# Patient Record
Sex: MALE | Race: WHITE | NOT HISPANIC OR LATINO | Employment: OTHER | URBAN - NONMETROPOLITAN AREA
[De-identification: names, ages, dates, MRNs, and addresses within clinical notes are randomized per-mention and may not be internally consistent; named-entity substitution may affect disease eponyms.]

---

## 2017-01-03 ENCOUNTER — OFFICE VISIT (OUTPATIENT)
Dept: NEUROLOGY | Facility: CLINIC | Age: 82
End: 2017-01-03

## 2017-01-03 VITALS
SYSTOLIC BLOOD PRESSURE: 116 MMHG | DIASTOLIC BLOOD PRESSURE: 88 MMHG | HEART RATE: 82 BPM | HEIGHT: 70 IN | BODY MASS INDEX: 25.2 KG/M2 | WEIGHT: 176 LBS

## 2017-01-03 DIAGNOSIS — G47.33 OBSTRUCTIVE SLEEP APNEA: ICD-10-CM

## 2017-01-03 DIAGNOSIS — I66.3: Primary | ICD-10-CM

## 2017-01-03 PROCEDURE — 99212 OFFICE O/P EST SF 10 MIN: CPT | Performed by: NURSE PRACTITIONER

## 2017-01-03 NOTE — MR AVS SNAPSHOT
James Birch   1/3/2017 3:20 PM   Office Visit    Dept Phone:  [phone]   Encounter #:  [id number]    Provider:  WILLIAM Moseley   Department:  [facility] NEUROLOGY                Your Full Care Plan              Your Updated Medication List          This list is accurate as of: 1/3/17  3:46 PM.  Always use your most recent med list.                albuterol 108 (90 BASE) MCG/ACT inhaler   Commonly known as:  PROVENTIL HFA;VENTOLIN HFA       budesonide-formoterol 160-4.5 MCG/ACT inhaler   Commonly known as:  SYMBICORT       CO Q-10 PO       dutasteride 0.5 MG capsule   Commonly known as:  AVODART   TAKE 1 CAPSULE DAILY GENERIC FOR AVODART       esomeprazole 40 MG capsule   Commonly known as:  NEXIUM   Take 1 capsule by mouth 2 (two) times a day.       FISH OIL PO       furosemide 40 MG tablet   Commonly known as:  LASIX       hydrOXYzine 25 MG capsule   Commonly known as:  VISTARIL       MULTIVITAMIN PO       SUPER B COMPLEX/VITAMIN C PO       tamsulosin 0.4 MG capsule 24 hr capsule   Commonly known as:  FLOMAX   TAKE 1 CAPSULE DAILY SHORTLY AFTER THE SAME MEAL GENERIC FOR FLOMAX       VITAMIN D3 PO       XARELTO 15 MG tablet   Generic drug:  rivaroxaban   TAKE ONE TABLET DAILY               You Were Diagnosed With        Codes Comments    Thrombosis of posterior inferior cerebellar artery    -  Primary ICD-10-CM: I66.3  ICD-9-CM: 433.80 denies any stroke-like sx    Obstructive sleep apnea     ICD-10-CM: G47.33  ICD-9-CM: 327.23 reports snoring, fatigue, and history of DAMIAN. Reports has not been tested in many years and does not have a machine.      Instructions     None    Patient Instructions History      Upcoming Appointments     Visit Type Date Time Department    FOLLOW UP 1/3/2017  3:20 PM [facility] NEUROLOGY SPE [facility]    FOLLOW UP 2/2/2017 11:20 AM [facility] NEUROLOGY SPE [facility]AD    FOLLOW UP 2/16/2017  2:30 PM [facility] [place]    FOLLOW UP 3/7/2017 10:40 AM [facility]  NEUROLOGY SPE PAD    FOLLOW UP 2017 10:00 AM [facility]      MyChart Signup     [facility] allows you to send messages to your doctor, view your test results, renew your prescriptions, schedule appointments, and more. To sign up, go to [employer] and click on the Sign Up Now link in the New User? box. Enter your [facility] Activation Code exactly as it appears below along with the last four digits of your Social Security Number and your Date of Birth () to complete the sign-up process. If you do not sign up before the expiration date, you must request a new code.    [facility] [facility] Activation Code: [email or web address]  Expires: 2017  3:45 PM    If you have questions, you can email [email or web address] or call [phone] to talk to our [facility] staff. Remember, [facility] is NOT to be used for urgent needs. For medical emergencies, dial 911.               Other Info from Your Visit           Your Appointments     2017 11:20 AM CST   Follow Up with ED Price   [facility] NEUROLOGY (--)    [address]  [place] [address] [phone]           Arrive 15 minutes prior to appointment.            2017  2:30 PM CST   Follow Up with Hemal William MD   [facility] FAMILY MEDICINE (--)    [address] [place] [id number] [phone]           Arrive 15 minutes prior to appointment.            Mar 07, 2017 10:40 AM CST   Follow Up with ED Price   [facility] NEUROLOGY (--)    [address]  [place] [address] [phone]           Arrive 15 minutes prior to appointment.            2017 10:00 AM CST   Follow Up with Skyler Trimble MD   [facility] GROUP (--)    [address]  [place] [id number] [phone]           Arrive 15 minutes prior to appointment.             "  Allergies     No Known Allergies      Reason for Visit     Stroke No new s/sx      Vital Signs     Blood Pressure Pulse Height Weight Body Mass Index Smoking Status    116/88 82 70\" (177.8 cm) 176 lb (79.8 kg) 25.25 kg/m2 Never Smoker      Problems and Diagnoses Noted     Sleep apnea    Thrombosis of posterior inferior cerebellar artery        "

## 2017-01-03 NOTE — PROGRESS NOTES
Subjective     Chief Complaint   Patient presents with   • Stroke     No new s/sx     James Birch is a 88 y.o. male here for follow-up of stroke and orthostatic hypotension. Reports unable to tolerate northera, took only about two weeks. Reports felt worse on the northera. Reports does not want/unable to tolerate. Reports some rare events of being dizzy going from a sitting to standing position.  Reports does not have dizziness very often. Denies any syncope, near syncope, or falls.    Denies any stroke-like sx.  Walks with cane-denies any falls  Stroke   This is a chronic problem. Associated symptoms include arthralgias, fatigue, myalgias, nausea, numbness and weakness. Pertinent negatives include no abdominal pain, chest pain, chills, congestion, coughing, fever, neck pain, rash or vomiting.        Current Outpatient Prescriptions   Medication Sig Dispense Refill   • albuterol (PROVENTIL HFA;VENTOLIN HFA) 108 (90 BASE) MCG/ACT inhaler Inhale 2 puffs as needed for wheezing.     • B Complex-C (SUPER B COMPLEX/VITAMIN C PO) Take  by mouth daily.     • budesonide-formoterol (SYMBICORT) 160-4.5 MCG/ACT inhaler Inhale 2 puffs 2 (two) times a day.     • Cholecalciferol (VITAMIN D3 PO) Take 1 tablet by mouth daily.     • Coenzyme Q10 (CO Q-10 PO) Take  by mouth daily.     • dutasteride (AVODART) 0.5 MG capsule TAKE 1 CAPSULE DAILY GENERIC FOR AVODART 90 capsule 1   • esomeprazole (NEXIUM) 40 MG capsule Take 1 capsule by mouth 2 (two) times a day. 60 capsule 5   • furosemide (LASIX) 40 MG tablet Take 40 mg by mouth daily as needed.     • hydrOXYzine (VISTARIL) 25 MG capsule Take 25 mg by mouth as needed for itching.     • Multiple Vitamins-Minerals (MULTIVITAMIN PO) Take 1 tablet by mouth daily.     • Omega-3 Fatty Acids (FISH OIL PO) Take  by mouth daily.     • tamsulosin (FLOMAX) 0.4 MG capsule 24 hr capsule TAKE 1 CAPSULE DAILY SHORTLY AFTER THE SAME MEAL GENERIC FOR FLOMAX 90 capsule 1   • XARELTO 15 MG tablet  "TAKE ONE TABLET DAILY 90 tablet 0     No current facility-administered medications for this visit.        Past Medical History   Diagnosis Date   • Atrial fibrillation    • Cancer      SKIN   • COPD (chronic obstructive pulmonary disease)    • GERD (gastroesophageal reflux disease)    • Hyperlipidemia    • Hypertension    • Obstructive sleep apnea    • Prostate disease    • Stroke    • Vertigo        Past Surgical History   Procedure Laterality Date   • Hip surgery     • Hernia repair         family history includes No Known Problems in his father and mother.    Social History   Substance Use Topics   • Smoking status: Never Smoker   • Smokeless tobacco: Never Used      Comment: NOT CURRENTLY   • Alcohol use No       Review of Systems   Constitutional: Positive for fatigue. Negative for chills and fever.   HENT: Positive for hearing loss. Negative for congestion and trouble swallowing.    Eyes: Positive for visual disturbance. Negative for photophobia.   Respiratory: Negative for cough, shortness of breath and wheezing.    Cardiovascular: Negative for chest pain and palpitations.   Gastrointestinal: Positive for nausea. Negative for abdominal pain and vomiting.   Endocrine: Negative.    Genitourinary: Negative.    Musculoskeletal: Positive for arthralgias, gait problem and myalgias. Negative for back pain and neck pain.   Skin: Negative for rash and wound.   Allergic/Immunologic: Negative for immunocompromised state.   Neurological: Positive for dizziness, weakness, light-headedness and numbness.   Hematological: Does not bruise/bleed easily.   Psychiatric/Behavioral: The patient is not nervous/anxious.        Objective     Visit Vitals   • /88   • Pulse 82   • Ht 70\" (177.8 cm)   • Wt 176 lb (79.8 kg)   • BMI 25.25 kg/m2   , Body mass index is 25.25 kg/(m^2).    Physical Exam   Constitutional: He is oriented to person, place, and time. He appears well-developed and well-nourished.   HENT:   Head: " Normocephalic.   Eyes: Pupils are equal, round, and reactive to light.   Neck: Normal range of motion. Neck supple. No JVD present.   Cardiovascular: Normal rate, regular rhythm and normal heart sounds.  Exam reveals no gallop and no friction rub.    No murmur heard.  Pulmonary/Chest: Effort normal and breath sounds normal. He has no wheezes. He has no rales.   Abdominal: Soft. Bowel sounds are normal. There is no tenderness.   Musculoskeletal: Normal range of motion. He exhibits no edema.   Lymphadenopathy:     He has no cervical adenopathy.   Neurological: He is alert and oriented to person, place, and time. He has normal strength. He displays no tremor. No cranial nerve deficit or sensory deficit. He exhibits normal muscle tone. He displays a negative Romberg sign. Coordination normal.   Reflex Scores:       Tricep reflexes are 1+ on the right side and 1+ on the left side.       Bicep reflexes are 1+ on the right side and 1+ on the left side.       Brachioradialis reflexes are 1+ on the right side and 1+ on the left side.       Patellar reflexes are 1+ on the right side and 1+ on the left side.       Achilles reflexes are 1+ on the right side and 1+ on the left side.  Skin: Skin is warm and dry.   Psychiatric: He has a normal mood and affect. Thought content normal.       Results for orders placed or performed in visit on 11/08/16   Comprehensive Metabolic Panel   Result Value Ref Range    Glucose 87 70 - 100 mg/dL    BUN 32 (H) 5 - 21 mg/dL    Creatinine 1.79 (H) 0.50 - 1.40 mg/dL    eGFR Non African Am 36 (L) >60 mL/min/1.73    eGFR African Am 44 (L) >60 mL/min/1.73    BUN/Creatinine Ratio 17.9 7.0 - 25.0    Sodium 138 135 - 145 mmol/L    Potassium 4.4 3.5 - 5.3 mmol/L    Chloride 98 98 - 110 mmol/L    Total CO2 26.0 24.0 - 31.0 mmol/L    Calcium 9.4 8.4 - 10.4 mg/dL    Total Protein 6.8 6.3 - 8.7 g/dL    Albumin 3.70 3.50 - 5.00 g/dL    Globulin 3.1 gm/dL    A/G Ratio 1.2 1.1 - 2.5 g/dL    Total Bilirubin 0.5  0.1 - 1.0 mg/dL    Alkaline Phosphatase 87 24 - 120 U/L    AST (SGOT) 18 7 - 45 U/L    ALT (SGPT) 25 0 - 54 U/L   Magnesium   Result Value Ref Range    Magnesium 1.9 1.4 - 2.2 mg/dL   Phosphorus   Result Value Ref Range    Phosphorus 3.8 2.5 - 4.5 mg/dL   Uric Acid   Result Value Ref Range    Uric Acid 6.1 3.5 - 8.5 mg/dL   Lipid Panel   Result Value Ref Range    Total Cholesterol 224 (H) 130 - 200 mg/dL    Triglycerides 215 (H) 0 - 149 mg/dL    HDL Cholesterol 43 >=40 mg/dL    VLDL Cholesterol 43 mg/dL    LDL Cholesterol  138 (H) 0 - 99 mg/dL   PSA   Result Value Ref Range    PSA 1.070 0.000 - 4.000 ng/mL   Vitamin D 25 Hydroxy   Result Value Ref Range    25 Hydroxy, Vitamin D 31.1 30.0 - 100.0 ng/mL   Protein / Creatinine Ratio, Urine   Result Value Ref Range    Creatinine, Urine 65.4 mg/dL    Total Protein, Urine 58.0 (H) 0.0 - 13.5 mg/dL    Protein/Creatinine Ratio 886.9 mg/g crea   Microscopic Examination   Result Value Ref Range    WBC, UA 3-5 (A) None Seen /hpf    RBC, UA 0-2 (A) None Seen /hpf    Epithelial Cells (non renal) 0-2 /hpf    Cast Type Comment     Bacteria, UA Comment None Seen /hpf   CBC & AUTO Differential   Result Value Ref Range    WBC 5.49 4.80 - 10.80 10*3/mm3    RBC 4.86 4.80 - 5.90 10*6/mm3    Hemoglobin 14.8 14.0 - 18.0 g/dL    Hematocrit 43.4 40.0 - 52.0 %    MCV 89.3 82.0 - 95.0 fL    MCH 30.5 28.0 - 32.0 pg    MCHC 34.1 33.0 - 36.0 g/dL    RDW 13.6 12.0 - 15.0 %    Platelets 203 130 - 400 10*3/mm3    Neutrophil Rel % 59.9 39.0 - 78.0 %    Lymphocyte Rel % 23.5 15.0 - 45.0 %    Monocyte Rel % 14.0 (H) 4.0 - 12.0 %    Eosinophil Rel % 2.2 0.0 - 4.0 %    Basophil Rel % 0.4 0.0 - 2.0 %    Neutrophils Absolute 3.29 1.87 - 8.40 10*3/mm3    Lymphocytes Absolute 1.29 0.72 - 4.86 10*3/mm3    Monocytes Absolute 0.77 0.19 - 1.30 10*3/mm3    Eosinophils Absolute 0.12 0.00 - 0.70 10*3/mm3    Basophils Absolute 0.02 0.00 - 0.20 10*3/mm3   Urinalysis With Microscopic   Result Value Ref Range     Specific Gravity, UA 1.014 1.005 - 1.030    pH, UA 5.5 5.0 - 8.0    Color, UA Yellow     Appearance, UA Clear Clear    Leukocytes, UA See below: (A) Negative    Protein See below: (A) Negative    Glucose, UA Comment Negative    Ketones Comment Negative    Blood, UA Comment Negative    Bilirubin, UA Comment Negative    Urobilinogen, UA Comment     Nitrite, UA Comment Negative       ASSESSMENT/PLAN    James was seen today for stroke.    Diagnoses and all orders for this visit:    Thrombosis of posterior inferior cerebellar artery  Comments:  denies any stroke-like sx  Orders:  -     Overnight Sleep Oximetry Study; Future    Obstructive sleep apnea  Comments:  reports snoring, fatigue, and history of DAMIAN. Reports has not been tested in many years and does not have a machine.  Orders:  -     Overnight Sleep Oximetry Study; Future  Discussed secondary stroke prevention to include a systolic blood pressure goal of less than 150, LDL goal less than 70, and 30-40 minutes of heart rate elevating exercise 3-4 times per week. Continue anticoagulant.  For any stroke-like symptoms, patient to present to the nearest ER.      Return in about 2 months (around 3/3/2017).        WILLIAM Moseley

## 2017-01-16 ENCOUNTER — TELEPHONE (OUTPATIENT)
Dept: NEUROLOGY | Facility: CLINIC | Age: 82
End: 2017-01-16

## 2017-01-24 RX ORDER — RIVAROXABAN 15 MG/1
TABLET, FILM COATED ORAL
Qty: 90 TABLET | Refills: 3 | Status: SHIPPED | OUTPATIENT
Start: 2017-01-24 | End: 2018-02-07 | Stop reason: SDUPTHER

## 2017-02-14 RX ORDER — ESOMEPRAZOLE MAGNESIUM 40 MG/1
CAPSULE, DELAYED RELEASE ORAL
Qty: 60 CAPSULE | Refills: 5 | Status: SHIPPED | OUTPATIENT
Start: 2017-02-14 | End: 2017-08-01 | Stop reason: SDUPTHER

## 2017-02-15 PROBLEM — K21.9 GASTROESOPHAGEAL REFLUX DISEASE: Chronic | Status: ACTIVE | Noted: 2017-02-15

## 2017-02-15 PROBLEM — Z79.01 ANTICOAGULATED: Status: ACTIVE | Noted: 2017-02-15

## 2017-02-15 PROBLEM — N40.0 PROSTATISM: Chronic | Status: ACTIVE | Noted: 2017-02-15

## 2017-02-15 PROBLEM — Z00.00 WELLNESS EXAMINATION: Status: ACTIVE | Noted: 2017-02-15

## 2017-02-15 PROBLEM — E78.5 HYPERLIPIDEMIA: Chronic | Status: ACTIVE | Noted: 2017-02-15

## 2017-02-15 PROBLEM — I10 HYPERTENSION: Chronic | Status: ACTIVE | Noted: 2017-02-15

## 2017-02-15 PROBLEM — N20.9 UROLITHIASIS: Chronic | Status: ACTIVE | Noted: 2017-02-15

## 2017-02-15 PROBLEM — G47.10 HYPERSOMNIA: Status: ACTIVE | Noted: 2017-02-15

## 2017-02-15 PROBLEM — J45.909 ASTHMA: Chronic | Status: ACTIVE | Noted: 2017-02-15

## 2017-02-15 PROBLEM — N18.9 CHRONIC KIDNEY DISEASE: Status: ACTIVE | Noted: 2017-02-15

## 2017-02-15 PROBLEM — R26.9 GAIT DIFFICULTY: Chronic | Status: ACTIVE | Noted: 2017-02-15

## 2017-02-15 PROBLEM — I83.93 VARICOSE VEINS OF LEGS: Status: ACTIVE | Noted: 2017-02-15

## 2017-02-16 ENCOUNTER — OFFICE VISIT (OUTPATIENT)
Dept: FAMILY MEDICINE CLINIC | Facility: CLINIC | Age: 82
End: 2017-02-16

## 2017-02-16 VITALS
HEART RATE: 142 BPM | BODY MASS INDEX: 25.77 KG/M2 | SYSTOLIC BLOOD PRESSURE: 126 MMHG | HEIGHT: 70 IN | OXYGEN SATURATION: 95 % | TEMPERATURE: 98.1 F | DIASTOLIC BLOOD PRESSURE: 70 MMHG | WEIGHT: 180 LBS | RESPIRATION RATE: 16 BRPM

## 2017-02-16 DIAGNOSIS — I10 ESSENTIAL HYPERTENSION: Chronic | ICD-10-CM

## 2017-02-16 DIAGNOSIS — I48.91 ATRIAL FIBRILLATION, UNSPECIFIED TYPE (HCC): ICD-10-CM

## 2017-02-16 DIAGNOSIS — I50.30 CONGESTIVE HEART FAILURE WITH LEFT VENTRICULAR DIASTOLIC DYSFUNCTION, NYHA CLASS 3 (HCC): ICD-10-CM

## 2017-02-16 DIAGNOSIS — R26.9 GAIT DIFFICULTY: Chronic | ICD-10-CM

## 2017-02-16 DIAGNOSIS — G47.33 OBSTRUCTIVE SLEEP APNEA: Primary | ICD-10-CM

## 2017-02-16 PROCEDURE — 99213 OFFICE O/P EST LOW 20 MIN: CPT | Performed by: FAMILY MEDICINE

## 2017-02-16 RX ORDER — MONTELUKAST SODIUM 10 MG/1
1 TABLET ORAL DAILY
COMMUNITY
Start: 2017-02-13 | End: 2017-09-01 | Stop reason: ALTCHOICE

## 2017-02-16 RX ORDER — RAMIPRIL 2.5 MG/1
1 CAPSULE ORAL DAILY
COMMUNITY
Start: 2017-02-13 | End: 2017-05-04 | Stop reason: SDUPTHER

## 2017-03-07 ENCOUNTER — OFFICE VISIT (OUTPATIENT)
Dept: NEUROLOGY | Facility: CLINIC | Age: 82
End: 2017-03-07

## 2017-03-07 VITALS
DIASTOLIC BLOOD PRESSURE: 74 MMHG | WEIGHT: 179 LBS | HEART RATE: 68 BPM | HEIGHT: 70 IN | SYSTOLIC BLOOD PRESSURE: 122 MMHG | BODY MASS INDEX: 25.62 KG/M2

## 2017-03-07 DIAGNOSIS — G47.33 OBSTRUCTIVE SLEEP APNEA: ICD-10-CM

## 2017-03-07 DIAGNOSIS — I66.3: Primary | ICD-10-CM

## 2017-03-07 DIAGNOSIS — I63.311 CEREBRAL INFARCTION DUE TO THROMBOSIS OF RIGHT MIDDLE CEREBRAL ARTERY (HCC): ICD-10-CM

## 2017-03-07 DIAGNOSIS — R26.9 GAIT DIFFICULTY: Chronic | ICD-10-CM

## 2017-03-07 DIAGNOSIS — G90.9 AUTONOMIC DYSFUNCTION: ICD-10-CM

## 2017-03-07 PROCEDURE — 99213 OFFICE O/P EST LOW 20 MIN: CPT | Performed by: PHYSICIAN ASSISTANT

## 2017-03-07 RX ORDER — DILTIAZEM HYDROCHLORIDE 60 MG/1
2 TABLET, FILM COATED ORAL
COMMUNITY
Start: 2017-03-06 | End: 2018-12-07 | Stop reason: SDUPTHER

## 2017-03-09 NOTE — PROGRESS NOTES
Subjective   James Birch is a 88 y.o. male is here today for follow-up.    HPI Comments: Orthostatic symptoms are stable to improved his blood pressure readings are better.  No recurrent stroke symptoms.  Some increased stress and worry in that his son and daughter-in-law were in a significant motor vehicle accident, and are admitted to the hospital.    Stroke   This is a chronic problem. The current episode started more than 1 year ago. The problem occurs daily. The problem has been gradually worsening. Associated symptoms include arthralgias, fatigue, myalgias, nausea, numbness and weakness. Pertinent negatives include no abdominal pain, chest pain, chills, congestion, coughing, fever, neck pain, rash or vomiting. The symptoms are aggravated by exertion and stress. Treatments tried: Supportive care antiplatelet therapy. The treatment provided no relief.   Sleep Apnea   This is a chronic problem. The current episode started more than 1 year ago. The problem occurs daily. The problem has been unchanged. Associated symptoms include arthralgias, fatigue, myalgias, nausea, numbness and weakness. Pertinent negatives include no abdominal pain, chest pain, chills, congestion, coughing, fever, neck pain, rash or vomiting. Nothing aggravates the symptoms. Treatments tried: CPAP. The treatment provided significant relief.       The following portions of the patient's history were reviewed and updated as appropriate: allergies, current medications, past family history, past medical history, past social history, past surgical history and problem list.    Review of Systems   Constitutional: Positive for fatigue. Negative for chills and fever.   HENT: Positive for hearing loss. Negative for congestion and trouble swallowing.    Eyes: Positive for visual disturbance. Negative for photophobia.   Respiratory: Negative for cough, shortness of breath and wheezing.    Cardiovascular: Negative for chest pain and palpitations.    Gastrointestinal: Positive for nausea. Negative for abdominal pain and vomiting.   Endocrine: Negative.    Genitourinary: Negative.    Musculoskeletal: Positive for arthralgias, gait problem and myalgias. Negative for back pain and neck pain.   Skin: Negative for rash and wound.   Allergic/Immunologic: Negative for immunocompromised state.   Neurological: Positive for dizziness, weakness, light-headedness and numbness.   Hematological: Does not bruise/bleed easily.   Psychiatric/Behavioral: The patient is not nervous/anxious.        Objective   Physical Exam   Constitutional: He is oriented to person, place, and time. He appears well-developed and well-nourished.   HENT:   Head: Normocephalic.   Eyes: Pupils are equal, round, and reactive to light.   Neck: Normal range of motion. Neck supple. No JVD present.   Cardiovascular: Normal rate, regular rhythm and normal heart sounds.  Exam reveals no gallop and no friction rub.    No murmur heard.  Pulmonary/Chest: Effort normal and breath sounds normal. He has no wheezes. He has no rales.   Abdominal: Soft. Bowel sounds are normal. There is no tenderness.   Musculoskeletal: Normal range of motion. He exhibits no edema.   Lymphadenopathy:     He has no cervical adenopathy.   Neurological: He is alert and oriented to person, place, and time. He has normal strength. He displays no tremor. No cranial nerve deficit or sensory deficit. He exhibits normal muscle tone. He displays a negative Romberg sign. Coordination normal.   Reflex Scores:       Tricep reflexes are 1+ on the right side and 1+ on the left side.       Bicep reflexes are 1+ on the right side and 1+ on the left side.       Brachioradialis reflexes are 1+ on the right side and 1+ on the left side.       Patellar reflexes are 1+ on the right side and 1+ on the left side.       Achilles reflexes are 1+ on the right side and 1+ on the left side.  Skin: Skin is warm and dry.   Psychiatric: He has a normal mood and  affect. Thought content normal.         Assessment/Plan   James was seen today for stroke and sleep apnea.    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    He has obstructive sleep apnea and he is struggling with this.  He is making a diligent effort to become compliant with this.    10 minutes of a 15 minute outpatient visit spent in counseling and coordination of care.              EMR Dragon transcription disclaimer:  Much of this encounter note is an electronic transcription/translation of spoken language to printed text.  The electronic translation of spoken language may permit erroneous, or at times, nonsensical words or phrases to be inadvertently transcribed.  The author has reviewed the note for such errors, however some may still exist.

## 2017-03-14 ENCOUNTER — TELEPHONE (OUTPATIENT)
Dept: FAMILY MEDICINE CLINIC | Facility: CLINIC | Age: 82
End: 2017-03-14

## 2017-03-14 DIAGNOSIS — R26.9 GAIT DIFFICULTY: Primary | ICD-10-CM

## 2017-03-14 NOTE — TELEPHONE ENCOUNTER
Correspondence:  Wife left note that [facility] gu[facility]upe had no help; and wondered if [facility] would review.  Asking them to review but concerned they will probably be short term help if able to help

## 2017-05-04 RX ORDER — RAMIPRIL 2.5 MG/1
CAPSULE ORAL
Qty: 90 CAPSULE | Refills: 1 | Status: SHIPPED | OUTPATIENT
Start: 2017-05-04 | End: 2017-11-09 | Stop reason: SDUPTHER

## 2017-05-11 RX ORDER — DUTASTERIDE 0.5 MG/1
CAPSULE, LIQUID FILLED ORAL
Qty: 90 CAPSULE | Refills: 1 | Status: SHIPPED | OUTPATIENT
Start: 2017-05-11 | End: 2017-11-20 | Stop reason: SDUPTHER

## 2017-05-11 RX ORDER — TAMSULOSIN HYDROCHLORIDE 0.4 MG/1
CAPSULE ORAL
Qty: 90 CAPSULE | Refills: 1 | Status: SHIPPED | OUTPATIENT
Start: 2017-05-11 | End: 2017-11-30 | Stop reason: SDUPTHER

## 2017-05-24 ENCOUNTER — TELEPHONE (OUTPATIENT)
Dept: FAMILY MEDICINE CLINIC | Facility: CLINIC | Age: 82
End: 2017-05-24

## 2017-05-25 ENCOUNTER — OFFICE VISIT (OUTPATIENT)
Dept: FAMILY MEDICINE CLINIC | Facility: CLINIC | Age: 82
End: 2017-05-25

## 2017-05-25 VITALS
HEART RATE: 66 BPM | TEMPERATURE: 98.9 F | WEIGHT: 176 LBS | DIASTOLIC BLOOD PRESSURE: 60 MMHG | OXYGEN SATURATION: 92 % | BODY MASS INDEX: 25.2 KG/M2 | HEIGHT: 70 IN | RESPIRATION RATE: 16 BRPM | SYSTOLIC BLOOD PRESSURE: 96 MMHG

## 2017-05-25 DIAGNOSIS — N18.30 CHRONIC KIDNEY DISEASE, STAGE 3 (MODERATE): ICD-10-CM

## 2017-05-25 DIAGNOSIS — I10 ESSENTIAL HYPERTENSION: Chronic | ICD-10-CM

## 2017-05-25 DIAGNOSIS — J44.9 CHRONIC OBSTRUCTIVE PULMONARY DISEASE, UNSPECIFIED COPD TYPE (HCC): Primary | ICD-10-CM

## 2017-05-25 DIAGNOSIS — Z79.01 ANTICOAGULATED: ICD-10-CM

## 2017-05-25 DIAGNOSIS — J44.1 COPD EXACERBATION (HCC): ICD-10-CM

## 2017-05-25 PROCEDURE — 99213 OFFICE O/P EST LOW 20 MIN: CPT | Performed by: FAMILY MEDICINE

## 2017-05-25 RX ORDER — LEVOFLOXACIN 250 MG/1
250 TABLET ORAL DAILY
Qty: 7 TABLET | Refills: 0 | Status: SHIPPED | OUTPATIENT
Start: 2017-05-25 | End: 2017-06-08

## 2017-05-25 RX ORDER — AZELASTINE HYDROCHLORIDE 0.5 MG/ML
1 SOLUTION/ DROPS OPHTHALMIC DAILY
COMMUNITY
End: 2017-07-28 | Stop reason: SDUPTHER

## 2017-05-25 RX ORDER — METHYLPREDNISOLONE 4 MG/1
TABLET ORAL
Qty: 21 TABLET | Refills: 0 | Status: SHIPPED | OUTPATIENT
Start: 2017-05-25 | End: 2017-06-08

## 2017-06-08 ENCOUNTER — TELEPHONE (OUTPATIENT)
Dept: FAMILY MEDICINE CLINIC | Facility: CLINIC | Age: 82
End: 2017-06-08

## 2017-06-08 DIAGNOSIS — J44.9 CHRONIC OBSTRUCTIVE PULMONARY DISEASE, UNSPECIFIED COPD TYPE (HCC): ICD-10-CM

## 2017-06-08 DIAGNOSIS — J44.1 COPD EXACERBATION (HCC): ICD-10-CM

## 2017-06-08 RX ORDER — LEVOFLOXACIN 250 MG/1
250 TABLET ORAL DAILY
Qty: 5 TABLET | Refills: 0 | Status: SHIPPED | OUTPATIENT
Start: 2017-06-08 | End: 2017-07-24 | Stop reason: SDUPTHER

## 2017-06-08 RX ORDER — METHYLPREDNISOLONE 4 MG/1
TABLET ORAL
Qty: 21 TABLET | Refills: 0 | Status: SHIPPED | OUTPATIENT
Start: 2017-06-08 | End: 2017-07-12

## 2017-06-09 ENCOUNTER — OFFICE VISIT (OUTPATIENT)
Dept: NEUROLOGY | Facility: CLINIC | Age: 82
End: 2017-06-09

## 2017-06-09 VITALS
HEIGHT: 70 IN | HEART RATE: 68 BPM | DIASTOLIC BLOOD PRESSURE: 78 MMHG | WEIGHT: 174 LBS | BODY MASS INDEX: 24.91 KG/M2 | SYSTOLIC BLOOD PRESSURE: 108 MMHG

## 2017-06-09 DIAGNOSIS — R26.9 GAIT DIFFICULTY: Chronic | ICD-10-CM

## 2017-06-09 DIAGNOSIS — G90.9 AUTONOMIC DYSFUNCTION: Primary | ICD-10-CM

## 2017-06-09 DIAGNOSIS — I66.3: ICD-10-CM

## 2017-06-09 PROCEDURE — 99213 OFFICE O/P EST LOW 20 MIN: CPT | Performed by: PHYSICIAN ASSISTANT

## 2017-06-15 NOTE — PROGRESS NOTES
Subjective   James Birch is a 89 y.o. male is here today for follow-up.    HPI Comments: Orthostatic symptoms are stable to improved his blood pressure readings are better. Some waxing and waning of neurologic symptoms including gait difficulties but overall neurologically stable with no lasting new changes.    Stroke   This is a chronic problem. The current episode started more than 1 year ago. The problem occurs daily. The problem has been gradually worsening. Associated symptoms include arthralgias, fatigue, myalgias, nausea, numbness and weakness. Pertinent negatives include no abdominal pain, chest pain, chills, congestion, coughing, fever, neck pain, rash or vomiting. The symptoms are aggravated by exertion and stress. Treatments tried: Supportive care antiplatelet therapy. The treatment provided no relief.   Sleep Apnea   This is a chronic problem. The current episode started more than 1 year ago. The problem occurs daily. The problem has been unchanged. Associated symptoms include arthralgias, fatigue, myalgias, nausea, numbness and weakness. Pertinent negatives include no abdominal pain, chest pain, chills, congestion, coughing, fever, neck pain, rash or vomiting. Nothing aggravates the symptoms. Treatments tried: CPAP. The treatment provided significant relief.       The following portions of the patient's history were reviewed and updated as appropriate: allergies, current medications, past family history, past medical history, past social history, past surgical history and problem list.    Review of Systems   Constitutional: Positive for fatigue. Negative for chills and fever.   HENT: Positive for hearing loss. Negative for congestion and trouble swallowing.    Eyes: Positive for visual disturbance. Negative for photophobia.   Respiratory: Negative for cough, shortness of breath and wheezing.    Cardiovascular: Negative for chest pain and palpitations.   Gastrointestinal: Positive for nausea.  Negative for abdominal pain and vomiting.   Endocrine: Negative.    Genitourinary: Negative.    Musculoskeletal: Positive for arthralgias, gait problem and myalgias. Negative for back pain and neck pain.   Skin: Negative for rash and wound.   Allergic/Immunologic: Negative for immunocompromised state.   Neurological: Positive for dizziness, weakness, light-headedness and numbness.   Hematological: Does not bruise/bleed easily.   Psychiatric/Behavioral: The patient is not nervous/anxious.        Objective   Physical Exam   Constitutional: He is oriented to person, place, and time. He appears well-developed and well-nourished.   HENT:   Head: Normocephalic.   Eyes: Pupils are equal, round, and reactive to light.   Neck: Normal range of motion. Neck supple. No JVD present.   Cardiovascular: Normal rate, regular rhythm and normal heart sounds.  Exam reveals no gallop and no friction rub.    No murmur heard.  Pulmonary/Chest: Effort normal and breath sounds normal. He has no wheezes. He has no rales.   Abdominal: Soft. Bowel sounds are normal. There is no tenderness.   Musculoskeletal: Normal range of motion. He exhibits no edema.   Lymphadenopathy:     He has no cervical adenopathy.   Neurological: He is alert and oriented to person, place, and time. He has normal strength. He displays no tremor. No cranial nerve deficit or sensory deficit. He exhibits normal muscle tone. He displays a negative Romberg sign. Coordination normal.   Reflex Scores:       Tricep reflexes are 1+ on the right side and 1+ on the left side.       Bicep reflexes are 1+ on the right side and 1+ on the left side.       Brachioradialis reflexes are 1+ on the right side and 1+ on the left side.       Patellar reflexes are 1+ on the right side and 1+ on the left side.       Achilles reflexes are 1+ on the right side and 1+ on the left side.  Skin: Skin is warm and dry.   Psychiatric: He has a normal mood and affect. Thought content normal.          Assessment/Plan   James was seen today for stroke.    Diagnoses and all orders for this visit:    Autonomic dysfunction    Thrombosis of posterior inferior cerebellar artery    Gait difficulty    No changes are recommended within the patient's medication regimen.    Safety and activity discussed in detail.    10 minutes of 15 minute outpatient visit was spent in counseling and coordination of care.      EMR Dragon transcription disclaimer:  Much of this encounter note is an electronic transcription/translation of spoken language to printed text.  The electronic translation of spoken language may permit erroneous, or at times, nonsensical words or phrases to be inadvertently transcribed.  The author has reviewed the note for such errors, however some may still exist.

## 2017-06-16 ENCOUNTER — TELEPHONE (OUTPATIENT)
Dept: NEUROLOGY | Facility: CLINIC | Age: 82
End: 2017-06-16

## 2017-06-16 NOTE — TELEPHONE ENCOUNTER
Sidra called and stated that James is getting more weak and feels that this is becoming urgent. She understood that [facility] was going to order a pulmonary test but she has not heard anything and she checked with Dr. Church and they have not had a referral. I did not see anything in his chart. Can you please call her or check with [facility] to see what we need to do.

## 2017-06-19 ENCOUNTER — TELEPHONE (OUTPATIENT)
Dept: NEUROLOGY | Facility: CLINIC | Age: 82
End: 2017-06-19

## 2017-06-19 NOTE — TELEPHONE ENCOUNTER
I did return Mrs Radha Birch's call. She is concerned because no one has contacted her about setting up Mr. Birch's Overnight Oximetry. I did call [facility] and they will contact the Susy today. I did ask her to call me back if they were not contacted. She did voice understanding.

## 2017-06-28 ENCOUNTER — TELEPHONE (OUTPATIENT)
Dept: NEUROLOGY | Facility: CLINIC | Age: 82
End: 2017-06-28

## 2017-06-28 DIAGNOSIS — G47.33 OBSTRUCTIVE SLEEP APNEA: Primary | ICD-10-CM

## 2017-06-28 DIAGNOSIS — G47.34 NOCTURNAL HYPOXIA: ICD-10-CM

## 2017-06-28 NOTE — PROGRESS NOTES
Review of the patient's overnight oxygen saturation report reveals greater than 5 minutes of SPO2 readings that are greater than 5% below the baseline SPO2 for this patient as well as a cumulative time 7 minutes and 52 seconds at or below 89%.  He appears to qualify for nighttime oxygen.    This was discussed with his wife.  She reports that the patient has been feeling generally weaker lately.

## 2017-07-11 ENCOUNTER — TELEPHONE (OUTPATIENT)
Dept: NEUROLOGY | Facility: CLINIC | Age: 82
End: 2017-07-11

## 2017-07-11 NOTE — TELEPHONE ENCOUNTER
I did return Mr Birch's call as requested. He wanted to know when his oxygen was going to be delivered. I did call [employer] and spoke with Joy who told me if I fax the orders over now they can do it this evening or tomorrow morning. Joy told me that they had never received the order. I did fax everything over to [employer] as requested. I called Mr. Birch back to let him know. He did voice understanding. I also called his son, Dr Birch to let him know. He voiced understanding as well.

## 2017-07-12 ENCOUNTER — OFFICE VISIT (OUTPATIENT)
Dept: NEUROLOGY | Facility: CLINIC | Age: 82
End: 2017-07-12

## 2017-07-12 VITALS
DIASTOLIC BLOOD PRESSURE: 72 MMHG | BODY MASS INDEX: 24.91 KG/M2 | HEIGHT: 70 IN | SYSTOLIC BLOOD PRESSURE: 112 MMHG | HEART RATE: 66 BPM | WEIGHT: 174 LBS

## 2017-07-12 DIAGNOSIS — G47.34 NOCTURNAL HYPOXIA: Primary | ICD-10-CM

## 2017-07-12 DIAGNOSIS — I66.3: ICD-10-CM

## 2017-07-12 DIAGNOSIS — G90.9 AUTONOMIC DYSFUNCTION: ICD-10-CM

## 2017-07-12 PROCEDURE — 99213 OFFICE O/P EST LOW 20 MIN: CPT | Performed by: PHYSICIAN ASSISTANT

## 2017-07-12 NOTE — PATIENT INSTRUCTIONS
Afrin nasal spray at pharmacy, two sprays each side before bed - can repeat once during the night if needed.

## 2017-07-24 ENCOUNTER — TELEPHONE (OUTPATIENT)
Dept: FAMILY MEDICINE CLINIC | Facility: CLINIC | Age: 82
End: 2017-07-24

## 2017-07-24 DIAGNOSIS — J44.9 CHRONIC OBSTRUCTIVE PULMONARY DISEASE, UNSPECIFIED COPD TYPE (HCC): ICD-10-CM

## 2017-07-24 DIAGNOSIS — J44.1 COPD EXACERBATION (HCC): ICD-10-CM

## 2017-07-24 RX ORDER — LEVOFLOXACIN 250 MG/1
250 TABLET ORAL DAILY
Qty: 5 TABLET | Refills: 0 | Status: SHIPPED | OUTPATIENT
Start: 2017-07-24 | End: 2017-09-01

## 2017-07-24 NOTE — TELEPHONE ENCOUNTER
Radha called and states pt has had a cold and congestion and a sore throat over the weekend and would like to have something called into [facility], [phone]

## 2017-07-24 NOTE — PROGRESS NOTES
Subjective   James Birch is a 89 y.o. male is here today for follow-up.    HPI Comments: Orthostatic symptoms are stable to improved his blood pressure readings are better. Some waxing and waning of neurologic symptoms including gait difficulties but overall neurologically stable with no lasting new changes.    Nocturnal hypoxia is being addressed with oxygen.    Stroke   This is a chronic problem. The current episode started more than 1 year ago. The problem occurs daily. The problem has been gradually worsening. Associated symptoms include arthralgias, fatigue, myalgias, nausea, numbness and weakness. Pertinent negatives include no abdominal pain, chest pain, chills, congestion, coughing, fever, neck pain, rash or vomiting. The symptoms are aggravated by exertion and stress. Treatments tried: Supportive care antiplatelet therapy. The treatment provided no relief.       The following portions of the patient's history were reviewed and updated as appropriate: allergies, current medications, past family history, past medical history, past social history, past surgical history and problem list.    Review of Systems   Constitutional: Positive for fatigue. Negative for chills and fever.   HENT: Positive for hearing loss. Negative for congestion and trouble swallowing.    Eyes: Positive for visual disturbance. Negative for photophobia.   Respiratory: Positive for shortness of breath. Negative for cough and wheezing.    Cardiovascular: Negative.  Negative for chest pain.   Gastrointestinal: Positive for nausea. Negative for abdominal pain and vomiting.   Endocrine: Negative.    Genitourinary: Negative.    Musculoskeletal: Positive for arthralgias, gait problem and myalgias. Negative for back pain and neck pain.   Skin: Negative.  Negative for rash.   Allergic/Immunologic: Negative.    Neurological: Positive for dizziness, weakness, light-headedness and numbness.   Hematological: Negative.    Psychiatric/Behavioral:  Positive for decreased concentration and sleep disturbance.       Objective   Physical Exam   Constitutional: He is oriented to person, place, and time. He appears well-developed and well-nourished.   HENT:   Head: Normocephalic.   Eyes: Pupils are equal, round, and reactive to light.   Neck: Normal range of motion. Neck supple. No JVD present.   Cardiovascular: Normal rate, regular rhythm and normal heart sounds.  Exam reveals no gallop and no friction rub.    No murmur heard.  Pulmonary/Chest: Effort normal and breath sounds normal. He has no wheezes. He has no rales.   Abdominal: Soft. Bowel sounds are normal. There is no tenderness.   Musculoskeletal: Normal range of motion. He exhibits no edema.   Lymphadenopathy:     He has no cervical adenopathy.   Neurological: He is alert and oriented to person, place, and time. He has normal strength. He displays no tremor. No cranial nerve deficit or sensory deficit. He exhibits normal muscle tone. He displays a negative Romberg sign. Coordination normal.   Reflex Scores:       Tricep reflexes are 1+ on the right side and 1+ on the left side.       Bicep reflexes are 1+ on the right side and 1+ on the left side.       Brachioradialis reflexes are 1+ on the right side and 1+ on the left side.       Patellar reflexes are 1+ on the right side and 1+ on the left side.       Achilles reflexes are 1+ on the right side and 1+ on the left side.  Skin: Skin is warm and dry.   Psychiatric: He has a normal mood and affect. Thought content normal.         Assessment/Plan    James was seen today for sleeping problem.    Diagnoses and all orders for this visit:    Nocturnal hypoxia    Thrombosis of posterior inferior cerebellar artery    Autonomic dysfunction    Mr. Birch has documented hypoxemia at night.  He shows a positive response to nocturnal oxygen with improved SaO2 levels.    We recommend that Mr. Birch continue nocturnal oxygen.    No changes were made in his medication  regimen today.    10 minutes of a 15 minute outpatient visit was spent in counseling and coordination of care.          EMR Dragon transcription disclaimer:  Much of this encounter note is an electronic transcription/translation of spoken language to printed text.  The electronic translation of spoken language may permit erroneous, or at times, nonsensical words or phrases to be inadvertently transcribed.  The author has reviewed the note for such errors, however some may still exist.

## 2017-07-28 RX ORDER — AZELASTINE HYDROCHLORIDE 0.5 MG/ML
SOLUTION/ DROPS OPHTHALMIC
Qty: 6 ML | Refills: 5 | Status: SHIPPED | OUTPATIENT
Start: 2017-07-28 | End: 2018-04-25 | Stop reason: SDUPTHER

## 2017-08-01 RX ORDER — ESOMEPRAZOLE MAGNESIUM 40 MG/1
CAPSULE, DELAYED RELEASE ORAL
Qty: 180 CAPSULE | Refills: 2 | Status: SHIPPED | OUTPATIENT
Start: 2017-08-01 | End: 2018-04-06 | Stop reason: SDUPTHER

## 2017-09-01 ENCOUNTER — OFFICE VISIT (OUTPATIENT)
Dept: FAMILY MEDICINE CLINIC | Facility: CLINIC | Age: 82
End: 2017-09-01

## 2017-09-01 VITALS
HEIGHT: 70 IN | RESPIRATION RATE: 18 BRPM | BODY MASS INDEX: 25.77 KG/M2 | TEMPERATURE: 98.1 F | OXYGEN SATURATION: 93 % | HEART RATE: 54 BPM | SYSTOLIC BLOOD PRESSURE: 132 MMHG | DIASTOLIC BLOOD PRESSURE: 82 MMHG | WEIGHT: 180 LBS

## 2017-09-01 DIAGNOSIS — J34.89 NASAL OBSTRUCTION: ICD-10-CM

## 2017-09-01 DIAGNOSIS — I48.91 ATRIAL FIBRILLATION, UNSPECIFIED TYPE (HCC): ICD-10-CM

## 2017-09-01 DIAGNOSIS — Z86.73 HISTORY OF CVA (CEREBROVASCULAR ACCIDENT): ICD-10-CM

## 2017-09-01 DIAGNOSIS — Z79.01 ANTICOAGULATED: ICD-10-CM

## 2017-09-01 DIAGNOSIS — I10 ESSENTIAL HYPERTENSION: Primary | Chronic | ICD-10-CM

## 2017-09-01 DIAGNOSIS — G47.34 NOCTURNAL HYPOXIA: ICD-10-CM

## 2017-09-01 DIAGNOSIS — I50.30 CONGESTIVE HEART FAILURE WITH LEFT VENTRICULAR DIASTOLIC DYSFUNCTION, NYHA CLASS 3 (HCC): ICD-10-CM

## 2017-09-01 PROBLEM — Z01.89 LABORATORY TEST: Status: ACTIVE | Noted: 2017-09-01

## 2017-09-01 PROCEDURE — 99213 OFFICE O/P EST LOW 20 MIN: CPT | Performed by: FAMILY MEDICINE

## 2017-09-01 NOTE — PROGRESS NOTES
Subjective   aJmes Birch is a 89 y.o. male presenting with chief complaint of:   Chief Complaint   Patient presents with   • COPD   • Fatigue   • Edema     ankle        History of Present Illness :  With wife.  Has multiple chronic problems; interval appointment.  One of these problems is HTN.      HTN.  The HTN has been present for years/it is chronic.  The HTN is assumed essential/without testing needed to look for other.  The HTN is controlled manifest by todays blood pressure and no home monitoring.   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 It is chronic.  The arrthymia is primarily a fib   The rhythm is not associated with syncope/near syncope, dizziness, or weakness. Medications being used help.  Congestive heart failure/echo changes: This has been present for years/over a year.  It is chronic.  The CHF had features on echo of diastolic dysfunction.  This has been associated with SOB, LE edema on/off.  Nocturnal hypoxemia:  This has been present for years/over a year.  It is chronic.   Using noctural oxygen.   Anticoagulation: Requires anticoagulation with Xarelto for CVA.   This needs to be continually monitored for risk/benefit.   History CVA:  This happened 2010.  It was without cerebellar bleed.  The neuro deficits have been  stable.  Nasal obstruction:  Has had chronically/over a year.  Has had nasal surgery. Has been using Afrin.    Edema:  Has had for years on/off/chronic.  Sees alittle worse now.  Worse end of the day.     Has an/another acute issue today: .    The following portions of the patient's history were reviewed and updated as appropriate: allergies, current medications, past family history, past medical history, past social history, past surgical history and problem list.  Records acquired and reviewed; [facility] migrated.      Current Outpatient Prescriptions:   •  albuterol (PROVENTIL HFA;VENTOLIN HFA) 108 (90 BASE) MCG/ACT inhaler, Inhale 2 puffs as needed for  wheezing., Disp: , Rfl:   •  azelastine (OPTIVAR) 0.05 % ophthalmic solution, INSTILL 1 DROP INTO BOTH EYES TWICE DAILY GENERIC FOR OPTIVAR, Disp: 6 mL, Rfl: 5  •  B Complex-C (SUPER B COMPLEX/VITAMIN C PO), Take  by mouth daily., Disp: , Rfl:   •  Cholecalciferol (VITAMIN D3 PO), Take 1 tablet by mouth daily., Disp: , Rfl:   •  Coenzyme Q10 (CO Q-10 PO), Take  by mouth daily., Disp: , Rfl:   •  dutasteride (AVODART) 0.5 MG capsule, TAKE 1 CAPSULE DAILY GENERIC FOR AVODART, Disp: 90 capsule, Rfl: 1  •  esomeprazole (nexIUM) 40 MG capsule, TAKE 1 CAPSULE TWICE DAILY GENERIC FOR NEXIUM (Patient taking differently: TAKE 1 CAPSULE DAILY GENERIC FOR NEXIUM), Disp: 180 capsule, Rfl: 2  •  fluticasone (VERAMYST) 27.5 MCG/SPRAY nasal spray, 2 sprays into each nostril Daily., Disp: , Rfl:   •  furosemide (LASIX) 40 MG tablet, Take 40 mg by mouth Daily., Disp: , Rfl:   •  Multiple Vitamins-Minerals (MULTIVITAMIN PO), Take 1 tablet by mouth daily., Disp: , Rfl:   •  Omega-3 Fatty Acids (FISH OIL PO), Take  by mouth daily., Disp: , Rfl:   •  ramipril (ALTACE) 2.5 MG capsule, TAKE 1 CAPSULE AT BEDTIME, Disp: 90 capsule, Rfl: 1  •  SYMBICORT 80-4.5 MCG/ACT inhaler, Inhale 2 puffs 2 (Two) Times a Day., Disp: , Rfl:   •  tamsulosin (FLOMAX) 0.4 MG capsule 24 hr capsule, TAKE 1 CAPSULE DAILY SHORTLY AFTER THE SAME MEAL GENERIC FOR FLOMAX, Disp: 90 capsule, Rfl: 1  •  XARELTO 15 MG tablet, TAKE ONE TABLET DAILY, Disp: 90 tablet, Rfl: 3  Afrin OTC    No Known Allergies    Review of Systems  GENERAL:  Inactive/slower with limits, speed, samni for age and balance. Sleep is ok. No fever now.  ENDO:  No syncope, near or diaphoretic sweaty spells..  HEENT: No head injury or headache,  No vision change, Same mod hearing loss.  Ears without pain/drainage.  No sore throat.  Marked bilateral nasal/sinus congestion/drainage. No epistaxis.  CHEST: No chest wall tenderness or mass. No change occ cough,  without wheeze, SOB; no hemoptysis.  CV: No  chest pain, palpatations, end of day ankle edema.  GI: No heartburn, dysphagia.  No abdominal pain, diarrhea, constipation, rectal bleeding, or melena.    Colonoscopy+nl/[facility]/9.5.12/5y  EGD+biop/[facility]/8.31.15     :  Voids without dysuria, or incontience to completion.  ORTHO: No painful/swollen joints but various on /off sore.  No change occ sore neck or back.  No acute neck or back pain without recent injury.   NEURO: No dizziness, weakness of extremities.  No change occ LE numbness/parethesias.   PSYCH: Mild memory loss.  Mood good; occ anxious, depressed but/and not suicidal.  Tolerated stress.     Results for orders placed or performed in visit on 11/08/16   Comprehensive Metabolic Panel   Result Value Ref Range    Glucose 87 70 - 100 mg/dL    BUN 32 (H) 5 - 21 mg/dL    Creatinine 1.79 (H) 0.50 - 1.40 mg/dL    eGFR Non African Am 36 (L) >60 mL/min/1.73    eGFR African Am 44 (L) >60 mL/min/1.73    BUN/Creatinine Ratio 17.9 7.0 - 25.0    Sodium 138 135 - 145 mmol/L    Potassium 4.4 3.5 - 5.3 mmol/L    Chloride 98 98 - 110 mmol/L    Total CO2 26.0 24.0 - 31.0 mmol/L    Calcium 9.4 8.4 - 10.4 mg/dL    Total Protein 6.8 6.3 - 8.7 g/dL    Albumin 3.70 3.50 - 5.00 g/dL    Globulin 3.1 gm/dL    A/G Ratio 1.2 1.1 - 2.5 g/dL    Total Bilirubin 0.5 0.1 - 1.0 mg/dL    Alkaline Phosphatase 87 24 - 120 U/L    AST (SGOT) 18 7 - 45 U/L    ALT (SGPT) 25 0 - 54 U/L   Magnesium   Result Value Ref Range    Magnesium 1.9 1.4 - 2.2 mg/dL   Phosphorus   Result Value Ref Range    Phosphorus 3.8 2.5 - 4.5 mg/dL   Uric Acid   Result Value Ref Range    Uric Acid 6.1 3.5 - 8.5 mg/dL   Lipid Panel   Result Value Ref Range    Total Cholesterol 224 (H) 130 - 200 mg/dL    Triglycerides 215 (H) 0 - 149 mg/dL    HDL Cholesterol 43 >=40 mg/dL    VLDL Cholesterol 43 mg/dL    LDL Cholesterol  138 (H) 0 - 99 mg/dL   PSA   Result Value Ref Range    PSA 1.070 0.000 - 4.000 ng/mL   Vitamin D 25 Hydroxy   Result Value Ref Range     25 Hydroxy, Vitamin D 31.1 30.0 - 100.0 ng/mL   Protein / Creatinine Ratio, Urine   Result Value Ref Range    Creatinine, Urine 65.4 mg/dL    Total Protein, Urine 58.0 (H) 0.0 - 13.5 mg/dL    Protein/Creatinine Ratio 886.9 mg/g crea   Microscopic Examination   Result Value Ref Range    WBC, UA 3-5 (A) None Seen /hpf    RBC, UA 0-2 (A) None Seen /hpf    Epithelial Cells (non renal) 0-2 /hpf    Cast Type Comment     Bacteria, UA Comment None Seen /hpf   CBC & AUTO Differential   Result Value Ref Range    WBC 5.49 4.80 - 10.80 10*3/mm3    RBC 4.86 4.80 - 5.90 10*6/mm3    Hemoglobin 14.8 14.0 - 18.0 g/dL    Hematocrit 43.4 40.0 - 52.0 %    MCV 89.3 82.0 - 95.0 fL    MCH 30.5 28.0 - 32.0 pg    MCHC 34.1 33.0 - 36.0 g/dL    RDW 13.6 12.0 - 15.0 %    Platelets 203 130 - 400 10*3/mm3    Neutrophil Rel % 59.9 39.0 - 78.0 %    Lymphocyte Rel % 23.5 15.0 - 45.0 %    Monocyte Rel % 14.0 (H) 4.0 - 12.0 %    Eosinophil Rel % 2.2 0.0 - 4.0 %    Basophil Rel % 0.4 0.0 - 2.0 %    Neutrophils Absolute 3.29 1.87 - 8.40 10*3/mm3    Lymphocytes Absolute 1.29 0.72 - 4.86 10*3/mm3    Monocytes Absolute 0.77 0.19 - 1.30 10*3/mm3    Eosinophils Absolute 0.12 0.00 - 0.70 10*3/mm3    Basophils Absolute 0.02 0.00 - 0.20 10*3/mm3   Urinalysis With Microscopic   Result Value Ref Range    Specific Gravity, UA 1.014 1.005 - 1.030    pH, UA 5.5 5.0 - 8.0    Color, UA Yellow     Appearance, UA Clear Clear    Leukocytes, UA See below: (A) Negative    Protein See below: (A) Negative    Glucose, UA Comment Negative    Ketones Comment Negative    Blood, UA Comment Negative    Bilirubin, UA Comment Negative    Urobilinogen, UA Comment     Nitrite, UA Comment Negative       No results found for: HGBA1C    Lab Results   Component Value Date     11/08/2016     09/08/2016     01/15/2015    K 4.4 11/08/2016    K 4.1 09/08/2016    K 4.0 01/15/2015    CL 98 11/08/2016     09/08/2016     01/15/2015    CO2 26.0 11/08/2016    CO2  "28 09/08/2016    CO2 26 01/15/2015    GLUCOSE 95 09/08/2016    GLUCOSE 105 (H) 01/15/2015    BUN 32 (H) 11/08/2016    BUN 26 (H) 09/08/2016    BUN 28 (H) 01/15/2015    CREATININE 1.79 (H) 11/08/2016    CREATININE 1.71 (H) 09/08/2016    CREATININE 1.42 (H) 01/15/2015    CALCIUM 9.4 11/08/2016    CALCIUM 9.1 09/08/2016    CALCIUM 9.0 01/15/2015    EGFRCLEARA 38 09/08/2016       Lab Results   Component Value Date    PSA 1.070 11/08/2016        Lab Results:  CBC:    Lab Results - Last 18 Months  Lab Units 11/08/16  1248 09/08/16  1304   WBC 10*3/mm3 5.49 4.80   HEMATOCRIT % 43.4 40.6     CMP:    Lab Results - Last 18 Months  Lab Units 11/08/16  1248 09/08/16  1304   SODIUM mmol/L 138 139   CHLORIDE mmol/L 98 100   TOTAL CO2, ARTERIAL mmol/L 26.0  --    TOTAL CO2 mmol/L  --  28   BUN mg/dL 32* 26*   CREATININE mg/dL 1.79* 1.71*   EGFR IF NONAFRICN AM mL/min/1.73 36*  --    EGFR IF AFRICN AM mL/min/1.73 44*  --    CALCIUM mg/dL 9.4 9.1     THYROID:No results for input(s): TSH, T3FREE, FREET4 in the last 73492 hours.    Invalid input(s): T3, T4  A1C:No results for input(s): HGBA1C in the last 57926 hours.    Objective   /82 (BP Location: Left arm, Patient Position: Sitting, Cuff Size: Large Adult)  Pulse 54  Temp 98.1 °F (36.7 °C) (Oral)   Resp 18  Ht 70\" (177.8 cm)  Wt 180 lb (81.6 kg)  SpO2 93%  BMI 25.83 kg/m2    Physical Exam  GENERAL:  Well nourished/developed in no acute distress. Obese  SKIN: Turgor excellent, without wound, rash, lesion.  HEENT: Normal cephalic without trauma.  Pupils equal round reactive to light. Extraocular motions full without nystagmus.   External canals nonobstructive nontender without reddness. Tymphatic membranes calos with shelia structures intact.   Oral cavity without growths, exudates, and moist.  Posterior pharnyx without mass, obstruction, reddness.  No thyroidmegaly, mass, tenderness, lymphadenopathy and supple.  CV: Irregular irregular rhythm.  No murmur, gallop, trace " 1/2 up leg pitting/equal leg edema. Posterior pulses intact.  No carotid bruits.  CHEST: No chest wall tenderness or mass.   LUNGS: Symmetric motion with clear to auscultation.  ABD: Soft, nontender without mass.   ORTHO: Symmetric extremities without swelling/point tenderness.  Full gross range of motion.  NEURO: CN 2-12 grossly intact.  Symmetric facies. 1/4 x bicep equal reflexes.  UE/LE   3/5 strength throughout.  Nonfocal use extremities. Speech clear.    PSYCH: Oriented x 3.  Pleasant calm, well kept.  Purposeful/directed conservation with intact short/long gross memory.     Assessment/Plan     1. Essential hypertension  controlled    2. Nasal obstruction  Bilateral; component of rhinitis medositica  - Ambulatory Referral to ENT (Otolaryngology)    3. Phcwbugfvvrehz-vpv-xbij/xarelto  Xarelto/a fib (hx CVA)    4. Nocturnal hypoxia-oxygen  Treating with oxygen    5. Atrial fibrillation, unspecified type  Rate controlled; xarelto treated    6. Congestive heart failure with left ventricular diastolic dysfunction, NYHA class 3  Mild ankle edema; overall compensated    7. History of CVA (cerebrovascular accident)  2010; doing well        Rx: reviewed.  Any other changes above and:   LAB: reviewed/above.  Orders above and:   3m CBC, BMP  6m CBC, CMP, iron  12m CBC, CMP, LIPID, TSH, Vit D, iron, ferritin, B12, folate      There are no Patient Instructions on file for this visit.    Follow up: Return for 1) Dr William 6m , 2) lab due.  Future Appointments  Date Time Provider Department Center   9/7/2017 8:30 AM LAB [facility] None   9/19/2017 11:20 AM ED [facility] None   11/17/2017 11:30 AM Hemal William MD [facility] None   11/21/2017 10:00 AM Skyler Trimble MD [facility] None   3/16/2018 11:15 AM Hemal William MD [facility] None

## 2017-09-07 DIAGNOSIS — N18.30 CHRONIC KIDNEY DISEASE, STAGE 3 (MODERATE): Primary | ICD-10-CM

## 2017-09-07 LAB
ALBUMIN SERPL-MCNC: 4 G/DL (ref 3.5–5)
ALBUMIN/GLOB SERPL: 1.4 G/DL (ref 1.1–2.5)
ALP SERPL-CCNC: 61 U/L (ref 24–120)
ALT SERPL-CCNC: 30 U/L (ref 0–54)
AST SERPL-CCNC: 19 U/L (ref 7–45)
BASOPHILS # BLD AUTO: 0.03 10*3/MM3 (ref 0–0.2)
BASOPHILS NFR BLD AUTO: 0.5 % (ref 0–2)
BILIRUB SERPL-MCNC: 0.8 MG/DL (ref 0.1–1)
BUN SERPL-MCNC: 31 MG/DL (ref 5–21)
BUN/CREAT SERPL: 18.6 (ref 7–25)
CALCIUM SERPL-MCNC: 9.5 MG/DL (ref 8.4–10.4)
CHLORIDE SERPL-SCNC: 102 MMOL/L (ref 98–110)
CO2 SERPL-SCNC: 26 MMOL/L (ref 24–31)
CREAT SERPL-MCNC: 1.67 MG/DL (ref 0.5–1.4)
EOSINOPHIL # BLD AUTO: 0.17 10*3/MM3 (ref 0–0.7)
EOSINOPHIL NFR BLD AUTO: 2.8 % (ref 0–4)
ERYTHROCYTE [DISTWIDTH] IN BLOOD BY AUTOMATED COUNT: 14.4 % (ref 12–15)
GLOBULIN SER CALC-MCNC: 2.8 GM/DL
GLUCOSE SERPL-MCNC: 92 MG/DL (ref 70–100)
HCT VFR BLD AUTO: 45.2 % (ref 40–52)
HGB BLD-MCNC: 14.6 G/DL (ref 14–18)
IMM GRANULOCYTES # BLD: 0.02 10*3/MM3 (ref 0–0.03)
IMM GRANULOCYTES NFR BLD: 0.3 % (ref 0–5)
LYMPHOCYTES # BLD AUTO: 1.37 10*3/MM3 (ref 0.72–4.86)
LYMPHOCYTES NFR BLD AUTO: 22.9 % (ref 15–45)
MCH RBC QN AUTO: 30.4 PG (ref 28–32)
MCHC RBC AUTO-ENTMCNC: 32.3 G/DL (ref 33–36)
MCV RBC AUTO: 94 FL (ref 82–95)
MONOCYTES # BLD AUTO: 0.72 10*3/MM3 (ref 0.19–1.3)
MONOCYTES NFR BLD AUTO: 12 % (ref 4–12)
NEUTROPHILS # BLD AUTO: 3.68 10*3/MM3 (ref 1.87–8.4)
NEUTROPHILS NFR BLD AUTO: 61.5 % (ref 39–78)
PLATELET # BLD AUTO: 177 10*3/MM3 (ref 130–400)
POTASSIUM SERPL-SCNC: 4.5 MMOL/L (ref 3.5–5.3)
PROT SERPL-MCNC: 6.8 G/DL (ref 6.3–8.7)
RBC # BLD AUTO: 4.81 10*6/MM3 (ref 4.8–5.9)
SODIUM SERPL-SCNC: 139 MMOL/L (ref 135–145)
WBC # BLD AUTO: 5.99 10*3/MM3 (ref 4.8–10.8)

## 2017-09-19 ENCOUNTER — OFFICE VISIT (OUTPATIENT)
Dept: NEUROLOGY | Facility: CLINIC | Age: 82
End: 2017-09-19

## 2017-09-19 VITALS
BODY MASS INDEX: 25.62 KG/M2 | HEIGHT: 70 IN | WEIGHT: 179 LBS | SYSTOLIC BLOOD PRESSURE: 128 MMHG | DIASTOLIC BLOOD PRESSURE: 68 MMHG

## 2017-09-19 DIAGNOSIS — I66.3: Primary | ICD-10-CM

## 2017-09-19 DIAGNOSIS — G47.34 NOCTURNAL HYPOXIA: ICD-10-CM

## 2017-09-19 DIAGNOSIS — G90.9 AUTONOMIC DYSFUNCTION: ICD-10-CM

## 2017-09-19 PROCEDURE — 99213 OFFICE O/P EST LOW 20 MIN: CPT | Performed by: PHYSICIAN ASSISTANT

## 2017-09-19 RX ORDER — OXYMETAZOLINE HYDROCHLORIDE 0.05 G/100ML
2 SPRAY NASAL NIGHTLY
COMMUNITY
End: 2017-11-21

## 2017-09-19 NOTE — PROGRESS NOTES
Subjective   James Birch is a 89 y.o. male is here today for follow-up.    HPI Comments: Orthostatic symptoms are stable to improved his blood pressure readings are better. Some waxing and waning of neurologic symptoms including gait difficulties but overall neurologically stable with no lasting new changes.    Nocturnal hypoxia is being addressed with oxygen.    Stroke   This is a chronic problem. The current episode started more than 1 year ago. The problem occurs daily. The problem has been gradually worsening. Associated symptoms include arthralgias, congestion, fatigue, myalgias, nausea, numbness and weakness. Pertinent negatives include no abdominal pain, chest pain, chills, coughing, fever, neck pain, rash or vomiting. The symptoms are aggravated by exertion and stress. Treatments tried: Supportive care antiplatelet therapy. The treatment provided no relief.       The following portions of the patient's history were reviewed and updated as appropriate: allergies, current medications, past family history, past medical history, past social history, past surgical history and problem list.    Review of Systems   Constitutional: Positive for fatigue. Negative for chills and fever.   HENT: Positive for congestion, hearing loss, postnasal drip, rhinorrhea and sinus pressure. Negative for trouble swallowing.    Eyes: Positive for visual disturbance. Negative for photophobia.   Respiratory: Positive for shortness of breath. Negative for cough and wheezing.    Cardiovascular: Negative.  Negative for chest pain.   Gastrointestinal: Positive for nausea. Negative for abdominal pain and vomiting.   Endocrine: Negative.    Genitourinary: Negative.    Musculoskeletal: Positive for arthralgias, gait problem and myalgias. Negative for back pain and neck pain.   Skin: Negative.  Negative for rash.   Allergic/Immunologic: Negative.    Neurological: Positive for dizziness, weakness, light-headedness and numbness.    Hematological: Negative.    Psychiatric/Behavioral: Positive for decreased concentration and sleep disturbance.       Objective   Physical Exam   Constitutional: He is oriented to person, place, and time. He appears well-developed and well-nourished.   HENT:   Head: Normocephalic.   Eyes: Pupils are equal, round, and reactive to light.   Neck: Normal range of motion. Neck supple. No JVD present.   Cardiovascular: Normal rate, regular rhythm and normal heart sounds.  Exam reveals no gallop and no friction rub.    No murmur heard.  Pulmonary/Chest: Effort normal and breath sounds normal. He has no wheezes. He has no rales.   Abdominal: Soft. Bowel sounds are normal. There is no tenderness.   Musculoskeletal: Normal range of motion. He exhibits no edema.   Lymphadenopathy:     He has no cervical adenopathy.   Neurological: He is alert and oriented to person, place, and time. He has normal strength. He displays no tremor. No cranial nerve deficit or sensory deficit. He exhibits normal muscle tone. He displays a negative Romberg sign. Coordination normal.   Reflex Scores:       Tricep reflexes are 1+ on the right side and 1+ on the left side.       Bicep reflexes are 1+ on the right side and 1+ on the left side.       Brachioradialis reflexes are 1+ on the right side and 1+ on the left side.       Patellar reflexes are 1+ on the right side and 1+ on the left side.       Achilles reflexes are 1+ on the right side and 1+ on the left side.  Skin: Skin is warm and dry.   Psychiatric: He has a normal mood and affect. Thought content normal.         Assessment/Plan   James was seen today for follow-up.    Diagnoses and all orders for this visit:    Thrombosis of posterior inferior cerebellar artery    Nocturnal hypoxia-oxygen    Autonomic dysfunction    The patient is neurologically stable.  No changes were made in his medication recommendations today.  He will continue on nocturnal oxygen.    10 minutes of 15 minute  outpatient visit spent in counseling and coordination of care today.       EMR Dragon transcription disclaimer:  Much of this encounter note is an electronic transcription/translation of spoken language to printed text.  The electronic translation of spoken language may permit erroneous, or at times, nonsensical words or phrases to be inadvertently transcribed.  The author has reviewed the note for such errors, however some may still exist.

## 2017-10-09 ENCOUNTER — TELEPHONE (OUTPATIENT)
Dept: FAMILY MEDICINE CLINIC | Facility: CLINIC | Age: 82
End: 2017-10-09

## 2017-10-09 DIAGNOSIS — J44.9 CHRONIC OBSTRUCTIVE PULMONARY DISEASE, UNSPECIFIED COPD TYPE (HCC): ICD-10-CM

## 2017-10-09 DIAGNOSIS — J44.1 COPD EXACERBATION (HCC): ICD-10-CM

## 2017-10-09 RX ORDER — LEVOFLOXACIN 250 MG/1
250 TABLET ORAL DAILY
Qty: 5 TABLET | Refills: 0 | Status: SHIPPED | OUTPATIENT
Start: 2017-10-09 | End: 2017-11-17

## 2017-10-09 RX ORDER — METHYLPREDNISOLONE 4 MG/1
TABLET ORAL
Qty: 21 TABLET | Refills: 0 | Status: SHIPPED | OUTPATIENT
Start: 2017-10-09 | End: 2017-11-17

## 2017-10-09 NOTE — TELEPHONE ENCOUNTER
PHONE CALL-NURSE       James Birch  4/18/1928          1. Your problem is head and chest congestion, cough productive yellowish, sob    2. Onset first sxs middle of last week     3. Fever no    4. If yes, taken or felt feverish    5. How high has it gone    6. Coming down now    7. Wants to be seen No    8. Prefers per phone yes    9. Would come in if provider requests yes    10. Rx/Allergy list correct yes    11. Cough light yellow productive     12. SOB yes    13. Wheezing no    14. Nausea/Vomiting no    15. Diarrhea no    16. If female, any chance of pregnancy    17. If no chance of pregnancy/why    18. Breast feeding    19. Anything else they want us to know    20. Pharmacy  [facility]    [phone]    Pt has had Levaquin 250 # 5 and medrol pack in past

## 2017-10-12 ENCOUNTER — OFFICE VISIT (OUTPATIENT)
Dept: OTOLARYNGOLOGY | Facility: CLINIC | Age: 82
End: 2017-10-12

## 2017-10-12 VITALS
DIASTOLIC BLOOD PRESSURE: 81 MMHG | HEART RATE: 66 BPM | BODY MASS INDEX: 25.64 KG/M2 | SYSTOLIC BLOOD PRESSURE: 141 MMHG | HEIGHT: 70 IN | TEMPERATURE: 98.2 F | WEIGHT: 179.13 LBS

## 2017-10-12 DIAGNOSIS — J34.89 NASAL VESTIBULITIS: Primary | ICD-10-CM

## 2017-10-12 DIAGNOSIS — G47.33 OSA (OBSTRUCTIVE SLEEP APNEA): ICD-10-CM

## 2017-10-12 PROCEDURE — 31231 NASAL ENDOSCOPY DX: CPT | Performed by: NURSE PRACTITIONER

## 2017-10-12 PROCEDURE — 99214 OFFICE O/P EST MOD 30 MIN: CPT | Performed by: NURSE PRACTITIONER

## 2017-10-12 RX ORDER — FLUTICASONE PROPIONATE 50 MCG
2 SPRAY, SUSPENSION (ML) NASAL DAILY
Qty: 16 G | Refills: 5 | Status: SHIPPED | OUTPATIENT
Start: 2017-10-12 | End: 2018-05-17

## 2017-10-12 NOTE — PROGRESS NOTES
PROCEDURE NOTE      PROCEDURE:    Nasal Endoscopy  ANESTHESIA:  Topical Ponticaine and Afrin Spray   REASON FOR THE PROCEDURE:  Patient is here for an evaluation for nasal congestion, chronic sinus problems.  The patient consented to operative intervention after a full discussion of risks, benefits, and alternatives. No guarantees were made or implied.   DETAILS OF THE OPERATION:  The patient was seated in the exam room chair. Nasal endoscopy was performed with the 0 degree scope through the nares after applying nebulized ponticaine/ Afrin spray . A 0 degree endoscope was introduced into the nasal cavity and gently directed to the deeper nasal cavity examining the following structures.   FINDINGS:  Mucosal Surfaces:  The mucosal surfaces demonstrated dryness  Nasal Septum:  The nasal septum was found to have normal mucosa.  Inferior Turbinates:  The inferior turbinates were found to have normal mucosa.   Middle Turbinates:  The middle turbinates were found edema and erythema.  Middle Meatus:  The middle meatus were found to have normal mucosa.   Sphenoethmoid Recess:  The sphenoethmoidal recess was found to have normal mucosa.   Nasopharynx:  The nasopharynx was found to have no lesion or mass.

## 2017-10-12 NOTE — PROGRESS NOTES
YOB: 1928  Location: [facility]  Location Address: [address] [place] [address], [facility], [address] [place] [address]  Location Phone: [phone]    Chief Complaint   Patient presents with   • Nasal Obstruction       History of Present Illness  James Birch is a 89 y.o. male.  James Birch is here for evaluation of ENT complaints. The patient has had problems with nasal congestion and nasal obstruction  The symptoms are not localized to a particular location. The patient has had severe symptoms. The symptoms have been present for the last year The symptoms are aggravated by  no identifiable factors. The symptoms are improved by Afrin.  Positive for DAMIAN, COPD.  He sleeps in a chair at night.  He uses Afrin nightly to breath through his nose.  Wears O2 PRN     Past Medical History:   Diagnosis Date   • Atrial fibrillation    • Cancer     SKIN   • Conductive hearing loss    • COPD (chronic obstructive pulmonary disease)    • Eustachian tube dysfunction    • GERD (gastroesophageal reflux disease)    • Hyperlipidemia    • Hypertension    • Obstructive sleep apnea    • Prostate disease    • Sensorineural hearing loss    • Stroke    • Vertigo        Past Surgical History:   Procedure Laterality Date   • HERNIA REPAIR     • HIP SURGERY           Current Outpatient Prescriptions:   •  albuterol (PROVENTIL HFA;VENTOLIN HFA) 108 (90 BASE) MCG/ACT inhaler, Inhale 2 puffs as needed for wheezing., Disp: , Rfl:   •  azelastine (OPTIVAR) 0.05 % ophthalmic solution, INSTILL 1 DROP INTO BOTH EYES TWICE DAILY GENERIC FOR OPTIVAR, Disp: 6 mL, Rfl: 5  •  B Complex-C (SUPER B COMPLEX/VITAMIN C PO), Take  by mouth daily., Disp: , Rfl:   •  Cholecalciferol (VITAMIN D3 PO), Take 1 tablet by mouth daily., Disp: , Rfl:   •  Coenzyme Q10 (CO Q-10 PO), Take  by mouth daily., Disp: , Rfl:   •  dutasteride (AVODART) 0.5 MG capsule, TAKE 1 CAPSULE DAILY GENERIC FOR AVODART, Disp: 90 capsule, Rfl: 1  •  esomeprazole (nexIUM) 40 MG  capsule, TAKE 1 CAPSULE TWICE DAILY GENERIC FOR NEXIUM (Patient taking differently: TAKE 1 CAPSULE DAILY GENERIC FOR NEXIUM), Disp: 180 capsule, Rfl: 2  •  furosemide (LASIX) 40 MG tablet, Take 40 mg by mouth Daily., Disp: , Rfl:   •  levoFLOXacin (LEVAQUIN) 250 MG tablet, Take 1 tablet by mouth Daily., Disp: 5 tablet, Rfl: 0  •  MethylPREDNISolone (MEDROL, GUY,) 4 MG tablet, Take as directed on package instructions., Disp: 21 tablet, Rfl: 0  •  Multiple Vitamins-Minerals (MULTIVITAMIN PO), Take 1 tablet by mouth daily., Disp: , Rfl:   •  Omega-3 Fatty Acids (FISH OIL PO), Take  by mouth daily., Disp: , Rfl:   •  oxymetazoline (AFRIN) 0.05 % nasal spray, 2 sprays into each nostril Every Night. 3 nights in a row, then stop until the next week., Disp: , Rfl:   •  ramipril (ALTACE) 2.5 MG capsule, TAKE 1 CAPSULE AT BEDTIME, Disp: 90 capsule, Rfl: 1  •  SYMBICORT 80-4.5 MCG/ACT inhaler, Inhale 2 puffs 2 (Two) Times a Day., Disp: , Rfl:   •  tamsulosin (FLOMAX) 0.4 MG capsule 24 hr capsule, TAKE 1 CAPSULE DAILY SHORTLY AFTER THE SAME MEAL GENERIC FOR FLOMAX, Disp: 90 capsule, Rfl: 1  •  XARELTO 15 MG tablet, TAKE ONE TABLET DAILY, Disp: 90 tablet, Rfl: 3  •  fluticasone (FLONASE) 50 MCG/ACT nasal spray, 2 sprays into each nostril Daily., Disp: 16 g, Rfl: 5  •  mupirocin (BACTROBAN) 2 % ointment, Apply intranasally bid, Disp: 22 g, Rfl: 0    Review of patient's allergies indicates no known allergies.    Family History   Problem Relation Age of Onset   • No Known Problems Mother    • No Known Problems Father        Social History     Social History   • Marital status:      Spouse name: N/A   • Number of children: 3   • Years of education: N/A     Occupational History   • Not on file.     Social History Main Topics   • Smoking status: Former Smoker     Types: Cigarettes     Quit date: 1967   • Smokeless tobacco: Never Used   • Alcohol use No   • Drug use: No   • Sexual activity: Defer     Other Topics Concern   • Not  on file     Social History Narrative       Review of Systems   Constitutional: Negative.    HENT:        SEE HPI   Eyes: Negative.    Respiratory: Negative.    Cardiovascular: Negative.    Gastrointestinal: Negative.    Endocrine: Negative.    Genitourinary: Negative.    Musculoskeletal: Negative.    Skin: Negative.    Allergic/Immunologic: Negative.    Neurological: Negative.    Hematological: Negative.    Psychiatric/Behavioral: Negative.        Vitals:    10/12/17 1023   BP: 141/81   Pulse: 66   Temp: 98.2 °F (36.8 °C)       Objective     Physical Exam  CONSTITUTIONAL: well nourished, alert, oriented, in no acute distress     COMMUNICATION AND VOICE: able to communicate normally, normal voice quality    HEAD: normocephalic, no lesions, atraumatic, no tenderness, no masses     FACE: appearance normal, no lesions, no tenderness, no deformities, facial motion symmetric    SALIVARY GLANDS: parotid glands with no tenderness, no swelling, no masses, submandibular glands with normal size, nontender    EYES: ocular motility normal, eyelids normal, orbits normal, no proptosis, conjunctiva normal , pupils equal, round     EARS:  Hearing: response to conversational voice moderately impaired  External Ears: auricles without lesions  Otoscopic: tympanic membrane appearance normal, no lesions, no perforation, normal mobility, no fluid    NOSE:  External Nose: structure normal, no tenderness on palpation, no nasal discharge, no lesions, no evidence of trauma, nostrils patent   Intranasal Exam: nasal mucosa dryness, vestibule within normal limits, inferior turbinate normal, nasal septum midline     ORAL:  Lips: upper and lower lips without lesion   Teeth: dentition within normal limits for age   Gums: gingivae healthy   Oral Mucosa: oral mucosa normal, no mucosal lesions   Floor of Mouth: Warthin’s duct patent, mucosa normal  Tongue: lingual mucosa normal without lesions, normal tongue mobility   Palate: soft and hard palates  with normal mucosa and structure  Oropharynx: oropharyngeal mucosa normal    NECK: neck appearance normal, no mass,  noted without erythema or tenderness    THYROID: no overt thyromegaly, no tenderness, nodules or mass present on palpation, position midline     LYMPH NODES: no lymphadenopathy    CHEST/RESPIRATORY: respiratory effort normal,     CARDIOVASCULAR:  extremities without cyanosis or edema      NEUROLOGIC/PSYCHIATRIC: oriented to time, place and person, mood normal, affect appropriate, CN II-XII intact grossly    Assessment/Plan   James was seen today for nasal obstruction.    Diagnoses and all orders for this visit:    Nasal vestibulitis    DAMIAN (obstructive sleep apnea)    Other orders  -     fluticasone (FLONASE) 50 MCG/ACT nasal spray; 2 sprays into each nostril Daily.  -     mupirocin (BACTROBAN) 2 % ointment; Apply intranasally bid      * Surgery not found *  No orders of the defined types were placed in this encounter.    Return in about 3 months (around 1/12/2018).       Patient Instructions   Call for problems or worsening symptoms    Wean off Afrin    STOP NOSESPRAYS FOR ANY BLEEDING

## 2017-11-09 RX ORDER — RAMIPRIL 2.5 MG/1
CAPSULE ORAL
Qty: 90 CAPSULE | Refills: 1 | Status: SHIPPED | OUTPATIENT
Start: 2017-11-09 | End: 2017-11-21 | Stop reason: SINTOL

## 2017-11-17 ENCOUNTER — OFFICE VISIT (OUTPATIENT)
Dept: FAMILY MEDICINE CLINIC | Facility: CLINIC | Age: 82
End: 2017-11-17

## 2017-11-17 VITALS
HEIGHT: 70 IN | WEIGHT: 181 LBS | BODY MASS INDEX: 25.91 KG/M2 | DIASTOLIC BLOOD PRESSURE: 70 MMHG | RESPIRATION RATE: 18 BRPM | HEART RATE: 72 BPM | TEMPERATURE: 98.1 F | SYSTOLIC BLOOD PRESSURE: 108 MMHG | OXYGEN SATURATION: 92 %

## 2017-11-17 DIAGNOSIS — Z86.73 HISTORY OF CVA (CEREBROVASCULAR ACCIDENT): ICD-10-CM

## 2017-11-17 DIAGNOSIS — I50.30 CONGESTIVE HEART FAILURE WITH LEFT VENTRICULAR DIASTOLIC DYSFUNCTION, NYHA CLASS 3 (HCC): ICD-10-CM

## 2017-11-17 DIAGNOSIS — N18.30 STAGE 3 CHRONIC KIDNEY DISEASE (HCC): ICD-10-CM

## 2017-11-17 DIAGNOSIS — G47.34 NOCTURNAL HYPOXIA: ICD-10-CM

## 2017-11-17 DIAGNOSIS — Z79.01 ANTICOAGULATED: ICD-10-CM

## 2017-11-17 DIAGNOSIS — I48.91 ATRIAL FIBRILLATION, UNSPECIFIED TYPE (HCC): ICD-10-CM

## 2017-11-17 DIAGNOSIS — R22.30 SHOULDER MASS: ICD-10-CM

## 2017-11-17 DIAGNOSIS — I10 ESSENTIAL HYPERTENSION: Primary | Chronic | ICD-10-CM

## 2017-11-17 DIAGNOSIS — N40.0 PROSTATISM: Chronic | ICD-10-CM

## 2017-11-17 PROCEDURE — 99213 OFFICE O/P EST LOW 20 MIN: CPT | Performed by: FAMILY MEDICINE

## 2017-11-17 RX ORDER — MONTELUKAST SODIUM 10 MG/1
10 TABLET ORAL NIGHTLY
COMMUNITY
End: 2018-05-17

## 2017-11-17 NOTE — PROGRESS NOTES
Subjective   James Birch is a 89 y.o. male presenting with chief complaint of:   Chief Complaint   Patient presents with   • Skin Lesion     L shoulder    • Hosp bed     mechanical bed that head lifts    • Leg Swelling   • Difficulty Swallowing   • Hypertension   • Hyperlipidemia   • Atrial Fibrillation   • Congestive Heart Failure   • Asthma   • COPD       History of Present Illness :  with patient and spouse.  Here for primarily a/an Chronic issue today.   Has has  multiple chronic problems to consider, is here for an interval appointment and one is HTN    The HTN has been present for years/it is chronic.  The HTN is assumed essential/without testing needed to look for other.  The HTN is controlled manifest by todays blood pressure and same home monitoring.  Associated illness below.   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  The arrthymia is primarily a fib   The rhythm is not associated with syncope/near syncope, dizziness, or weakness. Medications being used help.  Congestive heart failure/echo changes: This has been present for years/over a year.  It is chronic.  The CHF had features on echo of diastolic dysfunction.  This has been associated with SOB, LE edema on/off. Sees cardiology.  Asthma/COPD/chronic lung disease: The has been present for years/over a year.  It is chronic.  The problem is stable. The patient smoked for years and has stopped.  Obstructive Sleep Apnea/Nocturnal hypoxemia:  This has been present for years/over a year.  It is chronic.   Uses only oxygen; cannot stand cpap.  Anticoagulation: Requires anticoagulation with xarelto for CVA history/a fib.   This needs to be continually monitored for risk/benefit.   History CVA/or TIA:  This happened 2010/cerebellar.  This makes it a chronic concern.  It was with bleed.  The neuro deficits are stable for years:      Has an/another acute issue today: mass on L  shoulder without injury; ? month.    The following portions of the patient's history were reviewed and updated as appropriate: allergies, current medications, past family history, past medical history, past social history, past surgical history and problem list.  Records acquired and reviewed; TCC migrated.      Current Outpatient Prescriptions:   •  albuterol (PROVENTIL HFA;VENTOLIN HFA) 108 (90 BASE) MCG/ACT inhaler, Inhale 2 puffs as needed for wheezing., Disp: , Rfl:   •  azelastine (OPTIVAR) 0.05 % ophthalmic solution, INSTILL 1 DROP INTO BOTH EYES TWICE DAILY GENERIC FOR OPTIVAR, Disp: 6 mL, Rfl: 5  •  B Complex-C (SUPER B COMPLEX/VITAMIN C PO), Take  by mouth daily., Disp: , Rfl:   •  Cholecalciferol (VITAMIN D3 PO), Take 1 tablet by mouth daily., Disp: , Rfl:   •  Coenzyme Q10 (CO Q-10 PO), Take  by mouth daily., Disp: , Rfl:   •  dutasteride (AVODART) 0.5 MG capsule, TAKE 1 CAPSULE DAILY GENERIC FOR AVODART, Disp: 90 capsule, Rfl: 1  •  esomeprazole (nexIUM) 40 MG capsule, TAKE 1 CAPSULE TWICE DAILY GENERIC FOR NEXIUM (Patient taking differently: TAKE 1 CAPSULE DAILY GENERIC FOR NEXIUM), Disp: 180 capsule, Rfl: 2  •  fluticasone (FLONASE) 50 MCG/ACT nasal spray, 2 sprays into each nostril Daily., Disp: 16 g, Rfl: 5  •  furosemide (LASIX) 40 MG tablet, Take 40 mg by mouth Daily., Disp: , Rfl:   •  montelukast (SINGULAIR) 10 MG tablet, Take 10 mg by mouth Every Night., Disp: , Rfl:   •  Multiple Vitamins-Minerals (MULTIVITAMIN PO), Take 1 tablet by mouth daily., Disp: , Rfl:   •  mupirocin (BACTROBAN) 2 % ointment, Apply intranasally bid, Disp: 22 g, Rfl: 0  •  Omega-3 Fatty Acids (FISH OIL PO), Take  by mouth daily., Disp: , Rfl:   •  oxymetazoline (AFRIN) 0.05 % nasal spray, 2 sprays into each nostril Every Night. 3 nights in a row, then stop until the next week., Disp: , Rfl:   •  ramipril (ALTACE) 2.5 MG capsule, TAKE 1 CAPSULE AT BEDTIME GENERIC FOR ALTACE, Disp: 90 capsule, Rfl: 1  •  SYMBICORT 80-4.5  MCG/ACT inhaler, Inhale 2 puffs 2 (Two) Times a Day., Disp: , Rfl:   •  tamsulosin (FLOMAX) 0.4 MG capsule 24 hr capsule, TAKE 1 CAPSULE DAILY SHORTLY AFTER THE SAME MEAL GENERIC FOR FLOMAX, Disp: 90 capsule, Rfl: 1  •  XARELTO 15 MG tablet, TAKE ONE TABLET DAILY, Disp: 90 tablet, Rfl: 3    No Known Allergies    Review of Systems  GENERAL:  Inactive/slower with limits, speed, stamina for age and gait . Sleep is ok with oxygen. No fever now.  ENDO:  No syncope, near or diaphoretic sweaty spells.  HEENT: No head injury or headache,  No vision change, Same hearing loss.  Ears without pain/drainage.  No sore throat.  No significant nasal/sinus congestion/drainage. No epistaxis.  CHEST: No chest wall tenderness or mass. No change occ cough, without wheeze.  No SOB; no hemoptysis.  CV: No chest pain, palpitations, ankle edema.  GI: No heartburn, dysphagia.  No abdominal pain, diarrhea, constipation.  No rectal bleeding, or melena.    :  Voids without dysuria, or incontinence to completion.  ORTHO: No painful/swollen joints but various on /off sore.  No change occ sore neck or back.  No acute neck or back pain without recent injury.   NEURO: No dizziness, weakness of extremities.  No change UE/LE mild numbness/paresthesias.   PSYCH: Mild ? memory loss.  Mood good; occ anxious, depressed but/and not suicidal.  Tries to tolerate stress .     Results for orders placed or performed in visit on 09/07/17   Comprehensive Metabolic Panel   Result Value Ref Range    Glucose 92 70 - 100 mg/dL    BUN 31 (H) 5 - 21 mg/dL    Creatinine 1.67 (H) 0.50 - 1.40 mg/dL    eGFR Non African Am 39 (L) >60 mL/min/1.73    eGFR  Am 47 (L) >60 mL/min/1.73    BUN/Creatinine Ratio 18.6 7.0 - 25.0    Sodium 139 135 - 145 mmol/L    Potassium 4.5 3.5 - 5.3 mmol/L    Chloride 102 98 - 110 mmol/L    Total CO2 26.0 24.0 - 31.0 mmol/L    Calcium 9.5 8.4 - 10.4 mg/dL    Total Protein 6.8 6.3 - 8.7 g/dL    Albumin 4.00 3.50 - 5.00 g/dL    Globulin 2.8  gm/dL    A/G Ratio 1.4 1.1 - 2.5 g/dL    Total Bilirubin 0.8 0.1 - 1.0 mg/dL    Alkaline Phosphatase 61 24 - 120 U/L    AST (SGOT) 19 7 - 45 U/L    ALT (SGPT) 30 0 - 54 U/L   CBC & Differential   Result Value Ref Range    WBC 5.99 4.80 - 10.80 10*3/mm3    RBC 4.81 4.80 - 5.90 10*6/mm3    Hemoglobin 14.6 14.0 - 18.0 g/dL    Hematocrit 45.2 40.0 - 52.0 %    MCV 94.0 82.0 - 95.0 fL    MCH 30.4 28.0 - 32.0 pg    MCHC 32.3 (L) 33.0 - 36.0 g/dL    RDW 14.4 12.0 - 15.0 %    Platelets 177 130 - 400 10*3/mm3    Neutrophil Rel % 61.5 39.0 - 78.0 %    Lymphocyte Rel % 22.9 15.0 - 45.0 %    Monocyte Rel % 12.0 4.0 - 12.0 %    Eosinophil Rel % 2.8 0.0 - 4.0 %    Basophil Rel % 0.5 0.0 - 2.0 %    Neutrophils Absolute 3.68 1.87 - 8.40 10*3/mm3    Lymphocytes Absolute 1.37 0.72 - 4.86 10*3/mm3    Monocytes Absolute 0.72 0.19 - 1.30 10*3/mm3    Eosinophils Absolute 0.17 0.00 - 0.70 10*3/mm3    Basophils Absolute 0.03 0.00 - 0.20 10*3/mm3    Immature Granulocyte Rel % 0.3 0.0 - 5.0 %    Immature Grans Absolute 0.02 0.00 - 0.03 10*3/mm3       No results found for: HGBA1C    Lab Results   Component Value Date     09/07/2017     11/08/2016     09/08/2016    K 4.5 09/07/2017    K 4.4 11/08/2016    K 4.1 09/08/2016     09/07/2017    CL 98 11/08/2016     09/08/2016    CO2 26.0 09/07/2017    CO2 26.0 11/08/2016    CO2 28 09/08/2016    GLUCOSE 95 09/08/2016    GLUCOSE 105 (H) 01/15/2015    BUN 31 (H) 09/07/2017    BUN 32 (H) 11/08/2016    BUN 26 (H) 09/08/2016    CREATININE 1.67 (H) 09/07/2017    CREATININE 1.79 (H) 11/08/2016    CREATININE 1.71 (H) 09/08/2016    CALCIUM 9.5 09/07/2017    CALCIUM 9.4 11/08/2016    CALCIUM 9.1 09/08/2016    EGFRCLEARA 38 09/08/2016       Lab Results   Component Value Date    PSA 1.070 11/08/2016        Lab Results:  CBC:    Lab Results - Last 18 Months  Lab Units 09/07/17  0800 11/08/16  1248 09/08/16  1304   WBC 10*3/mm3 5.99 5.49 4.80   HEMATOCRIT % 45.2 43.4 40.6  "    CMP:    Lab Results - Last 18 Months  Lab Units 09/07/17  0800 11/08/16  1248 09/08/16  1304   SODIUM mmol/L 139 138 139   CHLORIDE mmol/L 102 98 100   TOTAL CO2, ARTERIAL mmol/L 26.0 26.0  --    TOTAL CO2 mmol/L  --   --  28   BUN mg/dL 31* 32* 26*   CREATININE mg/dL 1.67* 1.79* 1.71*   EGFR IF NONAFRICN AM mL/min/1.73 39* 36*  --    EGFR IF AFRICN AM mL/min/1.73 47* 44*  --    CALCIUM mg/dL 9.5 9.4 9.1     THYROID:No results for input(s): TSH, T3FREE, FREET4 in the last 17836 hours.    Invalid input(s): T3, T4  A1C:No results for input(s): HGBA1C in the last 97990 hours.    Objective   /70  Pulse 72  Temp 98.1 °F (36.7 °C) (Oral)   Resp 18  Ht 70\" (177.8 cm)  Wt 181 lb (82.1 kg)  SpO2 92%  BMI 25.97 kg/m2    Physical Exam  GENERAL:  Well nourished/developed in no acute distress.  SKIN: Turgor excellent, without wound, rash, lesion.  HEENT: Normal cephalic without trauma.  Pupils equal round reactive to light. Extraocular motions full without nystagmus.   External canals nonobstructive nontender without reddness. Tymphatic membranes calos with shelia structures intact.   Oral cavity without growths, exudates, and moist.  Posterior pharynx without mass, obstruction, redness.  No thyromegaly, mass, tenderness, lymphadenopathy and supple.  CV: Regular rhythm.  No murmur, gallop,  edema. Posterior pulses intact.  No carotid bruits.  CHEST: No chest wall tenderness or mass.   LUNGS: Symmetric motion with clear to auscultation.  No dullness to percussion  ABD: Soft, nontender without mass.   ORTHO: Symmetric extremities without swelling/point tenderness.  Full gross range of motion.  4 cm soft nodule over distal clavicle.  Walking with assistance cane.  NEURO: CN 2-12 grossly intact.  Symmetric facies. 1/4 x bicep equal reflexes.  UE/LE   3/5 strength throughout.  Nonfocal use extremities. Speech clear.  Intact light touch with monofilament, vibratory sensation with tuning fork; equal toes/distal feet.  "   PSYCH: Oriented x 3.  Pleasant calm, well kept.  Purposeful/directed conservation with intact short/long gross memory.     Assessment/Plan     1. Essential hypertension    2. Congestive heart failure with left ventricular diastolic dysfunction, NYHA class 3    3. Nocturnal hypoxia-oxygen    4. Prostatism    5. Bmvfzjiaxizpmm-jxn-sbkv/xarelto    6. 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 Stage 3 chronic kidney disease    8. Atrial fibrillation, unspecified type    9. Shoulder mass - L -shelia?        Rx: reviewed/changes:  same     LAB: reviewed/orders:   Orders Placed This Encounter   Procedures   • Hospital Bed   • XR Shoulder 2+ View Left   6/12m labs    Discussions:       There are no Patient Instructions on file for this visit.    Follow up: Return for lab during/or just before next apt;, Dr William-, 6 m;.  Future Appointments  Date Time Provider Department Center   11/21/2017 10:00 AM Skyler Trimble MD [facility] None   12/18/2017 11:20 AM ED Price [facility] None   5/15/2018 8:35 AM LAB [facility] None   5/17/2018 11:15 AM Hemal William MD [facility] None

## 2017-11-18 PROBLEM — Z87.891 HISTORY OF TOBACCO USE: Status: ACTIVE | Noted: 2017-11-18

## 2017-11-20 RX ORDER — DUTASTERIDE 0.5 MG/1
CAPSULE, LIQUID FILLED ORAL
Qty: 90 CAPSULE | Refills: 1 | Status: SHIPPED | OUTPATIENT
Start: 2017-11-20 | End: 2018-04-06 | Stop reason: SDUPTHER

## 2017-11-21 ENCOUNTER — OFFICE VISIT (OUTPATIENT)
Dept: CARDIOLOGY | Facility: CLINIC | Age: 82
End: 2017-11-21

## 2017-11-21 VITALS
WEIGHT: 180.3 LBS | SYSTOLIC BLOOD PRESSURE: 101 MMHG | HEIGHT: 70 IN | HEART RATE: 80 BPM | BODY MASS INDEX: 25.81 KG/M2 | DIASTOLIC BLOOD PRESSURE: 80 MMHG

## 2017-11-21 DIAGNOSIS — I48.20 CHRONIC ATRIAL FIBRILLATION (HCC): ICD-10-CM

## 2017-11-21 DIAGNOSIS — I10 ESSENTIAL HYPERTENSION: Primary | Chronic | ICD-10-CM

## 2017-11-21 DIAGNOSIS — I50.30 CONGESTIVE HEART FAILURE WITH LEFT VENTRICULAR DIASTOLIC DYSFUNCTION, NYHA CLASS 3 (HCC): ICD-10-CM

## 2017-11-21 DIAGNOSIS — E78.5 HYPERLIPIDEMIA, UNSPECIFIED HYPERLIPIDEMIA TYPE: Chronic | ICD-10-CM

## 2017-11-21 PROCEDURE — 93000 ELECTROCARDIOGRAM COMPLETE: CPT | Performed by: INTERNAL MEDICINE

## 2017-11-21 PROCEDURE — 99213 OFFICE O/P EST LOW 20 MIN: CPT | Performed by: INTERNAL MEDICINE

## 2017-11-21 NOTE — PROGRESS NOTES
"Subjective    James Birch is a 89 y.o. male. Fu of rhythm and chf    History of Present Illness     ATRIAL FIB:  Has been paroxysmal in the past and last year was in sinus rhythm. EKG today shows afib with HR=80. No palpitations or cp or sob but \"no energy\". Has no bleeding prob with noac.    HFpEF:  Has some chronic ankle edema and if it's not down by am he increases his Lasix to correct it. He has good uop with increases in Lasix. Is followed by [facility] for CKDIII. Does not do daily weights but I advised that again today and use a 2lb wt gain as an indicator to double the Lasix dose that day. Wt is stable by his scales and ours.    The following portions of the patient's history were reviewed and updated as appropriate: allergies, current medications, past family history, past medical history, past social history, past surgical history and problem list.    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/cerebellar   • Thrombosis of posterior inferior cerebellar artery   • Obstructive sleep apnea-not using-but uses oxygen   • Autonomic dysfunction   • Cardiac arrhythmia-a fib   • Congestive heart failure with left ventricular diastolic dysfunction, NYHA class 3   • Wellness examination-done   • Gait difficulty-cane/walker   • Asthma   • Iabhudebqvrpvs-qrd-lbun/xarelto   • Chronic kidney disease-3-IgA/nephrology   • Prostatism   • Urolithiasis   • Varicose veins of legs   • Hyperlipidemia   • Hypertension   • Hypersomnia   • Gastroesophageal reflux disease   • COPD (chronic obstructive pulmonary disease)   • Nocturnal hypoxia-oxygen advised   • Laboratory test*   • History of tobacco use       No Known Allergies    Family History   Problem Relation Age of Onset   • No Known Problems Mother    • No Known Problems Father        Social History     Social History   • Marital status:      Spouse name: N/A   • Number of children: 3   • Years of education: N/A     Occupational History   • Not on " file.     Social History Main Topics   • Smoking status: Former Smoker     Types: Cigarettes     Quit date: 1967   • Smokeless tobacco: Never Used   • Alcohol use No   • Drug use: No   • Sexual activity: Defer     Other Topics Concern   • Not on file     Social History Narrative         Current Outpatient Prescriptions:   •  albuterol (PROVENTIL HFA;VENTOLIN HFA) 108 (90 BASE) MCG/ACT inhaler, Inhale 2 puffs as needed for wheezing., Disp: , Rfl:   •  azelastine (OPTIVAR) 0.05 % ophthalmic solution, INSTILL 1 DROP INTO BOTH EYES TWICE DAILY GENERIC FOR OPTIVAR, Disp: 6 mL, Rfl: 5  •  B Complex-C (SUPER B COMPLEX/VITAMIN C PO), Take  by mouth daily., Disp: , Rfl:   •  Cholecalciferol (VITAMIN D3 PO), Take 1 tablet by mouth daily., Disp: , Rfl:   •  Coenzyme Q10 (CO Q-10 PO), Take  by mouth daily., Disp: , Rfl:   •  dutasteride (AVODART) 0.5 MG capsule, TAKE 1 CAPSULE DAILY GENERIC FOR AVODART, Disp: 90 capsule, Rfl: 1  •  esomeprazole (nexIUM) 40 MG capsule, TAKE 1 CAPSULE TWICE DAILY GENERIC FOR NEXIUM (Patient taking differently: TAKE 1 CAPSULE DAILY GENERIC FOR NEXIUM), Disp: 180 capsule, Rfl: 2  •  fluticasone (FLONASE) 50 MCG/ACT nasal spray, 2 sprays into each nostril Daily., Disp: 16 g, Rfl: 5  •  furosemide (LASIX) 40 MG tablet, Take 40 mg by mouth Daily., Disp: , Rfl:   •  montelukast (SINGULAIR) 10 MG tablet, Take 10 mg by mouth Every Night., Disp: , Rfl:   •  Multiple Vitamins-Minerals (MULTIVITAMIN PO), Take 1 tablet by mouth daily., Disp: , Rfl:   •  mupirocin (BACTROBAN) 2 % ointment, Apply intranasally bid, Disp: 22 g, Rfl: 0  •  Omega-3 Fatty Acids (FISH OIL PO), Take  by mouth daily., Disp: , Rfl:   •  SYMBICORT 80-4.5 MCG/ACT inhaler, Inhale 2 puffs 2 (Two) Times a Day., Disp: , Rfl:   •  tamsulosin (FLOMAX) 0.4 MG capsule 24 hr capsule, TAKE 1 CAPSULE DAILY SHORTLY AFTER THE SAME MEAL GENERIC FOR FLOMAX, Disp: 90 capsule, Rfl: 1  •  XARELTO 15 MG tablet, TAKE ONE TABLET DAILY, Disp: 90 tablet, Rfl:  "3    Past Surgical History:   Procedure Laterality Date   • HERNIA REPAIR     • HIP SURGERY         Review of Systems   Constitutional: Positive for fatigue.   Respiratory: Negative for apnea, chest tightness and shortness of breath.    Cardiovascular: Positive for leg swelling. Negative for chest pain and palpitations.   Gastrointestinal: Negative for abdominal pain and blood in stool.   Genitourinary: Negative for dysuria and hematuria.   Musculoskeletal: Negative for myalgias.   Neurological: Negative for weakness and light-headedness.   Psychiatric/Behavioral: Negative for sleep disturbance.       /80  Pulse 80  Ht 70\" (177.8 cm)  Wt 180 lb 4.8 oz (81.8 kg)  BMI 25.87 kg/m2  Procedures    Objective   Physical Exam   Constitutional: He is oriented to person, place, and time. He appears well-developed and well-nourished. No distress.   HENT:   Head: Normocephalic and atraumatic.   Eyes: Pupils are equal, round, and reactive to light.   Neck: No thyromegaly present.   Cardiovascular: Normal rate, normal heart sounds and intact distal pulses.  An irregularly irregular rhythm present. Exam reveals no gallop and no friction rub.    No murmur heard.  Pulmonary/Chest: Effort normal and breath sounds normal. No respiratory distress. He has no wheezes. He has no rales.   Abdominal: Soft. Bowel sounds are normal. He exhibits no distension. There is no tenderness.   Musculoskeletal: He exhibits edema.   2+ ankle edema without pre-tibial edema   Neurological: He is alert and oriented to person, place, and time.   Skin: Skin is warm and dry.   Psychiatric: He has a normal mood and affect.       Assessment/Plan   James was seen today for congestive heart failure and atrial fibrillation.    Diagnoses and all orders for this visit:    Essential hypertension  Comments:  bp too low now - will stop Ramipril  Orders:  -     ECG 12 Lead    Hyperlipidemia, unspecified hyperlipidemia type    Chronic atrial " fibrillation  Comments:  rate controlled and anti-coagulated    Congestive heart failure with left ventricular diastolic dysfunction, NYHA class 3  Comments:  seems euvolemic today                 Return in about 1 year (around 11/21/2018) for Next scheduled follow up.  Orders Placed This Encounter   Procedures   • ECG 12 Lead     Order Specific Question:   Reason for Exam:     Answer:   fu of rhythm

## 2017-11-30 RX ORDER — TAMSULOSIN HYDROCHLORIDE 0.4 MG/1
CAPSULE ORAL
Qty: 90 CAPSULE | Refills: 2 | Status: SHIPPED | OUTPATIENT
Start: 2017-11-30 | End: 2018-08-16 | Stop reason: SDUPTHER

## 2017-12-18 ENCOUNTER — OFFICE VISIT (OUTPATIENT)
Dept: NEUROLOGY | Facility: CLINIC | Age: 82
End: 2017-12-18

## 2017-12-18 VITALS
BODY MASS INDEX: 25.2 KG/M2 | HEIGHT: 70 IN | SYSTOLIC BLOOD PRESSURE: 100 MMHG | HEART RATE: 74 BPM | WEIGHT: 176 LBS | DIASTOLIC BLOOD PRESSURE: 70 MMHG

## 2017-12-18 DIAGNOSIS — G47.34 NOCTURNAL HYPOXIA: ICD-10-CM

## 2017-12-18 DIAGNOSIS — I66.3: Primary | ICD-10-CM

## 2017-12-18 DIAGNOSIS — G90.9 AUTONOMIC DYSFUNCTION: ICD-10-CM

## 2017-12-18 DIAGNOSIS — G47.33 OBSTRUCTIVE SLEEP APNEA: ICD-10-CM

## 2017-12-18 PROCEDURE — 99213 OFFICE O/P EST LOW 20 MIN: CPT | Performed by: PHYSICIAN ASSISTANT

## 2017-12-18 NOTE — PROGRESS NOTES
Subjective   James Birch is a 89 y.o. male is here today for follow-up.    HPI Comments: Some waxing and waning of neurologic symptoms including gait difficulties but overall neurologically stable with no lasting new changes.    Stroke   This is a chronic problem. The current episode started more than 1 year ago. The problem occurs daily. The problem has been gradually worsening. Associated symptoms include arthralgias, congestion, fatigue, myalgias, numbness and weakness. Pertinent negatives include no chills, coughing, fever or neck pain. The symptoms are aggravated by exertion and stress. Treatments tried: Supportive care antiplatelet therapy. The treatment provided no relief.       The following portions of the patient's history were reviewed and updated as appropriate: allergies, current medications, past family history, past medical history, past social history, past surgical history and problem list.    Review of Systems   Constitutional: Positive for fatigue. Negative for chills and fever.   HENT: Positive for congestion, hearing loss and sinus pressure. Negative for trouble swallowing.    Eyes: Positive for visual disturbance.   Respiratory: Positive for shortness of breath. Negative for cough.    Cardiovascular: Negative.    Gastrointestinal: Negative.    Endocrine: Negative.    Genitourinary: Negative.    Musculoskeletal: Positive for arthralgias, gait problem and myalgias. Negative for back pain and neck pain.   Skin: Negative.    Allergic/Immunologic: Negative.    Neurological: Positive for dizziness, weakness, light-headedness and numbness. Negative for facial asymmetry and speech difficulty.   Hematological: Negative.    Psychiatric/Behavioral: Positive for decreased concentration and sleep disturbance.       Objective   Physical Exam   Constitutional: He is oriented to person, place, and time. He appears well-developed and well-nourished.   HENT:   Head: Normocephalic.   Eyes: Pupils are equal,  round, and reactive to light.   Neck: Normal range of motion. Neck supple. No JVD present.   Cardiovascular: Normal rate, regular rhythm and normal heart sounds.  Exam reveals no gallop and no friction rub.    No murmur heard.  Pulmonary/Chest: Effort normal and breath sounds normal. He has no wheezes. He has no rales.   Abdominal: Soft. Bowel sounds are normal. There is no tenderness.   Musculoskeletal: Normal range of motion. He exhibits no edema.   Lymphadenopathy:     He has no cervical adenopathy.   Neurological: He is alert and oriented to person, place, and time. He has normal strength. He displays no tremor. No cranial nerve deficit or sensory deficit. He exhibits normal muscle tone. He displays a negative Romberg sign. Coordination normal.   Reflex Scores:       Tricep reflexes are 1+ on the right side and 1+ on the left side.       Bicep reflexes are 1+ on the right side and 1+ on the left side.       Brachioradialis reflexes are 1+ on the right side and 1+ on the left side.       Patellar reflexes are 1+ on the right side and 1+ on the left side.       Achilles reflexes are 1+ on the right side and 1+ on the left side.  Skin: Skin is warm and dry.   Psychiatric: He has a normal mood and affect. Thought content normal.         Assessment/Plan   James was seen today for stroke.    Diagnoses and all orders for this visit:    Thrombosis of posterior inferior cerebellar artery    Autonomic dysfunction    Obstructive sleep apnea    Nocturnal hypoxia-oxygen advised    Patient is neurologically stable.  I encouraged him to use his nighttime oxygen therapy.  We once again discussed the use of CPAP he has not been tolerant of.    10 minutes of a 15 minute outpatient visit was spent in counseling and coordination of care today.      Patient's BMI is within normal parameters. No follow-up required.    EMR Dragon transcription disclaimer:  Much of this encounter note is an electronic transcription/translation of  spoken language to printed text.  The electronic translation of spoken language may permit erroneous, or at times, nonsensical words or phrases to be inadvertently transcribed.  The author has reviewed the note for such errors, however some may still exist.

## 2017-12-28 DIAGNOSIS — Z20.828 EXPOSURE TO THE FLU: Primary | ICD-10-CM

## 2017-12-28 RX ORDER — OSELTAMIVIR PHOSPHATE 30 MG/1
30 CAPSULE ORAL
Qty: 10 CAPSULE | Refills: 0 | Status: SHIPPED | OUTPATIENT
Start: 2017-12-28 | End: 2018-03-08

## 2018-02-08 RX ORDER — RIVAROXABAN 15 MG/1
TABLET, FILM COATED ORAL
Qty: 90 TABLET | Refills: 3 | Status: SHIPPED | OUTPATIENT
Start: 2018-02-08 | End: 2019-01-26 | Stop reason: SDUPTHER

## 2018-03-08 DIAGNOSIS — E78.5 HYPERLIPIDEMIA, UNSPECIFIED HYPERLIPIDEMIA TYPE: Chronic | ICD-10-CM

## 2018-03-08 DIAGNOSIS — N18.30 STAGE 3 CHRONIC KIDNEY DISEASE (HCC): ICD-10-CM

## 2018-03-08 DIAGNOSIS — K21.9 GASTROESOPHAGEAL REFLUX DISEASE, ESOPHAGITIS PRESENCE NOT SPECIFIED: Chronic | ICD-10-CM

## 2018-03-08 DIAGNOSIS — I50.30 CONGESTIVE HEART FAILURE WITH LEFT VENTRICULAR DIASTOLIC DYSFUNCTION, NYHA CLASS 3 (HCC): ICD-10-CM

## 2018-03-08 DIAGNOSIS — G90.9 AUTONOMIC DYSFUNCTION: ICD-10-CM

## 2018-03-08 DIAGNOSIS — J44.9 CHRONIC OBSTRUCTIVE PULMONARY DISEASE, UNSPECIFIED COPD TYPE (HCC): ICD-10-CM

## 2018-03-08 DIAGNOSIS — N40.0 PROSTATISM: Chronic | ICD-10-CM

## 2018-03-08 DIAGNOSIS — Z00.00 WELLNESS EXAMINATION: ICD-10-CM

## 2018-03-08 DIAGNOSIS — N18.30 STAGE 3 CHRONIC KIDNEY DISEASE (HCC): Primary | ICD-10-CM

## 2018-03-08 DIAGNOSIS — I10 ESSENTIAL HYPERTENSION: Chronic | ICD-10-CM

## 2018-03-08 DIAGNOSIS — Z86.73 HISTORY OF CVA (CEREBROVASCULAR ACCIDENT): ICD-10-CM

## 2018-03-08 DIAGNOSIS — E78.5 HYPERLIPIDEMIA, UNSPECIFIED HYPERLIPIDEMIA TYPE: Primary | Chronic | ICD-10-CM

## 2018-03-13 ENCOUNTER — RESULTS ENCOUNTER (OUTPATIENT)
Dept: FAMILY MEDICINE CLINIC | Facility: CLINIC | Age: 83
End: 2018-03-13

## 2018-03-13 DIAGNOSIS — J44.9 CHRONIC OBSTRUCTIVE PULMONARY DISEASE, UNSPECIFIED COPD TYPE (HCC): ICD-10-CM

## 2018-03-13 DIAGNOSIS — N18.30 STAGE 3 CHRONIC KIDNEY DISEASE (HCC): ICD-10-CM

## 2018-03-13 DIAGNOSIS — Z86.73 HISTORY OF CVA (CEREBROVASCULAR ACCIDENT): ICD-10-CM

## 2018-03-13 DIAGNOSIS — G90.9 AUTONOMIC DYSFUNCTION: ICD-10-CM

## 2018-03-13 DIAGNOSIS — K21.9 GASTROESOPHAGEAL REFLUX DISEASE, ESOPHAGITIS PRESENCE NOT SPECIFIED: Chronic | ICD-10-CM

## 2018-03-13 DIAGNOSIS — I50.30 CONGESTIVE HEART FAILURE WITH LEFT VENTRICULAR DIASTOLIC DYSFUNCTION, NYHA CLASS 3 (HCC): ICD-10-CM

## 2018-03-13 DIAGNOSIS — I10 ESSENTIAL HYPERTENSION: Chronic | ICD-10-CM

## 2018-03-13 DIAGNOSIS — Z00.00 WELLNESS EXAMINATION: ICD-10-CM

## 2018-03-13 DIAGNOSIS — N40.0 PROSTATISM: Chronic | ICD-10-CM

## 2018-03-13 DIAGNOSIS — E78.5 HYPERLIPIDEMIA, UNSPECIFIED HYPERLIPIDEMIA TYPE: Chronic | ICD-10-CM

## 2018-04-06 RX ORDER — RIVAROXABAN 15 MG/1
TABLET, FILM COATED ORAL
Qty: 90 TABLET | OUTPATIENT
Start: 2018-04-06

## 2018-04-06 RX ORDER — ESOMEPRAZOLE MAGNESIUM 40 MG/1
CAPSULE, DELAYED RELEASE ORAL
Qty: 180 CAPSULE | Refills: 1 | Status: SHIPPED | OUTPATIENT
Start: 2018-04-06 | End: 2018-05-17 | Stop reason: CLARIF

## 2018-04-06 RX ORDER — DUTASTERIDE 0.5 MG/1
CAPSULE, LIQUID FILLED ORAL
Qty: 90 CAPSULE | Refills: 1 | Status: SHIPPED | OUTPATIENT
Start: 2018-04-06 | End: 2018-11-27 | Stop reason: SDUPTHER

## 2018-04-25 RX ORDER — AZELASTINE HYDROCHLORIDE 0.5 MG/ML
SOLUTION/ DROPS OPHTHALMIC
Qty: 6 ML | Refills: 5 | Status: SHIPPED | OUTPATIENT
Start: 2018-04-25 | End: 2019-02-26 | Stop reason: SDUPTHER

## 2018-05-04 DIAGNOSIS — I66.3: ICD-10-CM

## 2018-05-04 DIAGNOSIS — J44.9 CHRONIC OBSTRUCTIVE PULMONARY DISEASE, UNSPECIFIED COPD TYPE (HCC): ICD-10-CM

## 2018-05-04 DIAGNOSIS — N18.30 STAGE 3 CHRONIC KIDNEY DISEASE (HCC): ICD-10-CM

## 2018-05-04 DIAGNOSIS — K21.9 GASTROESOPHAGEAL REFLUX DISEASE, ESOPHAGITIS PRESENCE NOT SPECIFIED: Chronic | ICD-10-CM

## 2018-05-04 DIAGNOSIS — E78.5 HYPERLIPIDEMIA, UNSPECIFIED HYPERLIPIDEMIA TYPE: Primary | Chronic | ICD-10-CM

## 2018-05-04 DIAGNOSIS — Z87.891 HISTORY OF TOBACCO USE: ICD-10-CM

## 2018-05-04 DIAGNOSIS — G47.10 HYPERSOMNIA: ICD-10-CM

## 2018-05-04 DIAGNOSIS — Z79.01 ANTICOAGULATED: ICD-10-CM

## 2018-05-04 DIAGNOSIS — G90.9 AUTONOMIC DYSFUNCTION: ICD-10-CM

## 2018-05-04 DIAGNOSIS — R26.9 GAIT DIFFICULTY: Chronic | ICD-10-CM

## 2018-05-04 DIAGNOSIS — I48.20 CHRONIC ATRIAL FIBRILLATION (HCC): ICD-10-CM

## 2018-05-04 DIAGNOSIS — Z00.00 WELLNESS EXAMINATION: ICD-10-CM

## 2018-05-04 DIAGNOSIS — Z86.73 HISTORY OF CVA (CEREBROVASCULAR ACCIDENT): ICD-10-CM

## 2018-05-04 DIAGNOSIS — I10 ESSENTIAL HYPERTENSION: Chronic | ICD-10-CM

## 2018-05-04 DIAGNOSIS — N40.0 PROSTATISM: Chronic | ICD-10-CM

## 2018-05-07 ENCOUNTER — RESULTS ENCOUNTER (OUTPATIENT)
Dept: FAMILY MEDICINE CLINIC | Facility: CLINIC | Age: 83
End: 2018-05-07

## 2018-05-07 DIAGNOSIS — G90.9 AUTONOMIC DYSFUNCTION: ICD-10-CM

## 2018-05-07 DIAGNOSIS — Z87.891 HISTORY OF TOBACCO USE: ICD-10-CM

## 2018-05-07 DIAGNOSIS — J44.9 CHRONIC OBSTRUCTIVE PULMONARY DISEASE, UNSPECIFIED COPD TYPE (HCC): ICD-10-CM

## 2018-05-07 DIAGNOSIS — Z86.73 HISTORY OF CVA (CEREBROVASCULAR ACCIDENT): ICD-10-CM

## 2018-05-07 DIAGNOSIS — N40.0 PROSTATISM: Chronic | ICD-10-CM

## 2018-05-07 DIAGNOSIS — E78.5 HYPERLIPIDEMIA, UNSPECIFIED HYPERLIPIDEMIA TYPE: Chronic | ICD-10-CM

## 2018-05-07 DIAGNOSIS — I66.3: ICD-10-CM

## 2018-05-07 DIAGNOSIS — Z79.01 ANTICOAGULATED: ICD-10-CM

## 2018-05-07 DIAGNOSIS — Z00.00 WELLNESS EXAMINATION: ICD-10-CM

## 2018-05-07 DIAGNOSIS — N18.30 STAGE 3 CHRONIC KIDNEY DISEASE (HCC): ICD-10-CM

## 2018-05-07 DIAGNOSIS — R26.9 GAIT DIFFICULTY: Chronic | ICD-10-CM

## 2018-05-07 DIAGNOSIS — I48.20 CHRONIC ATRIAL FIBRILLATION (HCC): ICD-10-CM

## 2018-05-07 DIAGNOSIS — I10 ESSENTIAL HYPERTENSION: Chronic | ICD-10-CM

## 2018-05-07 DIAGNOSIS — K21.9 GASTROESOPHAGEAL REFLUX DISEASE, ESOPHAGITIS PRESENCE NOT SPECIFIED: Chronic | ICD-10-CM

## 2018-05-07 DIAGNOSIS — G47.10 HYPERSOMNIA: ICD-10-CM

## 2018-05-15 LAB
ALBUMIN SERPL-MCNC: 3.9 G/DL (ref 3.5–5)
ALBUMIN/GLOB SERPL: 1.4 G/DL (ref 1.1–2.5)
ALP SERPL-CCNC: 71 U/L (ref 24–120)
ALT SERPL-CCNC: 20 U/L (ref 0–54)
AST SERPL-CCNC: 22 U/L (ref 7–45)
BASOPHILS # BLD AUTO: 0.04 10*3/MM3 (ref 0–0.2)
BASOPHILS NFR BLD AUTO: 0.6 % (ref 0–2)
BILIRUB SERPL-MCNC: 0.9 MG/DL (ref 0.1–1)
BUN SERPL-MCNC: 40 MG/DL (ref 5–21)
BUN/CREAT SERPL: 21.3 (ref 7–25)
CALCIUM SERPL-MCNC: 9.9 MG/DL (ref 8.4–10.4)
CHLORIDE SERPL-SCNC: 97 MMOL/L (ref 98–110)
CHOLEST SERPL-MCNC: 265 MG/DL (ref 130–200)
CO2 SERPL-SCNC: 30 MMOL/L (ref 24–31)
CREAT SERPL-MCNC: 1.88 MG/DL (ref 0.5–1.4)
EOSINOPHIL # BLD AUTO: 0.21 10*3/MM3 (ref 0–0.7)
EOSINOPHIL NFR BLD AUTO: 3.3 % (ref 0–4)
ERYTHROCYTE [DISTWIDTH] IN BLOOD BY AUTOMATED COUNT: 13.5 % (ref 12–15)
FERRITIN SERPL-MCNC: 74.1 NG/ML (ref 17.9–464)
FOLATE SERPL-MCNC: >20 NG/ML
GFR SERPLBLD CREATININE-BSD FMLA CKD-EPI: 34 ML/MIN/1.73
GFR SERPLBLD CREATININE-BSD FMLA CKD-EPI: 41 ML/MIN/1.73
GLOBULIN SER CALC-MCNC: 2.8 GM/DL
GLUCOSE SERPL-MCNC: 103 MG/DL (ref 70–100)
HCT VFR BLD AUTO: 47.3 % (ref 40–52)
HDLC SERPL-MCNC: 52 MG/DL
HGB BLD-MCNC: 15.4 G/DL (ref 14–18)
IMM GRANULOCYTES # BLD: 0.05 10*3/MM3 (ref 0–0.03)
IMM GRANULOCYTES NFR BLD: 0.8 % (ref 0–5)
IRON SERPL-MCNC: 91 MCG/DL (ref 42–180)
LDLC SERPL CALC-MCNC: 182 MG/DL (ref 0–99)
LYMPHOCYTES # BLD AUTO: 1.14 10*3/MM3 (ref 0.72–4.86)
LYMPHOCYTES NFR BLD AUTO: 18 % (ref 15–45)
MCH RBC QN AUTO: 29.7 PG (ref 28–32)
MCHC RBC AUTO-ENTMCNC: 32.6 G/DL (ref 33–36)
MCV RBC AUTO: 91.1 FL (ref 82–95)
MONOCYTES # BLD AUTO: 0.8 10*3/MM3 (ref 0.19–1.3)
MONOCYTES NFR BLD AUTO: 12.7 % (ref 4–12)
NEUTROPHILS # BLD AUTO: 4.08 10*3/MM3 (ref 1.87–8.4)
NEUTROPHILS NFR BLD AUTO: 64.6 % (ref 39–78)
NRBC BLD AUTO-RTO: 0 /100 WBC (ref 0–0)
PLATELET # BLD AUTO: 193 10*3/MM3 (ref 130–400)
POTASSIUM SERPL-SCNC: 4.3 MMOL/L (ref 3.5–5.3)
PROT SERPL-MCNC: 6.7 G/DL (ref 6.3–8.7)
RBC # BLD AUTO: 5.19 10*6/MM3 (ref 4.8–5.9)
RETICS/RBC NFR AUTO: 1.54 % (ref 0.6–1.8)
SODIUM SERPL-SCNC: 138 MMOL/L (ref 135–145)
TRIGL SERPL-MCNC: 154 MG/DL (ref 0–149)
TSH SERPL DL<=0.005 MIU/L-ACNC: 1.33 MIU/ML (ref 0.47–4.68)
VIT B12 SERPL-MCNC: >1000 PG/ML (ref 239–931)
VLDLC SERPL CALC-MCNC: 30.8 MG/DL
WBC # BLD AUTO: 6.32 10*3/MM3 (ref 4.8–10.8)

## 2018-05-17 ENCOUNTER — OFFICE VISIT (OUTPATIENT)
Dept: FAMILY MEDICINE CLINIC | Facility: CLINIC | Age: 83
End: 2018-05-17

## 2018-05-17 VITALS
DIASTOLIC BLOOD PRESSURE: 72 MMHG | BODY MASS INDEX: 24.34 KG/M2 | HEIGHT: 70 IN | HEART RATE: 96 BPM | TEMPERATURE: 97.6 F | RESPIRATION RATE: 18 BRPM | OXYGEN SATURATION: 98 % | WEIGHT: 170 LBS | SYSTOLIC BLOOD PRESSURE: 116 MMHG

## 2018-05-17 DIAGNOSIS — G47.33 OBSTRUCTIVE SLEEP APNEA: ICD-10-CM

## 2018-05-17 DIAGNOSIS — Z79.01 ANTICOAGULATED: ICD-10-CM

## 2018-05-17 DIAGNOSIS — R11.2 NAUSEA AND VOMITING, INTRACTABILITY OF VOMITING NOT SPECIFIED, UNSPECIFIED VOMITING TYPE: ICD-10-CM

## 2018-05-17 DIAGNOSIS — I50.30 CONGESTIVE HEART FAILURE WITH LEFT VENTRICULAR DIASTOLIC DYSFUNCTION, NYHA CLASS 3 (HCC): ICD-10-CM

## 2018-05-17 DIAGNOSIS — I10 HYPERTENSION, UNSPECIFIED TYPE: Chronic | ICD-10-CM

## 2018-05-17 DIAGNOSIS — I48.20 CHRONIC ATRIAL FIBRILLATION (HCC): ICD-10-CM

## 2018-05-17 DIAGNOSIS — K21.9 GASTROESOPHAGEAL REFLUX DISEASE, ESOPHAGITIS PRESENCE NOT SPECIFIED: Chronic | ICD-10-CM

## 2018-05-17 DIAGNOSIS — N18.30 STAGE 3 CHRONIC KIDNEY DISEASE (HCC): Primary | ICD-10-CM

## 2018-05-17 DIAGNOSIS — R27.0 ATAXIA: ICD-10-CM

## 2018-05-17 DIAGNOSIS — R11.0 NAUSEA: ICD-10-CM

## 2018-05-17 PROCEDURE — 99214 OFFICE O/P EST MOD 30 MIN: CPT | Performed by: FAMILY MEDICINE

## 2018-05-17 RX ORDER — RANITIDINE 150 MG/1
150 TABLET ORAL NIGHTLY
COMMUNITY
End: 2018-05-17

## 2018-05-17 RX ORDER — ESOMEPRAZOLE MAGNESIUM 40 MG/1
40 CAPSULE, DELAYED RELEASE ORAL
Qty: 30 CAPSULE | Refills: 5 | Status: SHIPPED | OUTPATIENT
Start: 2018-05-17 | End: 2018-10-26 | Stop reason: SDUPTHER

## 2018-05-17 NOTE — PROGRESS NOTES
Subjective   James Birch is a 89 y.o. male presenting with chief complaint of:   Chief Complaint   Patient presents with   • Transient Ischemic Attack     has had 2 episodes   • Atrial Fibrillation   • Congestive Heart Failure   • Hyperlipidemia   • Heartburn   • Hypertension   • Asthma   • COPD   • Chronic Kidney Disease       History of Present Illness :  With wife.   Has multiple chronic problems to consider that might have a bearing on today's issues;  an interval appointment.       1. Stage 3 chronic kidney disease    2. Chronic atrial fibrillation    3. Rzqplhyoowhqvt-exk-qowx/xarelto    4. Congestive heart failure with left ventricular diastolic dysfunction, NYHA class 3    5. Gastroesophageal reflux disease, esophagitis presence not specified    6. Hypertension, unspecified type    7. DAMIAN-intolerant    8. Nausea    9. Nausea and vomiting, intractability of vomiting not specified, unspecified vomiting type    10. Ataxia        Other chronic problem/s to consider:   HTN.  The HTN has been present for years/it is chronic.  The HTN is assumed essential/without testing needed to look for other.  The HTN is controlled manifest by todays blood pressure and no home monitoring.  Associated illness below.   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 It is chronic.  The arrthymia is primarily a fib; allowed and treated rate control/anticoagulation.   The rhythm is not associated with syncope/near syncope, dizziness, or weakness. Medications being used help.  Congestive heart failure/echo changes: This has been present for years/over a year.  It is chronic.  The CHF had features on echo of diastolic dysfunction.  This has been associated with SOB, LE edema on/off. Sees cardiology.  CKD3/renal insufficiency: This has been present for years/over a year.  It is chronic.  It is stable by serial labs here. Sees nephrology.  Previously and today warned of risks NSAID-many other Rx (best to be sure  any prescriber aware of kidney issue), x-ray contrast, dehydration.  Associated with fatigue, occ LE edema.   Anticoagulation: Requires anticoagulation with xarelto for history cva, a fib.   This needs to be continually monitored for risk/benefit.   History CVA/or TIA:  This happened 1.29.10/cerebellar.  This makes it a chronic concern.  It was without bleed.  The neuro deficits are variable: on/off balance issues.  Follows with neuro.    GE reflux/heartburn: This has been present for years/over a year.  It is a chronic condition.   It is stable as there is no change in infrequent heartburn and no dysphagia.  Medication required to control symptoms.  Zantac treated.   Obstructive Sleep Apnea/Nocturnal hypoxemia:  This has been present for years/over a year.  It is chronic.    They are not compliant with use of Cpap machine  Prostatism: This has been present for years/over a year. It is chronic.  With avodart and flomax Rx he is voiding ok.  Sees urology;  AUA=below.    Has an/another acute issue today: on/off nausea and even vomiting.    The following portions of the patient's history were reviewed and updated as appropriate: allergies, current medications, past family history, past medical history, past social history, past surgical history and problem list.  Records acquired and reviewed; TCC migrated.      Current Outpatient Prescriptions:   •  albuterol (PROVENTIL HFA;VENTOLIN HFA) 108 (90 BASE) MCG/ACT inhaler, Inhale 2 puffs as needed for wheezing., Disp: , Rfl:   •  azelastine (OPTIVAR) 0.05 % ophthalmic solution, INSTILL 1 DROP INTO BOTH EYES TWICE DAILY GENERIC FOR OPTIVAR, Disp: 6 mL, Rfl: 5  •  B Complex-C (SUPER B COMPLEX/VITAMIN C PO), Take  by mouth daily., Disp: , Rfl:   •  Cholecalciferol (VITAMIN D3 PO), Take 1 tablet by mouth daily., Disp: , Rfl:   •  Coenzyme Q10 (CO Q-10 PO), Take  by mouth daily., Disp: , Rfl:   •  dutasteride (AVODART) 0.5 MG capsule, TAKE 1 CAPSULE DAILY GENERIC FOR AVODART,  Disp: 90 capsule, Rfl: 1  •  furosemide (LASIX) 40 MG tablet, Take 40 mg by mouth Daily., Disp: , Rfl:   •  Multiple Vitamins-Minerals (MULTIVITAMIN PO), Take 1 tablet by mouth daily., Disp: , Rfl:   •  Omega-3 Fatty Acids (FISH OIL PO), Take  by mouth daily., Disp: , Rfl:   •  SYMBICORT 80-4.5 MCG/ACT inhaler, Inhale 2 puffs 2 (Two) Times a Day., Disp: , Rfl:   •  tamsulosin (FLOMAX) 0.4 MG capsule 24 hr capsule, TAKE 1 CAPSULE DAILY SHORTLY AFTER THE SAME MEAL GENERIC FOR FLOMAX, Disp: 90 capsule, Rfl: 2  •  XARELTO 15 MG tablet, TAKE ONE TABLET DAILY, Disp: 90 tablet, Rfl: 3  .  Zantac.     No problems with medications.  Refills if needed done    No Known Allergies    Review of Systems  GENERAL:  Inactive/slower with limits, speed, stamina for age and gait . Sleep is ok with oxygen. No fever now.  ENDO:  No syncope, near or diaphoretic sweaty spells.  HEENT: No head injury or headache,  No vision change, Same hearing loss.  Ears without pain/drainage.  No sore throat.  No significant nasal/sinus congestion/drainage. No epistaxis.  CHEST: No chest wall tenderness or mass. No change occ cough, without wheeze.  No SOB; no hemoptysis.  CV: No chest pain, palpitations, ankle edema.  GI: No heartburn, dysphagia.  No abdominal pain, diarrhea, constipation.  No rectal bleeding, or melena.  On/off nausea and vomiting.   :  Voids without dysuria, or incontinence to completion.  ORTHO: No painful/swollen joints but various on /off sore.  No change occ sore neck or back.  No acute neck or back pain without recent injury.   NEURO: No dizziness, weakness of extremities.  No change UE/LE mild numbness/paresthesias.   PSYCH: Mild ? memory loss.  Mood good; occ anxious, depressed but/and not suicidal.  Tries to tolerate stress .     Results for orders placed or performed in visit on 05/07/18   Iron   Result Value Ref Range    Iron 91 42 - 180 mcg/dL   Vitamin B12 and Folate   Result Value Ref Range    Vitamin B-12 >1000 (H) 239  - 931 pg/mL    Folate >20.00 >2.75 ng/mL   Ferritin   Result Value Ref Range    Ferritin 74.10 17.90 - 464.00 ng/mL   Reticulocytes   Result Value Ref Range    Reticulocyte Absolute 1.54 0.60 - 1.80 %   Comprehensive metabolic panel   Result Value Ref Range    Glucose 103 (H) 70 - 100 mg/dL    BUN 40 (H) 5 - 21 mg/dL    Creatinine 1.88 (H) 0.50 - 1.40 mg/dL    eGFR Non African Am 34 (L) >60 mL/min/1.73    eGFR  Am 41 (L) >60 mL/min/1.73    BUN/Creatinine Ratio 21.3 7.0 - 25.0    Sodium 138 135 - 145 mmol/L    Potassium 4.3 3.5 - 5.3 mmol/L    Chloride 97 (L) 98 - 110 mmol/L    Total CO2 30.0 24.0 - 31.0 mmol/L    Calcium 9.9 8.4 - 10.4 mg/dL    Total Protein 6.7 6.3 - 8.7 g/dL    Albumin 3.90 3.50 - 5.00 g/dL    Globulin 2.8 gm/dL    A/G Ratio 1.4 1.1 - 2.5 g/dL    Total Bilirubin 0.9 0.1 - 1.0 mg/dL    Alkaline Phosphatase 71 24 - 120 U/L    AST (SGOT) 22 7 - 45 U/L    ALT (SGPT) 20 0 - 54 U/L   Lipid panel   Result Value Ref Range    Total Cholesterol 265 (H) 130 - 200 mg/dL    Triglycerides 154 (H) 0 - 149 mg/dL    HDL Cholesterol 52 >=40 mg/dL    VLDL Cholesterol 30.8 mg/dL    LDL Cholesterol  182 (H) 0 - 99 mg/dL   TSH   Result Value Ref Range    TSH 1.330 0.470 - 4.680 mIU/mL   CBC and Differential   Result Value Ref Range    WBC 6.32 4.80 - 10.80 10*3/mm3    RBC 5.19 4.80 - 5.90 10*6/mm3    Hemoglobin 15.4 14.0 - 18.0 g/dL    Hematocrit 47.3 40.0 - 52.0 %    MCV 91.1 82.0 - 95.0 fL    MCH 29.7 28.0 - 32.0 pg    MCHC 32.6 (L) 33.0 - 36.0 g/dL    RDW 13.5 12.0 - 15.0 %    Platelets 193 130 - 400 10*3/mm3    Neutrophil Rel % 64.6 39.0 - 78.0 %    Lymphocyte Rel % 18.0 15.0 - 45.0 %    Monocyte Rel % 12.7 (H) 4.0 - 12.0 %    Eosinophil Rel % 3.3 0.0 - 4.0 %    Basophil Rel % 0.6 0.0 - 2.0 %    Neutrophils Absolute 4.08 1.87 - 8.40 10*3/mm3    Lymphocytes Absolute 1.14 0.72 - 4.86 10*3/mm3    Monocytes Absolute 0.80 0.19 - 1.30 10*3/mm3    Eosinophils Absolute 0.21 0.00 - 0.70 10*3/mm3    Basophils Absolute  "0.04 0.00 - 0.20 10*3/mm3    Immature Granulocyte Rel % 0.8 0.0 - 5.0 %    Immature Grans Absolute 0.05 (H) 0.00 - 0.03 10*3/mm3    nRBC 0.0 0.0 - 0.0 /100 WBC       Lab Results   Component Value Date    PSA 1.070 11/08/2016        Lab Results:  CBC:    Lab Results - Last 18 Months  Lab Units 05/15/18  0801 09/07/17  0800   WBC 10*3/mm3  --  5.99   HEMOGLOBIN g/dL 15.4 14.6   HEMATOCRIT % 47.3 45.2   PLATELETS 10*3/mm3 193 177   IRON mcg/dL 91  --       BMP/CMP:    Lab Results - Last 18 Months  Lab Units 05/15/18  0801 09/07/17  0800   SODIUM mmol/L 138 139   POTASSIUM mmol/L 4.3 4.5   CHLORIDE mmol/L 97* 102   TOTAL CO2, ARTERIAL mmol/L 30.0 26.0   GLUCOSE mg/dL 103* 92   BUN mg/dL 40* 31*   CREATININE mg/dL 1.88* 1.67*   EGFR IF NONAFRICN AM mL/min/1.73 34* 39*   EGFR IF AFRICN AM mL/min/1.73 41* 47*   CALCIUM mg/dL 9.9 9.5     HEPATIC:    Lab Results - Last 18 Months  Lab Units 05/15/18  0801 09/07/17  0800   ALT (SGPT) U/L 20 30   AST (SGOT) U/L 22 19   ALK PHOS U/L 71 61     THYROID:    Lab Results - Last 18 Months  Lab Units 05/15/18  0801   TSH mIU/mL 1.330     A1C:No results for input(s): HGBA1C in the last 44829 hours.  PSA:No results for input(s): PSA in the last 09594 hours.    Objective   /72   Pulse 96   Temp 97.6 °F (36.4 °C) (Oral)   Resp 18   Ht 177.8 cm (70\")   Wt 77.1 kg (170 lb)   SpO2 98%   BMI 24.39 kg/m²   Body mass index is 24.39 kg/m².    Physical Exam  GENERAL:  Well nourished/developed in no acute distress.  SKIN: Turgor excellent, without wound, rash, lesion.  HEENT: Normal cephalic without trauma.  Pupils equal round reactive to light. Extraocular motions full without nystagmus.   External canals nonobstructive nontender without reddness. Tymphatic membranes calos with shelia structures intact.   Oral cavity without growths, exudates, and moist.  Posterior pharynx without mass, obstruction, redness.  No thyromegaly, mass, tenderness, lymphadenopathy and supple.  CV: Regular " rhythm.  No murmur, gallop,  edema. Posterior pulses intact.  No carotid bruits.  CHEST: No chest wall tenderness or mass.   LUNGS: Symmetric motion with clear to auscultation.  No dullness to percussion  ABD: Soft, nontender without mass.   ORTHO: Symmetric extremities without swelling/point tenderness.  Full gross range of motion.  4 cm soft nodule over distal clavicle.  Walking with assistance cane.  NEURO: CN 2-12 grossly intact.  Symmetric facies. 1/4 x bicep equal reflexes.  UE/LE   3/5 strength throughout.  Nonfocal use extremities. Speech clear.  Intact light touch with monofilament, vibratory sensation with tuning fork; equal toes/distal feet.    PSYCH: Oriented x 3.  Pleasant calm, well kept.  Purposeful/directed conservation with intact short/long gross memory.     Assessment/Plan     1. Stage 3 chronic kidney disease    2. Chronic atrial fibrillation    3. Atuowrimebxugv-tkh-vsyu/xarelto    4. Congestive heart failure with left ventricular diastolic dysfunction, NYHA class 3    5. Gastroesophageal reflux disease, esophagitis presence not specified    6. Hypertension, unspecified type    7. DAMIAN-intolerant    8. Nausea    9. Nausea and vomiting, intractability of vomiting not specified, unspecified vomiting type    10. Ataxia        Rx: reviewed/changes:  Stop zantac  Start nexium 40    LAB/Testing/Referrals: reviewed/orders:   Today:   Orders Placed This Encounter   Procedures   • MRI Brain Without Contrast   • US Gallbladder     Usual:   3m CBC, BMP  6m CBC, CMP, iron  12m CBC, CMP, LIPID, TSH, Vit D, iron, ferritin, B12, folate    Discussions:   Body mass index is 24.39 kg/m².   Patient's Body mass index is 24.39 kg/m². BMI is within normal parameters. No follow-up required.  Non-smoker    Patient Instructions   Stay hydrated   60 oz/24 hr minimum      Follow up: Return for after tests; tests soon.  Future Appointments  Date Time Provider Department Center   8/23/2018 9:20 AM ED [facility]  [facility] None   11/1/2018 11:30 AM MD [facility] None   11/21/2018 10:15 AM MD [facility] None

## 2018-05-24 DIAGNOSIS — R11.2 NAUSEA AND VOMITING, INTRACTABILITY OF VOMITING NOT SPECIFIED, UNSPECIFIED VOMITING TYPE: ICD-10-CM

## 2018-05-24 DIAGNOSIS — R11.0 NAUSEA: ICD-10-CM

## 2018-05-24 DIAGNOSIS — K21.9 GASTROESOPHAGEAL REFLUX DISEASE, ESOPHAGITIS PRESENCE NOT SPECIFIED: Chronic | ICD-10-CM

## 2018-05-24 DIAGNOSIS — R27.0 ATAXIA: ICD-10-CM

## 2018-05-24 NOTE — PROGRESS NOTES
Wife says he has not had any more vomiting or pain. Doesn't have any energy. He had an MRI today also

## 2018-05-25 ENCOUNTER — HOSPITAL ENCOUNTER (INPATIENT)
Facility: HOSPITAL | Age: 83
LOS: 2 days | Discharge: HOME | End: 2018-05-27
Attending: FAMILY MEDICINE | Admitting: FAMILY MEDICINE

## 2018-05-25 LAB
ANION GAP SERPL CALCULATED.3IONS-SCNC: 9 MMOL/L (ref 4–13)
BASOPHILS # BLD AUTO: 0.04 10*3/MM3 (ref 0–0.2)
BASOPHILS NFR BLD AUTO: 0.7 % (ref 0–2)
BUN BLD-MCNC: 40 MG/DL (ref 5–21)
BUN/CREAT SERPL: 19 (ref 7–25)
CALCIUM SPEC-SCNC: 9.3 MG/DL (ref 8.4–10.4)
CHLORIDE SERPL-SCNC: 99 MMOL/L (ref 98–110)
CO2 SERPL-SCNC: 28 MMOL/L (ref 24–31)
CREAT BLD-MCNC: 2.1 MG/DL (ref 0.5–1.4)
DEPRECATED RDW RBC AUTO: 44.2 FL (ref 40–54)
EOSINOPHIL # BLD AUTO: 0.13 10*3/MM3 (ref 0–0.7)
EOSINOPHIL NFR BLD AUTO: 2.1 % (ref 0–4)
ERYTHROCYTE [DISTWIDTH] IN BLOOD BY AUTOMATED COUNT: 13.5 % (ref 12–15)
GFR SERPL CREATININE-BSD FRML MDRD: 30 ML/MIN/1.73
GLUCOSE BLD-MCNC: 104 MG/DL (ref 70–100)
HCT VFR BLD AUTO: 42.3 % (ref 40–52)
HGB BLD-MCNC: 14.2 G/DL (ref 14–18)
IMM GRANULOCYTES # BLD: 0.04 10*3/MM3 (ref 0–0.03)
IMM GRANULOCYTES NFR BLD: 0.7 % (ref 0–5)
LYMPHOCYTES # BLD AUTO: 1.27 10*3/MM3 (ref 0.72–4.86)
LYMPHOCYTES NFR BLD AUTO: 20.7 % (ref 15–45)
MCH RBC QN AUTO: 29.7 PG (ref 28–32)
MCHC RBC AUTO-ENTMCNC: 33.6 G/DL (ref 33–36)
MCV RBC AUTO: 88.5 FL (ref 82–95)
MONOCYTES # BLD AUTO: 0.77 10*3/MM3 (ref 0.19–1.3)
MONOCYTES NFR BLD AUTO: 12.5 % (ref 4–12)
NEUTROPHILS # BLD AUTO: 3.9 10*3/MM3 (ref 1.87–8.4)
NEUTROPHILS NFR BLD AUTO: 63.3 % (ref 39–78)
NRBC BLD MANUAL-RTO: 0 /100 WBC (ref 0–0)
NT-PROBNP SERPL-MCNC: 660 PG/ML (ref 0–1800)
PLATELET # BLD AUTO: 182 10*3/MM3 (ref 130–400)
PMV BLD AUTO: 9.5 FL (ref 6–12)
POTASSIUM BLD-SCNC: 4.2 MMOL/L (ref 3.5–5.3)
RBC # BLD AUTO: 4.78 10*6/MM3 (ref 4.8–5.9)
SODIUM BLD-SCNC: 136 MMOL/L (ref 135–145)
TROPONIN I SERPL-MCNC: <0.012 NG/ML (ref 0–0.03)
WBC NRBC COR # BLD: 6.15 10*3/MM3 (ref 4.8–10.8)

## 2018-05-25 PROCEDURE — 85025 COMPLETE CBC W/AUTO DIFF WBC: CPT | Performed by: FAMILY MEDICINE

## 2018-05-25 PROCEDURE — 99222 1ST HOSP IP/OBS MODERATE 55: CPT | Performed by: FAMILY MEDICINE

## 2018-05-25 PROCEDURE — 80048 BASIC METABOLIC PNL TOTAL CA: CPT | Performed by: FAMILY MEDICINE

## 2018-05-25 PROCEDURE — 83880 ASSAY OF NATRIURETIC PEPTIDE: CPT | Performed by: FAMILY MEDICINE

## 2018-05-25 PROCEDURE — 94640 AIRWAY INHALATION TREATMENT: CPT

## 2018-05-25 PROCEDURE — 84484 ASSAY OF TROPONIN QUANT: CPT | Performed by: FAMILY MEDICINE

## 2018-05-25 RX ORDER — BUDESONIDE AND FORMOTEROL FUMARATE DIHYDRATE 80; 4.5 UG/1; UG/1
2 AEROSOL RESPIRATORY (INHALATION)
Status: DISCONTINUED | OUTPATIENT
Start: 2018-05-25 | End: 2018-05-27 | Stop reason: HOSPADM

## 2018-05-25 RX ORDER — SODIUM CHLORIDE 9 MG/ML
30 INJECTION, SOLUTION INTRAVENOUS CONTINUOUS
Status: DISCONTINUED | OUTPATIENT
Start: 2018-05-25 | End: 2018-05-27

## 2018-05-25 RX ORDER — SODIUM CHLORIDE 0.9 % (FLUSH) 0.9 %
1-10 SYRINGE (ML) INJECTION AS NEEDED
Status: DISCONTINUED | OUTPATIENT
Start: 2018-05-25 | End: 2018-05-27 | Stop reason: HOSPADM

## 2018-05-25 RX ORDER — DILTIAZEM HYDROCHLORIDE 120 MG/1
120 CAPSULE, COATED, EXTENDED RELEASE ORAL
Status: DISCONTINUED | OUTPATIENT
Start: 2018-05-26 | End: 2018-05-27 | Stop reason: HOSPADM

## 2018-05-25 RX ORDER — PANTOPRAZOLE SODIUM 40 MG/1
40 TABLET, DELAYED RELEASE ORAL
Status: DISCONTINUED | OUTPATIENT
Start: 2018-05-26 | End: 2018-05-27 | Stop reason: HOSPADM

## 2018-05-25 RX ORDER — FINASTERIDE 5 MG/1
5 TABLET, FILM COATED ORAL DAILY
Status: DISCONTINUED | OUTPATIENT
Start: 2018-05-26 | End: 2018-05-27 | Stop reason: HOSPADM

## 2018-05-25 RX ORDER — FUROSEMIDE 40 MG/1
40 TABLET ORAL DAILY
Status: DISCONTINUED | OUTPATIENT
Start: 2018-05-26 | End: 2018-05-27 | Stop reason: HOSPADM

## 2018-05-25 RX ORDER — TAMSULOSIN HYDROCHLORIDE 0.4 MG/1
0.4 CAPSULE ORAL DAILY
Status: DISCONTINUED | OUTPATIENT
Start: 2018-05-26 | End: 2018-05-27 | Stop reason: HOSPADM

## 2018-05-25 RX ORDER — KETOTIFEN FUMARATE 0.35 MG/ML
1 SOLUTION/ DROPS OPHTHALMIC 2 TIMES DAILY
Status: DISCONTINUED | OUTPATIENT
Start: 2018-05-25 | End: 2018-05-27 | Stop reason: HOSPADM

## 2018-05-25 RX ADMIN — BUDESONIDE AND FORMOTEROL FUMARATE DIHYDRATE 2 PUFF: 80; 4.5 AEROSOL RESPIRATORY (INHALATION) at 21:27

## 2018-05-25 RX ADMIN — KETOTIFEN FUMARATE 1 DROP: 0.35 SOLUTION/ DROPS OPHTHALMIC at 21:34

## 2018-05-25 RX ADMIN — SODIUM CHLORIDE 30 ML/HR: 9 INJECTION, SOLUTION INTRAVENOUS at 21:34

## 2018-05-26 ENCOUNTER — APPOINTMENT (OUTPATIENT)
Dept: CARDIOLOGY | Facility: HOSPITAL | Age: 83
End: 2018-05-26
Attending: FAMILY MEDICINE

## 2018-05-26 LAB
ANION GAP SERPL CALCULATED.3IONS-SCNC: 9 MMOL/L (ref 4–13)
ARTICHOKE IGE QN: 149 MG/DL (ref 0–99)
BASOPHILS # BLD AUTO: 0.04 10*3/MM3 (ref 0–0.2)
BASOPHILS NFR BLD AUTO: 0.7 % (ref 0–2)
BH CV ECHO MEAS - AO MAX PG (FULL): 5.5 MMHG
BH CV ECHO MEAS - AO MAX PG: 8.2 MMHG
BH CV ECHO MEAS - AO MEAN PG (FULL): 3 MMHG
BH CV ECHO MEAS - AO MEAN PG: 4 MMHG
BH CV ECHO MEAS - AO ROOT AREA (BSA CORRECTED): 2
BH CV ECHO MEAS - AO ROOT AREA: 10.2 CM^2
BH CV ECHO MEAS - AO ROOT DIAM: 3.6 CM
BH CV ECHO MEAS - AO V2 MAX: 143 CM/SEC
BH CV ECHO MEAS - AO V2 MEAN: 90.4 CM/SEC
BH CV ECHO MEAS - AO V2 VTI: 25.1 CM
BH CV ECHO MEAS - AVA(I,A): 2.3 CM^2
BH CV ECHO MEAS - AVA(I,D): 2.3 CM^2
BH CV ECHO MEAS - AVA(V,A): 2.2 CM^2
BH CV ECHO MEAS - AVA(V,D): 2.2 CM^2
BH CV ECHO MEAS - BSA(HAYCOCK): 1.8 M^2
BH CV ECHO MEAS - BSA: 1.8 M^2
BH CV ECHO MEAS - BZI_BMI: 21.4 KILOGRAMS/M^2
BH CV ECHO MEAS - BZI_METRIC_HEIGHT: 177.8 CM
BH CV ECHO MEAS - BZI_METRIC_WEIGHT: 67.6 KG
BH CV ECHO MEAS - CONTRAST EF 4CH: 62.2 ML/M^2
BH CV ECHO MEAS - EDV(CUBED): 46.7 ML
BH CV ECHO MEAS - EDV(MOD-SP4): 57.1 ML
BH CV ECHO MEAS - EDV(TEICH): 54.4 ML
BH CV ECHO MEAS - EF(CUBED): 80.2 %
BH CV ECHO MEAS - EF(MOD-SP4): 62.2 %
BH CV ECHO MEAS - EF(TEICH): 73.5 %
BH CV ECHO MEAS - ESV(CUBED): 9.3 ML
BH CV ECHO MEAS - ESV(MOD-SP4): 21.6 ML
BH CV ECHO MEAS - ESV(TEICH): 14.4 ML
BH CV ECHO MEAS - FS: 41.7 %
BH CV ECHO MEAS - IVS/LVPW: 0.82
BH CV ECHO MEAS - IVSD: 0.9 CM
BH CV ECHO MEAS - LA DIMENSION: 3.4 CM
BH CV ECHO MEAS - LA/AO: 0.94
BH CV ECHO MEAS - LAT PEAK E' VEL: 11.5 CM/SEC
BH CV ECHO MEAS - LV DIASTOLIC VOL/BSA (35-75): 31 ML/M^2
BH CV ECHO MEAS - LV MASS(C)D: 107.9 GRAMS
BH CV ECHO MEAS - LV MASS(C)DI: 58.6 GRAMS/M^2
BH CV ECHO MEAS - LV MAX PG: 2.7 MMHG
BH CV ECHO MEAS - LV MEAN PG: 1 MMHG
BH CV ECHO MEAS - LV SYSTOLIC VOL/BSA (12-30): 11.7 ML/M^2
BH CV ECHO MEAS - LV V1 MAX: 81.7 CM/SEC
BH CV ECHO MEAS - LV V1 MEAN: 53.8 CM/SEC
BH CV ECHO MEAS - LV V1 VTI: 15.2 CM
BH CV ECHO MEAS - LVIDD: 3.6 CM
BH CV ECHO MEAS - LVIDS: 2.1 CM
BH CV ECHO MEAS - LVLD AP4: 7.4 CM
BH CV ECHO MEAS - LVLS AP4: 6.3 CM
BH CV ECHO MEAS - LVOT AREA (M): 3.8 CM^2
BH CV ECHO MEAS - LVOT AREA: 3.8 CM^2
BH CV ECHO MEAS - LVOT DIAM: 2.2 CM
BH CV ECHO MEAS - LVPWD: 1.1 CM
BH CV ECHO MEAS - MED PEAK E' VEL: 8.27 CM/SEC
BH CV ECHO MEAS - MV DEC TIME: 0.14 SEC
BH CV ECHO MEAS - MV E MAX VEL: 97.4 CM/SEC
BH CV ECHO MEAS - RAP SYSTOLE: 5 MMHG
BH CV ECHO MEAS - RVSP: 22.6 MMHG
BH CV ECHO MEAS - SI(AO): 138.7 ML/M^2
BH CV ECHO MEAS - SI(CUBED): 20.3 ML/M^2
BH CV ECHO MEAS - SI(LVOT): 31.4 ML/M^2
BH CV ECHO MEAS - SI(MOD-SP4): 19.3 ML/M^2
BH CV ECHO MEAS - SI(TEICH): 21.7 ML/M^2
BH CV ECHO MEAS - SV(AO): 255.5 ML
BH CV ECHO MEAS - SV(CUBED): 37.4 ML
BH CV ECHO MEAS - SV(LVOT): 57.8 ML
BH CV ECHO MEAS - SV(MOD-SP4): 35.5 ML
BH CV ECHO MEAS - SV(TEICH): 40 ML
BH CV ECHO MEAS - TR MAX VEL: 210 CM/SEC
BH CV ECHO MEASUREMENTS AVERAGE E/E' RATIO: 9.85
BUN BLD-MCNC: 37 MG/DL (ref 5–21)
BUN/CREAT SERPL: 19.7 (ref 7–25)
CALCIUM SPEC-SCNC: 9 MG/DL (ref 8.4–10.4)
CHLORIDE SERPL-SCNC: 101 MMOL/L (ref 98–110)
CHOLEST SERPL-MCNC: 216 MG/DL (ref 130–200)
CO2 SERPL-SCNC: 29 MMOL/L (ref 24–31)
CREAT BLD-MCNC: 1.88 MG/DL (ref 0.5–1.4)
DEPRECATED RDW RBC AUTO: 42.6 FL (ref 40–54)
EOSINOPHIL # BLD AUTO: 0.17 10*3/MM3 (ref 0–0.7)
EOSINOPHIL NFR BLD AUTO: 2.9 % (ref 0–4)
ERYTHROCYTE [DISTWIDTH] IN BLOOD BY AUTOMATED COUNT: 13.4 % (ref 12–15)
GFR SERPL CREATININE-BSD FRML MDRD: 34 ML/MIN/1.73
GLUCOSE BLD-MCNC: 95 MG/DL (ref 70–100)
HCT VFR BLD AUTO: 40.2 % (ref 40–52)
HDLC SERPL-MCNC: 39 MG/DL
HGB BLD-MCNC: 13.8 G/DL (ref 14–18)
IMM GRANULOCYTES # BLD: 0.02 10*3/MM3 (ref 0–0.03)
IMM GRANULOCYTES NFR BLD: 0.3 % (ref 0–5)
LDLC/HDLC SERPL: 3.73 {RATIO}
LEFT ATRIUM VOLUME INDEX: 17.5 ML/M2
LEFT ATRIUM VOLUME: 32.2 CM3
LYMPHOCYTES # BLD AUTO: 1.01 10*3/MM3 (ref 0.72–4.86)
LYMPHOCYTES NFR BLD AUTO: 17.1 % (ref 15–45)
MAXIMAL PREDICTED HEART RATE: 130 BPM
MCH RBC QN AUTO: 30 PG (ref 28–32)
MCHC RBC AUTO-ENTMCNC: 34.3 G/DL (ref 33–36)
MCV RBC AUTO: 87.4 FL (ref 82–95)
MONOCYTES # BLD AUTO: 0.77 10*3/MM3 (ref 0.19–1.3)
MONOCYTES NFR BLD AUTO: 13.1 % (ref 4–12)
NEUTROPHILS # BLD AUTO: 3.88 10*3/MM3 (ref 1.87–8.4)
NEUTROPHILS NFR BLD AUTO: 65.9 % (ref 39–78)
NRBC BLD MANUAL-RTO: 0 /100 WBC (ref 0–0)
PLATELET # BLD AUTO: 179 10*3/MM3 (ref 130–400)
PMV BLD AUTO: 10 FL (ref 6–12)
POTASSIUM BLD-SCNC: 4.1 MMOL/L (ref 3.5–5.3)
RBC # BLD AUTO: 4.6 10*6/MM3 (ref 4.8–5.9)
SODIUM BLD-SCNC: 139 MMOL/L (ref 135–145)
STRESS TARGET HR: 111 BPM
TRIGL SERPL-MCNC: 157 MG/DL (ref 0–149)
WBC NRBC COR # BLD: 5.89 10*3/MM3 (ref 4.8–10.8)

## 2018-05-26 PROCEDURE — 94799 UNLISTED PULMONARY SVC/PX: CPT

## 2018-05-26 PROCEDURE — 93325 DOPPLER ECHO COLOR FLOW MAPG: CPT | Performed by: INTERNAL MEDICINE

## 2018-05-26 PROCEDURE — 93321 DOPPLER ECHO F-UP/LMTD STD: CPT | Performed by: INTERNAL MEDICINE

## 2018-05-26 PROCEDURE — 99221 1ST HOSP IP/OBS SF/LOW 40: CPT | Performed by: INTERNAL MEDICINE

## 2018-05-26 PROCEDURE — 94760 N-INVAS EAR/PLS OXIMETRY 1: CPT

## 2018-05-26 PROCEDURE — 85025 COMPLETE CBC W/AUTO DIFF WBC: CPT | Performed by: FAMILY MEDICINE

## 2018-05-26 PROCEDURE — 93308 TTE F-UP OR LMTD: CPT | Performed by: INTERNAL MEDICINE

## 2018-05-26 PROCEDURE — 93308 TTE F-UP OR LMTD: CPT

## 2018-05-26 PROCEDURE — 99223 1ST HOSP IP/OBS HIGH 75: CPT | Performed by: PSYCHIATRY & NEUROLOGY

## 2018-05-26 PROCEDURE — 99232 SBSQ HOSP IP/OBS MODERATE 35: CPT | Performed by: FAMILY MEDICINE

## 2018-05-26 PROCEDURE — 93325 DOPPLER ECHO COLOR FLOW MAPG: CPT

## 2018-05-26 PROCEDURE — 80048 BASIC METABOLIC PNL TOTAL CA: CPT | Performed by: FAMILY MEDICINE

## 2018-05-26 PROCEDURE — 93321 DOPPLER ECHO F-UP/LMTD STD: CPT

## 2018-05-26 PROCEDURE — 80061 LIPID PANEL: CPT | Performed by: FAMILY MEDICINE

## 2018-05-26 RX ORDER — ATORVASTATIN CALCIUM 10 MG/1
20 TABLET, FILM COATED ORAL NIGHTLY
Status: DISCONTINUED | OUTPATIENT
Start: 2018-05-26 | End: 2018-05-27 | Stop reason: HOSPADM

## 2018-05-26 RX ADMIN — KETOTIFEN FUMARATE 1 DROP: 0.35 SOLUTION/ DROPS OPHTHALMIC at 20:28

## 2018-05-26 RX ADMIN — RIVAROXABAN 15 MG: 15 TABLET, FILM COATED ORAL at 17:56

## 2018-05-26 RX ADMIN — DILTIAZEM HYDROCHLORIDE 120 MG: 120 CAPSULE, COATED, EXTENDED RELEASE ORAL at 09:27

## 2018-05-26 RX ADMIN — PANTOPRAZOLE SODIUM 40 MG: 40 TABLET, DELAYED RELEASE ORAL at 05:59

## 2018-05-26 RX ADMIN — FUROSEMIDE 40 MG: 40 TABLET ORAL at 08:20

## 2018-05-26 RX ADMIN — FINASTERIDE 5 MG: 5 TABLET, FILM COATED ORAL at 08:19

## 2018-05-26 RX ADMIN — KETOTIFEN FUMARATE 1 DROP: 0.35 SOLUTION/ DROPS OPHTHALMIC at 08:20

## 2018-05-26 RX ADMIN — ATORVASTATIN CALCIUM 20 MG: 10 TABLET, FILM COATED ORAL at 20:29

## 2018-05-26 RX ADMIN — TAMSULOSIN HYDROCHLORIDE 0.4 MG: 0.4 CAPSULE ORAL at 08:20

## 2018-05-26 RX ADMIN — BUDESONIDE AND FORMOTEROL FUMARATE DIHYDRATE 2 PUFF: 80; 4.5 AEROSOL RESPIRATORY (INHALATION) at 21:03

## 2018-05-26 RX ADMIN — BUDESONIDE AND FORMOTEROL FUMARATE DIHYDRATE 2 PUFF: 80; 4.5 AEROSOL RESPIRATORY (INHALATION) at 09:09

## 2018-05-27 VITALS
WEIGHT: 149.69 LBS | BODY MASS INDEX: 21.43 KG/M2 | HEART RATE: 65 BPM | TEMPERATURE: 97.8 F | HEIGHT: 70 IN | RESPIRATION RATE: 16 BRPM | OXYGEN SATURATION: 99 % | DIASTOLIC BLOOD PRESSURE: 82 MMHG | SYSTOLIC BLOOD PRESSURE: 129 MMHG

## 2018-05-27 LAB
ANION GAP SERPL CALCULATED.3IONS-SCNC: 8 MMOL/L (ref 4–13)
BASOPHILS # BLD AUTO: 0.03 10*3/MM3 (ref 0–0.2)
BASOPHILS NFR BLD AUTO: 0.5 % (ref 0–2)
BUN BLD-MCNC: 30 MG/DL (ref 5–21)
BUN/CREAT SERPL: 19.4 (ref 7–25)
CALCIUM SPEC-SCNC: 8.8 MG/DL (ref 8.4–10.4)
CHLORIDE SERPL-SCNC: 103 MMOL/L (ref 98–110)
CO2 SERPL-SCNC: 26 MMOL/L (ref 24–31)
CREAT BLD-MCNC: 1.55 MG/DL (ref 0.5–1.4)
DEPRECATED RDW RBC AUTO: 42.8 FL (ref 40–54)
EOSINOPHIL # BLD AUTO: 0.17 10*3/MM3 (ref 0–0.7)
EOSINOPHIL NFR BLD AUTO: 2.9 % (ref 0–4)
ERYTHROCYTE [DISTWIDTH] IN BLOOD BY AUTOMATED COUNT: 13.5 % (ref 12–15)
GFR SERPL CREATININE-BSD FRML MDRD: 42 ML/MIN/1.73
GLUCOSE BLD-MCNC: 100 MG/DL (ref 70–100)
HCT VFR BLD AUTO: 39.5 % (ref 40–52)
HGB BLD-MCNC: 13.7 G/DL (ref 14–18)
IMM GRANULOCYTES # BLD: 0.03 10*3/MM3 (ref 0–0.03)
IMM GRANULOCYTES NFR BLD: 0.5 % (ref 0–5)
LYMPHOCYTES # BLD AUTO: 1.07 10*3/MM3 (ref 0.72–4.86)
LYMPHOCYTES NFR BLD AUTO: 18.1 % (ref 15–45)
MCH RBC QN AUTO: 30.1 PG (ref 28–32)
MCHC RBC AUTO-ENTMCNC: 34.7 G/DL (ref 33–36)
MCV RBC AUTO: 86.8 FL (ref 82–95)
MONOCYTES # BLD AUTO: 0.75 10*3/MM3 (ref 0.19–1.3)
MONOCYTES NFR BLD AUTO: 12.7 % (ref 4–12)
NEUTROPHILS # BLD AUTO: 3.86 10*3/MM3 (ref 1.87–8.4)
NEUTROPHILS NFR BLD AUTO: 65.3 % (ref 39–78)
NRBC BLD MANUAL-RTO: 0 /100 WBC (ref 0–0)
PLATELET # BLD AUTO: 175 10*3/MM3 (ref 130–400)
PMV BLD AUTO: 9.8 FL (ref 6–12)
POTASSIUM BLD-SCNC: 4.1 MMOL/L (ref 3.5–5.3)
RBC # BLD AUTO: 4.55 10*6/MM3 (ref 4.8–5.9)
SODIUM BLD-SCNC: 137 MMOL/L (ref 135–145)
WBC NRBC COR # BLD: 5.91 10*3/MM3 (ref 4.8–10.8)

## 2018-05-27 PROCEDURE — 94799 UNLISTED PULMONARY SVC/PX: CPT

## 2018-05-27 PROCEDURE — 99238 HOSP IP/OBS DSCHRG MGMT 30/<: CPT | Performed by: FAMILY MEDICINE

## 2018-05-27 PROCEDURE — 85025 COMPLETE CBC W/AUTO DIFF WBC: CPT | Performed by: FAMILY MEDICINE

## 2018-05-27 PROCEDURE — 94760 N-INVAS EAR/PLS OXIMETRY 1: CPT

## 2018-05-27 PROCEDURE — 80048 BASIC METABOLIC PNL TOTAL CA: CPT | Performed by: FAMILY MEDICINE

## 2018-05-27 PROCEDURE — 99232 SBSQ HOSP IP/OBS MODERATE 35: CPT | Performed by: INTERNAL MEDICINE

## 2018-05-27 RX ORDER — LEVALBUTEROL TARTRATE 45 UG/1
1-2 AEROSOL, METERED ORAL EVERY 4 HOURS PRN
Qty: 1 INHALER | Refills: 5 | Status: SHIPPED | OUTPATIENT
Start: 2018-05-27 | End: 2018-11-28

## 2018-05-27 RX ORDER — DILTIAZEM HYDROCHLORIDE 120 MG/1
120 CAPSULE, COATED, EXTENDED RELEASE ORAL
Qty: 30 CAPSULE | Refills: 5 | Status: SHIPPED | OUTPATIENT
Start: 2018-05-28 | End: 2018-06-22 | Stop reason: SDUPTHER

## 2018-05-27 RX ORDER — ATORVASTATIN CALCIUM 20 MG/1
20 TABLET, FILM COATED ORAL NIGHTLY
Qty: 30 TABLET | Refills: 5 | Status: SHIPPED | OUTPATIENT
Start: 2018-05-27 | End: 2018-08-23

## 2018-05-27 RX ADMIN — BUDESONIDE AND FORMOTEROL FUMARATE DIHYDRATE 2 PUFF: 80; 4.5 AEROSOL RESPIRATORY (INHALATION) at 18:41

## 2018-05-27 RX ADMIN — BUDESONIDE AND FORMOTEROL FUMARATE DIHYDRATE 2 PUFF: 80; 4.5 AEROSOL RESPIRATORY (INHALATION) at 08:07

## 2018-05-27 RX ADMIN — PANTOPRAZOLE SODIUM 40 MG: 40 TABLET, DELAYED RELEASE ORAL at 05:32

## 2018-05-27 RX ADMIN — FINASTERIDE 5 MG: 5 TABLET, FILM COATED ORAL at 09:58

## 2018-05-27 RX ADMIN — SODIUM CHLORIDE 30 ML/HR: 9 INJECTION, SOLUTION INTRAVENOUS at 05:32

## 2018-05-27 RX ADMIN — FUROSEMIDE 40 MG: 40 TABLET ORAL at 09:59

## 2018-05-27 RX ADMIN — RIVAROXABAN 15 MG: 15 TABLET, FILM COATED ORAL at 18:11

## 2018-05-27 RX ADMIN — DILTIAZEM HYDROCHLORIDE 120 MG: 120 CAPSULE, COATED, EXTENDED RELEASE ORAL at 09:59

## 2018-05-27 RX ADMIN — TAMSULOSIN HYDROCHLORIDE 0.4 MG: 0.4 CAPSULE ORAL at 09:59

## 2018-05-27 RX ADMIN — KETOTIFEN FUMARATE 1 DROP: 0.35 SOLUTION/ DROPS OPHTHALMIC at 09:58

## 2018-05-28 ENCOUNTER — TELEPHONE (OUTPATIENT)
Dept: FAMILY MEDICINE CLINIC | Facility: CLINIC | Age: 83
End: 2018-05-28

## 2018-05-28 NOTE — TELEPHONE ENCOUNTER
ON CALL  [facility] Dew[facility] closed.   Cardizem 120 CD #[id number] NR called to [facility] (apparently they could not see Rx sent to [facility])  Wife called and told to  at [facility]

## 2018-05-29 ENCOUNTER — OFFICE VISIT (OUTPATIENT)
Dept: NEUROLOGY | Facility: CLINIC | Age: 83
End: 2018-05-29

## 2018-05-29 VITALS
WEIGHT: 171 LBS | DIASTOLIC BLOOD PRESSURE: 70 MMHG | SYSTOLIC BLOOD PRESSURE: 132 MMHG | HEIGHT: 70 IN | BODY MASS INDEX: 24.48 KG/M2 | HEART RATE: 78 BPM

## 2018-05-29 DIAGNOSIS — I66.3: Primary | ICD-10-CM

## 2018-05-29 DIAGNOSIS — G47.34 NOCTURNAL HYPOXIA: ICD-10-CM

## 2018-05-29 DIAGNOSIS — G90.9 AUTONOMIC DYSFUNCTION: ICD-10-CM

## 2018-05-29 PROCEDURE — 99213 OFFICE O/P EST LOW 20 MIN: CPT | Performed by: PHYSICIAN ASSISTANT

## 2018-06-06 NOTE — PROGRESS NOTES
Subjective   James Birch is a 89 y.o. male is here today for follow-up.    Some waxing and waning of neurologic symptoms including gait difficulties but overall neurologically stable with no lasting new changes.  He was evaluated recently by Dr. Vera in the hospital no further recommendations at that time.      Stroke   This is a chronic problem. The current episode started more than 1 year ago. The problem occurs daily. The problem has been gradually worsening. Associated symptoms include arthralgias, congestion, fatigue, myalgias, numbness and weakness. Pertinent negatives include no chills, coughing, fever or neck pain. The symptoms are aggravated by exertion and stress. Treatments tried: Supportive care antiplatelet therapy. The treatment provided no relief.       The following portions of the patient's history were reviewed and updated as appropriate: allergies, current medications, past family history, past medical history, past social history, past surgical history and problem list.    Review of Systems   Constitutional: Positive for fatigue. Negative for chills and fever.   HENT: Positive for congestion, hearing loss and sinus pressure. Negative for trouble swallowing.    Eyes: Positive for visual disturbance.   Respiratory: Positive for shortness of breath. Negative for cough.    Cardiovascular: Negative.    Gastrointestinal: Negative.    Endocrine: Negative.    Genitourinary: Negative.    Musculoskeletal: Positive for arthralgias, gait problem and myalgias. Negative for back pain and neck pain.   Skin: Negative.    Allergic/Immunologic: Negative.    Neurological: Positive for dizziness, weakness, light-headedness and numbness. Negative for facial asymmetry and speech difficulty.   Hematological: Negative.    Psychiatric/Behavioral: Positive for decreased concentration and sleep disturbance.       Objective   Physical Exam   Constitutional: He is oriented to person, place, and time. He appears  well-developed and well-nourished.   HENT:   Head: Normocephalic.   Eyes: Pupils are equal, round, and reactive to light.   Neck: Normal range of motion. Neck supple. No JVD present.   Cardiovascular: Normal rate, regular rhythm and normal heart sounds.  Exam reveals no gallop and no friction rub.    No murmur heard.  Pulmonary/Chest: Effort normal and breath sounds normal. He has no wheezes. He has no rales.   Abdominal: Soft. Bowel sounds are normal. There is no tenderness.   Musculoskeletal: Normal range of motion. He exhibits no edema.   Lymphadenopathy:     He has no cervical adenopathy.   Neurological: He is alert and oriented to person, place, and time. He has normal strength. He displays no tremor. No cranial nerve deficit or sensory deficit. He exhibits normal muscle tone. He displays a negative Romberg sign. Coordination normal.   Reflex Scores:       Tricep reflexes are 1+ on the right side and 1+ on the left side.       Bicep reflexes are 1+ on the right side and 1+ on the left side.       Brachioradialis reflexes are 1+ on the right side and 1+ on the left side.       Patellar reflexes are 1+ on the right side and 1+ on the left side.       Achilles reflexes are 1+ on the right side and 1+ on the left side.  Skin: Skin is warm and dry.   Psychiatric: He has a normal mood and affect. Thought content normal.         Assessment/Plan   James was seen today for stroke.    Diagnoses and all orders for this visit:    Thrombosis of posterior inferior cerebellar artery    Autonomic dysfunction    Nocturnal hypoxia-oxygen advised    The patient is neurologically stable.    Patient's extensive medical issues and frail condition are reviewed in detail the patient's family and patient today.    10 minutes of a 15 minute outpatient visit was spent in counseling and coordination of care today.  No changes were made in his medication regimen today.    Dictated utilizing Dragon dictation.

## 2018-06-07 ENCOUNTER — OFFICE VISIT (OUTPATIENT)
Dept: FAMILY MEDICINE CLINIC | Facility: CLINIC | Age: 83
End: 2018-06-07

## 2018-06-07 VITALS
SYSTOLIC BLOOD PRESSURE: 108 MMHG | RESPIRATION RATE: 16 BRPM | HEIGHT: 70 IN | OXYGEN SATURATION: 95 % | HEART RATE: 96 BPM | WEIGHT: 170 LBS | DIASTOLIC BLOOD PRESSURE: 68 MMHG | BODY MASS INDEX: 24.34 KG/M2 | TEMPERATURE: 98.4 F

## 2018-06-07 DIAGNOSIS — I48.20 CHRONIC ATRIAL FIBRILLATION (HCC): Primary | ICD-10-CM

## 2018-06-07 DIAGNOSIS — E78.5 HYPERLIPIDEMIA, UNSPECIFIED HYPERLIPIDEMIA TYPE: Chronic | ICD-10-CM

## 2018-06-07 DIAGNOSIS — Z79.01 ANTICOAGULATED: ICD-10-CM

## 2018-06-07 DIAGNOSIS — I50.30 CONGESTIVE HEART FAILURE WITH LEFT VENTRICULAR DIASTOLIC DYSFUNCTION, NYHA CLASS 3 (HCC): ICD-10-CM

## 2018-06-07 DIAGNOSIS — I10 HYPERTENSION, UNSPECIFIED TYPE: Chronic | ICD-10-CM

## 2018-06-07 DIAGNOSIS — N18.30 STAGE 3 CHRONIC KIDNEY DISEASE (HCC): ICD-10-CM

## 2018-06-07 PROCEDURE — 99496 TRANSJ CARE MGMT HIGH F2F 7D: CPT | Performed by: FAMILY MEDICINE

## 2018-06-07 RX ORDER — GUAIFENESIN 600 MG/1
600 TABLET, EXTENDED RELEASE ORAL EVERY 12 HOURS SCHEDULED
COMMUNITY
End: 2020-07-20

## 2018-06-07 NOTE — PROGRESS NOTES
Transitional Care Follow Up Visit  Subjective     James Birch is a 89 y.o. male who presents for a transitional care management visit.    Within 48 business hours after discharge our office contacted him via telephone to coordinate his care and needs.      I reviewed and discussed the details of that call along with the discharge summary, hospital problems, inpatient lab results, inpatient diagnostic studies, and consultation reports with James.     Current outpatient and discharge medications have been reconciled for the patient.    No flowsheet data found.  Risk for Readmission (LACE) Score: 11 (5/27/2018  5:00 AM)    History of Present Illness   HISTORY OF PRESENT ILLNESS: He presented to [facility] ED with dizziness worse than usual; as he has dizziness on/off.  He has hypertension, a fib (chronic), and history of large/significant cerebellar CVA 1.29.10 that took months to improve from.  He has chronic gait issues including balance and uses a cane usually to ambulate. He was seen in office with 2-4 days of nausea and nausea infrequent with vomiting  He was sent for US gb that was neg and MRI head (lumberg) with new R pontine lesion/suspect small vessel related.  During this he really was not having that much palpitations.  In [facility] ED he was found to have on/off rapid a fib to the 140s that was symptomatic with dizziness, weakness.  He was given a bolus of cardizem and became bradycardia into the 30s.  Other labs were unremarkable (TSH, T4 ok); enzymes were neg.  With the possible/likely recent CVA (has apt with neuro next week), the lability of his a fib we elected to transfer/admit to adjust Rx/attempt to control rhythm in a setting that could handle even a pacer if this though unexpected should become necessary.       Since home:  No further palpitations.  Usual occ sob/cough, dizziness with nausea.  No vomiting.      Course During Hospital Stay:    HOSPITAL COARSE: He was started on cardizem.  His HR improved;  with at times mely and at timess less faster.  Cardiology did not think more was needed.  Neurology/eusebio reviewed and recommended continued anticoagulation and observation. He was put back on a statin.  He was transitioned from ICU to the floor; after a day of same there home. Echo showed:      · Left ventricular systolic function is normal. Estimated ejection fraction is  61-65%.  · Right ventricular cavity is mildly dilated. Systolic function is normal.  · There is aortic valve sclerosis without significant stensois.     The following portions of the patient's history were reviewed and updated as appropriate: allergies, current medications, past family history, past medical history, past social history, past surgical history and problem list.    Review of Systems  GENERAL:  Inactive/slower with limits, speed, stamina for age and gait . Sleep is ok with oxygen. No fever now.  ENDO:  No syncope, near or diaphoretic sweaty spells.  HEENT: No head injury or headache,  No vision change, Same hearing loss.  Ears without pain/drainage.  No sore throat.  No significant nasal/sinus congestion/drainage. No epistaxis.  CHEST: No chest wall tenderness or mass. No change occ cough, without wheeze.  No SOB; no hemoptysis.  CV: No chest pain, palpitations, ankle edema.  GI: No heartburn, dysphagia.  No abdominal pain, diarrhea, constipation.  No rectal bleeding, or melena.  On/off nausea and vomiting.   :  Voids without dysuria, or incontinence to completion.  ORTHO: No painful/swollen joints but various on /off sore.  No change occ sore neck or back.  No acute neck or back pain without recent injury.   NEURO: No dizziness, weakness of extremities.  No change UE/LE mild numbness/paresthesias.   PSYCH: Mild ? memory loss.  Mood good; occ anxious, depressed but/and not suicidal.  Tries to tolerate stress .     Objective   Physical Exam   Vitals:    06/07/18 1418   BP: 108/68   Pulse: 96   Resp: 16   Temp: 98.4 °F (36.9 °C)    SpO2: 95%       GENERAL:  Well nourished/developed in no acute distress.  SKIN: Turgor excellent, without wound, rash, lesion.  HEENT: Normal cephalic without trauma.  Pupils equal round reactive to light. Extraocular motions full without nystagmus.   External canals nonobstructive nontender without reddness. Tymphatic membranes calos with shelia structures intact.   Oral cavity without growths, exudates, and moist.  Posterior pharynx without mass, obstruction, redness.  No thyromegaly, mass, tenderness, lymphadenopathy and supple.  CV: Regular rhythm.  No murmur, gallop,  edema. Posterior pulses intact.  No carotid bruits.  CHEST: No chest wall tenderness or mass.   LUNGS: Symmetric motion with clear to auscultation.  No dullness to percussion  ABD: Soft, nontender without mass.   ORTHO: Symmetric extremities without swelling/point tenderness.  Full gross range of motion.  4 cm soft nodule over distal clavicle.  Walking with assistance cane.  NEURO: CN 2-12 grossly intact.  Symmetric facies. 1/4 x bicep equal reflexes.  UE/LE   3/5 strength throughout.  Nonfocal use extremities. Speech clear.  Intact light touch with monofilament, vibratory sensation with tuning fork; equal toes/distal feet.    PSYCH: Oriented x 3.  Pleasant calm, well kept.  Purposeful/directed conservation with intact short/long gross memory.     Assessment/Plan    Reasons for admit/problems address while here:   Rapid a fib  Paroxysmal SVT  tachycardia  Bradycardia  Palpitations  Dizziness  Autonomic dysfunction  Recent R pontine lesion  Small vessel ischemic cerebral disease  Acute renal insufficiency (on CKD)  Hyperlipidemia-resumption of statin    Problems Addressed this Visit     Hypertension (Chronic)    Hyperlipidemia-statin (Chronic)    Congestive heart failure with left ventricular diastolic dysfunction, NYHA class 3    Chronic kidney disease-3-IgA/nephrology    Cardiac arrhythmia-a fib, SVT - Primary    [id number]/xarelto         Return for lab;, Dr William-, as planned;.

## 2018-06-14 ENCOUNTER — TELEPHONE (OUTPATIENT)
Dept: FAMILY MEDICINE CLINIC | Facility: CLINIC | Age: 83
End: 2018-06-14

## 2018-06-14 DIAGNOSIS — R26.9 GAIT DIFFICULTY: Primary | Chronic | ICD-10-CM

## 2018-06-14 DIAGNOSIS — R53.1 WEAKNESS: ICD-10-CM

## 2018-06-14 NOTE — TELEPHONE ENCOUNTER
"\"his wife Radha is inpatient past a total knee, and he wanted to be evaluated and treated for gait, weakness on an outpatient bases, we need order for Eval and treat for PT/OT\"     Orders done and faxed      "

## 2018-06-22 ENCOUNTER — TELEPHONE (OUTPATIENT)
Dept: FAMILY MEDICINE CLINIC | Facility: CLINIC | Age: 83
End: 2018-06-22

## 2018-06-22 RX ORDER — DILTIAZEM HYDROCHLORIDE 120 MG/1
120 CAPSULE, COATED, EXTENDED RELEASE ORAL
Qty: 30 CAPSULE | Refills: 5 | Status: SHIPPED | OUTPATIENT
Start: 2018-06-22 | End: 2018-11-27 | Stop reason: SDUPTHER

## 2018-06-22 NOTE — TELEPHONE ENCOUNTER
Wife called and states when pt was dc from [facility] about a month ago he was started on Cartia and it was sent to [facility] because of the weekend and there was no refill and wife wants to know if he is supposed to stay on it and if so new Rx needs sent to [facility], [phone]

## 2018-08-16 RX ORDER — TAMSULOSIN HYDROCHLORIDE 0.4 MG/1
CAPSULE ORAL
Qty: 90 CAPSULE | Refills: 1 | Status: SHIPPED | OUTPATIENT
Start: 2018-08-16 | End: 2019-02-27 | Stop reason: SDUPTHER

## 2018-08-21 NOTE — TELEPHONE ENCOUNTER
"\"he hasnt filled his lipitor and wife not sure if he forgot to get it refilled or not? But we need an order\"     >> , [place]NA Park Jun 7, 2018  2:29 PM  \"Dr Campos told him not to take it\"    Needs clarified it pt is to take it or not?    Let wife know we are sending message to [facility] to see if they know something we don't about the lipitor.  Not aware of any reason WE told him to stop    Notified wife and stated understanding    "

## 2018-08-22 NOTE — TELEPHONE ENCOUNTER
Kiko Campos PA English, Laura L, LPN             I see him tomorrow and will review and forward med recommendations

## 2018-08-23 ENCOUNTER — OFFICE VISIT (OUTPATIENT)
Dept: NEUROLOGY | Facility: CLINIC | Age: 83
End: 2018-08-23

## 2018-08-23 VITALS
BODY MASS INDEX: 24.34 KG/M2 | WEIGHT: 170 LBS | DIASTOLIC BLOOD PRESSURE: 78 MMHG | SYSTOLIC BLOOD PRESSURE: 122 MMHG | HEIGHT: 70 IN | HEART RATE: 88 BPM

## 2018-08-23 DIAGNOSIS — I66.3: Primary | ICD-10-CM

## 2018-08-23 PROCEDURE — 99214 OFFICE O/P EST MOD 30 MIN: CPT | Performed by: PHYSICIAN ASSISTANT

## 2018-08-26 NOTE — PROGRESS NOTES
Subjective   James Birch is a 89 y.o. male is here today for follow-up.    Progressive decline.      Stroke   This is a chronic problem. The current episode started more than 1 year ago. The problem occurs daily. The problem has been gradually worsening. Associated symptoms include arthralgias, congestion, fatigue, myalgias, numbness and weakness. Pertinent negatives include no chills, coughing, fever or neck pain. The symptoms are aggravated by exertion and stress. Treatments tried: Supportive care antiplatelet therapy. The treatment provided no relief.       The following portions of the patient's history were reviewed and updated as appropriate: allergies, current medications, past family history, past medical history, past social history, past surgical history and problem list.    Review of Systems   Constitutional: Positive for fatigue. Negative for chills and fever.   HENT: Positive for congestion, hearing loss and sinus pressure. Negative for trouble swallowing.    Eyes: Positive for visual disturbance.   Respiratory: Positive for shortness of breath. Negative for cough.    Cardiovascular: Negative.    Gastrointestinal: Negative.    Endocrine: Negative.    Genitourinary: Negative.    Musculoskeletal: Positive for arthralgias, gait problem and myalgias. Negative for back pain and neck pain.   Skin: Negative.    Allergic/Immunologic: Negative.    Neurological: Positive for dizziness, weakness, light-headedness and numbness. Negative for facial asymmetry and speech difficulty.   Hematological: Negative.    Psychiatric/Behavioral: Positive for confusion, decreased concentration and sleep disturbance.       Objective   Physical Exam   Constitutional: He is oriented to person, place, and time. He appears well-developed and well-nourished.   HENT:   Head: Normocephalic.   Eyes: Pupils are equal, round, and reactive to light.   Neck: Normal range of motion. Neck supple. No JVD present.   Cardiovascular: Normal  rate, regular rhythm and normal heart sounds.  Exam reveals no gallop and no friction rub.    No murmur heard.  Pulmonary/Chest: Effort normal and breath sounds normal. He has no wheezes. He has no rales.   Abdominal: Soft. Bowel sounds are normal. There is no tenderness.   Musculoskeletal: Normal range of motion. He exhibits no edema.   Lymphadenopathy:     He has no cervical adenopathy.   Neurological: He is alert and oriented to person, place, and time. He has normal strength. He displays no tremor. No cranial nerve deficit or sensory deficit. He exhibits normal muscle tone. He displays a negative Romberg sign. Coordination normal.   Reflex Scores:       Tricep reflexes are 1+ on the right side and 1+ on the left side.       Bicep reflexes are 1+ on the right side and 1+ on the left side.       Brachioradialis reflexes are 1+ on the right side and 1+ on the left side.       Patellar reflexes are 1+ on the right side and 1+ on the left side.       Achilles reflexes are 1+ on the right side and 1+ on the left side.  Skin: Skin is warm and dry.   Psychiatric: He has a normal mood and affect. Thought content normal. His speech is delayed. He is slowed. Cognition and memory are impaired. He expresses impulsivity. He is inattentive.         Assessment/Plan   James was seen today for stroke.    Diagnoses and all orders for this visit:    Thrombosis of posterior inferior cerebellar artery  -     Ambulatory Referral to Home Health      The patient's son and wife had some recent health and other issues and have not been able to supervise his medications as well, the patient is having difficulty managing his medication on his own, I recommended home health nursing for medication management suggestions.  The patient and family are very agreeable to this.  I have not made any medication changes today.    20 minutes of a 30 minute outpatient visit was spent in counseling and coordination of care today.    Dictated utilizing  Julian dictation.

## 2018-08-27 ENCOUNTER — TELEPHONE (OUTPATIENT)
Dept: NEUROLOGY | Facility: CLINIC | Age: 83
End: 2018-08-27

## 2018-08-27 NOTE — TELEPHONE ENCOUNTER
[facility] requested a nurse visit once a week for 3 weeks and PT eval and treat.  [facility] approved, verbal order given.

## 2018-09-14 ENCOUNTER — OFFICE VISIT (OUTPATIENT)
Dept: FAMILY MEDICINE CLINIC | Facility: CLINIC | Age: 83
End: 2018-09-14

## 2018-09-14 VITALS
WEIGHT: 171 LBS | TEMPERATURE: 97.6 F | DIASTOLIC BLOOD PRESSURE: 70 MMHG | SYSTOLIC BLOOD PRESSURE: 90 MMHG | HEART RATE: 73 BPM | RESPIRATION RATE: 18 BRPM | BODY MASS INDEX: 24.48 KG/M2 | OXYGEN SATURATION: 96 % | HEIGHT: 70 IN

## 2018-09-14 DIAGNOSIS — R11.0 NAUSEA: ICD-10-CM

## 2018-09-14 DIAGNOSIS — N18.30 STAGE 3 CHRONIC KIDNEY DISEASE (HCC): ICD-10-CM

## 2018-09-14 DIAGNOSIS — K21.9 GASTROESOPHAGEAL REFLUX DISEASE, ESOPHAGITIS PRESENCE NOT SPECIFIED: Chronic | ICD-10-CM

## 2018-09-14 DIAGNOSIS — I48.20 CHRONIC ATRIAL FIBRILLATION (HCC): ICD-10-CM

## 2018-09-14 DIAGNOSIS — G90.9 AUTONOMIC DYSFUNCTION: ICD-10-CM

## 2018-09-14 DIAGNOSIS — Z79.01 ANTICOAGULATED: ICD-10-CM

## 2018-09-14 DIAGNOSIS — I50.30 CONGESTIVE HEART FAILURE WITH LEFT VENTRICULAR DIASTOLIC DYSFUNCTION, NYHA CLASS 3 (HCC): ICD-10-CM

## 2018-09-14 DIAGNOSIS — I10 HYPERTENSION, UNSPECIFIED TYPE: Primary | Chronic | ICD-10-CM

## 2018-09-14 PROCEDURE — 99214 OFFICE O/P EST MOD 30 MIN: CPT | Performed by: FAMILY MEDICINE

## 2018-09-14 NOTE — PATIENT INSTRUCTIONS
1st; talk to Dr Sepulveda about EGD (even with xarelto with plans of no bx and like)  2nd testing gb nuclear test  3rd testing circulation of abdomen

## 2018-09-14 NOTE — PROGRESS NOTES
Subjective   James Birch is a 89 y.o. male presenting with chief complaint of:   Chief Complaint   Patient presents with   • Nausea     Patient is here today for nausea that occurs daily and occasionally vomits.   • Dizziness     Dizziness happens frequently.       History of Present Illness :  With wife.  Here for primarily an acute issue today; nausea.   Large breakfast and not much appetite rest of day.  Food does not make worse; it tends to help.    BM every 2 days; normal without constipation or diarrhea. No appetite after breakfast (weight stable).   Has multiple chronic problems to consider that might have a bearing on today's issues; not an interval appointment.       [facility] 5.2018 normal    Chronic/acute problems to review today:   1. Hypertension, unspecified type: chronic/bps stable over various visits and here.     2. Gastroesophageal reflux disease, esophagitis presence not specified: chronic/variable occ heartburn.    3. Autonomic dysfunction: chronic/other areas especially bp    4. Chronic atrial fibrillation (CMS/[facility]): chronic/stable allowed rate control/anticoagulation   5. Congestive heart failure with left ventricular diastolic dysfunction, NYHA class 3 (CMS/[facility]) : chronic/stable mild degree sob and leg edema; no orthopnea   6. Pynaaljayrfrpc-hrd-smrc/xarelto: chronic/for reason noted   7. Stage 3 chronic kidney disease: chronic/stable GFR seeing nephrology. To avoid further kidney function decline  A. Treat any time you think you have infection  B. Stay hydrated (dont get dehydrated); drink at least 60 oz fluid every 24 hr (1800 cc or nearly a 2L)  C. Do not allow any xrays with dye WITHOUT the doctor ordering checking your renal function  D. Do not get new medications without the doctor considering your renal condition  E. Do not use motrin/ibuprofen, alleve/naprosyn and these types of medications       Has an/another acute issue today: none.    The following portions of the patient's  history were reviewed and updated as appropriate: allergies, current medications, past family history, past medical history, past social history, past surgical history and problem list.  Records acquired and reviewed; TCC migrated.      Current Outpatient Prescriptions:   •  azelastine (OPTIVAR) 0.05 % ophthalmic solution, INSTILL 1 DROP INTO BOTH EYES TWICE DAILY GENERIC FOR OPTIVAR, Disp: 6 mL, Rfl: 5  •  B Complex-C (SUPER B COMPLEX/VITAMIN C PO), Take  by mouth daily., Disp: , Rfl:   •  Calcium Polycarbophil (FIBER-CAPS PO), Take  by mouth 2 (Two) Times a Day., Disp: , Rfl:   •  Cholecalciferol (VITAMIN D3 PO), Take 2,000 Int'l Units by mouth Daily., Disp: , Rfl:   •  Coenzyme Q10 (CO Q-10 PO), Take  by mouth daily., Disp: , Rfl:   •  diltiaZEM CD (CARDIZEM CD) 120 MG 24 hr capsule, Take 1 capsule by mouth Daily., Disp: 30 capsule, Rfl: 5  •  dutasteride (AVODART) 0.5 MG capsule, TAKE 1 CAPSULE DAILY GENERIC FOR AVODART, Disp: 90 capsule, Rfl: 1  •  esomeprazole (NEXIUM) 40 MG capsule, Take 1 capsule by mouth Every Morning Before Breakfast., Disp: 30 capsule, Rfl: 5  •  furosemide (LASIX) 40 MG tablet, Take 40 mg by mouth Daily. May take one half tablet additionally prn swelling, Disp: , Rfl:   •  guaiFENesin (MUCINEX) 600 MG 12 hr tablet, Take 1,200 mg by mouth Every Night., Disp: , Rfl:   •  levalbuterol (XOPENEX HFA) 45 MCG/ACT inhaler, Inhale 1-2 puffs Every 4 (Four) Hours As Needed for Wheezing., Disp: 1 inhaler, Rfl: 5  •  Multiple Vitamins-Minerals (MULTIVITAMIN PO), Take 1 tablet by mouth daily., Disp: , Rfl:   •  O2 (OXYGEN), Inhale 1 (One) Time. As needed at bedtime., Disp: , Rfl:   •  Omega-3 Fatty Acids (FISH OIL PO), Take 1,000 mg by mouth 2 (Two) Times a Day., Disp: , Rfl:   •  PROAIR  (90 Base) MCG/ACT inhaler, Every 4 (Four) Hours As Needed for Shortness of Air., Disp: , Rfl: 5  •  SYMBICORT 80-4.5 MCG/ACT inhaler, Inhale 2 puffs 2 (Two) Times a Day., Disp: , Rfl:   •  tamsulosin (FLOMAX)  0.4 MG capsule 24 hr capsule, TAKE 1 CAPSULE DAILY SHORTLY AFTER THE SAME MEAL GENERIC FOR FLOMAX, Disp: 90 capsule, Rfl: 1  •  XARELTO 15 MG tablet, TAKE ONE TABLET DAILY, Disp: 90 tablet, Rfl: 3      No problems with medications.  Refills if needed done    No Known Allergies    Review of Systems  GENERAL:  Inactive/slower with limits, speed, stamina for age and gait . Sleep is ok with oxygen. No fever now.  ENDO:  No syncope, near or diaphoretic sweaty spells.  HEENT: No head injury or headache,  No vision change, Same hearing loss.  Ears without pain/drainage.  No sore throat.  No significant nasal/sinus congestion/drainage. No epistaxis.  CHEST: No chest wall tenderness or mass. No change occ cough, without wheeze.  No SOB; no hemoptysis.  CV: No chest pain, palpitations, ankle edema.  GI: No  dysphagia.  No abdominal pain, diarrhea, constipation.  No rectal bleeding, or melena.  On/off nausea and vomiting/heartburn.    :  Voids without dysuria, or incontinence to completion.  ORTHO: No painful/swollen joints but various on /off sore.  No change occ sore neck or back.  No acute neck or back pain without recent injury.   NEURO: No dizziness, weakness of extremities.  No change UE/LE mild numbness/paresthesias.   PSYCH: Mild ? memory loss.  Mood good; occ anxious, depressed but/and not suicidal.  Tries to tolerate stress   Screening:  Mammogram: NA  Bone density: NA  Low dose CT chest: NA  GI:   Colonoscopy+nl/[facility]/9.5.12/5y  EGD+biop/[facility]/8.31.15  Prostate: urology past  Usual lab order  3m CBC, BMP  6m CBC, CMP, iron  12m CBC, CMP, LIPID, TSH, Vit D, iron, ferritin, B12, folate    Results for orders placed or performed during the hospital encounter of 05/25/18   Basic Metabolic Panel   Result Value Ref Range    Glucose 104 (H) 70 - 100 mg/dL    BUN 40 (H) 5 - 21 mg/dL    Creatinine 2.10 (H) 0.50 - 1.40 mg/dL    Sodium 136 135 - 145 mmol/L    Potassium 4.2 3.5 - 5.3 mmol/L    Chloride 99  98 - 110 mmol/L    CO2 28.0 24.0 - 31.0 mmol/L    Calcium 9.3 8.4 - 10.4 mg/dL    eGFR Non African Amer 30 (L) >60 mL/min/1.73    BUN/Creatinine Ratio 19.0 7.0 - 25.0    Anion Gap 9.0 4.0 - 13.0 mmol/L   BNP   Result Value Ref Range    proBNP 660.0 0.0-1,800.0 pg/mL   Troponin   Result Value Ref Range    Troponin I <0.012 0.000 - 0.034 ng/mL   CBC Auto Differential   Result Value Ref Range    WBC 6.15 4.80 - 10.80 10*3/mm3    RBC 4.78 (L) 4.80 - 5.90 10*6/mm3    Hemoglobin 14.2 14.0 - 18.0 g/dL    Hematocrit 42.3 40.0 - 52.0 %    MCV 88.5 82.0 - 95.0 fL    MCH 29.7 28.0 - 32.0 pg    MCHC 33.6 33.0 - 36.0 g/dL    RDW 13.5 12.0 - 15.0 %    RDW-SD 44.2 40.0 - 54.0 fl    MPV 9.5 6.0 - 12.0 fL    Platelets 182 130 - 400 10*3/mm3    Neutrophil % 63.3 39.0 - 78.0 %    Lymphocyte % 20.7 15.0 - 45.0 %    Monocyte % 12.5 (H) 4.0 - 12.0 %    Eosinophil % 2.1 0.0 - 4.0 %    Basophil % 0.7 0.0 - 2.0 %    Immature Grans % 0.7 0.0 - 5.0 %    Neutrophils, Absolute 3.90 1.87 - 8.40 10*3/mm3    Lymphocytes, Absolute 1.27 0.72 - 4.86 10*3/mm3    Monocytes, Absolute 0.77 0.19 - 1.30 10*3/mm3    Eosinophils, Absolute 0.13 0.00 - 0.70 10*3/mm3    Basophils, Absolute 0.04 0.00 - 0.20 10*3/mm3    Immature Grans, Absolute 0.04 (H) 0.00 - 0.03 10*3/mm3    nRBC 0.0 0.0 - 0.0 /100 WBC   Basic Metabolic Panel   Result Value Ref Range    Glucose 95 70 - 100 mg/dL    BUN 37 (H) 5 - 21 mg/dL    Creatinine 1.88 (H) 0.50 - 1.40 mg/dL    Sodium 139 135 - 145 mmol/L    Potassium 4.1 3.5 - 5.3 mmol/L    Chloride 101 98 - 110 mmol/L    CO2 29.0 24.0 - 31.0 mmol/L    Calcium 9.0 8.4 - 10.4 mg/dL    eGFR Non African Amer 34 (L) >60 mL/min/1.73    BUN/Creatinine Ratio 19.7 7.0 - 25.0    Anion Gap 9.0 4.0 - 13.0 mmol/L   Lipid Panel   Result Value Ref Range    Total Cholesterol 216 (H) 130 - 200 mg/dL    Triglycerides 157 (H) 0 - 149 mg/dL    HDL Cholesterol 39 (L) >=40 mg/dL    LDL Cholesterol  149 (H) 0 - 99 mg/dL    LDL/HDL Ratio 3.73    CBC Auto  Differential   Result Value Ref Range    WBC 5.89 4.80 - 10.80 10*3/mm3    RBC 4.60 (L) 4.80 - 5.90 10*6/mm3    Hemoglobin 13.8 (L) 14.0 - 18.0 g/dL    Hematocrit 40.2 40.0 - 52.0 %    MCV 87.4 82.0 - 95.0 fL    MCH 30.0 28.0 - 32.0 pg    MCHC 34.3 33.0 - 36.0 g/dL    RDW 13.4 12.0 - 15.0 %    RDW-SD 42.6 40.0 - 54.0 fl    MPV 10.0 6.0 - 12.0 fL    Platelets 179 130 - 400 10*3/mm3    Neutrophil % 65.9 39.0 - 78.0 %    Lymphocyte % 17.1 15.0 - 45.0 %    Monocyte % 13.1 (H) 4.0 - 12.0 %    Eosinophil % 2.9 0.0 - 4.0 %    Basophil % 0.7 0.0 - 2.0 %    Immature Grans % 0.3 0.0 - 5.0 %    Neutrophils, Absolute 3.88 1.87 - 8.40 10*3/mm3    Lymphocytes, Absolute 1.01 0.72 - 4.86 10*3/mm3    Monocytes, Absolute 0.77 0.19 - 1.30 10*3/mm3    Eosinophils, Absolute 0.17 0.00 - 0.70 10*3/mm3    Basophils, Absolute 0.04 0.00 - 0.20 10*3/mm3    Immature Grans, Absolute 0.02 0.00 - 0.03 10*3/mm3    nRBC 0.0 0.0 - 0.0 /100 WBC   Basic Metabolic Panel   Result Value Ref Range    Glucose 100 70 - 100 mg/dL    BUN 30 (H) 5 - 21 mg/dL    Creatinine 1.55 (H) 0.50 - 1.40 mg/dL    Sodium 137 135 - 145 mmol/L    Potassium 4.1 3.5 - 5.3 mmol/L    Chloride 103 98 - 110 mmol/L    CO2 26.0 24.0 - 31.0 mmol/L    Calcium 8.8 8.4 - 10.4 mg/dL    eGFR Non African Amer 42 (L) >60 mL/min/1.73    BUN/Creatinine Ratio 19.4 7.0 - 25.0    Anion Gap 8.0 4.0 - 13.0 mmol/L   CBC Auto Differential   Result Value Ref Range    WBC 5.91 4.80 - 10.80 10*3/mm3    RBC 4.55 (L) 4.80 - 5.90 10*6/mm3    Hemoglobin 13.7 (L) 14.0 - 18.0 g/dL    Hematocrit 39.5 (L) 40.0 - 52.0 %    MCV 86.8 82.0 - 95.0 fL    MCH 30.1 28.0 - 32.0 pg    MCHC 34.7 33.0 - 36.0 g/dL    RDW 13.5 12.0 - 15.0 %    RDW-SD 42.8 40.0 - 54.0 fl    MPV 9.8 6.0 - 12.0 fL    Platelets 175 130 - 400 10*3/mm3    Neutrophil % 65.3 39.0 - 78.0 %    Lymphocyte % 18.1 15.0 - 45.0 %    Monocyte % 12.7 (H) 4.0 - 12.0 %    Eosinophil % 2.9 0.0 - 4.0 %    Basophil % 0.5 0.0 - 2.0 %    Immature Grans % 0.5 0.0 -  5.0 %    Neutrophils, Absolute 3.86 1.87 - 8.40 10*3/mm3    Lymphocytes, Absolute 1.07 0.72 - 4.86 10*3/mm3    Monocytes, Absolute 0.75 0.19 - 1.30 10*3/mm3    Eosinophils, Absolute 0.17 0.00 - 0.70 10*3/mm3    Basophils, Absolute 0.03 0.00 - 0.20 10*3/mm3    Immature Grans, Absolute 0.03 0.00 - 0.03 10*3/mm3    nRBC 0.0 0.0 - 0.0 /100 WBC   Adult Transthoracic Echo Limited W/ Cont if Necessary Per Protocol   Result Value Ref Range    BSA 1.8 m^2    IVSd 0.9 cm    LVIDd 3.6 cm    LVIDs 2.1 cm    LVPWd 1.1 cm    IVS/LVPW 0.82     FS 41.7 %    EDV(Teich) 54.4 ml    ESV(Teich) 14.4 ml    EF(Teich) 73.5 %    EDV(cubed) 46.7 ml    ESV(cubed) 9.3 ml    EF(cubed) 80.2 %    LV mass(C)d 107.9 grams    LV mass(C)dI 58.6 grams/m^2    SV(Teich) 40.0 ml    SI(Teich) 21.7 ml/m^2    SV(cubed) 37.4 ml    SI(cubed) 20.3 ml/m^2    Ao root diam 3.6 cm    Ao root area 10.2 cm^2    LA dimension 3.4 cm    LA/Ao 0.94     LVOT diam 2.2 cm    LVOT area 3.8 cm^2    LVOT area(traced) 3.8 cm^2    LVLd ap4 7.4 cm    EDV(MOD-sp4) 57.1 ml    LVLs ap4 6.3 cm    ESV(MOD-sp4) 21.6 ml    EF(MOD-sp4) 62.2 %    SV(MOD-sp4) 35.5 ml    SI(MOD-sp4) 19.3 ml/m^2    Ao root area (BSA corrected) 2.0     EF - Contrast (4Ch) 62.2 ml/m^2    LV Gutierrez Vol (BSA corrected) 31.0 ml/m^2    LV Sys Vol (BSA corrected) 11.7 ml/m^2    MV E max nupur 97.4 cm/sec    MV dec time 0.14 sec    Ao pk nupur 143.0 cm/sec    Ao max PG 8.2 mmHg    Ao max PG (full) 5.5 mmHg    Ao V2 mean 90.4 cm/sec    Ao mean PG 4.0 mmHg    Ao mean PG (full) 3.0 mmHg    Ao V2 VTI 25.1 cm    APOLONIA(I,A) 2.3 cm^2    APOLONAI(I,D) 2.3 cm^2    APOLONIA(V,A) 2.2 cm^2    APOLONIA(V,D) 2.2 cm^2    LV V1 max PG 2.7 mmHg    LV V1 mean PG 1.0 mmHg    LV V1 max 81.7 cm/sec    LV V1 mean 53.8 cm/sec    LV V1 VTI 15.2 cm    SV(Ao) 255.5 ml    SI(Ao) 138.7 ml/m^2    SV(LVOT) 57.8 ml    SI(LVOT) 31.4 ml/m^2    TR max nupur 210.0 cm/sec    RVSP(TR) 22.6 mmHg    RAP systole 5.0 mmHg    BH CV ECHO CAROLYN - BZI_BMI 21.4 kilograms/m^2    BH CV  "ECHO CAROLYN - BSA([facility]) 1.8 m^2     CV ECHO CAROLYN - BZI_METRIC_WEIGHT 67.6 kg    BH CV ECHO CAROLYN - BZI_METRIC_HEIGHT 177.8 cm    Target HR (85%) 111 bpm    Max. Pred. HR (100%) 130 bpm    LA volume 32.2 cm3    LA Volume Index 17.5 mL/m2    Avg E/e' ratio 9.85     Lat Peak E' Elliot 11.5 cm/sec    Med Peak E' Elliot 8.27 cm/sec       Lab Results   Component Value Date    PSA 1.070 11/08/2016        Lab Results:  CBC:  Lab Results - Last 18 Months  Lab Units 05/27/18  0525 05/26/18  0445 05/25/18  2037 05/15/18  0801 09/07/17  0800   WBC 10*3/mm3 5.91 5.89 6.15  --  5.99   HEMOGLOBIN g/dL 13.7* 13.8* 14.2 15.4 14.6   HEMATOCRIT % 39.5* 40.2 42.3 47.3 45.2   PLATELETS 10*3/mm3 175 179 182 193 177   IRON mcg/dL  --   --   --  91  --       BMP/CMP:  Lab Results - Last 18 Months  Lab Units 05/27/18  0525 05/26/18  0445 05/25/18  2037 05/15/18  0801 09/07/17  0800   SODIUM mmol/L 137 139 136 138 139   POTASSIUM mmol/L 4.1 4.1 4.2 4.3 4.5   CHLORIDE mmol/L 103 101 99 97* 102   CO2 mmol/L 26.0 29.0 28.0  --   --    TOTAL CO2, ARTERIAL mmol/L  --   --   --  30.0 26.0   GLUCOSE mg/dL  --   --   --  103* 92   BUN mg/dL 30* 37* 40* 40* 31*   CREATININE mg/dL 1.55* 1.88* 2.10* 1.88* 1.67*   EGFR IF NONAFRICN AM mL/min/1.73 42* 34* 30* 34* 39*   EGFR IF AFRICN AM mL/min/1.73  --   --   --  41* 47*   CALCIUM mg/dL 8.8 9.0 9.3 9.9 9.5     HEPATIC:  Lab Results - Last 18 Months  Lab Units 05/15/18  0801 09/07/17  0800   ALT (SGPT) U/L 20 30   AST (SGOT) U/L 22 19   ALK PHOS U/L 71 61     THYROID:  Lab Results - Last 18 Months  Lab Units 05/15/18  0801   TSH mIU/mL 1.330     A1C:No results for input(s): HGBA1C in the last 74270 hours.  PSA:No results for input(s): PSA in the last 00362 hours.    Objective   BP 90/70 (BP Location: Left arm, Patient Position: Sitting, Cuff Size: Adult)   Pulse 73   Temp 97.6 °F (36.4 °C) (Oral)   Resp 18   Ht 177.8 cm (70\")   Wt 77.6 kg (171 lb)   SpO2 96%   BMI 24.54 kg/m²   Body mass index is 24.54 " kg/m².    Physical Exam  GENERAL:  Well nourished/developed in no acute distress.  SKIN: Turgor excellent, without wound, rash, lesion.  HEENT: Normal cephalic without trauma.  Pupils equal round reactive to light. Extraocular motions full without nystagmus.   External canals nonobstructive nontender without reddness. Tymphatic membranes calos with shelia structures intact.   Oral cavity without growths, exudates, and moist.  Posterior pharynx without mass, obstruction, redness.  No thyromegaly, mass, tenderness, lymphadenopathy and supple.  CV: Regular rhythm.  No murmur, gallop,  edema. Posterior pulses intact.  No carotid bruits.  CHEST: No chest wall tenderness or mass.   LUNGS: Symmetric motion with clear to auscultation.  No dullness to percussion  ABD: Soft, nontender without mass.   ORTHO: Symmetric extremities without swelling/point tenderness.  Full gross range of motion.  Walking with assistance cane.  NEURO: CN 2-12 grossly intact.  Symmetric facies. 1/4 x bicep equal reflexes.  UE/LE   3/5 strength throughout.  Nonfocal use extremities. Speech clear.  Intact light touch with monofilament, vibratory sensation with tuning fork; equal toes/distal feet.    PSYCH: Oriented x 3.  Pleasant calm, well kept.  Purposeful/directed conservation with intact short/long gross memory.     Assessment/Plan     1. Hypertension, unspecified type    2. Gastroesophageal reflux disease, esophagitis presence not specified    3. Autonomic dysfunction    4. Chronic atrial fibrillation (CMS/HCC)    5. Congestive heart failure with left ventricular diastolic dysfunction, NYHA class 3 (CMS/HCC)    6. Cmcmmihggqioco-vyj-rgwo/xarelto    7. Stage 3 chronic kidney disease    8. Nausea    ? Peptic, ? autonomatic dysfunction causing nausea    Rx: reviewed/changes:  same    LAB/Testing/Referrals: reviewed/orders:   Today: same  Orders Placed This Encounter   Procedures   • Ambulatory Referral to Gastroenterology       Discussions:   With  xarelto; see if GI would consider scope without bx  Body mass index is 24.54 kg/m².   Patient's Body mass index is 24.54 kg/m². BMI is within normal parameters. No follow-up required.  Non-smoker      Patient Instructions   1st; talk to Dr Sepulveda about EGD (even with xarelto with plans of no bx and like)  2nd testing gb nuclear test  3rd testing circulation of abdomen       Follow up: Return for lab;, Dr William-, as planned;.  Future Appointments  Date Time Provider Department Center   10/4/2018 10:00 AM Jorge A Marshall APRN [facility] None   11/1/2018 11:30 AM Hemal William MD [facility] None   11/21/2018 10:15 AM Skyler Trimble MD [facility]   12/27/2018 9:40 AM Kiko Campos PA [facility] None

## 2018-09-26 RX ORDER — RAMIPRIL 2.5 MG/1
CAPSULE ORAL
Qty: 90 CAPSULE | Refills: 1 | Status: SHIPPED | OUTPATIENT
Start: 2018-09-26 | End: 2019-04-24 | Stop reason: SDUPTHER

## 2018-10-01 RX ORDER — TRAMADOL HYDROCHLORIDE 50 MG/1
50 TABLET ORAL EVERY 8 HOURS PRN
Qty: 21 TABLET | Refills: 0 | Status: SHIPPED | OUTPATIENT
Start: 2018-10-01 | End: 2018-11-21

## 2018-10-01 NOTE — TELEPHONE ENCOUNTER
Wife called earlier today and states pt had fallen and was taken to [facility] ER and needed a f/u apt for tomorrow apt scheduled for 10:15 tomorrow now wife has come in and states pt has strained a muscle in his back on L side and is in a lot of pain with any movement and wants to know if Dr William would be willing to order him something for pain or maybe a muscle relaxer [phone]

## 2018-10-01 NOTE — TELEPHONE ENCOUNTER
Current Outpatient Prescriptions:   •  azelastine (OPTIVAR) 0.05 % ophthalmic solution, INSTILL 1 DROP INTO BOTH EYES TWICE DAILY GENERIC FOR OPTIVAR, Disp: 6 mL, Rfl: 5  •  B Complex-C (SUPER B COMPLEX/VITAMIN C PO), Take  by mouth daily., Disp: , Rfl:   •  Calcium Polycarbophil (FIBER-CAPS PO), Take  by mouth 2 (Two) Times a Day., Disp: , Rfl:   •  Cholecalciferol (VITAMIN D3 PO), Take 2,000 Int'l Units by mouth Daily., Disp: , Rfl:   •  Coenzyme Q10 (CO Q-10 PO), Take  by mouth daily., Disp: , Rfl:   •  diltiaZEM CD (CARDIZEM CD) 120 MG 24 hr capsule, Take 1 capsule by mouth Daily., Disp: 30 capsule, Rfl: 5  •  dutasteride (AVODART) 0.5 MG capsule, TAKE 1 CAPSULE DAILY GENERIC FOR AVODART, Disp: 90 capsule, Rfl: 1  •  esomeprazole (NEXIUM) 40 MG capsule, Take 1 capsule by mouth Every Morning Before Breakfast., Disp: 30 capsule, Rfl: 5  •  furosemide (LASIX) 40 MG tablet, Take 40 mg by mouth Daily. May take one half tablet additionally prn swelling, Disp: , Rfl:   •  guaiFENesin (MUCINEX) 600 MG 12 hr tablet, Take 1,200 mg by mouth Every Night., Disp: , Rfl:   •  levalbuterol (XOPENEX HFA) 45 MCG/ACT inhaler, Inhale 1-2 puffs Every 4 (Four) Hours As Needed for Wheezing., Disp: 1 inhaler, Rfl: 5  •  Multiple Vitamins-Minerals (MULTIVITAMIN PO), Take 1 tablet by mouth daily., Disp: , Rfl:   •  O2 (OXYGEN), Inhale 1 (One) Time. As needed at bedtime., Disp: , Rfl:   •  Omega-3 Fatty Acids (FISH OIL PO), Take 1,000 mg by mouth 2 (Two) Times a Day., Disp: , Rfl:   •  PROAIR  (90 Base) MCG/ACT inhaler, Every 4 (Four) Hours As Needed for Shortness of Air., Disp: , Rfl: 5  •  ramipril (ALTACE) 2.5 MG capsule, TAKE 1 CAPSULE AT BEDTIME GENERIC FOR ALTACE, Disp: 90 capsule, Rfl: 1  •  SYMBICORT 80-4.5 MCG/ACT inhaler, Inhale 2 puffs 2 (Two) Times a Day., Disp: , Rfl:   •  tamsulosin (FLOMAX) 0.4 MG capsule 24 hr capsule, TAKE 1 CAPSULE DAILY SHORTLY AFTER THE SAME MEAL GENERIC FOR FLOMAX, Disp: 90 capsule, Rfl: 1  •   XARELTO 15 MG tablet, TAKE ONE TABLET DAILY, Disp: 90 tablet, Rfl: 3    Ultram 50 mg tid #21

## 2018-10-02 ENCOUNTER — OFFICE VISIT (OUTPATIENT)
Dept: FAMILY MEDICINE CLINIC | Facility: CLINIC | Age: 83
End: 2018-10-02

## 2018-10-02 VITALS
RESPIRATION RATE: 18 BRPM | TEMPERATURE: 97.9 F | SYSTOLIC BLOOD PRESSURE: 100 MMHG | OXYGEN SATURATION: 91 % | HEART RATE: 90 BPM | DIASTOLIC BLOOD PRESSURE: 60 MMHG

## 2018-10-02 DIAGNOSIS — R11.2 NAUSEA AND VOMITING, INTRACTABILITY OF VOMITING NOT SPECIFIED, UNSPECIFIED VOMITING TYPE: ICD-10-CM

## 2018-10-02 DIAGNOSIS — Z79.01 ANTICOAGULATED: ICD-10-CM
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
## 2018-10-02 DIAGNOSIS — I10 HYPERTENSION, UNSPECIFIED TYPE: Chronic | ICD-10-CM

## 2018-10-02 DIAGNOSIS — S62.102A CLOSED FRACTURE OF LEFT WRIST, INITIAL ENCOUNTER: ICD-10-CM

## 2018-10-02 DIAGNOSIS — R07.89 CHEST WALL PAIN: ICD-10-CM

## 2018-10-02 DIAGNOSIS — T14.8XXA ABRASION: ICD-10-CM

## 2018-10-02 PROCEDURE — 99214 OFFICE O/P EST MOD 30 MIN: CPT | Performed by: FAMILY MEDICINE

## 2018-10-02 RX ORDER — ONDANSETRON 4 MG/1
4 TABLET, FILM COATED ORAL EVERY 8 HOURS PRN
Qty: 12 TABLET | Refills: 0 | Status: SHIPPED | OUTPATIENT
Start: 2018-10-02 | End: 2018-11-21

## 2018-10-02 RX ORDER — HYDROCODONE BITARTRATE AND ACETAMINOPHEN 5; 325 MG/1; MG/1
1 TABLET ORAL EVERY 6 HOURS PRN
Qty: 12 TABLET | Refills: 0 | Status: SHIPPED | OUTPATIENT
Start: 2018-10-02 | End: 2018-10-17

## 2018-10-02 NOTE — PROGRESS NOTES
Subjective   James Birch is a 89 y.o. male presenting with chief complaint of:   Chief Complaint   Patient presents with   • Fall     [facility] ER F/U        History of Present Illness :  With wife.  Here for primarily an acute issue today; fall yesterday.  Was trying to reach up to kill spider; lost footing and fell.  No LOC.   Went to [facility]; has distal radius fracture splinted.  Has pain mid/lateral L rib/chest wall pain; hurts 8-10/10 with movement/deep breathing.   No LOC.   Has multiple chronic problems to consider that might have a bearing on today's issues; not an interval appointment.       Chronic/acute problems to review today:   1. Hypertension, unspecified type: chronic/stable.  Stable here/home blood pressures.  No significant chest pain, SOB, LE edema, orthopnea, near syncope, dizziness/light headness.      2. Chest wall pain: acute; L posterior mid chest.  To touch, deep breath.    3. Fall, initial encounter: acute/above   4. Nausea and vomiting, intractability of vomiting not specified, unspecified vomiting type: chronic/variable worse since pain Rx/this.    5. Closed fracture of left wrist, initial encounter: acute/above    6. Onacmldtqjslpz-rix-rfed/xarelto: chronic/reasons noted and risk/benefit to stay on   7. Abrasion: LUE/above.      Has an/another acute issue today: none.    The following portions of the patient's history were reviewed and updated as appropriate: allergies, current medications, past family history, past medical history, past social history, past surgical history and problem list.      Current Outpatient Prescriptions:   •  azelastine (OPTIVAR) 0.05 % ophthalmic solution, INSTILL 1 DROP INTO BOTH EYES TWICE DAILY GENERIC FOR OPTIVAR, Disp: 6 mL, Rfl: 5  •  B Complex-C (SUPER B COMPLEX/VITAMIN C PO), Take  by mouth daily., Disp: , Rfl:   •  Calcium Polycarbophil (FIBER-CAPS PO), Take  by mouth 2 (Two) Times a Day., Disp: , Rfl:   •  Cholecalciferol (VITAMIN D3 PO), Take 2,000 Int'l  Units by mouth Daily., Disp: , Rfl:   •  Coenzyme Q10 (CO Q-10 PO), Take  by mouth daily., Disp: , Rfl:   •  diltiaZEM CD (CARDIZEM CD) 120 MG 24 hr capsule, Take 1 capsule by mouth Daily., Disp: 30 capsule, Rfl: 5  •  dutasteride (AVODART) 0.5 MG capsule, TAKE 1 CAPSULE DAILY GENERIC FOR AVODART, Disp: 90 capsule, Rfl: 1  •  esomeprazole (NEXIUM) 40 MG capsule, Take 1 capsule by mouth Every Morning Before Breakfast., Disp: 30 capsule, Rfl: 5  •  furosemide (LASIX) 40 MG tablet, Take 40 mg by mouth Daily. May take one half tablet additionally prn swelling, Disp: , Rfl:   •  guaiFENesin (MUCINEX) 600 MG 12 hr tablet, Take 1,200 mg by mouth Every Night., Disp: , Rfl:   •  levalbuterol (XOPENEX HFA) 45 MCG/ACT inhaler, Inhale 1-2 puffs Every 4 (Four) Hours As Needed for Wheezing., Disp: 1 inhaler, Rfl: 5  •  Multiple Vitamins-Minerals (MULTIVITAMIN PO), Take 1 tablet by mouth daily., Disp: , Rfl:   •  O2 (OXYGEN), Inhale 1 (One) Time. As needed at bedtime., Disp: , Rfl:   •  Omega-3 Fatty Acids (FISH OIL PO), Take 1,000 mg by mouth 2 (Two) Times a Day., Disp: , Rfl:   •  PROAIR  (90 Base) MCG/ACT inhaler, Every 4 (Four) Hours As Needed for Shortness of Air., Disp: , Rfl: 5  •  ramipril (ALTACE) 2.5 MG capsule, TAKE 1 CAPSULE AT BEDTIME GENERIC FOR ALTACE, Disp: 90 capsule, Rfl: 1  •  SYMBICORT 80-4.5 MCG/ACT inhaler, Inhale 2 puffs 2 (Two) Times a Day., Disp: , Rfl:   •  tamsulosin (FLOMAX) 0.4 MG capsule 24 hr capsule, TAKE 1 CAPSULE DAILY SHORTLY AFTER THE SAME MEAL GENERIC FOR FLOMAX, Disp: 90 capsule, Rfl: 1  •  traMADol (ULTRAM) 50 MG tablet, Take 1 tablet by mouth Every 8 (Eight) Hours As Needed for Moderate Pain ., Disp: 21 tablet, Rfl: 0  •  XARELTO 15 MG tablet, TAKE ONE TABLET DAILY, Disp: 90 tablet, Rfl: 3    No problems with medications.  Refills if needed done    No Known Allergies    Review of Systems  GENERAL:  Inactive/slower with limits, speed, stamina for age and gait . Sleep is ok with oxygen.  No fever now.  ENDO:  No syncope, near or diaphoretic sweaty spells.  HEENT: No head injury or headache,  No vision change.  Same hearing loss.  Ears without pain/drainage.  No sore throat.  No significant nasal/sinus congestion/drainage. No epistaxis.  CHEST: Marked L posterior chest wall tenderness or mass. No change occ cough, without wheeze.  No SOB; no hemoptysis.  CV: No exertional chest pain, palpitations, ankle edema.  GI: No  dysphagia.  No abdominal pain, diarrhea, constipation.  No rectal bleeding, or melena.  On/off nausea and vomiting/heartburn.    :  Voids without dysuria, or incontinence to completion.  ORTHO: No painful/swollen joints but various on /off sore.  No change occ sore neck or back.  No acute neck or back pain without recent injury.   NEURO: No dizziness, weakness of extremities.  No change UE/LE mild numbness/paresthesias.   PSYCH: Mild ? memory loss.  Mood good; occ anxious, depressed but/and not suicidal.  Tries to tolerate stress   Screening:  Mammogram: NA  Bone density: NA  Low dose CT chest: NA  GI:   Colonoscopy+nl/[facility]/9.5.12/5y  EGD+biop/[facility]/8.31.15  Prostate: urology past  Usual lab order  3m CBC, BMP  6m CBC, CMP, iron  12m CBC, CMP, LIPID, TSH, Vit D, iron, ferritin, B12, folate    Results for orders placed or performed during the hospital encounter of 05/25/18   Basic Metabolic Panel   Result Value Ref Range    Glucose 104 (H) 70 - 100 mg/dL    BUN 40 (H) 5 - 21 mg/dL    Creatinine 2.10 (H) 0.50 - 1.40 mg/dL    Sodium 136 135 - 145 mmol/L    Potassium 4.2 3.5 - 5.3 mmol/L    Chloride 99 98 - 110 mmol/L    CO2 28.0 24.0 - 31.0 mmol/L    Calcium 9.3 8.4 - 10.4 mg/dL    eGFR Non African Amer 30 (L) >60 mL/min/1.73    BUN/Creatinine Ratio 19.0 7.0 - 25.0    Anion Gap 9.0 4.0 - 13.0 mmol/L   BNP   Result Value Ref Range    proBNP 660.0 0.0-1,800.0 pg/mL   Troponin   Result Value Ref Range    Troponin I <0.012 0.000 - 0.034 ng/mL   CBC Auto Differential    Result Value Ref Range    WBC 6.15 4.80 - 10.80 10*3/mm3    RBC 4.78 (L) 4.80 - 5.90 10*6/mm3    Hemoglobin 14.2 14.0 - 18.0 g/dL    Hematocrit 42.3 40.0 - 52.0 %    MCV 88.5 82.0 - 95.0 fL    MCH 29.7 28.0 - 32.0 pg    MCHC 33.6 33.0 - 36.0 g/dL    RDW 13.5 12.0 - 15.0 %    RDW-SD 44.2 40.0 - 54.0 fl    MPV 9.5 6.0 - 12.0 fL    Platelets 182 130 - 400 10*3/mm3    Neutrophil % 63.3 39.0 - 78.0 %    Lymphocyte % 20.7 15.0 - 45.0 %    Monocyte % 12.5 (H) 4.0 - 12.0 %    Eosinophil % 2.1 0.0 - 4.0 %    Basophil % 0.7 0.0 - 2.0 %    Immature Grans % 0.7 0.0 - 5.0 %    Neutrophils, Absolute 3.90 1.87 - 8.40 10*3/mm3    Lymphocytes, Absolute 1.27 0.72 - 4.86 10*3/mm3    Monocytes, Absolute 0.77 0.19 - 1.30 10*3/mm3    Eosinophils, Absolute 0.13 0.00 - 0.70 10*3/mm3    Basophils, Absolute 0.04 0.00 - 0.20 10*3/mm3    Immature Grans, Absolute 0.04 (H) 0.00 - 0.03 10*3/mm3    nRBC 0.0 0.0 - 0.0 /100 WBC   Basic Metabolic Panel   Result Value Ref Range    Glucose 95 70 - 100 mg/dL    BUN 37 (H) 5 - 21 mg/dL    Creatinine 1.88 (H) 0.50 - 1.40 mg/dL    Sodium 139 135 - 145 mmol/L    Potassium 4.1 3.5 - 5.3 mmol/L    Chloride 101 98 - 110 mmol/L    CO2 29.0 24.0 - 31.0 mmol/L    Calcium 9.0 8.4 - 10.4 mg/dL    eGFR Non African Amer 34 (L) >60 mL/min/1.73    BUN/Creatinine Ratio 19.7 7.0 - 25.0    Anion Gap 9.0 4.0 - 13.0 mmol/L   Lipid Panel   Result Value Ref Range    Total Cholesterol 216 (H) 130 - 200 mg/dL    Triglycerides 157 (H) 0 - 149 mg/dL    HDL Cholesterol 39 (L) >=40 mg/dL    LDL Cholesterol  149 (H) 0 - 99 mg/dL    LDL/HDL Ratio 3.73    CBC Auto Differential   Result Value Ref Range    WBC 5.89 4.80 - 10.80 10*3/mm3    RBC 4.60 (L) 4.80 - 5.90 10*6/mm3    Hemoglobin 13.8 (L) 14.0 - 18.0 g/dL    Hematocrit 40.2 40.0 - 52.0 %    MCV 87.4 82.0 - 95.0 fL    MCH 30.0 28.0 - 32.0 pg    MCHC 34.3 33.0 - 36.0 g/dL    RDW 13.4 12.0 - 15.0 %    RDW-SD 42.6 40.0 - 54.0 fl    MPV 10.0 6.0 - 12.0 fL    Platelets 179 130 - 400  10*3/mm3    Neutrophil % 65.9 39.0 - 78.0 %    Lymphocyte % 17.1 15.0 - 45.0 %    Monocyte % 13.1 (H) 4.0 - 12.0 %    Eosinophil % 2.9 0.0 - 4.0 %    Basophil % 0.7 0.0 - 2.0 %    Immature Grans % 0.3 0.0 - 5.0 %    Neutrophils, Absolute 3.88 1.87 - 8.40 10*3/mm3    Lymphocytes, Absolute 1.01 0.72 - 4.86 10*3/mm3    Monocytes, Absolute 0.77 0.19 - 1.30 10*3/mm3    Eosinophils, Absolute 0.17 0.00 - 0.70 10*3/mm3    Basophils, Absolute 0.04 0.00 - 0.20 10*3/mm3    Immature Grans, Absolute 0.02 0.00 - 0.03 10*3/mm3    nRBC 0.0 0.0 - 0.0 /100 WBC   Basic Metabolic Panel   Result Value Ref Range    Glucose 100 70 - 100 mg/dL    BUN 30 (H) 5 - 21 mg/dL    Creatinine 1.55 (H) 0.50 - 1.40 mg/dL    Sodium 137 135 - 145 mmol/L    Potassium 4.1 3.5 - 5.3 mmol/L    Chloride 103 98 - 110 mmol/L    CO2 26.0 24.0 - 31.0 mmol/L    Calcium 8.8 8.4 - 10.4 mg/dL    eGFR Non African Amer 42 (L) >60 mL/min/1.73    BUN/Creatinine Ratio 19.4 7.0 - 25.0    Anion Gap 8.0 4.0 - 13.0 mmol/L   CBC Auto Differential   Result Value Ref Range    WBC 5.91 4.80 - 10.80 10*3/mm3    RBC 4.55 (L) 4.80 - 5.90 10*6/mm3    Hemoglobin 13.7 (L) 14.0 - 18.0 g/dL    Hematocrit 39.5 (L) 40.0 - 52.0 %    MCV 86.8 82.0 - 95.0 fL    MCH 30.1 28.0 - 32.0 pg    MCHC 34.7 33.0 - 36.0 g/dL    RDW 13.5 12.0 - 15.0 %    RDW-SD 42.8 40.0 - 54.0 fl    MPV 9.8 6.0 - 12.0 fL    Platelets 175 130 - 400 10*3/mm3    Neutrophil % 65.3 39.0 - 78.0 %    Lymphocyte % 18.1 15.0 - 45.0 %    Monocyte % 12.7 (H) 4.0 - 12.0 %    Eosinophil % 2.9 0.0 - 4.0 %    Basophil % 0.5 0.0 - 2.0 %    Immature Grans % 0.5 0.0 - 5.0 %    Neutrophils, Absolute 3.86 1.87 - 8.40 10*3/mm3    Lymphocytes, Absolute 1.07 0.72 - 4.86 10*3/mm3    Monocytes, Absolute 0.75 0.19 - 1.30 10*3/mm3    Eosinophils, Absolute 0.17 0.00 - 0.70 10*3/mm3    Basophils, Absolute 0.03 0.00 - 0.20 10*3/mm3    Immature Grans, Absolute 0.03 0.00 - 0.03 10*3/mm3    nRBC 0.0 0.0 - 0.0 /100 WBC   Adult Transthoracic Echo  Limited W/ Cont if Necessary Per Protocol   Result Value Ref Range    BSA 1.8 m^2    IVSd 0.9 cm    LVIDd 3.6 cm    LVIDs 2.1 cm    LVPWd 1.1 cm    IVS/LVPW 0.82     FS 41.7 %    EDV(Teich) 54.4 ml    ESV(Teich) 14.4 ml    EF(Teich) 73.5 %    EDV(cubed) 46.7 ml    ESV(cubed) 9.3 ml    EF(cubed) 80.2 %    LV mass(C)d 107.9 grams    LV mass(C)dI 58.6 grams/m^2    SV(Teich) 40.0 ml    SI(Teich) 21.7 ml/m^2    SV(cubed) 37.4 ml    SI(cubed) 20.3 ml/m^2    Ao root diam 3.6 cm    Ao root area 10.2 cm^2    LA dimension 3.4 cm    LA/Ao 0.94     LVOT diam 2.2 cm    LVOT area 3.8 cm^2    LVOT area(traced) 3.8 cm^2    LVLd ap4 7.4 cm    EDV(MOD-sp4) 57.1 ml    LVLs ap4 6.3 cm    ESV(MOD-sp4) 21.6 ml    EF(MOD-sp4) 62.2 %    SV(MOD-sp4) 35.5 ml    SI(MOD-sp4) 19.3 ml/m^2    Ao root area (BSA corrected) 2.0     EF - Contrast (4Ch) 62.2 ml/m^2    LV Gutierrez Vol (BSA corrected) 31.0 ml/m^2    LV Sys Vol (BSA corrected) 11.7 ml/m^2    MV E max elliot 97.4 cm/sec    MV dec time 0.14 sec    Ao pk ellito 143.0 cm/sec    Ao max PG 8.2 mmHg    Ao max PG (full) 5.5 mmHg    Ao V2 mean 90.4 cm/sec    Ao mean PG 4.0 mmHg    Ao mean PG (full) 3.0 mmHg    Ao V2 VTI 25.1 cm    APOLONIA(I,A) 2.3 cm^2    APOLONIA(I,D) 2.3 cm^2    APOLONIA(V,A) 2.2 cm^2    APOLONIA(V,D) 2.2 cm^2    LV V1 max PG 2.7 mmHg    LV V1 mean PG 1.0 mmHg    LV V1 max 81.7 cm/sec    LV V1 mean 53.8 cm/sec    LV V1 VTI 15.2 cm    SV(Ao) 255.5 ml    SI(Ao) 138.7 ml/m^2    SV(LVOT) 57.8 ml    SI(LVOT) 31.4 ml/m^2    TR max elliot 210.0 cm/sec    RVSP(TR) 22.6 mmHg    RAP systole 5.0 mmHg     CV ECHO CAROLYN - BZI_BMI 21.4 kilograms/m^2     CV ECHO CAROLYN - BSA(HAYCOCK) 1.8 m^2     CV ECHO CAROLYN - BZI_METRIC_WEIGHT 67.6 kg     CV ECHO CAROLYN - BZI_METRIC_HEIGHT 177.8 cm    Target HR (85%) 111 bpm    Max. Pred. HR (100%) 130 bpm    LA volume 32.2 cm3    LA Volume Index 17.5 mL/m2    Avg E/e' ratio 9.85     Lat Peak E' Elliot 11.5 cm/sec    Med Peak E' Elliot 8.27 cm/sec       Lab Results   Component Value Date     PSA 1.070 11/08/2016        Lab Results:  CBC:    Lab Results - Last 18 Months  Lab Units 05/27/18  0525 05/26/18  0445 05/25/18  2037 05/15/18  0801 09/07/17  0800   WBC 10*3/mm3 5.91 5.89 6.15  --  5.99   HEMOGLOBIN g/dL 13.7* 13.8* 14.2 15.4 14.6   HEMATOCRIT % 39.5* 40.2 42.3 47.3 45.2   PLATELETS 10*3/mm3 175 179 182 193 177   IRON mcg/dL  --   --   --  91  --       BMP/CMP:    Lab Results - Last 18 Months  Lab Units 05/27/18  0525 05/26/18  0445 05/25/18  2037 05/15/18  0801 09/07/17  0800   SODIUM mmol/L 137 139 136 138 139   POTASSIUM mmol/L 4.1 4.1 4.2 4.3 4.5   CHLORIDE mmol/L 103 101 99 97* 102   CO2 mmol/L 26.0 29.0 28.0  --   --    TOTAL CO2, ARTERIAL mmol/L  --   --   --  30.0 26.0   GLUCOSE mg/dL  --   --   --  103* 92   BUN mg/dL 30* 37* 40* 40* 31*   CREATININE mg/dL 1.55* 1.88* 2.10* 1.88* 1.67*   EGFR IF NONAFRICN AM mL/min/1.73 42* 34* 30* 34* 39*   EGFR IF AFRICN AM mL/min/1.73  --   --   --  41* 47*   CALCIUM mg/dL 8.8 9.0 9.3 9.9 9.5     HEPATIC:    Lab Results - Last 18 Months  Lab Units 05/15/18  0801 09/07/17  0800   ALT (SGPT) U/L 20 30   AST (SGOT) U/L 22 19   ALK PHOS U/L 71 61     THYROID:    Lab Results - Last 18 Months  Lab Units 05/15/18  0801   TSH mIU/mL 1.330     A1C:No results for input(s): HGBA1C in the last 69064 hours.  PSA:No results for input(s): PSA in the last 75018 hours.    Objective   /60   Pulse 90   Temp 97.9 °F (36.6 °C) (Oral)   Resp 18   SpO2 91%   There is no height or weight on file to calculate BMI.    Physical Exam  GENERAL:  Well nourished/developed in no acute distress.  SKIN: Turgor excellent, without rash; dried blood on lower L arm bandage.   HEENT: Normal cephalic without trauma.  Pupils equal round reactive to light. Extraocular motions full without nystagmus.   External canals nonobstructive nontender without reddness. Tymphatic membranes calos with shelia structures intact.   Oral cavity without growths, exudates, and moist.  Posterior pharynx  without mass, obstruction, redness.  No thyromegaly, mass, tenderness, lymphadenopathy and supple.  CV: Regular rhythm.  No murmur, gallop,  edema. Posterior pulses intact.  No carotid bruits.  CHEST: Marked L mid/posterior chest wall tenderness or mass.   LUNGS: Symmetric motion with clear to auscultation.   ABD: Soft, nontender without mass.   ORTHO: Symmetric extremities without swelling/point tenderness.  Full gross range of motion.  Wheelchair.   NEURO: CN 2-12 grossly intact.  Symmetric facies. 1/4 x bicep equal reflexes.  UE/LE   3/5 strength throughout.  Nonfocal use extremities. Speech clear.   PSYCH: Oriented x 3.  Pleasant calm, well kept.  Purposeful/directed conservation with intact short/long gross memory.     Assessment/Plan     1. Hypertension, unspecified type    2. Chest wall pain    3. Fall, initial encounter    4. Nausea and vomiting, intractability of vomiting not specified, unspecified vomiting type    5. Closed fracture of left wrist, initial encounter    6. Ofdaxwklxftgbm-cnk-tqld/xarelto    7. Abrasion    likely L posterior rib fracture, skin tear LUE, and known nondisplaced/closed L distal radius fracture    Rx: reviewed/changes:  Add Norco 5    LAB/Testing/Referrals: reviewed/orders:   Today:   Orders Placed This Encounter   Procedures   • Comprehensive Metabolic Panel   • Ambulatory Referral to Orthopedic Surgery   • CBC & Differential       Discussions:   Home health referral; wound care/PT  There is no height or weight on file to calculate BMI.   Patient's There is no height or weight on file to calculate BMI. BMI is within normal parameters. No follow-up required.  Non-smoker      There are no Patient Instructions on file for this visit.    Follow up: Return for lab;, Dr William-, as planned;.  Future Appointments  Date Time Provider Department Center   10/30/2018 9:00 AM Jorge A Mrashall APRN [facility] None   11/1/2018 11:30 AM Hemal William MD [facility] None    11/21/2018 10:15 AM Skyler Trimble MD [facility]   12/27/2018 9:40 AM Kiko Campos PA [facility] None

## 2018-10-03 LAB
ALBUMIN SERPL-MCNC: 3.9 G/DL (ref 3.5–5)
ALBUMIN/GLOB SERPL: 1.4 G/DL (ref 1.1–2.5)
ALP SERPL-CCNC: 89 U/L (ref 24–120)
ALT SERPL-CCNC: 16 U/L (ref 0–54)
AST SERPL-CCNC: 20 U/L (ref 7–45)
BASOPHILS # BLD AUTO: 0.03 10*3/MM3 (ref 0–0.2)
BASOPHILS NFR BLD AUTO: 0.4 % (ref 0–2)
BILIRUB SERPL-MCNC: 0.9 MG/DL (ref 0.1–1)
BUN SERPL-MCNC: 31 MG/DL (ref 5–21)
BUN/CREAT SERPL: 14.7 (ref 7–25)
CALCIUM SERPL-MCNC: 9.4 MG/DL (ref 8.4–10.4)
CHLORIDE SERPL-SCNC: 98 MMOL/L (ref 98–110)
CO2 SERPL-SCNC: 26 MMOL/L (ref 24–31)
CREAT SERPL-MCNC: 2.11 MG/DL (ref 0.5–1.4)
EOSINOPHIL # BLD AUTO: 0.14 10*3/MM3 (ref 0–0.7)
EOSINOPHIL NFR BLD AUTO: 2.1 % (ref 0–4)
ERYTHROCYTE [DISTWIDTH] IN BLOOD BY AUTOMATED COUNT: 14 % (ref 12–15)
GLOBULIN SER CALC-MCNC: 2.7 GM/DL
GLUCOSE SERPL-MCNC: 103 MG/DL (ref 70–100)
HCT VFR BLD AUTO: 41.2 % (ref 40–52)
HGB BLD-MCNC: 13.3 G/DL (ref 14–18)
IMM GRANULOCYTES # BLD: 0.01 10*3/MM3 (ref 0–0.03)
IMM GRANULOCYTES NFR BLD: 0.1 % (ref 0–5)
LYMPHOCYTES # BLD AUTO: 0.71 10*3/MM3 (ref 0.72–4.86)
LYMPHOCYTES NFR BLD AUTO: 10.5 % (ref 15–45)
MCH RBC QN AUTO: 29.4 PG (ref 28–32)
MCHC RBC AUTO-ENTMCNC: 32.3 G/DL (ref 33–36)
MCV RBC AUTO: 90.9 FL (ref 82–95)
MONOCYTES # BLD AUTO: 0.69 10*3/MM3 (ref 0.19–1.3)
MONOCYTES NFR BLD AUTO: 10.2 % (ref 4–12)
NEUTROPHILS # BLD AUTO: 5.19 10*3/MM3 (ref 1.87–8.4)
NEUTROPHILS NFR BLD AUTO: 76.7 % (ref 39–78)
NRBC BLD AUTO-RTO: 0 /100 WBC (ref 0–0)
PLATELET # BLD AUTO: 201 10*3/MM3 (ref 130–400)
POTASSIUM SERPL-SCNC: 3.7 MMOL/L (ref 3.5–5.3)
PROT SERPL-MCNC: 6.6 G/DL (ref 6.3–8.7)
RBC # BLD AUTO: 4.53 10*6/MM3 (ref 4.8–5.9)
SODIUM SERPL-SCNC: 139 MMOL/L (ref 135–145)
WBC # BLD AUTO: 6.77 10*3/MM3 (ref 4.8–10.8)

## 2018-10-09 DIAGNOSIS — J44.9 CHRONIC OBSTRUCTIVE PULMONARY DISEASE, UNSPECIFIED COPD TYPE (HCC): ICD-10-CM

## 2018-10-09 DIAGNOSIS — N18.30 STAGE 3 CHRONIC KIDNEY DISEASE (HCC): ICD-10-CM

## 2018-10-09 DIAGNOSIS — G90.9 AUTONOMIC DYSFUNCTION: ICD-10-CM

## 2018-10-09 DIAGNOSIS — I50.30 CONGESTIVE HEART FAILURE WITH LEFT VENTRICULAR DIASTOLIC DYSFUNCTION, NYHA CLASS 3 (HCC): ICD-10-CM

## 2018-10-09 DIAGNOSIS — Z86.73 HISTORY OF CVA (CEREBROVASCULAR ACCIDENT): ICD-10-CM

## 2018-10-09 DIAGNOSIS — R26.9 GAIT DIFFICULTY: Primary | Chronic | ICD-10-CM

## 2018-10-09 NOTE — PROGRESS NOTES
Radha informed of this. Referral order put in for [facility]. Nurse Juan notified he needs labs tomorrow.

## 2018-10-09 NOTE — PROGRESS NOTES
She says they seen Ortho yesterday. To see them again in 2 wks. Changing his bandage daily. She says no one from Home Health has called or came to see him. He is still vomiting. Nothing is staying down.He is gradually getting weaker all the time. They had to reschedule his appt with . She could not get him there. He was too sick. They have rescheduled it for 2wks from today.

## 2018-10-10 ENCOUNTER — LAB REQUISITION (OUTPATIENT)
Dept: LAB | Facility: HOSPITAL | Age: 83
End: 2018-10-10

## 2018-10-10 DIAGNOSIS — Z00.00 ENCOUNTER FOR GENERAL ADULT MEDICAL EXAMINATION WITHOUT ABNORMAL FINDINGS: ICD-10-CM

## 2018-10-10 LAB
ANION GAP SERPL CALCULATED.3IONS-SCNC: 12 MMOL/L (ref 4–13)
BASOPHILS # BLD AUTO: 0.03 10*3/MM3 (ref 0–0.2)
BASOPHILS NFR BLD AUTO: 0.5 % (ref 0–2)
BUN BLD-MCNC: 40 MG/DL (ref 5–21)
BUN/CREAT SERPL: 18.3 (ref 7–25)
CALCIUM SPEC-SCNC: 9.6 MG/DL (ref 8.4–10.4)
CHLORIDE SERPL-SCNC: 97 MMOL/L (ref 98–110)
CO2 SERPL-SCNC: 28 MMOL/L (ref 24–31)
CREAT BLD-MCNC: 2.19 MG/DL (ref 0.5–1.4)
DEPRECATED RDW RBC AUTO: 44.2 FL (ref 40–54)
EOSINOPHIL # BLD AUTO: 0.2 10*3/MM3 (ref 0–0.7)
EOSINOPHIL NFR BLD AUTO: 3.3 % (ref 0–4)
ERYTHROCYTE [DISTWIDTH] IN BLOOD BY AUTOMATED COUNT: 13.6 % (ref 12–15)
GFR SERPL CREATININE-BSD FRML MDRD: 28 ML/MIN/1.73
GLUCOSE BLD-MCNC: 100 MG/DL (ref 70–100)
HCT VFR BLD AUTO: 38.7 % (ref 40–52)
HGB BLD-MCNC: 12.9 G/DL (ref 14–18)
IMM GRANULOCYTES # BLD: 0.04 10*3/MM3 (ref 0–0.03)
IMM GRANULOCYTES NFR BLD: 0.7 % (ref 0–5)
LYMPHOCYTES # BLD AUTO: 0.95 10*3/MM3 (ref 0.72–4.86)
LYMPHOCYTES NFR BLD AUTO: 15.5 % (ref 15–45)
MCH RBC QN AUTO: 29.5 PG (ref 28–32)
MCHC RBC AUTO-ENTMCNC: 33.3 G/DL (ref 33–36)
MCV RBC AUTO: 88.6 FL (ref 82–95)
MONOCYTES # BLD AUTO: 0.76 10*3/MM3 (ref 0.19–1.3)
MONOCYTES NFR BLD AUTO: 12.4 % (ref 4–12)
NEUTROPHILS # BLD AUTO: 4.15 10*3/MM3 (ref 1.87–8.4)
NEUTROPHILS NFR BLD AUTO: 67.6 % (ref 39–78)
NRBC BLD MANUAL-RTO: 0 /100 WBC (ref 0–0)
PLATELET # BLD AUTO: 224 10*3/MM3 (ref 130–400)
PMV BLD AUTO: 9.4 FL (ref 6–12)
POTASSIUM BLD-SCNC: 4.7 MMOL/L (ref 3.5–5.3)
RBC # BLD AUTO: 4.37 10*6/MM3 (ref 4.8–5.9)
SODIUM BLD-SCNC: 137 MMOL/L (ref 135–145)
WBC NRBC COR # BLD: 6.13 10*3/MM3 (ref 4.8–10.8)

## 2018-10-10 PROCEDURE — 80048 BASIC METABOLIC PNL TOTAL CA: CPT | Performed by: FAMILY MEDICINE

## 2018-10-10 PROCEDURE — 85025 COMPLETE CBC W/AUTO DIFF WBC: CPT | Performed by: FAMILY MEDICINE

## 2018-10-11 ENCOUNTER — APPOINTMENT (OUTPATIENT)
Dept: GENERAL RADIOLOGY | Facility: HOSPITAL | Age: 83
End: 2018-10-11

## 2018-10-11 ENCOUNTER — HOSPITAL ENCOUNTER (OUTPATIENT)
Facility: HOSPITAL | Age: 83
Setting detail: OBSERVATION
Discharge: HOME | End: 2018-10-13
Attending: FAMILY MEDICINE | Admitting: FAMILY MEDICINE

## 2018-10-11 ENCOUNTER — OFFICE VISIT (OUTPATIENT)
Dept: FAMILY MEDICINE CLINIC | Facility: CLINIC | Age: 83
End: 2018-10-11

## 2018-10-11 VITALS
SYSTOLIC BLOOD PRESSURE: 90 MMHG | HEIGHT: 70 IN | RESPIRATION RATE: 18 BRPM | DIASTOLIC BLOOD PRESSURE: 58 MMHG | OXYGEN SATURATION: 95 % | TEMPERATURE: 97.4 F | HEART RATE: 78 BPM

## 2018-10-11 DIAGNOSIS — Z79.01 ANTICOAGULATED: ICD-10-CM

## 2018-10-11 DIAGNOSIS — N17.9 ACUTE RENAL FAILURE, UNSPECIFIED ACUTE RENAL FAILURE TYPE (HCC): ICD-10-CM

## 2018-10-11 DIAGNOSIS — R26.9 GAIT DIFFICULTY: Chronic | ICD-10-CM

## 2018-10-11 DIAGNOSIS — R26.9 GAIT ABNORMALITY: ICD-10-CM

## 2018-10-11 DIAGNOSIS — R11.2 NAUSEA AND VOMITING, INTRACTABILITY OF VOMITING NOT SPECIFIED, UNSPECIFIED VOMITING TYPE: ICD-10-CM

## 2018-10-11 DIAGNOSIS — I48.20 CHRONIC ATRIAL FIBRILLATION (HCC): ICD-10-CM

## 2018-10-11 DIAGNOSIS — N18.30 STAGE 3 CHRONIC KIDNEY DISEASE (HCC): ICD-10-CM

## 2018-10-11 DIAGNOSIS — I10 HYPERTENSION, UNSPECIFIED TYPE: Chronic | ICD-10-CM

## 2018-10-11 LAB
AMYLASE SERPL-CCNC: 105 U/L (ref 30–110)
BASOPHILS # BLD AUTO: 0.03 10*3/MM3 (ref 0–0.2)
BASOPHILS NFR BLD AUTO: 0.6 % (ref 0–2)
DEPRECATED RDW RBC AUTO: 41.8 FL (ref 40–54)
EOSINOPHIL # BLD AUTO: 0.15 10*3/MM3 (ref 0–0.7)
EOSINOPHIL NFR BLD AUTO: 3 % (ref 0–4)
ERYTHROCYTE [DISTWIDTH] IN BLOOD BY AUTOMATED COUNT: 13.3 % (ref 12–15)
HCT VFR BLD AUTO: 33.7 % (ref 40–52)
HGB BLD-MCNC: 11.8 G/DL (ref 14–18)
IMM GRANULOCYTES # BLD: 0.02 10*3/MM3 (ref 0–0.03)
IMM GRANULOCYTES NFR BLD: 0.4 % (ref 0–5)
IRON 24H UR-MRATE: 45 MCG/DL (ref 42–180)
IRON SATN MFR SERPL: 16 % (ref 20–45)
LIPASE SERPL-CCNC: 144 U/L (ref 23–203)
LYMPHOCYTES # BLD AUTO: 0.93 10*3/MM3 (ref 0.72–4.86)
LYMPHOCYTES NFR BLD AUTO: 18.5 % (ref 15–45)
MCH RBC QN AUTO: 29.9 PG (ref 28–32)
MCHC RBC AUTO-ENTMCNC: 35 G/DL (ref 33–36)
MCV RBC AUTO: 85.3 FL (ref 82–95)
MONOCYTES # BLD AUTO: 0.61 10*3/MM3 (ref 0.19–1.3)
MONOCYTES NFR BLD AUTO: 12.1 % (ref 4–12)
NEUTROPHILS # BLD AUTO: 3.29 10*3/MM3 (ref 1.87–8.4)
NEUTROPHILS NFR BLD AUTO: 65.4 % (ref 39–78)
NRBC BLD MANUAL-RTO: 0 /100 WBC (ref 0–0)
PLATELET # BLD AUTO: 212 10*3/MM3 (ref 130–400)
PMV BLD AUTO: 9.2 FL (ref 6–12)
RBC # BLD AUTO: 3.95 10*6/MM3 (ref 4.8–5.9)
TIBC SERPL-MCNC: 277 MCG/DL (ref 225–420)
TSH SERPL DL<=0.05 MIU/L-ACNC: 0.32 MIU/ML (ref 0.47–4.68)
WBC NRBC COR # BLD: 5.03 10*3/MM3 (ref 4.8–10.8)

## 2018-10-11 PROCEDURE — 74018 RADEX ABDOMEN 1 VIEW: CPT

## 2018-10-11 PROCEDURE — 82728 ASSAY OF FERRITIN: CPT | Performed by: INTERNAL MEDICINE

## 2018-10-11 PROCEDURE — 82150 ASSAY OF AMYLASE: CPT | Performed by: FAMILY MEDICINE

## 2018-10-11 PROCEDURE — 80053 COMPREHEN METABOLIC PANEL: CPT | Performed by: FAMILY MEDICINE

## 2018-10-11 PROCEDURE — 83540 ASSAY OF IRON: CPT | Performed by: FAMILY MEDICINE

## 2018-10-11 PROCEDURE — 84443 ASSAY THYROID STIM HORMONE: CPT | Performed by: FAMILY MEDICINE

## 2018-10-11 PROCEDURE — G0378 HOSPITAL OBSERVATION PER HR: HCPCS

## 2018-10-11 PROCEDURE — 94799 UNLISTED PULMONARY SVC/PX: CPT

## 2018-10-11 PROCEDURE — 85025 COMPLETE CBC W/AUTO DIFF WBC: CPT | Performed by: FAMILY MEDICINE

## 2018-10-11 PROCEDURE — 94760 N-INVAS EAR/PLS OXIMETRY 1: CPT

## 2018-10-11 PROCEDURE — 83690 ASSAY OF LIPASE: CPT | Performed by: FAMILY MEDICINE

## 2018-10-11 PROCEDURE — 96360 HYDRATION IV INFUSION INIT: CPT

## 2018-10-11 PROCEDURE — 99222 1ST HOSP IP/OBS MODERATE 55: CPT | Performed by: CLINICAL NURSE SPECIALIST

## 2018-10-11 PROCEDURE — 96361 HYDRATE IV INFUSION ADD-ON: CPT

## 2018-10-11 PROCEDURE — 83550 IRON BINDING TEST: CPT | Performed by: FAMILY MEDICINE

## 2018-10-11 PROCEDURE — 99215 OFFICE O/P EST HI 40 MIN: CPT | Performed by: FAMILY MEDICINE

## 2018-10-11 RX ORDER — HYDROCODONE BITARTRATE AND ACETAMINOPHEN 5; 325 MG/1; MG/1
1 TABLET ORAL EVERY 6 HOURS PRN
Status: DISCONTINUED | OUTPATIENT
Start: 2018-10-11 | End: 2018-10-13 | Stop reason: HOSPADM

## 2018-10-11 RX ORDER — SODIUM CHLORIDE 0.9 % (FLUSH) 0.9 %
3-10 SYRINGE (ML) INJECTION AS NEEDED
Status: DISCONTINUED | OUTPATIENT
Start: 2018-10-11 | End: 2018-10-13 | Stop reason: HOSPADM

## 2018-10-11 RX ORDER — GUAIFENESIN 600 MG/1
600 TABLET, EXTENDED RELEASE ORAL EVERY 12 HOURS SCHEDULED
Status: DISCONTINUED | OUTPATIENT
Start: 2018-10-11 | End: 2018-10-13 | Stop reason: HOSPADM

## 2018-10-11 RX ORDER — BUDESONIDE AND FORMOTEROL FUMARATE DIHYDRATE 80; 4.5 UG/1; UG/1
2 AEROSOL RESPIRATORY (INHALATION)
Status: DISCONTINUED | OUTPATIENT
Start: 2018-10-11 | End: 2018-10-13 | Stop reason: HOSPADM

## 2018-10-11 RX ORDER — SODIUM CHLORIDE 9 MG/ML
75 INJECTION, SOLUTION INTRAVENOUS CONTINUOUS
Status: DISCONTINUED | OUTPATIENT
Start: 2018-10-11 | End: 2018-10-13 | Stop reason: HOSPADM

## 2018-10-11 RX ORDER — SODIUM CHLORIDE 0.9 % (FLUSH) 0.9 %
3 SYRINGE (ML) INJECTION EVERY 12 HOURS SCHEDULED
Status: DISCONTINUED | OUTPATIENT
Start: 2018-10-11 | End: 2018-10-13 | Stop reason: HOSPADM

## 2018-10-11 RX ORDER — ONDANSETRON 4 MG/1
4 TABLET, FILM COATED ORAL EVERY 8 HOURS PRN
Status: DISCONTINUED | OUTPATIENT
Start: 2018-10-11 | End: 2018-10-13 | Stop reason: HOSPADM

## 2018-10-11 RX ORDER — PANTOPRAZOLE SODIUM 40 MG/1
40 TABLET, DELAYED RELEASE ORAL EVERY MORNING
Status: DISCONTINUED | OUTPATIENT
Start: 2018-10-12 | End: 2018-10-13 | Stop reason: HOSPADM

## 2018-10-11 RX ORDER — DILTIAZEM HYDROCHLORIDE 120 MG/1
120 CAPSULE, COATED, EXTENDED RELEASE ORAL
Status: DISCONTINUED | OUTPATIENT
Start: 2018-10-11 | End: 2018-10-13 | Stop reason: HOSPADM

## 2018-10-11 RX ORDER — FINASTERIDE 5 MG/1
5 TABLET, FILM COATED ORAL DAILY
Status: DISCONTINUED | OUTPATIENT
Start: 2018-10-11 | End: 2018-10-13 | Stop reason: HOSPADM

## 2018-10-11 RX ORDER — KETOTIFEN FUMARATE 0.35 MG/ML
1 SOLUTION/ DROPS OPHTHALMIC 2 TIMES DAILY
Status: DISCONTINUED | OUTPATIENT
Start: 2018-10-11 | End: 2018-10-13 | Stop reason: HOSPADM

## 2018-10-11 RX ORDER — TAMSULOSIN HYDROCHLORIDE 0.4 MG/1
0.4 CAPSULE ORAL NIGHTLY
Status: DISCONTINUED | OUTPATIENT
Start: 2018-10-11 | End: 2018-10-13 | Stop reason: HOSPADM

## 2018-10-11 RX ORDER — TAMSULOSIN HYDROCHLORIDE 0.4 MG/1
0.4 CAPSULE ORAL DAILY
Status: DISCONTINUED | OUTPATIENT
Start: 2018-10-11 | End: 2018-10-11 | Stop reason: SDUPTHER

## 2018-10-11 RX ADMIN — GUAIFENESIN 600 MG: 600 TABLET, EXTENDED RELEASE ORAL at 20:15

## 2018-10-11 RX ADMIN — TAMSULOSIN HYDROCHLORIDE 0.4 MG: 0.4 CAPSULE ORAL at 20:15

## 2018-10-11 RX ADMIN — GUAIFENESIN 600 MG: 600 TABLET, EXTENDED RELEASE ORAL at 17:44

## 2018-10-11 RX ADMIN — SODIUM CHLORIDE 125 ML/HR: 9 INJECTION, SOLUTION INTRAVENOUS at 21:51

## 2018-10-11 RX ADMIN — SODIUM CHLORIDE 125 ML/HR: 9 INJECTION, SOLUTION INTRAVENOUS at 14:20

## 2018-10-11 RX ADMIN — HYDROCODONE BITARTRATE AND ACETAMINOPHEN 1 TABLET: 5; 325 TABLET ORAL at 21:51

## 2018-10-11 NOTE — CONSULTS
[facility] GASTROENTEROLOGY              Initial Inpatient Consult Note  James Birch  4/18/1928    Referring Provider: Hemal William MD    Admission: 10/11/2018  Consult date: 10/11/2018  Chief complaint: nausea/vomiting constipation    Subjective     History of present illness:  Pt is a pleasant 89 year old male admitted by Dr William with the complaint of nausea vomiting and severe constipation. It has been approx 1 week since he has had a BM. His symptoms have been progressive ongoing since that time. No indigestion or reflux. No difficulty swallowing. He says he has had a recent fall with fractured wrist and rib which has caused him pain requiring pain medications. He denies any fever chills or sweats. No wt loss. Endoscopy with Dr Sepulveda 8/31/2015 normal.     Past Medical History:  Past Medical History:   Diagnosis Date   • Arthritis    • Asthma    • Atrial fibrillation (CMS/HCC)    • Cancer (CMS/HCC)     SKIN   • CHF (congestive heart failure) (CMS/HCC)    • Conductive hearing loss    • COPD (chronic obstructive pulmonary disease) (CMS/HCC)    • Eustachian tube dysfunction    • GERD (gastroesophageal reflux disease)    • Hyperlipidemia    • Hypertension    • Obstructive sleep apnea    • Prostate disease    • Sensorineural hearing loss    • Stroke (CMS/HCC)    • Vertigo        Past Surgical History:  Past Surgical History:   Procedure Laterality Date   • CATARACT EXTRACTION      left   • CATARACT EXTRACTION      left   • COLONOSCOPY  09/05/2012    normal   • ENDOSCOPY  08/31/2015    normal   • EYE SURGERY     • FRACTURE SURGERY     • HERNIA REPAIR     • HIP SURGERY     • SKIN BIOPSY          Social History:   Social History   Substance Use Topics   • Smoking status: Former Smoker     Packs/day: 1.00     Years: 18.00     Types: Cigarettes     Quit date: 1967   • Smokeless tobacco: Never Used      Comment: already quit   • Alcohol use No        Family History:  Family History   Problem  Relation Age of Onset   • No Known Problems Mother    • No Known Problems Father        Home Meds:  Prescriptions Prior to Admission   Medication Sig Dispense Refill Last Dose   • azelastine (OPTIVAR) 0.05 % ophthalmic solution INSTILL 1 DROP INTO BOTH EYES TWICE DAILY GENERIC FOR OPTIVAR 6 mL 5 Taking   • B Complex-C (SUPER B COMPLEX/VITAMIN C PO) Take  by mouth daily.   Taking   • Calcium Polycarbophil (FIBER-CAPS PO) Take  by mouth 2 (Two) Times a Day.   Taking   • Cholecalciferol (VITAMIN D3 PO) Take 2,000 Int'l Units by mouth Daily.   Taking   • Coenzyme Q10 (CO Q-10 PO) Take  by mouth daily.   Taking   • diltiaZEM CD (CARDIZEM CD) 120 MG 24 hr capsule Take 1 capsule by mouth Daily. 30 capsule 5 Taking   • dutasteride (AVODART) 0.5 MG capsule TAKE 1 CAPSULE DAILY GENERIC FOR AVODART 90 capsule 1 Taking   • esomeprazole (NEXIUM) 40 MG capsule Take 1 capsule by mouth Every Morning Before Breakfast. 30 capsule 5 Taking   • furosemide (LASIX) 40 MG tablet Take 40 mg by mouth Daily. May take one half tablet additionally prn swelling   Taking   • guaiFENesin (MUCINEX) 600 MG 12 hr tablet Take 600 mg by mouth Every 12 (Twelve) Hours.   Taking   • HYDROcodone-acetaminophen (NORCO) 5-325 MG per tablet Take 1 tablet by mouth Every 6 (Six) Hours As Needed for Moderate Pain . 12 tablet 0 Taking   • levalbuterol (XOPENEX HFA) 45 MCG/ACT inhaler Inhale 1-2 puffs Every 4 (Four) Hours As Needed for Wheezing. 1 inhaler 5 Taking   • Multiple Vitamins-Minerals (MULTIVITAMIN PO) Take 1 tablet by mouth daily.   Taking   • O2 (OXYGEN) Inhale 1 L/min Every Night. As needed at bedtime.    Taking   • Omega-3 Fatty Acids (FISH OIL PO) Take 1,000 mg by mouth 2 (Two) Times a Day.   Taking   • ondansetron (ZOFRAN) 4 MG tablet Take 1 tablet by mouth Every 8 (Eight) Hours As Needed for Nausea or Vomiting. 12 tablet 0 Taking   • PROAIR  (90 Base) MCG/ACT inhaler Every 4 (Four) Hours As Needed for Shortness of Air.  5 Not Taking   •  ramipril (ALTACE) 2.5 MG capsule TAKE 1 CAPSULE AT BEDTIME GENERIC FOR ALTACE 90 capsule 1 Taking   • SYMBICORT 80-4.5 MCG/ACT inhaler Inhale 2 puffs 2 (Two) Times a Day.   Taking   • tamsulosin (FLOMAX) 0.4 MG capsule 24 hr capsule TAKE 1 CAPSULE DAILY SHORTLY AFTER THE SAME MEAL GENERIC FOR FLOMAX 90 capsule 1 Taking   • traMADol (ULTRAM) 50 MG tablet Take 1 tablet by mouth Every 8 (Eight) Hours As Needed for Moderate Pain . 21 tablet 0 Taking   • XARELTO 15 MG tablet TAKE ONE TABLET DAILY 90 tablet 3 Taking       Current Meds:   Hospital Medications (active)       Dose Frequency Start End    budesonide-formoterol (SYMBICORT) 80-4.5 MCG/ACT inhaler 2 puff 2 puff 2 Times Daily - RT 10/11/2018     Sig - Route: Inhale 2 puffs 2 (Two) Times a Day. - Inhalation    diltiaZEM CD (CARDIZEM CD) 24 hr capsule 120 mg 120 mg Every 24 Hours Scheduled 10/11/2018     Sig - Route: Take 1 capsule by mouth Daily. - Oral    finasteride (PROSCAR) tablet 5 mg 5 mg Daily 10/11/2018     Sig - Route: Take 1 tablet by mouth Daily. - Oral    guaiFENesin (MUCINEX) 12 hr tablet 600 mg 600 mg Every 12 Hours Scheduled 10/11/2018     Sig - Route: Take 1 tablet by mouth Every 12 (Twelve) Hours. - Oral    HYDROcodone-acetaminophen (NORCO) 5-325 MG per tablet 1 tablet 1 tablet Every 6 Hours PRN 10/11/2018     Sig - Route: Take 1 tablet by mouth Every 6 (Six) Hours As Needed for Moderate Pain . - Oral    ketotifen (ZADITOR) 0.025 % ophthalmic solution 1 drop 1 drop 2 Times Daily 10/11/2018     Sig - Route: Administer 1 drop to both eyes 2 (Two) Times a Day. - Both Eyes    O2 (OXYGEN) 1 L/min Nightly 10/11/2018     Sig - Route: Inhale 1 L/min Every Night. - Inhalation    ondansetron (ZOFRAN) tablet 4 mg 4 mg Every 8 Hours PRN 10/11/2018     Sig - Route: Take 1 tablet by mouth Every 8 (Eight) Hours As Needed for Nausea or Vomiting. - Oral    pantoprazole (PROTONIX) EC tablet 40 mg 40 mg Every Morning 10/12/2018     Sig - Route: Take 1 tablet by mouth  Every Morning. - Oral    rivaroxaban (XARELTO) tablet 15 mg 15 mg Daily 10/11/2018     Sig - Route: Take 1 tablet by mouth Daily. - Oral    sodium chloride 0.9 % flush 3 mL 3 mL Every 12 Hours Scheduled 10/11/2018     Sig - Route: Infuse 3 mL into a venous catheter Every 12 (Twelve) Hours. - Intravenous    sodium chloride 0.9 % flush 3-10 mL 3-10 mL As Needed 10/11/2018     Sig - Route: Infuse 3-10 mL into a venous catheter As Needed for Line Care. - Intravenous    sodium chloride 0.9 % infusion 125 mL/hr Continuous 10/11/2018     Sig - Route: Infuse 125 mL/hr into a venous catheter Continuous. - Intravenous    tamsulosin (FLOMAX) 24 hr capsule 0.4 mg 0.4 mg Daily 10/11/2018     Sig - Route: Take 1 capsule by mouth Daily. - Oral          Allergies:  No Known Allergies    Review of Systems  Review of Systems   Constitutional: Negative for activity change, appetite change, chills, diaphoresis, fatigue, fever and unexpected weight change.   HENT: Negative for ear pain, hearing loss, mouth sores, sore throat, trouble swallowing and voice change.    Eyes: Negative.    Respiratory: Negative for cough, choking, shortness of breath and wheezing.    Cardiovascular: Negative for chest pain and palpitations.   Gastrointestinal: Positive for constipation, nausea and vomiting. Negative for abdominal pain, blood in stool and diarrhea.   Endocrine: Negative for cold intolerance and heat intolerance.   Genitourinary: Negative for decreased urine volume, dysuria, frequency, hematuria and urgency.   Musculoskeletal: Negative for back pain, gait problem and myalgias.   Skin: Negative for color change, pallor and rash.   Allergic/Immunologic: Negative for food allergies and immunocompromised state.   Neurological: Negative for dizziness, tremors, seizures, syncope, weakness, light-headedness, numbness and headaches.   Hematological: Negative for adenopathy. Does not bruise/bleed easily.   Psychiatric/Behavioral: Negative for agitation  and confusion. The patient is not nervous/anxious.    All other systems reviewed and are negative.       Objective     Vital Signs  Temp:  [97.4 °F (36.3 °C)-97.9 °F (36.6 °C)] 97.9 °F (36.6 °C)  Heart Rate:  [78-84] 84  Resp:  [18] 18  BP: ()/(58-77) 111/77    Physical Exam:  General Appearance:    Alert, cooperative, in no acute distress   Head:    Normocephalic, without obvious abnormality, atraumatic   Eyes:            Lids and lashes normal, conjunctivae and sclerae normal, no   Icterus, conjunctival pallor   Throat:   No oral lesions, no thrush, oral mucosa moist, posterior oropharynx clear   Neck:   No adenopathy, supple, trachea midline, no thyromegaly, no   carotid bruit, no JVD   Lungs:     Clear to auscultation,respirations regular, even and                   unlabored    Heart:    Regular rhythm and normal rate, normal S1 and S2, no            murmur   Chest Wall:    No abnormalities observed   Abdomen:     Normal bowel sounds, no masses, no organomegaly, soft        non-tender, non-distended, no guarding, no rebound                 tenderness   Rectal:     Deferred   Extremities:   no edema, no cyanosis   Skin:   No open lesions, bruising or rash   Lymph nodes:   No palpable cervical adenopathy   Psychiatric:  Judgement and insight: normal   Orientation to person place and time: normal   Mood and affect: normal     Results Review:    Results from last 7 days  Lab Units 10/10/18  1300   WBC 10*3/mm3 6.13   HEMOGLOBIN g/dL 12.9*   HEMATOCRIT % 38.7*   PLATELETS 10*3/mm3 224         Results from last 7 days  Lab Units 10/10/18  1300   SODIUM mmol/L 137   POTASSIUM mmol/L 4.7   CHLORIDE mmol/L 97*   CO2 mmol/L 28.0   BUN mg/dL 40*   CREATININE mg/dL 2.19*   CALCIUM mg/dL 9.6   GLUCOSE mg/dL 100              Radiology Review:  Imaging Results (last 72 hours)     ** No results found for the last 72 hours. **          Assessment/Plan       Acute renal failure (CMS/HCC)      1. Nausea/vomiting  2.  Constipation  3. Recent fall with fractures requiring pain medications.     Will get a KUB and amylase/lipase. Feel that his symptoms are likely related to his constipation secondary to pain medications however will rule out other.     EMR Dragon/transcription disclaimer: Much of this encounter note is electronic transcription/translation of spoken language to printed text. The electronic translation of spoken language may be erroneous, or at times, nonsensical words or phrases may be inadvertently transcribed. Although I have reviewed the note for such errors, some may still exist.  Joy Ambriz APRBHAVANI  [facility] Gastroenterology  10/11/18  2:16 PM    EMR Dragon/transcription disclaimer: Much of this encounter note is an electronic transcription/translation of spoken language to printed text.  The electronic translation of spoken language may permit erroneous, or at times, nonsensical words or phrases to be inadvertently transcribed.  Although I have reviewed the note for such errors, some may still exist.      Greg Sargent MD  2:47 PM  10/11/2018

## 2018-10-11 NOTE — PROGRESS NOTES
Subjective   James Birch is a 89 y.o. male presenting with chief complaint of:   Chief Complaint   Patient presents with   • Vomiting   • Follow-up     labs       History of Present Illness :  With wife.  Here for primarily an acute issue today; continued vomiting with lab showing dropping GFR.   Has multiple chronic problems to consider that might have a bearing on today's issues; not an interval appointment.       Chronic/acute problems to review today:   1. Acute renal failure, unspecified acute renal failure type (CMS/HCC): acute/with his continued vomiting gfr is dropping   2. Stage 3 chronic kidney disease (CMS/HCC): chronic/worse with above   3. Heurummirnbkdp-ckn-iowe/xarelto: risk with xarelto   4. Chronic atrial fibrillation (CMS/HCC): chronic   5. Hypertension, unspecified type: Chronic/lower. Stable prior here/home blood pressures.  No significant chest pain, SOB, LE edema, orthopnea, near syncope, dizziness/light headness but did fall recent witn LUE skin tear      6. Gait difficulty-cane/walker; acutely worse and weaker through several days vomiting   7. Nausea and vomiting, intractability of vomiting not specified, unspecified vomiting type: acute; last several days/weeks      Has an/another acute issue today: none.    The following portions of the patient's history were reviewed and updated as appropriate: allergies, current medications, past family history, past medical history, past social history, past surgical history and problem list.    No current facility-administered medications for this visit.   No current outpatient prescriptions on file.    Facility-Administered Medications Ordered in Other Visits:   •  budesonide-formoterol (SYMBICORT) 80-4.5 MCG/ACT inhaler 2 puff, 2 puff, Inhalation, BID - RT, Hemal William MD  •  diltiaZEM CD (CARDIZEM CD) 24 hr capsule 120 mg, 120 mg, Oral, Q24H, Hemal William MD  •  finasteride (PROSCAR) tablet 5 mg, 5 mg, Oral, Daily, Stewart  Hemal Guzman MD  •  guaiFENesin (MUCINEX) 12 hr tablet 600 mg, 600 mg, Oral, Q12H, Hemal William MD, 600 mg at 10/11/18 2015  •  HYDROcodone-acetaminophen (NORCO) 5-325 MG per tablet 1 tablet, 1 tablet, Oral, Q6H PRN, Hemal William MD  •  Influenza Vac Subunit Quad (FLUCELVAX) injection 0.5 mL, 0.5 mL, Intramuscular, During Hospitalization, Hemal William MD  •  ketotifen (ZADITOR) 0.025 % ophthalmic solution 1 drop, 1 drop, Both Eyes, BID, Hemal William MD, 1 drop at 10/11/18 2015  •  O2 (OXYGEN), 1 L/min, Inhalation, Nightly, Hemal William MD  •  ondansetron (ZOFRAN) tablet 4 mg, 4 mg, Oral, Q8H PRN, Hemal William MD  •  [START ON 10/12/2018] pantoprazole (PROTONIX) EC tablet 40 mg, 40 mg, Oral, QAM, Hemal William MD  •  rivaroxaban (XARELTO) tablet 15 mg, 15 mg, Oral, Daily, Hemal William MD  •  sodium chloride 0.9 % flush 3 mL, 3 mL, Intravenous, Q12H, Hemal William MD  •  sodium chloride 0.9 % flush 3-10 mL, 3-10 mL, Intravenous, PRN, Hemal William MD  •  sodium chloride 0.9 % infusion, 125 mL/hr, Intravenous, Continuous, Hemal William MD, Last Rate: 125 mL/hr at 10/11/18 1834, 125 mL/hr at 10/11/18 1834  •  tamsulosin (FLOMAX) 24 hr capsule 0.4 mg, 0.4 mg, Oral, Nightly, Hemal William MD, 0.4 mg at 10/11/18 2015    No problems with medications.  Refills if needed done    No Known Allergies    Review of Systems  GENERAL:  Inactive/slower with limits, speed, stamina for age and gait . Sleep is ok with oxygen. No fever now.  ENDO:  No syncope, near or diaphoretic sweaty spells.  HEENT: No head injury or headache,  No vision change.  Same hearing loss.  Ears without pain/drainage.  No sore throat.  No significant nasal/sinus congestion/drainage. No epistaxis.  CHEST: Marked L posterior chest wall tenderness or mass. No change occ cough, without wheeze.  No SOB; no hemoptysis.  CV: No exertional chest pain, palpitations,  ankle edema.  GI: No  dysphagia.  No abdominal pain, diarrhea, constipation.  No rectal bleeding, or melena.  On/off nausea and vomiting/heartburn.    :  Voids without dysuria, or incontinence to completion.  ORTHO: No painful/swollen joints but various on /off sore.  No change occ sore neck or back.  No acute neck or back pain without recent injury.   NEURO: No dizziness, weakness of extremities.  No change UE/LE mild numbness/paresthesias.   PSYCH: Mild ? memory loss.  Mood good; occ anxious, depressed but/and not suicidal.  Tries to tolerate stress   Screening:  Mammogram: NA  Bone density: NA  Low dose CT chest: NA  GI:   Colonoscopy+nl/[facility]/9.5.12/5y  EGD+biop/[facility]/8.31.15  Prostate: urology past  Usual lab order  3m CBC, BMP  6m CBC, CMP, iron  12m CBC, CMP, LIPID, TSH, Vit D, iron, ferritin, B12, folate    Results for orders placed or performed in visit on 10/10/18   Basic Metabolic Panel   Result Value Ref Range    Glucose 100 70 - 100 mg/dL    BUN 40 (H) 5 - 21 mg/dL    Creatinine 2.19 (H) 0.50 - 1.40 mg/dL    Sodium 137 135 - 145 mmol/L    Potassium 4.7 3.5 - 5.3 mmol/L    Chloride 97 (L) 98 - 110 mmol/L    CO2 28.0 24.0 - 31.0 mmol/L    Calcium 9.6 8.4 - 10.4 mg/dL    eGFR Non African Amer 28 (L) >60 mL/min/1.73    BUN/Creatinine Ratio 18.3 7.0 - 25.0    Anion Gap 12.0 4.0 - 13.0 mmol/L   CBC Auto Differential   Result Value Ref Range    WBC 6.13 4.80 - 10.80 10*3/mm3    RBC 4.37 (L) 4.80 - 5.90 10*6/mm3    Hemoglobin 12.9 (L) 14.0 - 18.0 g/dL    Hematocrit 38.7 (L) 40.0 - 52.0 %    MCV 88.6 82.0 - 95.0 fL    MCH 29.5 28.0 - 32.0 pg    MCHC 33.3 33.0 - 36.0 g/dL    RDW 13.6 12.0 - 15.0 %    RDW-SD 44.2 40.0 - 54.0 fl    MPV 9.4 6.0 - 12.0 fL    Platelets 224 130 - 400 10*3/mm3    Neutrophil % 67.6 39.0 - 78.0 %    Lymphocyte % 15.5 15.0 - 45.0 %    Monocyte % 12.4 (H) 4.0 - 12.0 %    Eosinophil % 3.3 0.0 - 4.0 %    Basophil % 0.5 0.0 - 2.0 %    Immature Grans % 0.7 0.0 - 5.0 %     Neutrophils, Absolute 4.15 1.87 - 8.40 10*3/mm3    Lymphocytes, Absolute 0.95 0.72 - 4.86 10*3/mm3    Monocytes, Absolute 0.76 0.19 - 1.30 10*3/mm3    Eosinophils, Absolute 0.20 0.00 - 0.70 10*3/mm3    Basophils, Absolute 0.03 0.00 - 0.20 10*3/mm3    Immature Grans, Absolute 0.04 (H) 0.00 - 0.03 10*3/mm3    nRBC 0.0 0.0 - 0.0 /100 WBC       Lab Results   Component Value Date    PSA 1.070 11/08/2016        Lab Results:  CBC:    Lab Results - Last 18 Months  Lab Units 10/11/18  1417 10/10/18  1300 10/02/18  1002 05/27/18  0525 05/26/18  0445 05/25/18  2037 05/15/18  0801 09/07/17  0800   WBC 10*3/mm3 5.03 6.13  --  5.91 5.89 6.15  --  5.99   HEMOGLOBIN g/dL 11.8* 12.9* 13.3* 13.7* 13.8* 14.2 15.4 14.6   HEMATOCRIT % 33.7* 38.7* 41.2 39.5* 40.2 42.3 47.3 45.2   PLATELETS 10*3/mm3 212 224 201 175 179 182 193 177   IRON mcg/dL 45  --   --   --   --   --  91  --       BMP/CMP:    Lab Results - Last 18 Months  Lab Units 10/11/18  1417 10/10/18  1300 10/02/18  1002 05/27/18  0525 05/26/18 0445 05/25/18  2037 05/15/18  0801 09/07/17  0800   SODIUM mmol/L 135 137 139 137 139 136 138 139   POTASSIUM mmol/L 4.4 4.7 3.7 4.1 4.1 4.2 4.3 4.5   CHLORIDE mmol/L 97* 97* 98 103 101 99 97* 102   CO2 mmol/L 28.0 28.0  --  26.0 29.0 28.0  --   --    TOTAL CO2, ARTERIAL mmol/L  --   --  26.0  --   --   --  30.0 26.0   GLUCOSE mg/dL  --   --  103*  --   --   --  103* 92   BUN mg/dL 40* 40* 31* 30* 37* 40* 40* 31*   CREATININE mg/dL 2.20* 2.19* 2.11* 1.55* 1.88* 2.10* 1.88* 1.67*   EGFR IF NONAFRICN AM mL/min/1.73 28* 28* 30* 42* 34* 30* 34* 39*   EGFR IF AFRICN AM mL/min/1.73  --   --  36*  --   --   --  41* 47*   CALCIUM mg/dL 9.1 9.6 9.4 8.8 9.0 9.3 9.9 9.5     HEPATIC:    Lab Results - Last 18 Months  Lab Units 10/11/18  1417 10/02/18  1002 05/15/18  0801 09/07/17  0800   ALT (SGPT) U/L 22 16 20 30   AST (SGOT) U/L 27 20 22 19   ALK PHOS U/L 57 89 71 61     THYROID:    Lab Results - Last 18 Months  Lab Units 10/11/18  1417  "05/15/18  0801   TSH mIU/mL 0.321* 1.330     A1C:No results for input(s): HGBA1C in the last 22074 hours.  PSA:No results for input(s): PSA in the last 29551 hours.    Objective   BP 90/58 (BP Location: Right arm, Patient Position: Sitting, Cuff Size: Adult)   Pulse 78   Temp 97.4 °F (36.3 °C) (Oral)   Resp 18   Ht 177.8 cm (70\")   SpO2 95%   There is no height or weight on file to calculate BMI.    Physical Exam  GENERAL:  Thin AFA developed in no acute distress.  SKIN: Turgor excellent, without rash; bandage LUE  HEENT: Normal cephalic without trauma.  Pupils equal round reactive to light. Extraocular motions full without nystagmus.   External canals nonobstructive nontender without reddness. Tymphatic membranes calos with shelia structures intact.   Oral cavity without growths, exudates, and moist.  Posterior pharynx without mass, obstruction, redness.  No thyromegaly, mass, tenderness, lymphadenopathy and supple.  CV: Regular rhythm.  No murmur, gallop,  edema. Posterior pulses intact.  No carotid bruits.  CHEST: Marked L mid/posterior chest wall tenderness or mass.   LUNGS: Symmetric motion with clear to auscultation.   ABD: Soft, nontender without mass.   ORTHO: Symmetric extremities without swelling/point tenderness.  Full gross range of motion.  Wheelchair.   NEURO: CN 2-12 grossly intact.  Symmetric facies. 1/4 x bicep equal reflexes.  UE/LE   3/5 strength throughout.  Nonfocal use extremities. Speech clear.   PSYCH: Oriented x 3.  Pleasant calm, well kept.  Purposeful/directed conservation with intact short/long gross memory.    Assessment/Plan     1. Acute renal failure, unspecified acute renal failure type (CMS/HCC)    2. Stage 3 chronic kidney disease (CMS/HCC)    3. Upgmcatxitrtrj-aef-ymmq/xarelto    4. Chronic atrial fibrillation (CMS/HCC)    5. Hypertension, unspecified type    6. Gait difficulty-cane/walker    7. Nausea and vomiting, intractability of vomiting not specified, unspecified vomiting " type    maybe dehydration    Rx: reviewed/changes:  Same; but admit    LAB/Testing/Referrals: reviewed/orders:   Today: admit/admit labs  No orders of the defined types were placed in this encounter.      Discussions:   Direct admit; needs GI and nephrology to review  There is no height or weight on file to calculate BMI.   Patient's There is no height or weight on file to calculate BMI. BMI is within normal parameters. No follow-up required.  Non-smoker      There are no Patient Instructions on file for this visit.    Follow up: Return for will see how testing/or treatment goes and decide;.  Future Appointments  Date Time Provider Department Center   10/30/2018 9:00 AM Jorge A Marshall APRN [facility] None   11/1/2018 11:30 AM Hemal William MD [facility] None   11/21/2018 10:15 AM Skyler Trimble MD [facility]   11/28/2018 10:15 AM Bill Church MD [facility] None   2/5/2019 9:20 AM Kiko Campos PA [facility] None

## 2018-10-11 NOTE — PLAN OF CARE
Problem: Patient Care Overview  Goal: Plan of Care Review  Outcome: Ongoing (interventions implemented as appropriate)   10/11/18 0404   Coping/Psychosocial   Plan of Care Reviewed With patient   Plan of Care Review   Progress no change   OTHER   Outcome Summary vss, patient recieved as a direct admit, will continue to monitor       Problem: Fall Risk (Adult)  Goal: Identify Related Risk Factors and Signs and Symptoms  Outcome: Ongoing (interventions implemented as appropriate)    Goal: Absence of Fall  Outcome: Ongoing (interventions implemented as appropriate)      Problem: Nausea/Vomiting (Adult)  Goal: Identify Related Risk Factors and Signs and Symptoms  Outcome: Ongoing (interventions implemented as appropriate)    Goal: Symptom Relief  Outcome: Ongoing (interventions implemented as appropriate)    Goal: Adequate Hydration  Outcome: Ongoing (interventions implemented as appropriate)

## 2018-10-12 LAB
25(OH)D3 SERPL-MCNC: 84.1 NG/ML (ref 30–100)
ALBUMIN SERPL-MCNC: 3.3 G/DL (ref 3.5–5)
ALBUMIN/GLOB SERPL: 1.3 G/DL (ref 1.1–2.5)
ALP SERPL-CCNC: 57 U/L (ref 24–120)
ALT SERPL W P-5'-P-CCNC: 22 U/L (ref 0–54)
ANION GAP SERPL CALCULATED.3IONS-SCNC: 10 MMOL/L (ref 4–13)
ANION GAP SERPL CALCULATED.3IONS-SCNC: 8 MMOL/L (ref 4–13)
AST SERPL-CCNC: 27 U/L (ref 7–45)
BASOPHILS # BLD AUTO: 0.04 10*3/MM3 (ref 0–0.2)
BASOPHILS NFR BLD AUTO: 0.8 % (ref 0–2)
BILIRUB SERPL-MCNC: 0.8 MG/DL (ref 0.1–1)
BUN BLD-MCNC: 32 MG/DL (ref 5–21)
BUN BLD-MCNC: 40 MG/DL (ref 5–21)
BUN/CREAT SERPL: 16.7 (ref 7–25)
BUN/CREAT SERPL: 18.2 (ref 7–25)
CALCIUM SPEC-SCNC: 8.8 MG/DL (ref 8.4–10.4)
CALCIUM SPEC-SCNC: 9.1 MG/DL (ref 8.4–10.4)
CHLORIDE SERPL-SCNC: 103 MMOL/L (ref 98–110)
CHLORIDE SERPL-SCNC: 97 MMOL/L (ref 98–110)
CO2 SERPL-SCNC: 24 MMOL/L (ref 24–31)
CO2 SERPL-SCNC: 28 MMOL/L (ref 24–31)
CREAT BLD-MCNC: 1.92 MG/DL (ref 0.5–1.4)
CREAT BLD-MCNC: 2.2 MG/DL (ref 0.5–1.4)
DEPRECATED RDW RBC AUTO: 42.3 FL (ref 40–54)
EOSINOPHIL # BLD AUTO: 0.23 10*3/MM3 (ref 0–0.7)
EOSINOPHIL NFR BLD AUTO: 4.7 % (ref 0–4)
ERYTHROCYTE [DISTWIDTH] IN BLOOD BY AUTOMATED COUNT: 13.2 % (ref 12–15)
FERRITIN SERPL-MCNC: 88.4 NG/ML (ref 17.9–464)
GFR SERPL CREATININE-BSD FRML MDRD: 28 ML/MIN/1.73
GFR SERPL CREATININE-BSD FRML MDRD: 33 ML/MIN/1.73
GLOBULIN UR ELPH-MCNC: 2.6 GM/DL
GLUCOSE BLD-MCNC: 81 MG/DL (ref 70–100)
GLUCOSE BLD-MCNC: 95 MG/DL (ref 70–100)
HCT VFR BLD AUTO: 36 % (ref 40–52)
HGB BLD-MCNC: 12.4 G/DL (ref 14–18)
LYMPHOCYTES # BLD AUTO: 0.86 10*3/MM3 (ref 0.72–4.86)
LYMPHOCYTES NFR BLD AUTO: 17.7 % (ref 15–45)
MAGNESIUM SERPL-MCNC: 1.9 MG/DL (ref 1.4–2.2)
MCH RBC QN AUTO: 30.1 PG (ref 28–32)
MCHC RBC AUTO-ENTMCNC: 34.4 G/DL (ref 33–36)
MCV RBC AUTO: 87.4 FL (ref 82–95)
MONOCYTES # BLD AUTO: 0.58 10*3/MM3 (ref 0.19–1.3)
MONOCYTES NFR BLD AUTO: 11.9 % (ref 4–12)
NEUTROPHILS # BLD AUTO: 3.14 10*3/MM3 (ref 1.87–8.4)
NEUTROPHILS NFR BLD AUTO: 64.7 % (ref 39–78)
PHOSPHATE SERPL-MCNC: 3.4 MG/DL (ref 2.5–4.5)
PLATELET # BLD AUTO: 173 10*3/MM3 (ref 130–400)
PMV BLD AUTO: 9.7 FL (ref 6–12)
POTASSIUM BLD-SCNC: 4.1 MMOL/L (ref 3.5–5.3)
POTASSIUM BLD-SCNC: 4.4 MMOL/L (ref 3.5–5.3)
PROT SERPL-MCNC: 5.9 G/DL (ref 6.3–8.7)
PTH-INTACT SERPL-MCNC: 38.1 PG/ML (ref 7.5–53.5)
RBC # BLD AUTO: 4.12 10*6/MM3 (ref 4.8–5.9)
SODIUM BLD-SCNC: 135 MMOL/L (ref 135–145)
SODIUM BLD-SCNC: 135 MMOL/L (ref 135–145)
URATE SERPL-MCNC: 8.7 MG/DL (ref 3.5–8.5)
WBC NRBC COR # BLD: 4.86 10*3/MM3 (ref 4.8–10.8)

## 2018-10-12 PROCEDURE — 84550 ASSAY OF BLOOD/URIC ACID: CPT | Performed by: INTERNAL MEDICINE

## 2018-10-12 PROCEDURE — 94799 UNLISTED PULMONARY SVC/PX: CPT

## 2018-10-12 PROCEDURE — 97161 PT EVAL LOW COMPLEX 20 MIN: CPT

## 2018-10-12 PROCEDURE — 94760 N-INVAS EAR/PLS OXIMETRY 1: CPT

## 2018-10-12 PROCEDURE — 96361 HYDRATE IV INFUSION ADD-ON: CPT

## 2018-10-12 PROCEDURE — G8979 MOBILITY GOAL STATUS: HCPCS

## 2018-10-12 PROCEDURE — 83735 ASSAY OF MAGNESIUM: CPT | Performed by: INTERNAL MEDICINE

## 2018-10-12 PROCEDURE — 84100 ASSAY OF PHOSPHORUS: CPT | Performed by: INTERNAL MEDICINE

## 2018-10-12 PROCEDURE — 82306 VITAMIN D 25 HYDROXY: CPT | Performed by: INTERNAL MEDICINE

## 2018-10-12 PROCEDURE — 85025 COMPLETE CBC W/AUTO DIFF WBC: CPT | Performed by: FAMILY MEDICINE

## 2018-10-12 PROCEDURE — G0378 HOSPITAL OBSERVATION PER HR: HCPCS

## 2018-10-12 PROCEDURE — 83970 ASSAY OF PARATHORMONE: CPT | Performed by: INTERNAL MEDICINE

## 2018-10-12 PROCEDURE — 99220 PR INITIAL OBSERVATION CARE/DAY 70 MINUTES: CPT | Performed by: FAMILY MEDICINE

## 2018-10-12 PROCEDURE — G8978 MOBILITY CURRENT STATUS: HCPCS

## 2018-10-12 PROCEDURE — 80048 BASIC METABOLIC PNL TOTAL CA: CPT | Performed by: FAMILY MEDICINE

## 2018-10-12 PROCEDURE — 99232 SBSQ HOSP IP/OBS MODERATE 35: CPT | Performed by: CLINICAL NURSE SPECIALIST

## 2018-10-12 PROCEDURE — 94640 AIRWAY INHALATION TREATMENT: CPT

## 2018-10-12 RX ORDER — POLYETHYLENE GLYCOL 3350 17 G/17G
17 POWDER, FOR SOLUTION ORAL 2 TIMES DAILY
Status: DISCONTINUED | OUTPATIENT
Start: 2018-10-12 | End: 2018-10-13 | Stop reason: HOSPADM

## 2018-10-12 RX ORDER — BISACODYL 10 MG
10 SUPPOSITORY, RECTAL RECTAL ONCE
Status: COMPLETED | OUTPATIENT
Start: 2018-10-12 | End: 2018-10-12

## 2018-10-12 RX ADMIN — HYDROCODONE BITARTRATE AND ACETAMINOPHEN 1 TABLET: 5; 325 TABLET ORAL at 20:35

## 2018-10-12 RX ADMIN — BUDESONIDE AND FORMOTEROL FUMARATE DIHYDRATE 2 PUFF: 80; 4.5 AEROSOL RESPIRATORY (INHALATION) at 18:22

## 2018-10-12 RX ADMIN — SODIUM CHLORIDE 125 ML/HR: 9 INJECTION, SOLUTION INTRAVENOUS at 06:55

## 2018-10-12 RX ADMIN — GUAIFENESIN 600 MG: 600 TABLET, EXTENDED RELEASE ORAL at 07:59

## 2018-10-12 RX ADMIN — TAMSULOSIN HYDROCHLORIDE 0.4 MG: 0.4 CAPSULE ORAL at 20:33

## 2018-10-12 RX ADMIN — GUAIFENESIN 600 MG: 600 TABLET, EXTENDED RELEASE ORAL at 20:33

## 2018-10-12 RX ADMIN — POLYETHYLENE GLYCOL (3350) 17 G: 17 POWDER, FOR SOLUTION ORAL at 21:14

## 2018-10-12 RX ADMIN — RIVAROXABAN 15 MG: 15 TABLET, FILM COATED ORAL at 07:58

## 2018-10-12 RX ADMIN — BUDESONIDE AND FORMOTEROL FUMARATE DIHYDRATE 2 PUFF: 80; 4.5 AEROSOL RESPIRATORY (INHALATION) at 08:08

## 2018-10-12 RX ADMIN — KETOTIFEN FUMARATE 1 DROP: 0.35 SOLUTION/ DROPS OPHTHALMIC at 19:07

## 2018-10-12 RX ADMIN — SODIUM CHLORIDE 75 ML/HR: 9 INJECTION, SOLUTION INTRAVENOUS at 20:33

## 2018-10-12 RX ADMIN — BISACODYL 10 MG: 10 SUPPOSITORY RECTAL at 15:40

## 2018-10-12 RX ADMIN — DILTIAZEM HYDROCHLORIDE 120 MG: 120 CAPSULE, COATED, EXTENDED RELEASE ORAL at 07:58

## 2018-10-12 RX ADMIN — PANTOPRAZOLE SODIUM 40 MG: 40 TABLET, DELAYED RELEASE ORAL at 06:55

## 2018-10-12 RX ADMIN — POLYETHYLENE GLYCOL (3350) 17 G: 17 POWDER, FOR SOLUTION ORAL at 12:00

## 2018-10-12 RX ADMIN — KETOTIFEN FUMARATE 1 DROP: 0.35 SOLUTION/ DROPS OPHTHALMIC at 07:58

## 2018-10-12 RX ADMIN — FINASTERIDE 5 MG: 5 TABLET, FILM COATED ORAL at 07:59

## 2018-10-12 NOTE — PLAN OF CARE
Problem: Patient Care Overview  Goal: Plan of Care Review  Outcome: Ongoing (interventions implemented as appropriate)   10/12/18 1230   Coping/Psychosocial   Plan of Care Reviewed With patient   Plan of Care Review   Progress improving   OTHER   Outcome Summary PT eval completed. Pt performs bed mobility and transfers with supervision to min assist of 1. Gait with rwx ~ 100 ft with CGA. Pt reported no c/os pain initially, however with movements with his extremities in sitting and with transitional movements c/os pain L rib due to recent fx. Pt demonstrates increase GAYATHRI, decrease step length, and slowed pace with gait. Pt motivated with supportive spouse. Recent hx of fall sustaining L wrist fx now with splint in place. Reports he has been using his walker ever since and no increase c/os pain. Discussed possible benefit of platform on rwx for the L UE. Feel pt will be safe to return home with assist at discharge, however may benefit from skilled therapy through home health services. Will continue with skilled PT while in acute care for strengthening, balance training, education for safety/falls prevention, to assist with pain management, and to improve I with functional mobility reducing fall risk.

## 2018-10-12 NOTE — CONSULTS
Nephrology ([facility]) Consult Note      Patient:  James Birch  YOB: 1928  Date of Service: 10/11/2018  MRN: [id number]   Acct: [id number]   Primary Care Physician: Hemal William MD  Advance Directive:   Code Status and Medical Interventions:   Ordered at: 10/11/18 1328     Code Status:    CPR     Medical Interventions (Level of Support Prior to Arrest):    Full     Admit Date: 10/11/2018       Hospital Day: 0  Referring Provider: Hemal William MD      Patient personally seen and examined.  Complete chart including Consults, Notes, Operative Reports, Labs, Cardiology, and Radiology studies reviewed as able.        Subjective:  James Birch is a 89 y.o. male  whom we were consulted for CKD 4.  Pt of [facility].  Recent fall with rib fracture and started on narcotics.  Subsequently developed constipation x1 week and decreased intake with n/v.  No dysuria/hematuria.  Recalls no NSAIDs. Son is an HD pt at [facility].  Repeatedly asking for food.      Allergies:  Patient has no known allergies.    Home Meds:  Prescriptions Prior to Admission   Medication Sig Dispense Refill Last Dose   • azelastine (OPTIVAR) 0.05 % ophthalmic solution INSTILL 1 DROP INTO BOTH EYES TWICE DAILY GENERIC FOR OPTIVAR 6 mL 5 Taking   • B Complex-C (SUPER B COMPLEX/VITAMIN C PO) Take  by mouth daily.   Taking   • Calcium Polycarbophil (FIBER-CAPS PO) Take  by mouth 2 (Two) Times a Day.   Taking   • Cholecalciferol (VITAMIN D3 PO) Take 2,000 Int'l Units by mouth Daily.   Taking   • Coenzyme Q10 (CO Q-10 PO) Take  by mouth daily.   Taking   • diltiaZEM CD (CARDIZEM CD) 120 MG 24 hr capsule Take 1 capsule by mouth Daily. 30 capsule 5 Taking   • dutasteride (AVODART) 0.5 MG capsule TAKE 1 CAPSULE DAILY GENERIC FOR AVODART 90 capsule 1 Taking   • esomeprazole (NEXIUM) 40 MG capsule Take 1 capsule by mouth Every Morning Before Breakfast. 30 capsule 5 Taking   • furosemide  (LASIX) 40 MG tablet Take 40 mg by mouth Daily. May take one half tablet additionally prn swelling   Taking   • guaiFENesin (MUCINEX) 600 MG 12 hr tablet Take 600 mg by mouth Every 12 (Twelve) Hours.   Taking   • HYDROcodone-acetaminophen (NORCO) 5-325 MG per tablet Take 1 tablet by mouth Every 6 (Six) Hours As Needed for Moderate Pain . 12 tablet 0 Taking   • levalbuterol (XOPENEX HFA) 45 MCG/ACT inhaler Inhale 1-2 puffs Every 4 (Four) Hours As Needed for Wheezing. 1 inhaler 5 Taking   • Multiple Vitamins-Minerals (MULTIVITAMIN PO) Take 1 tablet by mouth daily.   Taking   • O2 (OXYGEN) Inhale 1 L/min Every Night. As needed at bedtime.    Taking   • Omega-3 Fatty Acids (FISH OIL PO) Take 1,000 mg by mouth 2 (Two) Times a Day.   Taking   • ondansetron (ZOFRAN) 4 MG tablet Take 1 tablet by mouth Every 8 (Eight) Hours As Needed for Nausea or Vomiting. 12 tablet 0 Taking   • PROAIR  (90 Base) MCG/ACT inhaler Every 4 (Four) Hours As Needed for Shortness of Air.  5 Not Taking   • ramipril (ALTACE) 2.5 MG capsule TAKE 1 CAPSULE AT BEDTIME GENERIC FOR ALTACE 90 capsule 1 Taking   • SYMBICORT 80-4.5 MCG/ACT inhaler Inhale 2 puffs 2 (Two) Times a Day.   Taking   • tamsulosin (FLOMAX) 0.4 MG capsule 24 hr capsule TAKE 1 CAPSULE DAILY SHORTLY AFTER THE SAME MEAL GENERIC FOR FLOMAX 90 capsule 1 Taking   • traMADol (ULTRAM) 50 MG tablet Take 1 tablet by mouth Every 8 (Eight) Hours As Needed for Moderate Pain . 21 tablet 0 Taking   • XARELTO 15 MG tablet TAKE ONE TABLET DAILY 90 tablet 3 Taking       Medicines:  Current Facility-Administered Medications   Medication Dose Route Frequency Provider Last Rate Last Dose   • budesonide-formoterol (SYMBICORT) 80-4.5 MCG/ACT inhaler 2 puff  2 puff Inhalation BID - RT Hemal William MD       • diltiaZEM CD (CARDIZEM CD) 24 hr capsule 120 mg  120 mg Oral Q24H Hemal William MD       • finasteride (PROSCAR) tablet 5 mg  5 mg Oral Daily Hemal William MD       •  guaiFENesin (MUCINEX) 12 hr tablet 600 mg  600 mg Oral Q12H Hemal William MD   600 mg at 10/11/18 2015   • HYDROcodone-acetaminophen (NORCO) 5-325 MG per tablet 1 tablet  1 tablet Oral Q6H PRN Hemal William MD   1 tablet at 10/11/18 2151   • Influenza Vac Subunit Quad (FLUCELVAX) injection 0.5 mL  0.5 mL Intramuscular During Hospitalization Hemal William MD       • ketotifen (ZADITOR) 0.025 % ophthalmic solution 1 drop  1 drop Both Eyes BID Hemal William MD   1 drop at 10/11/18 2015   • O2 (OXYGEN)  1 L/min Inhalation Nightly Hemal William MD       • ondansetron (ZOFRAN) tablet 4 mg  4 mg Oral Q8H PRN Hemal William MD       • [START ON 10/12/2018] pantoprazole (PROTONIX) EC tablet 40 mg  40 mg Oral QAM Hemal William MD       • rivaroxaban (XARELTO) tablet 15 mg  15 mg Oral Daily Hemal William MD       • sodium chloride 0.9 % flush 3 mL  3 mL Intravenous Q12H Hemal William MD       • sodium chloride 0.9 % flush 3-10 mL  3-10 mL Intravenous PRN Hemal William MD       • sodium chloride 0.9 % infusion  125 mL/hr Intravenous Continuous Hemal William  mL/hr at 10/11/18 2151 125 mL/hr at 10/11/18 2151   • tamsulosin (FLOMAX) 24 hr capsule 0.4 mg  0.4 mg Oral Nightly Hemal William MD   0.4 mg at 10/11/18 2015       Past Medical History:  Past Medical History:   Diagnosis Date   • Arthritis    • Asthma    • Atrial fibrillation (CMS/HCC)    • Cancer (CMS/HCC)     SKIN   • CHF (congestive heart failure) (CMS/HCC)    • Conductive hearing loss    • COPD (chronic obstructive pulmonary disease) (CMS/HCC)    • Eustachian tube dysfunction    • GERD (gastroesophageal reflux disease)    • Hyperlipidemia    • Hypertension    • Obstructive sleep apnea    • Prostate disease    • Sensorineural hearing loss    • Stroke (CMS/HCC)    • Vertigo        Past Surgical History:  Past Surgical History:   Procedure Laterality Date   • CATARACT  EXTRACTION      left   • CATARACT EXTRACTION      left   • COLONOSCOPY  09/05/2012    normal   • ENDOSCOPY  08/31/2015    normal   • EYE SURGERY     • FRACTURE SURGERY     • HERNIA REPAIR     • HIP SURGERY     • SKIN BIOPSY         Family History  Family History   Problem Relation Age of Onset   • No Known Problems Mother    • No Known Problems Father        Social History  Social History     Social History   • Marital status:      Spouse name: N/A   • Number of children: 3   • Years of education: N/A     Occupational History   • Not on file.     Social History Main Topics   • Smoking status: Former Smoker     Packs/day: 1.00     Years: 18.00     Types: Cigarettes     Quit date: 1967   • Smokeless tobacco: Never Used      Comment: already quit   • Alcohol use No   • Drug use: No   • Sexual activity: Not Currently     Partners: Female     Birth control/ protection: Abstinence     Other Topics Concern   • Not on file     Social History Narrative   • No narrative on file         Review of Systems:  History obtained from chart review and the patient  General ROS: No fever or chills  Respiratory ROS: No cough, shortness of breath, wheezing  Cardiovascular ROS: No chest pain or palpitations  Gastrointestinal ROS: No abdominal pain or melena  Genito-Urinary ROS: No dysuria or hematuria  14 point ROS reviewed with the patient and negative except as noted above and in the HPI unless unable to obtain.    Objective:  BP 97/73 (BP Location: Right arm, Patient Position: Lying)   Pulse 69   Temp 98.5 °F (36.9 °C) (Temporal Artery )   Resp 16   SpO2 95%   No intake or output data in the 24 hours ending 10/11/18 2350  General: awake/alert   Chest:  clear to auscultation bilaterally without respiratory distress  CVS: regular rate and rhythm  Abdominal: soft, nontender, normal bowel sounds  Extremities: no cyanosis or edema  Skin: warm and dry without rash      Labs:    Results from last 7 days  Lab Units 10/11/18  1417  10/10/18  1300   WBC 10*3/mm3 5.03 6.13   HEMOGLOBIN g/dL 11.8* 12.9*   HEMATOCRIT % 33.7* 38.7*   PLATELETS 10*3/mm3 212 224           Results from last 7 days  Lab Units 10/11/18  1417 10/10/18  1300   SODIUM mmol/L 135 137   POTASSIUM mmol/L 4.4 4.7   CHLORIDE mmol/L 97* 97*   CO2 mmol/L 28.0 28.0   BUN mg/dL 40* 40*   CREATININE mg/dL 2.20* 2.19*   CALCIUM mg/dL 9.1 9.6   BILIRUBIN mg/dL 0.8  --    ALK PHOS U/L 57  --    ALT (SGPT) U/L 22  --    AST (SGOT) U/L 27  --    GLUCOSE mg/dL 95 100       Radiology:   Imaging Results (last 72 hours)     Procedure Component Value Units Date/Time    XR Abdomen KUB [[id number]] Collected:  10/11/18 1957     Updated:  10/11/18 2001    Narrative:       EXAMINATION:   XR ABDOMEN KUB-  10/11/2018 7:57 PM CDT     XR ABDOMEN KUB- 10/11/2018 7:46 PM CDT     HISTORY: Daily vomiting       COMPARISON: None     FINDINGS:  There is a nonspecific bowel gas pattern. No soft tissue mass or  pathologic calcification  is visualized.     Degenerative changes noted in the lumbar spine. Mesh is present in the  lower abdomen. Right hip prosthesis is present..        Impression:       1. Non specific bowel gas pattern..         This report was finalized on 10/11/2018 19:58 by Dr. Oskar Blanc MD.          Culture:         Assessment   CKD 4  Constipation - opiate induced  Anemia - CKD / mild iron deficiency  IgA Nephritis  Recent fall with rib fx    Plan:  D/w pt/family  Monitor labs  Consider auryxia when bowel regimen established        Thank you for the consult, we appreciate the opportunity to provide care to your patients.  Feel free to contact me if I can be of any further assistance.      Naun Valente MD  10/11/2018  11:50 PM

## 2018-10-12 NOTE — PLAN OF CARE
Problem: Patient Care Overview  Goal: Plan of Care Review  Outcome: Ongoing (interventions implemented as appropriate)   10/12/18 0533   Coping/Psychosocial   Plan of Care Reviewed With patient   Plan of Care Review   Progress no change   OTHER   Outcome Summary Pt denies nausea. Medicated for back pain with Norco prn. B/p 102/67, 97/73, 97/67. Pt has a large skin tear and a moderate size skin tear to his left lateral forearm. Dressing changed, cleaned with NS and adaptic dressing applied wrapped with kerlix.      Goal: Individualization and Mutuality  Outcome: Ongoing (interventions implemented as appropriate)    Goal: Discharge Needs Assessment  Outcome: Ongoing (interventions implemented as appropriate)    Goal: Interprofessional Rounds/Family Conf  Outcome: Ongoing (interventions implemented as appropriate)      Problem: Fall Risk (Adult)  Goal: Identify Related Risk Factors and Signs and Symptoms  Outcome: Ongoing (interventions implemented as appropriate)    Goal: Absence of Fall  Outcome: Ongoing (interventions implemented as appropriate)      Problem: Nausea/Vomiting (Adult)  Goal: Identify Related Risk Factors and Signs and Symptoms  Outcome: Ongoing (interventions implemented as appropriate)    Goal: Symptom Relief  Outcome: Ongoing (interventions implemented as appropriate)    Goal: Adequate Hydration  Outcome: Ongoing (interventions implemented as appropriate)

## 2018-10-12 NOTE — PLAN OF CARE
Problem: Patient Care Overview  Goal: Plan of Care Review  Outcome: Ongoing (interventions implemented as appropriate)   10/12/18 7176   Coping/Psychosocial   Plan of Care Reviewed With patient   Plan of Care Review   Progress improving   OTHER   Outcome Summary Patient had no c/o of pain today. Miralax and dulcolax given with minimal effect. VSS, will continue to monitor       Problem: Fall Risk (Adult)  Goal: Identify Related Risk Factors and Signs and Symptoms  Outcome: Ongoing (interventions implemented as appropriate)    Goal: Absence of Fall  Outcome: Ongoing (interventions implemented as appropriate)      Problem: Nausea/Vomiting (Adult)  Goal: Identify Related Risk Factors and Signs and Symptoms  Outcome: Outcome(s) achieved Date Met: 10/12/18    Goal: Symptom Relief  Outcome: Ongoing (interventions implemented as appropriate)    Goal: Adequate Hydration  Outcome: Outcome(s) achieved Date Met: 10/12/18

## 2018-10-12 NOTE — PROGRESS NOTES
Discharge Planning Assessment  [facility]     Patient Name: James Brich  MRN: [id number]  Today's Date: 10/12/2018    Admit Date: 10/11/2018          Discharge Needs Assessment     Row Name 10/12/18 1401       Living Environment    Lives With spouse;facility resident    Name(s) of Who Lives With Patient Radha    Current Living Arrangements independent/assisted living facility    Primary Care Provided by self    Provides Primary Care For no one    Family Caregiver if Needed spouse    Quality of Family Relationships supportive;involved;helpful    Able to Return to Prior Arrangements yes       Resource/Environmental Concerns    Resource/Environmental Concerns none    Transportation Concerns car, none       Transition Planning    Patient/Family Anticipates Transition to home with family    Patient/Family Anticipated Services at Transition home health care    Transportation Anticipated family or friend will provide       Discharge Needs Assessment    Readmission Within the Last 30 Days no previous admission in last 30 days    Concerns to be Addressed denies needs/concerns at this time;no discharge needs identified    Equipment Currently Used at Home cane, quad;walker, rolling;wheelchair;shower chair;oxygen    Anticipated Changes Related to Illness none    Equipment Needed After Discharge none    Outpatient/Agency/Support Group Needs homecare agency    Discharge Facility/Level of Care Needs home with home health            Discharge Plan     Row Name 10/12/18 4645       Plan    Plan [facility] facility    Patient/Family in Agreement with Plan yes    Plan Comments Spoke with pt/wife in room to assess for home needs. Pt is independent saying he will return back to [facility]. Wife at bedside lives with him there.  Pt is followed by [facility] , they will need notified upon d/c status.  Pt has needed DME and does have RX coverage. Will follow.         Destination     No service coordination in this encounter.       Durable Medical Equipment     No service coordination in this encounter.      Dialysis/Infusion     No service coordination in this encounter.      Home Medical Care     No service coordination in this encounter.      Social Care     No service coordination in this encounter.                Demographic Summary    No documentation.           Functional Status    No documentation.           Psychosocial    No documentation.           Abuse/Neglect    No documentation.           Legal    No documentation.           Substance Abuse    No documentation.           Patient Forms    No documentation.         KEHINDE Ross

## 2018-10-12 NOTE — THERAPY EVALUATION
Acute Care - Physical Therapy Initial Evaluation  [facility]     Patient Name: James Birch  : 1928  MRN: [id number]  Today's Date: 10/12/2018   Onset of Illness/Injury or Date of Surgery: 10/12/18  Date of Referral to PT: 10/11/18  Referring Physician: Dr. YESSENIA William      Admit Date: 10/11/2018    Visit Dx:     ICD-10-CM ICD-9-CM   1. Gait abnormality R26.9 781.2     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   • Thrombosis of posterior inferior cerebellar artery   • DAMIAN-intolerant   • Autonomic dysfunction   • Cardiac arrhythmia-a fib, SVT   • Congestive heart failure with left ventricular diastolic dysfunction, NYHA class 3 (CMS/HCC)   • Wellness examination-done   • Gait difficulty-cane/walker   • Asthma   • Vjwexcazzyccqd-uwx-xjpq/xarelto   • Chronic kidney disease-3-IgA/nephrology   • Prostatism   • Urolithiasis   • Varicose veins of legs   • Hyperlipidemia-statin   • Hypertension   • Hypersomnia   • Gastroesophageal reflux disease   • COPD (chronic obstructive pulmonary disease) (CMS/HCC)   • Nocturnal hypoxia-oxygen advised   • Laboratory test*   • History of tobacco use   • Acute renal failure (CMS/HCC)     Past Medical History:   Diagnosis Date   • Arthritis    • Asthma    • Atrial fibrillation (CMS/HCC)    • Cancer (CMS/HCC)     SKIN   • CHF (congestive heart failure) (CMS/HCC)    • Conductive hearing loss    • COPD (chronic obstructive pulmonary disease) (CMS/HCC)    • Eustachian tube dysfunction    • GERD (gastroesophageal reflux disease)    • Hyperlipidemia    • Hypertension    • Obstructive sleep apnea    • Prostate disease    • Sensorineural hearing loss    • Stroke (CMS/HCC)    • Vertigo      Past Surgical History:   Procedure Laterality Date   • CATARACT EXTRACTION      left   • CATARACT EXTRACTION      left   • COLONOSCOPY  2012    normal   • ENDOSCOPY  2015    normal   • EYE SURGERY     • FRACTURE SURGERY     • HERNIA REPAIR    "  • HIP SURGERY     • SKIN BIOPSY          PT ASSESSMENT (last 12 hours)      Physical Therapy Evaluation     Row Name 10/12/18 1100          PT Evaluation Time/Intention    Subjective Information complains of;numbness   numbness in his feet, \"from laying here\"  -     Document Type evaluation;other (see comments)   see MAR  -JE     Mode of Treatment physical therapy  -JE     Patient Effort good  -JE     Symptoms Noted During/After Treatment increased pain;other (see comments)   L side/back due rib fx  -JE     Row Name 10/12/18 1100          General Information    Patient Profile Reviewed? yes  -JE     Onset of Illness/Injury or Date of Surgery 10/12/18  -     Referring Physician Dr. YESSENIA William  -     Patient Observations alert;cooperative;agree to therapy  -     Patient/Family Observations supportive spouse present initially  -     General Observations of Patient IV, hearing aides, telemetry  -JE     Prior Level of Function independent:;all household mobility;min assist:;community mobility;ADL's   reports he hasn't been walking to dining room since fall  -JE     Equipment Currently Used at Home rollator;walker, rolling  -JE     Pertinent History of Current Functional Problem Pt admitted due to N/V and constipation.  Pt fell ~ 2 wks ago and sustained L wrist fx and L side rib fx now with L wrist splint.  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  -JE     Existing Precautions/Restrictions fall;other (see comments)   recent L wrist fx with splint in place, recent L rib fx  -JE     Limitations/Impairments hearing;other (see comments)   recent L wrist fx and rib fx  -JE     Equipment Issued to Patient --   provided pt with rwx from hospital to be used while here  -JE     Risks Reviewed patient:;nausea/vomiting;dizziness;increased discomfort;lines disloged;increased drainage;change in vital signs;other (comment)   fall risks  -JE     Benefits Reviewed " patient:;improve function;increase independence;increase strength;increase balance;decrease pain;decrease risk of DVT;improve skin integrity;increase knowledge;other (comment)   safety/falls prevention  -     Barriers to Rehab previous functional deficit;hearing deficit  -     Row Name 10/12/18 1100          Relationship/Environment    Primary Source of Support/Comfort spouse  -     Name of Support/Comfort Primary Source wife  -CHERIE     Lives With spouse  -CHERIE     Name(s) of Who Lives With Patient --   pt/spouse residents at assisted living - [facility]  -     Family Caregiver if Needed spouse  -     Row Name 10/12/18 1100          Resource/Environmental Concerns    Current Living Arrangements independent/assisted living facility  -     Row Name 10/12/18 1100          Cognitive Assessment/Intervention- PT/OT    Orientation Status (Cognition) oriented x 4  -     Follows Commands (Cognition) WFL  -     Safety Deficit (Cognitive) safety precautions awareness  -     Row Name 10/12/18 1100          Safety Issues, Functional Mobility    Safety Issues Affecting Function (Mobility) friction/shear risk;safety precaution awareness  -     Impairments Affecting Function (Mobility) balance;endurance/activity tolerance;pain;postural/trunk control;range of motion (ROM);strength  -     Row Name 10/12/18 1100          Bed Mobility Assessment/Treatment    Bed Mobility Assessment/Treatment rolling left;rolling right;scooting/bridging;supine-sit;sit-supine  -     Rolling Left [place] (Bed Mobility) contact guard;verbal cues  -CHERIE     Rolling Right [place] (Bed Mobility) contact guard;verbal cues  -CHERIE     Scooting/Bridging [place] (Bed Mobility) supervision;verbal cues  -CHERIE     Supine-Sit [place] (Bed Mobility) minimum assist (75% patient effort);verbal cues  -CHERIE     Sit-Supine [place] (Bed Mobility) minimum assist (75% patient effort);verbal cues  -CHERIE     Assistive Device (Bed Mobility) bed  rails  -     Row Name 10/12/18 1100          Transfer Assessment/Treatment    Transfer Assessment/Treatment sit-stand transfer;stand-sit transfer  -     Sit-Stand [place] (Transfers) contact guard;verbal cues  -     Stand-Sit [place] (Transfers) contact guard;verbal cues  -     Row Name 10/12/18 1100          Sit-Stand Transfer    Assistive Device (Sit-Stand Transfers) walker, front-wheeled  -     Row Name 10/12/18 1100          Stand-Sit Transfer    Assistive Device (Stand-Sit Transfers) walker, front-wheeled  -     Row Name 10/12/18 1100          Gait/Stairs Assessment/Training    Gait/Stairs Assessment/Training gait/ambulation assistive device;distance ambulated;gait pattern;gait deviations;gait/ambulation independence  -     [place] Level (Gait) contact guard;verbal cues  -     Assistive Device (Gait) walker, front-wheeled  -     Distance in Feet (Gait) 100 ft  -     Pattern (Gait) step-through  -     Deviations/Abnormal Patterns (Gait) bilateral deviations;base of support, wide;gait speed decreased;stride length decreased  -     Bilateral Gait Deviations forward flexed posture;heel strike decreased  -     Comment (Gait/Stairs) pt has abdominal binder due to recent L rib fx  -     Row Name 10/12/18 1100          General ROM    GENERAL ROM COMMENTS (P)  AROM all 4 extremities WFLs, however L shld decreased compared to R due to pain, L elbow flexion/extension decreased compared to R - per pt due to skin abrasion, L wrist with splint in place  -     Row Name 10/12/18 1100          MMT (Manual Muscle Testing)    General MMT Comments R UE grossly 4 to 4-/5, L UE not formally assessed due to recent L rib and wrist fx, B LEs grossly 4/5; all ms groups assessed in seated position  -     Row Name 10/12/18 1100          Motor Assessment/Intervention    Additional Documentation Balance (Group)  -     Row Name 10/12/18 1100          Balance    Balance static sitting balance   "-[facility] Name 10/12/18 1100          Static Sitting Balance    Level of [place] (Supported Sitting, Static Balance) contact guard assist;standby assist  -     Sitting Position (Supported Sitting, Static Balance) sitting on edge of bed  -     Time Able to Maintain Position (Supported Sitting, Static Balance) more than 5 minutes  -     Comment (Supported Sitting, Static Balance) mild LOB with dynamic activities of extremities recovering I  -[facility] Name 10/12/18 1100          Sensory Assessment/Intervention    Sensory General Assessment other (see comments)   reports numbness in B feet  -[facility] Name 10/12/18 1100          Pain Assessment    Additional Documentation Pain Scale: Numbers Pre/Post-Treatment (Group)  -[facility] Name 10/12/18 1100          Pain Scale: Numbers Pre/Post-Treatment    Pain Scale: Numbers, Pretreatment 0/10 - no pain  -     Pain Scale: Numbers, Post-Treatment 0/10 - no pain  -     Pre/Post Treatment Pain Comment increase c/os pain with transitional mvmts due to \"L rib fx\"  -     Pain Intervention(s) Medication (See MAR);Repositioned;Ambulation/increased activity;Rest  -[facility] Name 10/12/18 1100          Edema Assessment & Management    Location (Edema) lower extremity, left;lower extremity, right  -     Additional Documentation Location (Edema) (Row)  -[facility] Name 10/12/18 1100          Orthotics & Prosthetics Management    Orthosis Location wrist hand orthosis  -     Additional Documentation Orthosis Location (Row)  -[facility] Name 10/12/18 1100          Wrist/Hand Orthosis Management    Type (Wrist/Hand Orthosis) left;prefabricated  -     Functional Design (Wrist/Hand Orthosis) static orthosis  -     Therapeutic Indications (Wrist/Hand Orthosis) fracture immobilization;pain management;rest/inflammation reduction;stabilization and support  -     Wearing Schedule (Wrist/Hand Orthosis) wear full time  -     Compliance/Wearing Issues (Wrist/Hand " Orthosis) other (see comments)   pt compliant with use of L wrist splint  -     Row Name             Wound 10/11/18 Left lateral elbow skin tear    Wound - Properties Group Date first assessed: 10/11/18  -BRIAN Present On Admission : yes  -BRIAN Side: Left  -BRIAN Orientation: lateral  -BRIAN Location: elbow  -BRIAN Type: skin tear  -BRAIN Stage, Pressure Injury: other (see comments)  -BRIAN    Row Name 10/12/18 1100          Coping    Observed Emotional State accepting;calm;cooperative  -     Row Name 10/12/18 1100          Plan of Care Review    Plan of Care Reviewed With patient  -     Row Name 10/12/18 1100          Physical Therapy Clinical Impression    Date of Referral to PT 10/11/18  -     Patient/Family Goals Statement (PT Clinical Impression) improve mobility  -     Criteria for Skilled Interventions Met (PT Clinical Impression) yes;treatment indicated;other (see comments)   decrease activity tolerance, balance, and strength  -     Rehab Potential (PT Clinical Summary) good, to achieve stated therapy goals  -     Predicted Duration of Therapy (PT) until discharge or goals achieved  -     Care Plan Review (PT) evaluation/treatment results reviewed;care plan/treatment goals reviewed;risks/benefits reviewed;patient/other agree to care plan  -     Row Name 10/12/18 1100          Physical Therapy Goals    Bed Mobility Goal Selection (PT) bed mobility, PT goal 1  -     Transfer Goal Selection (PT) transfer, PT goal 1  -     Gait Training Goal Selection (PT) gait training, PT goal 1  -     Row Name 10/12/18 1100          Bed Mobility Goal 1 (PT)    Activity/Assistive Device (Bed Mobility Goal 1, PT) rolling to left;rolling to right;scooting;sit to supine;supine to sit  -     [place] Level/Cues Needed (Bed Mobility Goal 1, PT) standby assist  -     Time Frame (Bed Mobility Goal 1, PT) long term goal (LTG);10 days  -     Progress/Outcomes (Bed Mobility Goal 1, PT) goal ongoing  -     Row Name  10/12/18 1100          Transfer Goal 1 (PT)    Activity/Assistive Device (Transfer Goal 1, PT) sit-to-stand/stand-to-sit;bed-to-chair/chair-to-bed;walker, rolling  -JE     [place] Level/Cues Needed (Transfer Goal 1, PT) standby assist  -JE     Time Frame (Transfer Goal 1, PT) long term goal (LTG);10 days  -JE     Progress/Outcome (Transfer Goal 1, PT) goal ongoing  -JE     Row Name 10/12/18 1100          Gait Training Goal 1 (PT)    Activity/Assistive Device (Gait Training Goal 1, PT) gait (walking locomotion);decrease fall risk;forward stepping;backward stepping;assistive device use;improve balance and speed;increase endurance/gait distance;increase energy conservation;walker, rolling  -JE     [place] Level (Gait Training Goal 1, PT) standby assist  -JE     Distance (Gait Goal 1, PT) 250 ft  -JE     Time Frame (Gait Training Goal 1, PT) long term goal (LTG);10 days  -JE     Progress/Outcome (Gait Training Goal 1, PT) goal ongoing  -     Row Name 10/12/18 1100          Patient Education Goal (PT)    Activity (Patient Education Goal, PT) safety/falls prevention  -JE     [place]/Cues/Accuracy (Memory Goal 2, PT) demonstrates adequately;verbalizes understanding  -JE     Time Frame (Patient Education Goal, PT) long term goal (LTG);10 days  -JE     Progress/Outcome (Patient Education Goal, PT) goal ongoing  -     Row Name 10/12/18 1100          Positioning and Restraints    Post Treatment Position bed  -JE     In Bed fowlers;call light within reach;encouraged to call for assist;exit alarm on;side rails up x1;legs elevated  -     Row Name 10/12/18 1100          Living Environment    Home Accessibility wheelchair accessible  -       User Key  (r) = Recorded By, (t) = Taken By, (c) = Cosigned By    Initials Name Provider Type    Joy Clayton, RNA Registered Nurse    Geraldine Gomez, PT Physical Therapist          Physical Therapy Education     Title: PT OT SLP Therapies (Done)     Topic:  Physical Therapy (Done)     Point: Mobility training (Done)    Learning Progress Summary     Learner Status Readiness Method Response Comment Documented by    Patient Done Acceptance SHAHAB,RICHA ROSS DU,NR Education for improved technique with rolling, scooting up in bed, and transfers; discussed purpose of PT and importance of activity  10/12/18 1225          Point: Precautions (Done)    Learning Progress Summary     Learner Status Readiness Method Response Comment Documented by    Patient Done Acceptance RICHA GUDINO DU,NR Educ: for safety/falls prevention to include use rwx at all times, always call for assist, to transition slowly during transfers to allow for adjustment to change in position; educ: re: risks assoc w/ bedrest- frequent aps, deep breathing,pressure relief  10/12/18 1226                      User Key     Initials Effective Dates Name Provider Type Discipline     08/02/18 -  Geraldine Woods, PT Physical Therapist PT                PT Recommendation and Plan  Anticipated Discharge Disposition (PT): home with assist, home with home health  Planned Therapy Interventions (PT Eval): balance training, bed mobility training, gait training, patient/family education, strengthening, ROM (range of motion), transfer training, other (see comments) (safety/falls prevention; pain management)  Therapy Frequency (PT Clinical Impression): 2 times/day  Outcome Summary/Treatment Plan (PT)  Anticipated Discharge Disposition (PT): home with assist, home with home health  Plan of Care Reviewed With: patient  Progress: improving  Outcome Summary: PT eval completed.  Pt performs bed mobility and transfers with supervision to min assist of 1.  Gait with rwx ~ 100 ft with CGA.  Pt reported no c/os pain initially, however with movements with his extremities in sitting and with transitional movements c/os pain L rib due to recent fx.  Pt demonstrates increase GAYATHRI, decrease step length, and slowed pace with gait.  Pt motivated with  supportive spouse.  Recent hx of fall sustaining L wrist fx now with splint in place.  Reports he has been using his walker ever since and no increase c/os pain.  Discussed possible benefit of platform on rwx for the L UE.  Feel pt will be safe to return home with assist at discharge, however may benefit from skilled therapy through home health services.  Will continue with skilled PT while in acute care for strengthening, balance training, education for safety/falls prevention, to assist with pain management, and to improve I with functional mobility reducing fall risk.          Outcome Measures     Row Name 10/12/18 1100             How much help from another person do you currently need...    Turning from your back to your side while in flat bed without using bedrails? 3  -JE      Moving from lying on back to sitting on the side of a flat bed without bedrails? 3  -JE      Moving to and from a bed to a chair (including a wheelchair)? 3  -JE      Standing up from a chair using your arms (e.g., wheelchair, bedside chair)? 3  -JE      Climbing 3-5 steps with a railing? 3  -JE      To walk in hospital room? 3  -JE      AM-PAC 6 Clicks Score 18  -JE         Functional Assessment    Outcome Measure Options AM-PAC 6 Clicks Basic Mobility (PT)  -        User Key  (r) = Recorded By, (t) = Taken By, (c) = Cosigned By    Initials Name Provider Type    Geraldine Gomez, PT Physical Therapist           Time Calculation:         PT Charges     Row Name 10/12/18 1236             Time Calculation    Start Time 1100  -      Stop Time 1155  -      Time Calculation (min) 55 min  -      PT Received On 10/12/18  -      PT Goal Re-Cert Due Date 10/22/18  -        User Key  (r) = Recorded By, (t) = Taken By, (c) = Cosigned By    Initials Name Provider Type    Geraldine Gomez, PT Physical Therapist        Therapy Suggested Charges     Code   Minutes Charges    None           Therapy Charges for Today     Code  Description Service Date Service Provider Modifiers Qty    31478236490 HC PT MOBILITY CURRENT 10/12/2018 Geraldine Woods, PT GP, CK 1    19458860378 HC PT MOBILITY PROJECTED 10/12/2018 Geraldine Woods, PT GP, CJ 1    71649938320 HC PT EVAL LOW COMPLEXITY 4 10/12/2018 Geraldine Woods, PT GP 1          PT G-Codes  PT Professional Judgement Used?: Yes  Outcome Measure Options: AM-PAC 6 Clicks Basic Mobility (PT)  AM-PAC 6 Clicks Score: 18  Functional Limitation: Mobility: Walking and moving around  Mobility: Walking and Moving Around Current Status (): At least 40 percent but less than 60 percent impaired, limited or restricted  Mobility: Walking and Moving Around Goal Status (): At least 20 percent but less than 40 percent impaired, limited or restricted      Geraldine Woods, PT  10/12/2018

## 2018-10-12 NOTE — PROGRESS NOTES
Nephrology ([facility]) Progress Note      Patient:  James Birch  YOB: 1928  Date of Service: 10/12/2018  MRN: [id number]   Acct: [id number]   Primary Care Physician: Hemal William MD  Advance Directive:   Code Status and Medical Interventions:   Ordered at: 10/11/18 1328     Code Status:    CPR     Medical Interventions (Level of Support Prior to Arrest):    Full     Admit Date: 10/11/2018       Hospital Day: 1  Referring Provider: Hemal William MD      Patient personally seen and examined.  Complete chart including Consults, Notes, Operative Reports, Labs, Cardiology, and Radiology studies reviewed as able.        Subjective:  James Birch is a 89 y.o. male  whom we were consulted for EB/CKD.  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   His GFR is usually ~30.  Two weeks ago he fell and fractured his L radius and a rib.  He had been on pain medication and prior to this admission had not had a BM in 1 week. He had associated N/V and poor fluid intake.   He had routine chemistries with Dr. William and found to have a decline in GFR, down to 28. He was then admitted to [facility] for IV hydration.       Creatinine is down to 1.9 today which is essentially his baseline.   He was evaluated by GI today. He states overall he is feeling significantly better with no specific complaints.       Allergies:  Patient has no known allergies.    Home Meds:  Prescriptions Prior to Admission   Medication Sig Dispense Refill Last Dose   • azelastine (OPTIVAR) 0.05 % ophthalmic solution INSTILL 1 DROP INTO BOTH EYES TWICE DAILY GENERIC FOR OPTIVAR 6 mL 5 Taking   • B Complex-C (SUPER B COMPLEX/VITAMIN C PO) Take  by mouth daily.   Taking   • Calcium Polycarbophil (FIBER-CAPS PO) Take  by mouth 2 (Two) Times a Day.   Taking   • Cholecalciferol (VITAMIN D3 PO) Take 2,000 Int'l Units by mouth Daily.   Taking   • Coenzyme Q10 (CO  Q-10 PO) Take  by mouth daily.   Taking   • diltiaZEM CD (CARDIZEM CD) 120 MG 24 hr capsule Take 1 capsule by mouth Daily. 30 capsule 5 Taking   • dutasteride (AVODART) 0.5 MG capsule TAKE 1 CAPSULE DAILY GENERIC FOR AVODART 90 capsule 1 Taking   • esomeprazole (NEXIUM) 40 MG capsule Take 1 capsule by mouth Every Morning Before Breakfast. 30 capsule 5 Taking   • furosemide (LASIX) 40 MG tablet Take 40 mg by mouth Daily. May take one half tablet additionally prn swelling   Taking   • guaiFENesin (MUCINEX) 600 MG 12 hr tablet Take 600 mg by mouth Every 12 (Twelve) Hours.   Taking   • HYDROcodone-acetaminophen (NORCO) 5-325 MG per tablet Take 1 tablet by mouth Every 6 (Six) Hours As Needed for Moderate Pain . 12 tablet 0 Taking   • levalbuterol (XOPENEX HFA) 45 MCG/ACT inhaler Inhale 1-2 puffs Every 4 (Four) Hours As Needed for Wheezing. 1 inhaler 5 Taking   • Multiple Vitamins-Minerals (MULTIVITAMIN PO) Take 1 tablet by mouth daily.   Taking   • O2 (OXYGEN) Inhale 1 L/min Every Night. As needed at bedtime.    Taking   • Omega-3 Fatty Acids (FISH OIL PO) Take 1,000 mg by mouth 2 (Two) Times a Day.   Taking   • ondansetron (ZOFRAN) 4 MG tablet Take 1 tablet by mouth Every 8 (Eight) Hours As Needed for Nausea or Vomiting. 12 tablet 0 Taking   • PROAIR  (90 Base) MCG/ACT inhaler Every 4 (Four) Hours As Needed for Shortness of Air.  5 Not Taking   • ramipril (ALTACE) 2.5 MG capsule TAKE 1 CAPSULE AT BEDTIME GENERIC FOR ALTACE 90 capsule 1 Taking   • SYMBICORT 80-4.5 MCG/ACT inhaler Inhale 2 puffs 2 (Two) Times a Day.   Taking   • tamsulosin (FLOMAX) 0.4 MG capsule 24 hr capsule TAKE 1 CAPSULE DAILY SHORTLY AFTER THE SAME MEAL GENERIC FOR FLOMAX 90 capsule 1 Taking   • traMADol (ULTRAM) 50 MG tablet Take 1 tablet by mouth Every 8 (Eight) Hours As Needed for Moderate Pain . 21 tablet 0 Taking   • XARELTO 15 MG tablet TAKE ONE TABLET DAILY 90 tablet 3 Taking       Medicines:  Current Facility-Administered Medications    Medication Dose Route Frequency Provider Last Rate Last Dose   • budesonide-formoterol (SYMBICORT) 80-4.5 MCG/ACT inhaler 2 puff  2 puff Inhalation BID - RT Hemal William MD   2 puff at 10/12/18 0808   • diltiaZEM CD (CARDIZEM CD) 24 hr capsule 120 mg  120 mg Oral Q24H Hemal William MD   120 mg at 10/12/18 0758   • finasteride (PROSCAR) tablet 5 mg  5 mg Oral Daily Hemal William MD   5 mg at 10/12/18 0759   • guaiFENesin (MUCINEX) 12 hr tablet 600 mg  600 mg Oral Q12H Hemal William MD   600 mg at 10/12/18 0759   • HYDROcodone-acetaminophen (NORCO) 5-325 MG per tablet 1 tablet  1 tablet Oral Q6H PRN Hemal William MD   1 tablet at 10/11/18 2151   • Influenza Vac Subunit Quad (FLUCELVAX) injection 0.5 mL  0.5 mL Intramuscular During Hospitalization Hemal William MD       • ketotifen (ZADITOR) 0.025 % ophthalmic solution 1 drop  1 drop Both Eyes BID Hemal William MD   1 drop at 10/12/18 0758   • O2 (OXYGEN)  1 L/min Inhalation Nightly Hemal William MD       • ondansetron (ZOFRAN) tablet 4 mg  4 mg Oral Q8H PRN Hemal William MD       • pantoprazole (PROTONIX) EC tablet 40 mg  40 mg Oral QAM Hemal William MD   40 mg at 10/12/18 0655   • polyethylene glycol (MIRALAX) powder 17 g  17 g Oral BID Joy Ambriz APRN       • rivaroxaban (XARELTO) tablet 15 mg  15 mg Oral Daily Hemal William MD   15 mg at 10/12/18 0758   • sodium chloride 0.9 % flush 3 mL  3 mL Intravenous Q12H Hemal William MD       • sodium chloride 0.9 % flush 3-10 mL  3-10 mL Intravenous PRN Hemal William MD       • sodium chloride 0.9 % infusion  125 mL/hr Intravenous Continuous Hemal William  mL/hr at 10/12/18 0655 125 mL/hr at 10/12/18 0655   • tamsulosin (FLOMAX) 24 hr capsule 0.4 mg  0.4 mg Oral Nightly Hemal William MD   0.4 mg at 10/11/18 2015       Past Medical History:  Past Medical History:   Diagnosis Date   •  Arthritis    • Asthma    • Atrial fibrillation (CMS/HCC)    • Cancer (CMS/HCC)     SKIN   • CHF (congestive heart failure) (CMS/HCC)    • Conductive hearing loss    • COPD (chronic obstructive pulmonary disease) (CMS/HCC)    • Eustachian tube dysfunction    • GERD (gastroesophageal reflux disease)    • Hyperlipidemia    • Hypertension    • Obstructive sleep apnea    • Prostate disease    • Sensorineural hearing loss    • Stroke (CMS/HCC)    • Vertigo        Past Surgical History:  Past Surgical History:   Procedure Laterality Date   • CATARACT EXTRACTION      left   • CATARACT EXTRACTION      left   • COLONOSCOPY  09/05/2012    normal   • ENDOSCOPY  08/31/2015    normal   • EYE SURGERY     • FRACTURE SURGERY     • HERNIA REPAIR     • HIP SURGERY     • SKIN BIOPSY         Family History  Family History   Problem Relation Age of Onset   • No Known Problems Mother    • No Known Problems Father        Social History  Social History     Social History   • Marital status:      Spouse name: N/A   • Number of children: 3   • Years of education: N/A     Occupational History   • Not on file.     Social History Main Topics   • Smoking status: Former Smoker     Packs/day: 1.00     Years: 18.00     Types: Cigarettes     Quit date: 1967   • Smokeless tobacco: Never Used      Comment: already quit   • Alcohol use No   • Drug use: No   • Sexual activity: Not Currently     Partners: Female     Birth control/ protection: Abstinence     Other Topics Concern   • Not on file     Social History Narrative   • No narrative on file         Review of Systems:  History obtained from chart review and the patient  General ROS: No fever or chills  Respiratory ROS: No cough, shortness of breath, wheezing  Cardiovascular ROS: No chest pain or palpitations  Gastrointestinal ROS: No abdominal pain or melena  Genito-Urinary ROS: No dysuria or hematuria    Objective:  BP 92/56 (BP Location: Right arm, Patient Position: Lying)   Pulse 73    Temp 97.7 °F (36.5 °C) (Temporal Artery )   Resp 16   SpO2 100%     Intake/Output Summary (Last 24 hours) at 10/12/18 1027  Last data filed at 10/12/18 0503   Gross per 24 hour   Intake                0 ml   Output              300 ml   Net             -300 ml     General: awake/alert   Chest:  clear to auscultation bilaterally without respiratory distress  CVS: regular rate and rhythm  Abdominal: soft, nontender, normal bowel sounds  Extremities: +1 BLE  Skin: warm and dry without rash      Labs:    Results from last 7 days  Lab Units 10/12/18  0646 10/11/18  1417 10/10/18  1300   WBC 10*3/mm3 4.86 5.03 6.13   HEMOGLOBIN g/dL 12.4* 11.8* 12.9*   HEMATOCRIT % 36.0* 33.7* 38.7*   PLATELETS 10*3/mm3 173 212 224           Results from last 7 days  Lab Units 10/12/18  0622 10/11/18  1417 10/10/18  1300   SODIUM mmol/L 135 135 137   POTASSIUM mmol/L 4.1 4.4 4.7   CHLORIDE mmol/L 103 97* 97*   CO2 mmol/L 24.0 28.0 28.0   BUN mg/dL 32* 40* 40*   CREATININE mg/dL 1.92* 2.20* 2.19*   CALCIUM mg/dL 8.8 9.1 9.6   BILIRUBIN mg/dL  --  0.8  --    ALK PHOS U/L  --  57  --    ALT (SGPT) U/L  --  22  --    AST (SGOT) U/L  --  27  --    GLUCOSE mg/dL 81 95 100       Radiology:   Imaging Results (last 72 hours)     Procedure Component Value Units Date/Time    XR Abdomen KUB [[id number]] Collected:  10/11/18 1957     Updated:  10/11/18 2001    Narrative:       EXAMINATION:   XR ABDOMEN KUB-  10/11/2018 7:57 PM CDT     XR ABDOMEN KUB- 10/11/2018 7:46 PM CDT     HISTORY: Daily vomiting       COMPARISON: None     FINDINGS:  There is a nonspecific bowel gas pattern. No soft tissue mass or  pathologic calcification  is visualized.     Degenerative changes noted in the lumbar spine. Mesh is present in the  lower abdomen. Right hip prosthesis is present..        Impression:       1. Non specific bowel gas pattern..         This report was finalized on 10/11/2018 19:58 by Dr. Oskar Blanc MD.          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

## 2018-10-12 NOTE — PROGRESS NOTES
Malnutrition Severity Assessment    Patient Name:  James Birch  YOB: 1928  MRN: [id number]  Admit Date:  10/11/2018    Patient meets criteria for : Moderate malnutrition    Comments:  If in agreement with malnutrition assessment, please attest documentation. Thanks.     Malnutrition Type: Chronic Illness Malnutrition     Malnutrition Type (last 8 hours)      Malnutrition Severity Assessment     Row Name 10/12/18 1638       Malnutrition Severity Assessment    Malnutrition Type Chronic Illness Malnutrition    Row Name 10/12/18 1638       Physical Signs of Malnutrition (Chronic)    Muscle Wasting Mild   mild temporalis depression; clavicle prominent with decreased tone of pectoris and deltoid noted; mild loss of interosseous muscle    Fat Loss Mild   slight depression of orbital fat pad; loose skin with some (not ample) fat tissue on upper arms    Secondary Physical Signs Present (comment)   decreased skin turgor; dry/flaky; generalized weakness; constipation    Row Name 10/12/18 1638       Weight Status (Chronic)    Weight Loss --   approx 10 lbs (per family) over the past yr; 5.5%     Row Name 10/12/18 1638       Energy Intake Status (Chronic)    Energy Intake Mod (<75% / > or equal to 1 mo)    Row Name 10/12/18 1638       Criteria Met (Must meet criteria for severity in at least 2 of these categories: M Wasting, Fat Loss, Fluid, Secondary Signs, Wt. Status, Intake)    Patient meets criteria for  Moderate malnutrition          Electronically signed by:  Esperanza Steel RDN, LD  10/12/18 4:47 PM

## 2018-10-13 VITALS
WEIGHT: 171.4 LBS | BODY MASS INDEX: 24.59 KG/M2 | OXYGEN SATURATION: 98 % | DIASTOLIC BLOOD PRESSURE: 81 MMHG | SYSTOLIC BLOOD PRESSURE: 128 MMHG | RESPIRATION RATE: 16 BRPM | HEART RATE: 74 BPM | TEMPERATURE: 97.2 F

## 2018-10-13 LAB
ANION GAP SERPL CALCULATED.3IONS-SCNC: 7 MMOL/L (ref 4–13)
BASOPHILS # BLD AUTO: 0.02 10*3/MM3 (ref 0–0.2)
BASOPHILS NFR BLD AUTO: 0.4 % (ref 0–2)
BUN BLD-MCNC: 23 MG/DL (ref 5–21)
BUN/CREAT SERPL: 13.5 (ref 7–25)
CALCIUM SPEC-SCNC: 8.6 MG/DL (ref 8.4–10.4)
CHLORIDE SERPL-SCNC: 102 MMOL/L (ref 98–110)
CO2 SERPL-SCNC: 28 MMOL/L (ref 24–31)
CREAT BLD-MCNC: 1.7 MG/DL (ref 0.5–1.4)
DEPRECATED RDW RBC AUTO: 41.9 FL (ref 40–54)
EOSINOPHIL # BLD AUTO: 0.21 10*3/MM3 (ref 0–0.7)
EOSINOPHIL NFR BLD AUTO: 4.4 % (ref 0–4)
ERYTHROCYTE [DISTWIDTH] IN BLOOD BY AUTOMATED COUNT: 13.2 % (ref 12–15)
GFR SERPL CREATININE-BSD FRML MDRD: 38 ML/MIN/1.73
GLUCOSE BLD-MCNC: 82 MG/DL (ref 70–100)
HCT VFR BLD AUTO: 33.7 % (ref 40–52)
HGB BLD-MCNC: 11.4 G/DL (ref 14–18)
IMM GRANULOCYTES # BLD: 0.02 10*3/MM3 (ref 0–0.03)
IMM GRANULOCYTES NFR BLD: 0.4 % (ref 0–5)
LYMPHOCYTES # BLD AUTO: 0.79 10*3/MM3 (ref 0.72–4.86)
LYMPHOCYTES NFR BLD AUTO: 16.6 % (ref 15–45)
MCH RBC QN AUTO: 29.5 PG (ref 28–32)
MCHC RBC AUTO-ENTMCNC: 33.8 G/DL (ref 33–36)
MCV RBC AUTO: 87.3 FL (ref 82–95)
MONOCYTES # BLD AUTO: 0.57 10*3/MM3 (ref 0.19–1.3)
MONOCYTES NFR BLD AUTO: 12 % (ref 4–12)
NEUTROPHILS # BLD AUTO: 3.14 10*3/MM3 (ref 1.87–8.4)
NEUTROPHILS NFR BLD AUTO: 66.2 % (ref 39–78)
NRBC BLD MANUAL-RTO: 0 /100 WBC (ref 0–0)
PLATELET # BLD AUTO: 198 10*3/MM3 (ref 130–400)
PMV BLD AUTO: 9.4 FL (ref 6–12)
POTASSIUM BLD-SCNC: 3.9 MMOL/L (ref 3.5–5.3)
RBC # BLD AUTO: 3.86 10*6/MM3 (ref 4.8–5.9)
SODIUM BLD-SCNC: 137 MMOL/L (ref 135–145)
WBC NRBC COR # BLD: 4.75 10*3/MM3 (ref 4.8–10.8)

## 2018-10-13 PROCEDURE — G0378 HOSPITAL OBSERVATION PER HR: HCPCS

## 2018-10-13 PROCEDURE — 94760 N-INVAS EAR/PLS OXIMETRY 1: CPT

## 2018-10-13 PROCEDURE — 80048 BASIC METABOLIC PNL TOTAL CA: CPT | Performed by: FAMILY MEDICINE

## 2018-10-13 PROCEDURE — 97110 THERAPEUTIC EXERCISES: CPT

## 2018-10-13 PROCEDURE — 99232 SBSQ HOSP IP/OBS MODERATE 35: CPT | Performed by: CLINICAL NURSE SPECIALIST

## 2018-10-13 PROCEDURE — 85025 COMPLETE CBC W/AUTO DIFF WBC: CPT | Performed by: FAMILY MEDICINE

## 2018-10-13 PROCEDURE — 97116 GAIT TRAINING THERAPY: CPT

## 2018-10-13 PROCEDURE — 94799 UNLISTED PULMONARY SVC/PX: CPT

## 2018-10-13 PROCEDURE — 99217 PR OBSERVATION CARE DISCHARGE MANAGEMENT: CPT | Performed by: FAMILY MEDICINE

## 2018-10-13 RX ORDER — FUROSEMIDE 40 MG/1
20 TABLET ORAL DAILY
Start: 2018-10-13 | End: 2019-05-09

## 2018-10-13 RX ORDER — POLYETHYLENE GLYCOL 3350 17 G/17G
17 POWDER, FOR SOLUTION ORAL DAILY
Qty: 30 EACH | Refills: 1 | Status: ON HOLD | OUTPATIENT
Start: 2018-10-13 | End: 2022-01-01

## 2018-10-13 RX ADMIN — POLYETHYLENE GLYCOL (3350) 17 G: 17 POWDER, FOR SOLUTION ORAL at 08:23

## 2018-10-13 RX ADMIN — Medication 3 ML: at 08:24

## 2018-10-13 RX ADMIN — GUAIFENESIN 600 MG: 600 TABLET, EXTENDED RELEASE ORAL at 08:23

## 2018-10-13 RX ADMIN — FINASTERIDE 5 MG: 5 TABLET, FILM COATED ORAL at 08:23

## 2018-10-13 RX ADMIN — BUDESONIDE AND FORMOTEROL FUMARATE DIHYDRATE 2 PUFF: 80; 4.5 AEROSOL RESPIRATORY (INHALATION) at 09:34

## 2018-10-13 RX ADMIN — KETOTIFEN FUMARATE 1 DROP: 0.35 SOLUTION/ DROPS OPHTHALMIC at 06:27

## 2018-10-13 RX ADMIN — PANTOPRAZOLE SODIUM 40 MG: 40 TABLET, DELAYED RELEASE ORAL at 06:28

## 2018-10-13 RX ADMIN — RIVAROXABAN 15 MG: 15 TABLET, FILM COATED ORAL at 08:23

## 2018-10-13 RX ADMIN — DILTIAZEM HYDROCHLORIDE 120 MG: 120 CAPSULE, COATED, EXTENDED RELEASE ORAL at 08:23

## 2018-10-13 NOTE — DISCHARGE INSTRUCTIONS
ACTIVITY:  Gradually increase activity   No driving  Safety device when up: cane/walker as appropirate  Wound care/procedure: continue wash soap/water dry well and dry dressing    DIET:  AHA/renal  Additiona  Suggest no alcohol   Avoid caffeine   Calories 0271-4045/24 hr  Fluids at least 60 oz/24 hr    MEDICATIONS:   Per AVS    EXPECT:   LAB weekly BMP  Home health to resume care    CALL:   If problems/questions

## 2018-10-13 NOTE — THERAPY TREATMENT NOTE
Acute Care - Physical Therapy Treatment Note  [facility]     Patient Name: James Birch  : 1928  MRN: [id number]  Today's Date: 10/13/2018  Onset of Illness/Injury or Date of Surgery: 10/12/18  Date of Referral to PT: 10/11/18  Referring Physician: Dr. YESSENIA William    Admit Date: 10/11/2018    Visit Dx:    ICD-10-CM ICD-9-CM   1. Gait abnormality R26.9 781.2     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   • Thrombosis of posterior inferior cerebellar artery   • DAMIAN-intolerant   • Autonomic dysfunction   • Cardiac arrhythmia-a fib, SVT   • Congestive heart failure with left ventricular diastolic dysfunction, NYHA class 3 (CMS/[facility])   • Wellness examination-done   • Gait difficulty-cane/walker   • Asthma   • Uigvkxffaipkkj-mcp-tspx/xarelto   • Chronic kidney disease-3-IgA/nephrology   • Prostatism   • Urolithiasis   • Varicose veins of legs   • Hyperlipidemia-statin   • Hypertension   • Hypersomnia   • Gastroesophageal reflux disease   • COPD (chronic obstructive pulmonary disease) (CMS/[facility])   • Nocturnal hypoxia-oxygen advised   • Laboratory test*   • History of tobacco use   • Acute renal failure (CMS/[facility])       Therapy Treatment          Rehabilitation Treatment Summary     Row Name 10/13/18 1051             Treatment Time/Intention    Discipline physical therapy assistant  -AB      Document Type therapy note (daily note)  -AB2      Subjective Information no complaints  -AB2      Mode of Treatment physical therapy  -AB2      Existing Precautions/Restrictions fall   splint on L wrist and L rib fx  -AB2      Recorded by [AB] Maria C Boyer, PTA 10/13/18 1051  [AB2] Maria C Boyer, PTA 10/13/18 1123      Row Name 10/13/18 1051             Bed Mobility Assessment/Treatment    Scooting/Bridging [place] (Bed Mobility) supervision  -AB      Supine-Sit [place] (Bed Mobility) verbal cues;minimum assist (75% patient effort)  -AB      Sit-Supine [place]  (Bed Mobility) verbal cues;contact guard;minimum assist (75% patient effort)  -AB      Recorded by [AB] Maria C Boyer, PTA 10/13/18 1123      Row Name 10/13/18 1051             Sit-Stand Transfer    Sit-Stand [place] (Transfers) contact guard  -AB      Assistive Device (Sit-Stand Transfers) walker, front-wheeled  -AB      Recorded by [AB] Maria C Boyer, PTA 10/13/18 1123      Row Name 10/13/18 1051             Stand-Sit Transfer    Stand-Sit [place] (Transfers) minimum assist (75% patient effort);contact guard  -AB      Assistive Device (Stand-Sit Transfers) walker, front-wheeled  -AB      Recorded by [AB] Maria C Boyer, PTA 10/13/18 1123      Row Name 10/13/18 1051             Gait/Stairs Assessment/Training    Gait/Stairs Assessment/Training gait/ambulation independence  -AB      [place] Level (Gait) contact guard  -AB      Assistive Device (Gait) walker, front-wheeled  -AB      Distance in Feet (Gait) 325  -AB      Pattern (Gait) step-through  -AB      Bilateral Gait Deviations forward flexed posture  -AB      Recorded by [AB] Maria C Boyer, PTA 10/13/18 1123      Row Name 10/13/18 1051             Motor Skills Assessment/Interventions    Additional Documentation Therapeutic Exercise (Group)  -AB      Recorded by [AB] Maria C Boyer, PTA 10/13/18 1123      Row Name 10/13/18 1051             Therapeutic Exercise    Lower Extremity (Therapeutic Exercise) LAQ (long arc quad), bilateral;marching while seated  -AB      Lower Extremity Range of Motion (Therapeutic Exercise) ankle dorsiflexion/plantar flexion, bilateral  -AB      Exercise Type (Therapeutic Exercise) AROM (active range of motion)  -AB      Position (Therapeutic Exercise) seated  -AB      Sets/Reps (Therapeutic Exercise) 20  -AB      Recorded by [AB] Maria C Boyer, PTA 10/13/18 1123      Row Name 10/13/18 1051             Positioning and Restraints    Pre-Treatment Position in bed  -AB      Post Treatment Position bed  -AB       In Bed fowlers;call light within reach;with family/caregiver  -AB      Recorded by [AB] Maria C Boyer, PTA 10/13/18 1123      Row Name                Wound 10/11/18 Left lateral elbow skin tear    Wound - Properties Group Date first assessed: 10/11/18 [BRIAN] Present On Admission : yes [JB2] Side: Left [JB2] Orientation: lateral [JB2] Location: elbow [JB2] Type: skin tear [JB2] Stage, Pressure Injury: other (see comments) [JB2] Recorded by:  [BRIAN] Joy Lagos, RNA 10/12/18 1002 [JB2] Joy Lagos, RNA 10/12/18 1003      User Key  (r) = Recorded By, (t) = Taken By, (c) = Cosigned By    Initials Name Effective Dates Discipline    AB Maria C Boyer, PTA 08/02/16 -  PT    Joy Clayton, RNA 06/06/18 -  Nurse          Wound 10/11/18 Left lateral elbow skin tear (Active)   Dressing Appearance dressing loose 10/13/2018  8:00 AM   Drainage Amount small 10/13/2018  8:00 AM   Care, Wound cleansed with;dressing removed;sterile normal saline 10/13/2018  8:00 AM   Dressing Care, Wound dressing applied 10/13/2018  8:00 AM             Physical Therapy Education     Title: PT OT SLP Therapies (Done)     Topic: Physical Therapy (Done)     Point: Mobility training (Done)    Learning Progress Summary     Learner Status Readiness Method Response Comment Documented by    Patient Done Acceptance E,TB,BHAVANA OCONNELL,NR Education for improved technique with rolling, scooting up in bed, and transfers; discussed purpose of PT and importance of activity  10/12/18 1225          Point: Precautions (Done)    Learning Progress Summary     Learner Status Readiness Method Response Comment Documented by    Patient Done Acceptance RICHA GUDINO DU,NR Educ: for safety/falls prevention to include use rwx at all times, always call for assist, to transition slowly during transfers to allow for adjustment to change in position; educ: re: risks assoc w/ bedrest- frequent aps, deep breathing,pressure relief JE 10/12/18 4451                       User Key     Initials Effective Dates Name Provider Type [place]     08/02/18 -  Geraldine Woods, PT Physical Therapist PT                    PT Recommendation and Plan     Plan of Care Reviewed With: patient  Progress: improving  Outcome Summary: He agreed to therapy, needed min assist to come into sitting. CGA to stand and he amb 375' CGA with RWX. He did have decreased safety and tryed to sit before fully turned around when we were back in the room. He was GGA to get back in bed and  he was left in fowlers with family.          Outcome Measures     Row Name 10/12/18 1100             How much help from another person do you currently need...    Turning from your back to your side while in flat bed without using bedrails? 3  -JE      Moving from lying on back to sitting on the side of a flat bed without bedrails? 3  -JE      Moving to and from a bed to a chair (including a wheelchair)? 3  -JE      Standing up from a chair using your arms (e.g., wheelchair, bedside chair)? 3  -JE      Climbing 3-5 steps with a railing? 3  -JE      To walk in hospital room? 3  -JE      AM-PAC 6 Clicks Score 18  -JE         Functional Assessment    Outcome Measure Options AM-PAC 6 Clicks Basic Mobility (PT)  -        User Key  (r) = Recorded By, (t) = Taken By, (c) = Cosigned By    Initials Name Provider Type    Geraldine Gomez, PT Physical Therapist           Time Calculation:         PT Charges     Row Name 10/13/18 1051             Time Calculation    Start Time 1051  -AB      Stop Time 1123  -AB      Time Calculation (min) 32 min  -AB      PT Received On 10/13/18  -AB      PT Goal Re-Cert Due Date 10/22/18  -AB         Time Calculation- PT    Total Timed Code Minutes- PT 32 minute(s)  -AB        User Key  (r) = Recorded By, (t) = Taken By, (c) = Cosigned By    Initials Name Provider Type    Maria C Mendieta, PTA Physical Therapy Assistant        Therapy Suggested Charges     Code   Minutes Charges    None            Therapy Charges for Today     Code Description Service Date Service Provider Modifiers Qty    [id number] HC GAIT TRAINING EA 15 MIN 10/13/2018 Maria C Boyer, PTA GP 1    07452236972 HC PT THER PROC EA 15 MIN 10/13/2018 Maria C oByer, JORDYN GP 1          PT G-Codes  PT Professional Judgement Used?: Yes  Outcome Measure Options: AM-PAC 6 Clicks Basic Mobility (PT)  AM-PAC 6 Clicks Score: 18  Functional Limitation: Mobility: Walking and moving around  Mobility: Walking and Moving Around Current Status (): At least 40 percent but less than 60 percent impaired, limited or restricted  Mobility: Walking and Moving Around Goal Status (): At least 20 percent but less than 40 percent impaired, limited or restricted    Maria C Boyer PTA  10/13/2018

## 2018-10-13 NOTE — PROGRESS NOTES
Nephrology ([facility]) Progress Note      Patient:  James Birch  YOB: 1928  Date of Service: 10/13/2018  MRN: [id number]   Acct: [id number]   Primary Care Physician: Hemal William MD  Advance Directive:   Code Status and Medical Interventions:   Ordered at: 10/11/18 1328     Code Status:    CPR     Medical Interventions (Level of Support Prior to Arrest):    Full     Admit Date: 10/11/2018       Hospital Day: 2  Referring Provider: Hemal William MD      Patient personally seen and examined.  Complete chart including Consults, Notes, Operative Reports, Labs, Cardiology, and Radiology studies reviewed as able.        Subjective:  James Birch is a 89 y.o. male  whom we were consulted for EB/CKD.  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   His GFR is usually ~30.  Two weeks ago he fell and fractured his L radius and a rib.  He had been on pain medication and prior to this admission had not had a BM in 1 week. He had associated N/V and poor fluid intake.   He had routine chemistries with Dr. William and found to have a decline in GFR, down to 28. He was then admitted to [facility] for IV hydration.        Today, BM x2 yesterday, none today.  Feeling better.  No n/v.  Appetite improved.    Allergies:  Patient has no known allergies.    Home Meds:  No prescriptions prior to admission.       Medicines:  No current facility-administered medications for this encounter.      Current Outpatient Prescriptions   Medication Sig Dispense Refill   • azelastine (OPTIVAR) 0.05 % ophthalmic solution INSTILL 1 DROP INTO BOTH EYES TWICE DAILY GENERIC FOR OPTIVAR 6 mL 5   • B Complex-C (SUPER B COMPLEX/VITAMIN C PO) Take  by mouth daily.     • Calcium Polycarbophil (FIBER-CAPS PO) Take  by mouth 2 (Two) Times a Day.     • Cholecalciferol (VITAMIN D3 PO) Take 2,000 Int'l Units by mouth Daily.     • Coenzyme Q10 (CO Q-10 PO) Take   by mouth daily.     • diltiaZEM CD (CARDIZEM CD) 120 MG 24 hr capsule Take 1 capsule by mouth Daily. 30 capsule 5   • dutasteride (AVODART) 0.5 MG capsule TAKE 1 CAPSULE DAILY GENERIC FOR AVODART 90 capsule 1   • esomeprazole (NEXIUM) 40 MG capsule Take 1 capsule by mouth Every Morning Before Breakfast. 30 capsule 5   • furosemide (LASIX) 40 MG tablet Take 0.5 tablets by mouth Daily. May take one half tablet additionally prn swelling     • guaiFENesin (MUCINEX) 600 MG 12 hr tablet Take 600 mg by mouth Every 12 (Twelve) Hours.     • HYDROcodone-acetaminophen (NORCO) 5-325 MG per tablet Take 1 tablet by mouth Every 6 (Six) Hours As Needed for Moderate Pain . 12 tablet 0   • levalbuterol (XOPENEX HFA) 45 MCG/ACT inhaler Inhale 1-2 puffs Every 4 (Four) Hours As Needed for Wheezing. 1 inhaler 5   • Multiple Vitamins-Minerals (MULTIVITAMIN PO) Take 1 tablet by mouth daily.     • O2 (OXYGEN) Inhale 1 L/min Every Night. As needed at bedtime.      • Omega-3 Fatty Acids (FISH OIL PO) Take 1,000 mg by mouth 2 (Two) Times a Day.     • ondansetron (ZOFRAN) 4 MG tablet Take 1 tablet by mouth Every 8 (Eight) Hours As Needed for Nausea or Vomiting. 12 tablet 0   • polyethylene glycol (MIRALAX) packet Take 17 g by mouth Daily. 30 each 1   • PROAIR  (90 Base) MCG/ACT inhaler Every 4 (Four) Hours As Needed for Shortness of Air.  5   • ramipril (ALTACE) 2.5 MG capsule TAKE 1 CAPSULE AT BEDTIME GENERIC FOR ALTACE 90 capsule 1   • SYMBICORT 80-4.5 MCG/ACT inhaler Inhale 2 puffs 2 (Two) Times a Day.     • tamsulosin (FLOMAX) 0.4 MG capsule 24 hr capsule TAKE 1 CAPSULE DAILY SHORTLY AFTER THE SAME MEAL GENERIC FOR FLOMAX 90 capsule 1   • traMADol (ULTRAM) 50 MG tablet Take 1 tablet by mouth Every 8 (Eight) Hours As Needed for Moderate Pain . 21 tablet 0   • XARELTO 15 MG tablet TAKE ONE TABLET DAILY 90 tablet 3       Past Medical History:  Past Medical History:   Diagnosis Date   • Arthritis    • Asthma    • Atrial fibrillation  (CMS/HCC)    • Cancer (CMS/HCC)     SKIN   • CHF (congestive heart failure) (CMS/HCC)    • Conductive hearing loss    • COPD (chronic obstructive pulmonary disease) (CMS/HCC)    • Eustachian tube dysfunction    • GERD (gastroesophageal reflux disease)    • Hyperlipidemia    • Hypertension    • Obstructive sleep apnea    • Prostate disease    • Sensorineural hearing loss    • Stroke (CMS/HCC)    • Vertigo        Past Surgical History:  Past Surgical History:   Procedure Laterality Date   • CATARACT EXTRACTION      left   • CATARACT EXTRACTION      left   • COLONOSCOPY  09/05/2012    normal   • ENDOSCOPY  08/31/2015    normal   • EYE SURGERY     • FRACTURE SURGERY     • HERNIA REPAIR     • HIP SURGERY     • SKIN BIOPSY         Family History  Family History   Problem Relation Age of Onset   • No Known Problems Mother    • No Known Problems Father        Social History  Social History     Social History   • Marital status:      Spouse name: N/A   • Number of children: 3   • Years of education: N/A     Occupational History   • Not on file.     Social History Main Topics   • Smoking status: Former Smoker     Packs/day: 1.00     Years: 18.00     Types: Cigarettes     Quit date: 1967   • Smokeless tobacco: Never Used      Comment: already quit   • Alcohol use No   • Drug use: No   • Sexual activity: Not Currently     Partners: Female     Birth control/ protection: Abstinence     Other Topics Concern   • Not on file     Social History Narrative   • No narrative on file         Review of Systems:  History obtained from chart review and the patient  General ROS: No fever or chills  Respiratory ROS: No cough, shortness of breath, wheezing  Cardiovascular ROS: No chest pain or palpitations  Gastrointestinal ROS: No abdominal pain or melena  Genito-Urinary ROS: No dysuria or hematuria    Objective:  /81 (BP Location: Right arm, Patient Position: Lying)   Pulse 74   Temp 97.2 °F (36.2 °C) (Temporal Artery )   Resp  16   Wt 77.7 kg (171 lb 6.4 oz)   SpO2 98%   BMI 24.59 kg/m²     Intake/Output Summary (Last 24 hours) at 10/13/18 1756  Last data filed at 10/13/18 1310   Gross per 24 hour   Intake             1343 ml   Output             1200 ml   Net              143 ml     General: awake/alert   Chest:  clear to auscultation bilaterally without respiratory distress  CVS: regular rate and rhythm  Abdominal: soft, nontender, normal bowel sounds  Extremities: no cyanosis or edema  Skin: warm and dry without rash      Labs:    Results from last 7 days  Lab Units 10/13/18  0552 10/12/18  0646 10/11/18  1417   WBC 10*3/mm3 4.75* 4.86 5.03   HEMOGLOBIN g/dL 11.4* 12.4* 11.8*   HEMATOCRIT % 33.7* 36.0* 33.7*   PLATELETS 10*3/mm3 198 173 212           Results from last 7 days  Lab Units 10/13/18  0552 10/12/18  0622 10/11/18  1417   SODIUM mmol/L 137 135 135   POTASSIUM mmol/L 3.9 4.1 4.4   CHLORIDE mmol/L 102 103 97*   CO2 mmol/L 28.0 24.0 28.0   BUN mg/dL 23* 32* 40*   CREATININE mg/dL 1.70* 1.92* 2.20*   CALCIUM mg/dL 8.6 8.8 9.1   BILIRUBIN mg/dL  --   --  0.8   ALK PHOS U/L  --   --  57   ALT (SGPT) U/L  --   --  22   AST (SGOT) U/L  --   --  27   GLUCOSE mg/dL 82 81 95       Radiology:   Imaging Results (last 72 hours)     Procedure Component Value Units Date/Time    XR Abdomen KUB [[id number]] Collected:  10/11/18 1957     Updated:  10/11/18 2001    Narrative:       EXAMINATION:   XR ABDOMEN KUB-  10/11/2018 7:57 PM CDT     XR ABDOMEN KUB- 10/11/2018 7:46 PM CDT     HISTORY: Daily vomiting       COMPARISON: None     FINDINGS:  There is a nonspecific bowel gas pattern. No soft tissue mass or  pathologic calcification  is visualized.     Degenerative changes noted in the lumbar spine. Mesh is present in the  lower abdomen. Right hip prosthesis is present..        Impression:       1. Non specific bowel gas pattern..         This report was finalized on 10/11/2018 19:58 by Dr. Oskar Blanc MD.          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

## 2018-10-13 NOTE — DISCHARGE SUMMARY
"Patient ID: James Birch  MRN: [id number]     Acct:  [id number]    Admit Date: 10/11/2018   Discharge Date: 10/13/2018  Date of service: 10/13/2018    Consults:    IP CONSULT TO GASTROENTEROLOGY  IP CONSULT TO NEPHROLOGY      PATIENT PROFILE: The patient is a 89 y/[facility] he was cooperative.      CHIEF COMPLAINT: \"drop renal function\"     HISTORY OF PRESENT ILLNESS: Weeks/days of nausea, nausea with vomiting on/off.  Outpt GI apt several days away.  Has CKD with usual GFRs 30s-40s; drop this week to 28.  Along with this increased weakness; fall recent with major skin tear LUE, fracture L radius ([facility] has splinted), and fortunately with xarelto; nothing else.  With uncertain why vomiting, risk for further renal decline and weakness/fall risk admitted for review GI sooner, nephrology review, fluids, PT.      No Known Allergies     HOME MEDICATIONS:          Prior to Admission medications    Medication Sig Start Date End Date Taking? Authorizing Provider   azelastine (OPTIVAR) 0.05 % ophthalmic solution INSTILL 1 DROP INTO BOTH EYES TWICE DAILY GENERIC FOR OPTIVAR 4/25/18     Hemal William MD   B Complex-C (SUPER B COMPLEX/VITAMIN C PO) Take  by mouth daily.       Maryuri Coburn MD   Calcium Polycarbophil (FIBER-CAPS PO) Take  by mouth 2 (Two) Times a Day.       Maryuri Coburn MD   Cholecalciferol (VITAMIN D3 PO) Take 2,000 Int'l Units by mouth Daily.       Maryuri Coburn MD   Coenzyme Q10 (CO Q-10 PO) Take  by mouth daily.       Maryuri Coburn MD   diltiaZEM CD (CARDIZEM CD) 120 MG 24 hr capsule Take 1 capsule by mouth Daily. 6/22/18     Hemal William MD   dutasteride (AVODART) 0.5 MG capsule TAKE 1 CAPSULE DAILY GENERIC FOR AVODART 4/6/18     Hemal William MD   esomeprazole (NEXIUM) 40 MG capsule Take 1 capsule by mouth Every Morning Before Breakfast. 5/17/18     Hemal William MD   furosemide " (LASIX) 40 MG tablet Take 40 mg by mouth Daily. May take one half tablet additionally prn swelling       Maryuri Coburn MD   guaiFENesin (MUCINEX) 600 MG 12 hr tablet Take 600 mg by mouth Every 12 (Twelve) Hours.       Maryuri Coburn MD   HYDROcodone-acetaminophen (NORCO) 5-325 MG per tablet Take 1 tablet by mouth Every 6 (Six) Hours As Needed for Moderate Pain . 10/2/18     Hemal William MD   levalbuterol (XOPENEX HFA) 45 MCG/ACT inhaler Inhale 1-2 puffs Every 4 (Four) Hours As Needed for Wheezing. 5/27/18     Hemal William MD   Multiple Vitamins-Minerals (MULTIVITAMIN PO) Take 1 tablet by mouth daily.       Maryuri Coburn MD   O2 (OXYGEN) Inhale 1 L/min Every Night. As needed at bedtime.        Maryuri Coburn MD   Omega-3 Fatty Acids (FISH OIL PO) Take 1,000 mg by mouth 2 (Two) Times a Day.       Maryuri Coburn MD   ondansetron (ZOFRAN) 4 MG tablet Take 1 tablet by mouth Every 8 (Eight) Hours As Needed for Nausea or Vomiting. 10/2/18     Hemal William MD   PROAIR  (90 Base) MCG/ACT inhaler Every 4 (Four) Hours As Needed for Shortness of Air. 5/30/18     Maryuri Coburn MD   ramipril (ALTACE) 2.5 MG capsule TAKE 1 CAPSULE AT BEDTIME GENERIC FOR ALTACE 9/26/18     Hemal William MD   SYMBICORT 80-4.5 MCG/ACT inhaler Inhale 2 puffs 2 (Two) Times a Day. 3/6/17     Maryuri Coburn MD   tamsulosin (FLOMAX) 0.4 MG capsule 24 hr capsule TAKE 1 CAPSULE DAILY SHORTLY AFTER THE SAME MEAL GENERIC FOR FLOMAX 8/16/18     Hemal William MD   traMADol (ULTRAM) 50 MG tablet Take 1 tablet by mouth Every 8 (Eight) Hours As Needed for Moderate Pain . 10/1/18     Hemal William MD   XARELTO 15 MG tablet TAKE ONE TABLET DAILY 2/8/18     Skyler Trimble MD         PAST HISTORY:  CHILDHOOD: unremarkable.      PAST HISTORY:  CHILDHOOD: unremarkable.      PROCEDURES:     SCANNED  Prostate  exam/urology/confirmed/8.22.16     EGD+biop/[facility]/Mc/10-2-02  Colonscopy/Mary/02-20-02  EGD+PEG-candida esophagitis/[facility]/[place]/3-25-10  Colonoscopy+nl/[facility]/Derick/9.5.12/5y  EGD+biop/[facility]/Derick/8.31.15     SURGERIES:  R Hip/1994  Renan Inguinal Hernia/2001  L ear/Misbah  L ear/Misbah  L ear/Misbah/9.30.11  Tkach/L lower eyelid excision/LH/7.8.16        FAMILY HISTORY:  Heart/none  DM/none  CA-prostate/none  CA-colon/none  CA-eye/b  CA-testicle/b     HABITS:  Tobacco-smoker/age 18/<1 ppd/dc 1965  Alcohol/none  Drugs/none     SOCIAL HISTORY:  /1949  Retired/[employer]/1993  Children/3     HOSPITAL ADMITS:   BH:   HOSPITAL NOTES:  1.29.10-2.12.10 vertigo?CVA  2.12.10-4.10.10-TCU  cerebellar CVA-probably of atrial fib  vertigo  nausea with vomiting-positional  nausea -central  nausea with vomiting-central  gastroenteritis (N&V, diarrhea)  hematemesis  anemia  diarrhea  nutritional risk/decline  A fib-flutter  ataxia  gait issues  IgA nephropathy  coumadin therapy- afib indication  hypercoagulation  hypermagnesiuma  syncope- vasovagal +/- orthostasis  hypotension  late affects CVA, (balance, gait, vertigo, nausea)  pseudomonas bacteremia- lung/PIC line  LLL pneumonia 3.21.10  COPD  esophagitis-candida     HOSPITAL NOTES:  3.29.11-4.1.11 R hand burn  R hand burn-2nd degree  seizure-possible  CVA-cerebellar/embolic-1.29.10  late affects CVA  atrial fib-chronic  coumadin therapy  excessive INR 4.47  chronic kidney disease-3     HOSPITAL NOTES: [facility]  3.28.13-4.2.13   fever-assume from respiratory source  nausea with vomiting  abnormal xray-STELLA  STELLA pneumonia-community acquired  copd excerbation  copd  long term anticoagulation-coumadin (a fib, CVA)  elevated/low INR 3.21-1.65  CKD-3 (IgA nephropathy)  abnormal urine (mod blood, protein)  bacturia (3.25.13 10-25K E coli)  stomatitis     HOSPITAL NOTES: [facility]  9.16.13   weakness  leg weakness..     5.25.18-5.27.18  Rapid a fib  Paroxysmal  SVT  tachycardia  Bradycardia  Palpitations  Dizziness  Autonomic dysfunction  Recent R pontine lesion  Small vessel ischemic cerebral disease  Acute renal insufficiency (on CKD)  Hyperlipidemia-resumption of statin     [facility]:   12.4.91-12.14.91-pelvic fx  1.28.10-1.29.10-vertigo     [facility]:   None     GENERAL:  Inactive/slower with limits, speed, stamina for age and fatigue; no sure of his walk. Sleep is ok. No fever.  ENDO:  No syncope, near or diaphoretic sweaty spells.  BS Ok here.  HEENT: No head injury occ/same headache,  No vision change.  Same aide treated hearing loss.  Ears without pain/drainage.  No sore throat.  No significant nasal/sinus congestion/drainage. No epistaxis.  CHEST: No chest wall tenderness or mass. No change occ cough, wheeze, SOB; no hemoptysis.  CV: No chest pain, palpitations, ankle edema.  GI: No heartburn, dysphagia.  No abdominal pain, diarrhea,  rectal bleeding, or melena; no BM several days.  On/off nausea and vomiting even before Norco.   :  Voids without dysuria, or incontinence to completion.  ORTHO: No painful/swollen joints but various on /off sore.  No change mild sore neck or back.  No acute neck or back pain without recent injury.   NEURO: No dizziness, weakness of extremities.  No change LE numbness/paresthesias.   PSYCH: Mild/stable memory loss.  Mood good; occ mildly anxious, depressed but/and not suicidal.  Tolerated stress.      PHYSICAL EXAMINATION:  BP 97/67 (BP Location: Right arm, Patient Position: Lying)   Pulse 68   Temp 98.4 °F (36.9 °C) (Temporal Artery )   Resp 16   SpO2 94%      GENERAL:  Well nourished/developed in no acute distress. Thin  SKIN: Turgor excellent, without wound, rash, lesion other than scabbing/skin tear LUE/elbow region.   HEENT: Normal cephalic without trauma.  Pupils equal round reactive to light. Extraocular motions full without nystagmus.   .  Oral cavity without growths, exudates, and moist.  Posterior pharynx without  mass, obstruction, redness.  No thyromegaly, mass, tenderness, lymphadenopathy and supple.  CV: Regular rhythm.  No murmur, gallop,  edema. Posterior pulses intact.  No carotid bruits.   CHEST: Mild chest wall tenderness without mass.   LUNGS: Symmetric motion with clear to auscultation.   ABD: Soft, nontender without mass.   ORTHO: Symmetric extremities without swelling/point tenderness.  Full gross range of motion.    NEURO: CN 2-12 grossly intact.  Symmetric facies. 1/4 x bicep equal reflexes.  UE/LE   2-3/5 strength throughout.  Nonfocal use extremities. Speech clear.    PSYCH: Oriented x 3.  Pleasant calm, well kept.  Purposeful/directed conservation with intact short/long gross memory.     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  Cerebral small vessel disease  Late effects CVA  Recurrent dizziness  History seizure disorder  Peripheral neuropathy  Gait difficulties  Fall risk-history falls  Prostatism  BPH  History urolithiasis  Varicose veins  GE reflux (heartburn)  History cholinergic urticaria  Hearing loss-conductive  Degenerative joint disease     REASON FOR ADMISSION:    Acute renal injury (with CKD) GFR 28 usually 30s  Dehydration (b/cr 18.3)  Nausea  Nausea/vomiting  Gait decline-acute  Recent fall  Risk falls  Recent UE skin tear  Recent distal radius fracture    HOSPITAL COARSE: He was treated with IV fluids, PPI suppression and seen by GI and nephrology; who agreed wit this approach.  He was treated with miralax (once his hydration was  improved); and had a BM.  The previous (even before Norco for his recent injuries) nausea resolved.  His GFR/renal function improved; any worsening of renal function was aborted with the fluids.  He felt better; PT helped restart an improved ambulation and he/his wife were eager for discharge once eating, stooling, without nausea, improved labs and some degree of mobility.     Labs included:     Labs  Lab Results (last 24 hours)     Procedure Component Value Units Date/Time    Basic Metabolic Panel [[id number]]  (Abnormal) Collected:  10/13/18 0552    Specimen:  Blood Updated:  10/13/18 0748     Glucose 82 mg/dL      BUN 23 (H) mg/dL      Creatinine 1.70 (H) mg/dL      Sodium 137 mmol/L      Potassium 3.9 mmol/L      Chloride 102 mmol/L      CO2 28.0 mmol/L      Calcium 8.6 mg/dL      eGFR Non African Amer 38 (L) mL/min/1.73      BUN/Creatinine Ratio 13.5     Anion Gap 7.0 mmol/L     Narrative:       The MDRD GFR formula is only valid for adults with stable renal function between ages 18 and 70.    CBC & Differential [[id number]] Collected:  10/13/18 0552    Specimen:  Blood Updated:  10/13/18 0737    Narrative:       The following orders were created for panel order CBC & Differential.  Procedure                               Abnormality         Status                     ---------                               -----------         ------                     CBC Auto Differential[[id number]]        Abnormal            Final result                 Please view results for these tests on the individual orders.    CBC Auto Differential [[id number]]  (Abnormal) Collected:  10/13/18 0552    Specimen:  Blood Updated:  10/13/18 0737     WBC 4.75 (L) 10*3/mm3      RBC 3.86 (L) 10*6/mm3      Hemoglobin 11.4 (L) g/dL      Hematocrit 33.7 (L) %      MCV 87.3 fL      MCH 29.5 pg      MCHC 33.8 g/dL      RDW 13.2 %      RDW-SD 41.9 fl      MPV 9.4 fL      Platelets 198 10*3/mm3      Neutrophil % 66.2 %      Lymphocyte % 16.6 %       Monocyte % 12.0 %      Eosinophil % 4.4 (H) %      Basophil % 0.4 %      Immature Grans % 0.4 %      Neutrophils, Absolute 3.14 10*3/mm3      Lymphocytes, Absolute 0.79 10*3/mm3      Monocytes, Absolute 0.57 10*3/mm3      Eosinophils, Absolute 0.21 10*3/mm3      Basophils, Absolute 0.02 10*3/mm3      Immature Grans, Absolute 0.02 10*3/mm3      nRBC 0.0 /100 WBC           Lab Results:  CBC:   Results from last 7 days  Lab Units 10/13/18  0552 10/12/18  0646 10/11/18  1417 10/10/18  1300   WBC 10*3/mm3 4.75* 4.86 5.03 6.13   HEMOGLOBIN g/dL 11.4* 12.4* 11.8* 12.9*   HEMATOCRIT % 33.7* 36.0* 33.7* 38.7*   PLATELETS 10*3/mm3 198 173 212 224     BMP:  Results from last 7 days  Lab Units 10/13/18  0552 10/12/18  0622 10/11/18  1417 10/10/18  1300   SODIUM mmol/L 137 135 135 137   POTASSIUM mmol/L 3.9 4.1 4.4 4.7   CHLORIDE mmol/L 102 103 97* 97*   CO2 mmol/L 28.0 24.0 28.0 28.0   BUN mg/dL 23* 32* 40* 40*   CREATININE mg/dL 1.70* 1.92* 2.20* 2.19*   GLUCOSE mg/dL 82 81 95 100   CALCIUM mg/dL 8.6 8.8 9.1 9.6   ALT (SGPT) U/L  --   --  22  --      A1c:Invalid input(s): A1C   A1c:@LABRCNOP(A1c:7)@    GFR: Invalid input(s): EGFR NON  AMER    Culture Results:      DISCHARGE ASSESSMENT:  Reasons for admit/problems address while here:   Acute renal injury (with CKD) GFR 28 usually 30s  Dehydration (b/cr 18.3)  Nausea-may constipation, pain Rx; ? others  Nausea/vomiting-same  Gait decline-acute  Recent fall  Risk falls  Recent UE skin tear  Recent distal radius fracture  Constipation-narcotic influenced    Chronic problems affecting stay:  See above      PLAN:   See AVS    Discharge Disposition:  Home/assisted living    Discharge Medications:     Discharge Medications      ASK your doctor about these medications      Instructions Start Date   azelastine 0.05 % ophthalmic solution  Commonly known as:  OPTIVAR   INSTILL 1 DROP INTO BOTH EYES TWICE DAILY GENERIC FOR OPTIVAR      CO Q-10 PO   Oral, Daily      diltiaZEM   MG 24 hr capsule  Commonly known as:  CARDIZEM CD   120 mg, Oral, Every 24 Hours Scheduled      dutasteride 0.5 MG capsule  Commonly known as:  AVODART   TAKE 1 CAPSULE DAILY GENERIC FOR AVODART      esomeprazole 40 MG capsule  Commonly known as:  NEXIUM   40 mg, Oral, Every Morning Before Breakfast      FIBER-CAPS PO   Oral, 2 Times Daily      FISH OIL PO   1,000 mg, Oral, 2 Times Daily      furosemide 40 MG tablet  Commonly known as:  LASIX   40 mg, Oral, Daily, May take one half tablet additionally prn swelling      guaiFENesin 600 MG 12 hr tablet  Commonly known as:  MUCINEX   600 mg, Oral, Every 12 Hours Scheduled      HYDROcodone-acetaminophen 5-325 MG per tablet  Commonly known as:  NORCO   1 tablet, Oral, Every 6 Hours PRN      levalbuterol 45 MCG/ACT inhaler  Commonly known as:  XOPENEX HFA   1-2 puffs, Inhalation, Every 4 Hours PRN      MULTIVITAMIN PO   1 tablet, Oral, Daily      O2  Commonly known as:  OXYGEN   1 L/min, Inhalation, Nightly, As needed at bedtime.      ondansetron 4 MG tablet  Commonly known as:  ZOFRAN   4 mg, Oral, Every 8 Hours PRN      PROAIR  (90 Base) MCG/ACT inhaler  Generic drug:  albuterol   Every 4 Hours PRN      ramipril 2.5 MG capsule  Commonly known as:  ALTACE   TAKE 1 CAPSULE AT BEDTIME GENERIC FOR ALTACE      SUPER B COMPLEX/VITAMIN C PO   Oral, Daily      SYMBICORT 80-4.5 MCG/ACT inhaler  Generic drug:  budesonide-formoterol   2 puffs, Inhalation, 2 Times Daily - RT      tamsulosin 0.4 MG capsule 24 hr capsule  Commonly known as:  FLOMAX   TAKE 1 CAPSULE DAILY SHORTLY AFTER THE SAME MEAL GENERIC FOR FLOMAX      traMADol 50 MG tablet  Commonly known as:  ULTRAM   50 mg, Oral, Every 8 Hours PRN      VITAMIN D3 PO   2,000 Int'l Units, Oral, Daily      XARELTO 15 MG tablet  Generic drug:  rivaroxaban   TAKE ONE TABLET DAILY             Discharge Care Plan/Instructions:   ACTIVITY:  Gradually increase activity   No driving  Safety device when up: cane/walker as  appropirate  Wound care/procedure: continue wash soap/water dry well and dry dressing    DIET:  AHA/renal  Additiona  Suggest no alcohol   Avoid caffeine   Calories 4790-5837/24 hr  Fluids at least 60 oz/24 hr    MEDICATIONS:   Per AVS    EXPECT:   LAB weekly [facility]  Home health to resume care    CALL:   If problems/questions    Follow-up Appointments:   Dr [facility] f/u extra 10.17.18 (call for time)    Other appointments:   Future Appointments  Date Time Provider Department Center   10/30/2018 9:00 AM Jorge A Marshall APRN [facility] None   11/1/2018 11:30 AM Hemal William MD [facility] None   11/21/2018 10:15 AM Sykler Trimble MD [facility]   11/28/2018 10:15 AM Bill Church MD [facility] None   2/5/2019 9:20 AM Kiko Campos PA [facility] N [facility] None       CONDITION: stable/improved    PROGNOSIS: guarded

## 2018-10-13 NOTE — PLAN OF CARE
Problem: Patient Care Overview  Goal: Plan of Care Review  Outcome: Ongoing (interventions implemented as appropriate)   10/13/18 0527   Coping/Psychosocial   Plan of Care Reviewed With patient   Plan of Care Review   Progress improving   OTHER   Outcome Summary Medicated for pain x1 this shift. No s/s n/v this shift. Voiding without difficulty. Continue to monitor.     Goal: Individualization and Mutuality  Outcome: Ongoing (interventions implemented as appropriate)   10/13/18 0527   Individualization   Patient Specific Preferences Pt. prefers the door to be left open.     Goal: Interprofessional Rounds/Family Conf  Outcome: Ongoing (interventions implemented as appropriate)   10/13/18 0527   Interdisciplinary Rounds/Family Conf   Participants nursing;patient       Problem: Fall Risk (Adult)  Goal: Absence of Fall  Outcome: Ongoing (interventions implemented as appropriate)   10/13/18 0527   Fall Risk (Adult)   Absence of Fall making progress toward outcome       Problem: Nausea/Vomiting (Adult)  Goal: Symptom Relief  Outcome: Ongoing (interventions implemented as appropriate)   10/13/18 0527   Nausea/Vomiting (Adult)   Symptom Relief achieves outcome

## 2018-10-13 NOTE — PLAN OF CARE
Problem: Patient Care Overview  Goal: Plan of Care Review  Outcome: Ongoing (interventions implemented as appropriate)   10/13/18 1051   Coping/Psychosocial   Plan of Care Reviewed With patient   Plan of Care Review   Progress improving   OTHER   Outcome Summary He agreed to therapy, needed min assist to come into sitting. CGA to stand and he amb 375' CGA with RWX. He did have decreased safety and tryed to sit before fully turned around when we were back in the room. He was GGA to get back in bed and he was left in fowlers with family.

## 2018-10-13 NOTE — PROGRESS NOTES
Continued Stay Note  [facility]     Patient Name: James Birch  MRN: [id number]  Today's Date: 10/13/2018    Admit Date: 10/11/2018          Discharge Plan     Row Name 10/13/18 1409       Plan    Final Discharge Disposition Code 06 - home with home health care    Final Note Pt is ready for dc.  TUTU has spoken to Rochelle at [facility] to inform of dc.  DC summary is not yet entered in EPIC.  TUTU will fax to [facility] once ready.  MARTINEZ Murphy.                Discharge Codes    No documentation.       Expected Discharge Date and Time     Expected Discharge Date Expected Discharge Time    Oct 13, 2018             MARTINEZ Ayala

## 2018-10-13 NOTE — PROGRESS NOTES
[facility]  Inpatient Progress Note  James Birch  4/18/1928    10/13/2018  Reason for Follow Up:  constipation    Subjective     Subjective:   PT has had 2 good BMs since starting Miralax. He is eating a donut and coffee this am. He wants to go home. No abdominal pain. Afebrile.    Current Facility-Administered Medications:   •  budesonide-formoterol (SYMBICORT) 80-4.5 MCG/ACT inhaler 2 puff, 2 puff, Inhalation, BID - RT, Hemal William MD, 2 puff at 10/13/18 0934  •  diltiaZEM CD (CARDIZEM CD) 24 hr capsule 120 mg, 120 mg, Oral, Q24H, Hemal William MD, 120 mg at 10/13/18 0823  •  finasteride (PROSCAR) tablet 5 mg, 5 mg, Oral, Daily, Hemal William MD, 5 mg at 10/13/18 0823  •  guaiFENesin (MUCINEX) 12 hr tablet 600 mg, 600 mg, Oral, Q12H, Hemal William MD, 600 mg at 10/13/18 0823  •  HYDROcodone-acetaminophen (NORCO) 5-325 MG per tablet 1 tablet, 1 tablet, Oral, Q6H PRN, Hemal William MD, 1 tablet at 10/12/18 2035  •  Influenza Vac Subunit Quad (FLUCELVAX) injection 0.5 mL, 0.5 mL, Intramuscular, During Hospitalization, Hemal William MD  •  ketotifen (ZADITOR) 0.025 % ophthalmic solution 1 drop, 1 drop, Both Eyes, BID, Hemal William MD, 1 drop at 10/13/18 0627  •  O2 (OXYGEN), 1 L/min, Inhalation, Nightly, Hemal William MD  •  ondansetron (ZOFRAN) tablet 4 mg, 4 mg, Oral, Q8H PRN, Hemal William MD  •  pantoprazole (PROTONIX) EC tablet 40 mg, 40 mg, Oral, QAM, Hemal William MD, 40 mg at 10/13/18 0628  •  polyethylene glycol (MIRALAX) powder 17 g, 17 g, Oral, BID, Joy Ambriz, APRN, 17 g at 10/13/18 0823  •  rivaroxaban (XARELTO) tablet 15 mg, 15 mg, Oral, Daily, Hemal William MD, 15 mg at 10/13/18 0823  •  sodium chloride 0.9 % flush 3 mL, 3 mL, Intravenous, Q12H, Hemal William MD, 3 mL at 10/13/18 0824  •  sodium chloride 0.9 % flush 3-10 mL, 3-10 mL, Intravenous, PRN, Hemal William  MD Thomas  •  sodium chloride 0.9 % infusion, 75 mL/hr, Intravenous, Continuous, Angeli Vidal APRN, Last Rate: 75 mL/hr at 10/12/18 2033, 75 mL/hr at 10/12/18 2033  •  tamsulosin (FLOMAX) 24 hr capsule 0.4 mg, 0.4 mg, Oral, Nightly, StewartHemal MD, 0.4 mg at 10/12/18 2033    Review of Systems:    Review of Systems   Constitutional: Negative for activity change, appetite change, chills, diaphoresis, fatigue, fever and unexpected weight change.   HENT: Negative for ear pain, hearing loss, mouth sores, sore throat, trouble swallowing and voice change.    Eyes: Negative.    Respiratory: Negative for cough, choking, shortness of breath and wheezing.    Cardiovascular: Negative for chest pain and palpitations.   Gastrointestinal: Positive for constipation. Negative for abdominal pain, blood in stool, diarrhea, nausea and vomiting.   Endocrine: Negative for cold intolerance and heat intolerance.   Genitourinary: Negative for decreased urine volume, dysuria, frequency, hematuria and urgency.   Musculoskeletal: Negative for back pain, gait problem and myalgias.   Skin: Negative for color change, pallor and rash.   Allergic/Immunologic: Negative for food allergies and immunocompromised state.   Neurological: Negative for dizziness, tremors, seizures, syncope, weakness, light-headedness, numbness and headaches.   Hematological: Negative for adenopathy. Does not bruise/bleed easily.   Psychiatric/Behavioral: Negative for agitation and confusion. The patient is not nervous/anxious.    All other systems reviewed and are negative.       Objective     Vital Signs  Temp:  [97.4 °F (36.3 °C)-98.8 °F (37.1 °C)] 97.5 °F (36.4 °C)  Heart Rate:  [68-76] 75  Resp:  [12-18] 12  BP: ()/(62-79) 148/79  Body mass index is 24.59 kg/m².    Intake/Output Summary (Last 24 hours) at 10/13/18 1004  Last data filed at 10/13/18 0629   Gross per 24 hour   Intake              863 ml   Output              900 ml   Net               -37 ml     No intake/output data recorded.       Physical Exam:   General: patient awake, alert and cooperative, no acute distress   Eyes: Normal lids and lashes, no scleral icterus   Neck: supple, no JVD   Skin: warm and dry   Cardiovascular: regular rhythm and rate, no murmurs auscultated   Pulm: clear to auscultation bilaterally, regular and unlabored   Abdomen: soft, nontender, nondistended; normal bowel sounds   Psychiatric: Normal mood and behavior; converses appropriately     Results Review:    I have reviewed all of the patients current test results      Results from last 7 days  Lab Units 10/13/18  0552 10/12/18  0646 10/11/18  1417   WBC 10*3/mm3 4.75* 4.86 5.03   HEMOGLOBIN g/dL 11.4* 12.4* 11.8*   HEMATOCRIT % 33.7* 36.0* 33.7*   PLATELETS 10*3/mm3 198 173 212         Results from last 7 days  Lab Units 10/13/18  0552 10/12/18  0622 10/11/18  1417   SODIUM mmol/L 137 135 135   POTASSIUM mmol/L 3.9 4.1 4.4   CHLORIDE mmol/L 102 103 97*   CO2 mmol/L 28.0 24.0 28.0   BUN mg/dL 23* 32* 40*   CREATININE mg/dL 1.70* 1.92* 2.20*   CALCIUM mg/dL 8.6 8.8 9.1   BILIRUBIN mg/dL  --   --  0.8   ALK PHOS U/L  --   --  57   ALT (SGPT) U/L  --   --  22   AST (SGOT) U/L  --   --  27   GLUCOSE mg/dL 82 81 95               Lab Results  Lab Value Date/Time   LIPASE 144 10/11/2018 1417       Radiology:    Imaging Results (last 24 hours)     ** No results found for the last 24 hours. 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   • Autonomic dysfunction G90.9   • Cardiac arrhythmia-a fib, SVT I49.9   • Congestive heart failure with left ventricular diastolic dysfunction, NYHA class 3 (CMS/HCC) I50.30   • Wellness examination-done Z00.00   • Gait difficulty-cane/walker R26.9   • Asthma J45.909   • Fyfaagemrhkgah-ueh-euid/xarelto Z79.01   • Chronic  kidney disease-3-IgA/nephrology N18.9   • Prostatism N40.0   • Urolithiasis N20.9   • Varicose veins of legs I83.93   • Hyperlipidemia-statin E78.5   • Hypertension I10   • Hypersomnia G47.10   • Gastroesophageal reflux disease K21.9   • COPD (chronic obstructive pulmonary disease) (CMS/[facility]) J44.9   • Nocturnal hypoxia-oxygen advised G47.34   • Laboratory test* Z01.89   • History of tobacco use Z87.891   • Acute renal failure (CMS/[facility]) N17.9       1. Constipation with associated nausea/vomiting    To follow up with his Primary GI Dr Sepulveda as outpatient. I suggested he continue his Miralax up to twice daily and adjust as needed. He agrees.     EMR Dragon/transcription disclaimer: Much of this encounter note is electronic transcription/translation of spoken language to printed text. The electronic translation of spoken language may be erroneous, or at times, nonsensical words or phrases may be inadvertently transcribed. Although I have reviewed the note for such errors, some may still exist.    Joy Ambriz, APRN  10/13/18  10:04 AM      EMR Dragon/transcription disclaimer: Much of this encounter note is an electronic transcription/translation of spoken language to printed text.  The electronic translation of spoken language may permit erroneous, or at times, nonsensical words or phrases to be inadvertently transcribed.  Although I have reviewed the note for such errors, some may still exist.      Greg Sargent MD  10:52 AM  10/13/2018

## 2018-10-14 ENCOUNTER — READMISSION MANAGEMENT (OUTPATIENT)
Dept: CALL CENTER | Facility: HOSPITAL | Age: 83
End: 2018-10-14

## 2018-10-14 NOTE — OUTREACH NOTE
Prep Survey      Responses   Facility patient discharged from?  [place]   Is patient eligible?  No   What are the reasons patient is not eligible?  [facility]   Does the patient have one of the following disease processes/diagnoses(primary or secondary)?  Other   Prep survey completed?  Yes          Roxana Layton RN

## 2018-10-14 NOTE — THERAPY DISCHARGE NOTE
Acute Care - Physical Therapy Discharge Summary  [facility]       Patient Name: James Birch  : 1928  MRN: [id number]    Today's Date: 10/14/2018  Onset of Illness/Injury or Date of Surgery: 10/12/18    Date of Referral to PT: 10/11/18  Referring Physician: Dr. YESSENIA William      Admit Date: 10/11/2018      PT Recommendation and Plan    Visit Dx:    ICD-10-CM ICD-9-CM   1. Gait abnormality R26.9 781.2             Outcome Measures     Row Name 10/12/18 1100             How much help from another person do you currently need...    Turning from your back to your side while in flat bed without using bedrails? 3  -JE      Moving from lying on back to sitting on the side of a flat bed without bedrails? 3  -JE      Moving to and from a bed to a chair (including a wheelchair)? 3  -JE      Standing up from a chair using your arms (e.g., wheelchair, bedside chair)? 3  -JE      Climbing 3-5 steps with a railing? 3  -JE      To walk in hospital room? 3  -JE      AM-PAC 6 Clicks Score 18  -JE         Functional Assessment    Outcome Measure Options AM-PAC 6 Clicks Basic Mobility (PT)  -JE        User Key  (r) = Recorded By, (t) = Taken By, (c) = Cosigned By    Initials Name Provider Type    Geraldine Gomez, PT Physical Therapist            Therapy Suggested Charges     Code   Minutes Charges    None                   PT Rehab Goals     Row Name 10/14/18 0700             Bed Mobility Goal 1 (PT)    Activity/Assistive Device (Bed Mobility Goal 1, PT) rolling to left;rolling to right;scooting;sit to supine;supine to sit  -AB      [place] Level/Cues Needed (Bed Mobility Goal 1, PT) standby assist  -AB      Time Frame (Bed Mobility Goal 1, PT) long term goal (LTG);10 days  -AB      Progress/Outcomes (Bed Mobility Goal 1, PT) goal not met  -AB         Transfer Goal 1 (PT)    Activity/Assistive Device (Transfer Goal 1, PT) sit-to-stand/stand-to-sit;bed-to-chair/chair-to-bed;walker, rolling  -AB      [place]  Level/Cues Needed (Transfer Goal 1, PT) standby assist  -AB      Time Frame (Transfer Goal 1, PT) long term goal (LTG);10 days  -AB      Progress/Outcome (Transfer Goal 1, PT) goal not met  -AB         Gait Training Goal 1 (PT)    Activity/Assistive Device (Gait Training Goal 1, PT) gait (walking locomotion);decrease fall risk;forward stepping;backward stepping;assistive device use;improve balance and speed;increase endurance/gait distance;increase energy conservation;walker, rolling  -AB      [place] Level (Gait Training Goal 1, PT) standby assist  -AB      Distance (Gait Goal 1, PT) 250 ft  -AB      Time Frame (Gait Training Goal 1, PT) long term goal (LTG);10 days  -AB      Progress/Outcome (Gait Training Goal 1, PT) goal not met  -AB         Patient Education Goal (PT)    Activity (Patient Education Goal, PT) safety/falls prevention  -AB      [place]/Cues/Accuracy (Memory Goal 2, PT) demonstrates adequately;verbalizes understanding  -AB      Time Frame (Patient Education Goal, PT) long term goal (LTG);10 days  -AB      Progress/Outcome (Patient Education Goal, PT) goal met  -AB        User Key  (r) = Recorded By, (t) = Taken By, (c) = Cosigned By    Initials Name Provider Type Discipline    Maria C Mendieta PTA Physical Therapy Assistant PT          Therapy Charges for Today     Code Description Service Date Service Provider Modifiers Qty    [id number]  GAIT TRAINING EA 15 MIN 10/13/2018 Maria C Boyer PTA GP 1    [id number]  PT THER PROC EA 15 MIN 10/13/2018 Maria C Boyer PTA GP 1          PT Discharge Summary  Anticipated Discharge Disposition (PT): home  Reason for Discharge: Discharge from facility  Outcomes Achieved: Refer to plan of care for updates on goals achieved  Discharge Destination: Home      Maria C Boyer PTA   10/14/2018

## 2018-10-15 ENCOUNTER — TELEPHONE (OUTPATIENT)
Dept: FAMILY MEDICINE CLINIC | Facility: CLINIC | Age: 83
End: 2018-10-15

## 2018-10-15 PROBLEM — R26.9 GAIT ABNORMALITY: Status: ACTIVE | Noted: 2018-10-15

## 2018-10-17 ENCOUNTER — OFFICE VISIT (OUTPATIENT)
Dept: FAMILY MEDICINE CLINIC | Facility: CLINIC | Age: 83
End: 2018-10-17

## 2018-10-17 VITALS
RESPIRATION RATE: 18 BRPM | SYSTOLIC BLOOD PRESSURE: 94 MMHG | WEIGHT: 170 LBS | HEIGHT: 70 IN | DIASTOLIC BLOOD PRESSURE: 62 MMHG | TEMPERATURE: 97.9 F | BODY MASS INDEX: 24.34 KG/M2 | OXYGEN SATURATION: 98 % | HEART RATE: 76 BPM

## 2018-10-17 DIAGNOSIS — Z79.01 ANTICOAGULATED: ICD-10-CM

## 2018-10-17 DIAGNOSIS — I48.20 CHRONIC ATRIAL FIBRILLATION (HCC): ICD-10-CM

## 2018-10-17 DIAGNOSIS — I10 HYPERTENSION, UNSPECIFIED TYPE: Chronic | ICD-10-CM

## 2018-10-17 DIAGNOSIS — S41.102D WOUND OF LEFT UPPER EXTREMITY, SUBSEQUENT ENCOUNTER: ICD-10-CM

## 2018-10-17 DIAGNOSIS — N18.30 STAGE 3 CHRONIC KIDNEY DISEASE (HCC): ICD-10-CM

## 2018-10-17 DIAGNOSIS — I50.30 CONGESTIVE HEART FAILURE WITH LEFT VENTRICULAR DIASTOLIC DYSFUNCTION, NYHA CLASS 3 (HCC): ICD-10-CM

## 2018-10-17 DIAGNOSIS — N17.9 ACUTE RENAL FAILURE, UNSPECIFIED ACUTE RENAL FAILURE TYPE (HCC): ICD-10-CM

## 2018-10-17 DIAGNOSIS — R26.9 GAIT DIFFICULTY: Chronic | ICD-10-CM

## 2018-10-17 LAB
BUN SERPL-MCNC: 20 MG/DL (ref 5–21)
BUN/CREAT SERPL: 12.1 (ref 7–25)
CALCIUM SERPL-MCNC: 9.2 MG/DL (ref 8.4–10.4)
CHLORIDE SERPL-SCNC: 99 MMOL/L (ref 98–110)
CO2 SERPL-SCNC: 27 MMOL/L (ref 24–31)
CREAT SERPL-MCNC: 1.65 MG/DL (ref 0.5–1.4)
GLUCOSE SERPL-MCNC: 78 MG/DL (ref 70–100)
POTASSIUM SERPL-SCNC: 3.9 MMOL/L (ref 3.5–5.3)
SODIUM SERPL-SCNC: 136 MMOL/L (ref 135–145)

## 2018-10-17 PROCEDURE — 99496 TRANSJ CARE MGMT HIGH F2F 7D: CPT | Performed by: FAMILY MEDICINE

## 2018-10-17 RX ORDER — CEPHALEXIN 500 MG/1
1 CAPSULE ORAL 4 TIMES DAILY
COMMUNITY
Start: 2018-10-16 | End: 2018-11-21

## 2018-10-17 NOTE — PROGRESS NOTES
Transitional Care Follow Up Visit  Subjective     James Birch is a 89 y.o. male who presents for a transitional care management visit.  With wife.    Within 48 business hours after discharge our office contacted him via telephone to coordinate his care and needs.      I reviewed and discussed the details of that call along with the discharge summary, hospital problems, inpatient lab results, inpatient diagnostic studies, and consultation reports with James.     Current outpatient and discharge medications have been reconciled for the patient.    Admit Date: 10/11/2018   Discharge Date: 10/13/2018  Acute renal injury (with CKD) GFR 28 usually 30s  Dehydration (b/cr 18.3)  Nausea-may constipation, pain Rx; ? others  Nausea/vomiting-same  Gait decline-acute  Recent fall  Risk falls  Recent UE skin tear  Recent distal radius fracture  Constipation-narcotic influenced    Risk for Readmission (LACE) Score: 11 (10/13/2018  5:00 AM)    History of Present Illness   Weeks/days of nausea, nausea with vomiting on/off.  Outpt GI apt several days away.  Has CKD with usual GFRs 30s-40s; drop this week to 28.  Along with this increased weakness; fall recent with major skin tear LUE, fracture L radius ([facility] has splinted), and fortunately with xarelto; nothing else.  With uncertain why vomiting, risk for further renal decline and weakness/fall risk admitted for review GI sooner, nephrology review, fluids, PT.      Course During Hospital Stay:    HOSPITAL COARSE: He was treated with IV fluids, PPI suppression and seen by GI and nephrology; who agreed wit this approach.  He was treated with miralax (once his hydration was improved); and had a BM.  The previous (even before Norco for his recent injuries) nausea resolved.  His GFR/renal function improved; any worsening of renal function was aborted with the fluids.  He felt better; PT helped restart an improved ambulation and he/his wife were eager for discharge once eating,  stooling, without nausea, improved labs and some degree of mobility.      The following portions of the patient's history were reviewed and updated as appropriate: allergies, current medications, past family history, past medical history, past social history, past surgical history and problem list.    Review of Systems  GENERAL:  Inactive/slower with limits, speed, stamina for age and gait . Sleep is ok with oxygen.  No fever now.  SKIN: wound L arm slowly healing.  Ortho put on Keflex.   ENDO:  No syncope, near or diaphoretic sweaty spells.  HEENT: No head injury or headache,  No vision change.  Same hearing loss.  Ears without pain/drainage.  No sore throat.  No significant nasal/sinus congestion/drainage. No epistaxis.  CHEST: Marked L posterior chest wall tenderness or mass. No change occ cough, without wheeze.  No SOB; no hemoptysis.  CV: No exertional chest pain, palpitations; getting ankle edema (lasix 40 changed to 20).  GI: No  dysphagia.  No abdominal pain, diarrhea, constipation.  No rectal bleeding, or melena.  On/off nausea and vomiting/heartburn.    :  Voids without dysuria, or incontinence to completion.  ORTHO: No painful/swollen joints but various on /off sore.  No change occ sore neck or back.  No acute neck or back pain without recent injury.   NEURO: No dizziness, weakness of extremities.  No change UE/LE mild numbness/paresthesias.   PSYCH: Mild ? memory loss.  Mood good; occ anxious, depressed but/and not suicidal.  Tries to tolerate stress   Screening:  Mammogram: NA  Bone density: NA  Low dose CT chest: NA  GI:   Colonoscopy+nl/[facility]/9.5.12/5y  EGD+biop/[facility]/8.31.15  Prostate: urology past  Usual lab order  3m CBC, BMP  6m CBC, CMP, iron  12m CBC, CMP, LIPID, TSH, Vit D, iron, ferritin, B12, folate    Results for orders placed or performed during the hospital encounter of 10/11/18   Amylase   Result Value Ref Range    Amylase 105 30 - 110 U/L   Comprehensive Metabolic  Panel   Result Value Ref Range    Glucose 95 70 - 100 mg/dL    BUN 40 (H) 5 - 21 mg/dL    Creatinine 2.20 (H) 0.50 - 1.40 mg/dL    Sodium 135 135 - 145 mmol/L    Potassium 4.4 3.5 - 5.3 mmol/L    Chloride 97 (L) 98 - 110 mmol/L    CO2 28.0 24.0 - 31.0 mmol/L    Calcium 9.1 8.4 - 10.4 mg/dL    Total Protein 5.9 (L) 6.3 - 8.7 g/dL    Albumin 3.30 (L) 3.50 - 5.00 g/dL    ALT (SGPT) 22 0 - 54 U/L    AST (SGOT) 27 7 - 45 U/L    Alkaline Phosphatase 57 24 - 120 U/L    Total Bilirubin 0.8 0.1 - 1.0 mg/dL    eGFR Non African Amer 28 (L) >60 mL/min/1.73    Globulin 2.6 gm/dL    A/G Ratio 1.3 1.1 - 2.5 g/dL    BUN/Creatinine Ratio 18.2 7.0 - 25.0    Anion Gap 10.0 4.0 - 13.0 mmol/L   Iron Profile   Result Value Ref Range    Iron 45 42 - 180 mcg/dL    TIBC 277 225 - 420 mcg/dL    Iron Saturation 16 (L) 20 - 45 %   Lipase   Result Value Ref Range    Lipase 144 23 - 203 U/L   TSH   Result Value Ref Range    TSH 0.321 (L) 0.470 - 4.680 mIU/mL   CBC Auto Differential   Result Value Ref Range    WBC 5.03 4.80 - 10.80 10*3/mm3    RBC 3.95 (L) 4.80 - 5.90 10*6/mm3    Hemoglobin 11.8 (L) 14.0 - 18.0 g/dL    Hematocrit 33.7 (L) 40.0 - 52.0 %    MCV 85.3 82.0 - 95.0 fL    MCH 29.9 28.0 - 32.0 pg    MCHC 35.0 33.0 - 36.0 g/dL    RDW 13.3 12.0 - 15.0 %    RDW-SD 41.8 40.0 - 54.0 fl    MPV 9.2 6.0 - 12.0 fL    Platelets 212 130 - 400 10*3/mm3    Neutrophil % 65.4 39.0 - 78.0 %    Lymphocyte % 18.5 15.0 - 45.0 %    Monocyte % 12.1 (H) 4.0 - 12.0 %    Eosinophil % 3.0 0.0 - 4.0 %    Basophil % 0.6 0.0 - 2.0 %    Immature Grans % 0.4 0.0 - 5.0 %    Neutrophils, Absolute 3.29 1.87 - 8.40 10*3/mm3    Lymphocytes, Absolute 0.93 0.72 - 4.86 10*3/mm3    Monocytes, Absolute 0.61 0.19 - 1.30 10*3/mm3    Eosinophils, Absolute 0.15 0.00 - 0.70 10*3/mm3    Basophils, Absolute 0.03 0.00 - 0.20 10*3/mm3    Immature Grans, Absolute 0.02 0.00 - 0.03 10*3/mm3    nRBC 0.0 0.0 - 0.0 /100 WBC   Ferritin   Result Value Ref Range    Ferritin 88.40 17.90 - 464.00  ng/mL   Basic Metabolic Panel   Result Value Ref Range    Glucose 81 70 - 100 mg/dL    BUN 32 (H) 5 - 21 mg/dL    Creatinine 1.92 (H) 0.50 - 1.40 mg/dL    Sodium 135 135 - 145 mmol/L    Potassium 4.1 3.5 - 5.3 mmol/L    Chloride 103 98 - 110 mmol/L    CO2 24.0 24.0 - 31.0 mmol/L    Calcium 8.8 8.4 - 10.4 mg/dL    eGFR Non African Amer 33 (L) >60 mL/min/1.73    BUN/Creatinine Ratio 16.7 7.0 - 25.0    Anion Gap 8.0 4.0 - 13.0 mmol/L   Vitamin D 25 Hydroxy   Result Value Ref Range    25 Hydroxy, Vitamin D 84.1 30.0 - 100.0 ng/ml   PTH, Intact   Result Value Ref Range    PTH, Intact 38.1 7.5 - 53.5 pg/mL   Magnesium   Result Value Ref Range    Magnesium 1.9 1.4 - 2.2 mg/dL   Phosphorus   Result Value Ref Range    Phosphorus 3.4 2.5 - 4.5 mg/dL   Uric Acid   Result Value Ref Range    Uric Acid 8.7 (H) 3.5 - 8.5 mg/dL   CBC Auto Differential   Result Value Ref Range    WBC 4.86 4.80 - 10.80 10*3/mm3    RBC 4.12 (L) 4.80 - 5.90 10*6/mm3    Hemoglobin 12.4 (L) 14.0 - 18.0 g/dL    Hematocrit 36.0 (L) 40.0 - 52.0 %    MCV 87.4 82.0 - 95.0 fL    MCH 30.1 28.0 - 32.0 pg    MCHC 34.4 33.0 - 36.0 g/dL    RDW 13.2 12.0 - 15.0 %    RDW-SD 42.3 40.0 - 54.0 fl    MPV 9.7 6.0 - 12.0 fL    Platelets 173 130 - 400 10*3/mm3    Neutrophil % 64.7 39.0 - 78.0 %    Lymphocyte % 17.7 15.0 - 45.0 %    Monocyte % 11.9 4.0 - 12.0 %    Eosinophil % 4.7 (H) 0.0 - 4.0 %    Basophil % 0.8 0.0 - 2.0 %    Neutrophils, Absolute 3.14 1.87 - 8.40 10*3/mm3    Lymphocytes, Absolute 0.86 0.72 - 4.86 10*3/mm3    Monocytes, Absolute 0.58 0.19 - 1.30 10*3/mm3    Eosinophils, Absolute 0.23 0.00 - 0.70 10*3/mm3    Basophils, Absolute 0.04 0.00 - 0.20 10*3/mm3   Basic Metabolic Panel   Result Value Ref Range    Glucose 82 70 - 100 mg/dL    BUN 23 (H) 5 - 21 mg/dL    Creatinine 1.70 (H) 0.50 - 1.40 mg/dL    Sodium 137 135 - 145 mmol/L    Potassium 3.9 3.5 - 5.3 mmol/L    Chloride 102 98 - 110 mmol/L    CO2 28.0 24.0 - 31.0 mmol/L    Calcium 8.6 8.4 - 10.4 mg/dL     eGFR Non African Amer 38 (L) >60 mL/min/1.73    BUN/Creatinine Ratio 13.5 7.0 - 25.0    Anion Gap 7.0 4.0 - 13.0 mmol/L   CBC Auto Differential   Result Value Ref Range    WBC 4.75 (L) 4.80 - 10.80 10*3/mm3    RBC 3.86 (L) 4.80 - 5.90 10*6/mm3    Hemoglobin 11.4 (L) 14.0 - 18.0 g/dL    Hematocrit 33.7 (L) 40.0 - 52.0 %    MCV 87.3 82.0 - 95.0 fL    MCH 29.5 28.0 - 32.0 pg    MCHC 33.8 33.0 - 36.0 g/dL    RDW 13.2 12.0 - 15.0 %    RDW-SD 41.9 40.0 - 54.0 fl    MPV 9.4 6.0 - 12.0 fL    Platelets 198 130 - 400 10*3/mm3    Neutrophil % 66.2 39.0 - 78.0 %    Lymphocyte % 16.6 15.0 - 45.0 %    Monocyte % 12.0 4.0 - 12.0 %    Eosinophil % 4.4 (H) 0.0 - 4.0 %    Basophil % 0.4 0.0 - 2.0 %    Immature Grans % 0.4 0.0 - 5.0 %    Neutrophils, Absolute 3.14 1.87 - 8.40 10*3/mm3    Lymphocytes, Absolute 0.79 0.72 - 4.86 10*3/mm3    Monocytes, Absolute 0.57 0.19 - 1.30 10*3/mm3    Eosinophils, Absolute 0.21 0.00 - 0.70 10*3/mm3    Basophils, Absolute 0.02 0.00 - 0.20 10*3/mm3    Immature Grans, Absolute 0.02 0.00 - 0.03 10*3/mm3    nRBC 0.0 0.0 - 0.0 /100 WBC       Lab Results   Component Value Date    PSA 1.070 11/08/2016        Lab Results:  CBC:  Lab Results - Last 18 Months  Lab Units 10/13/18  0552 10/12/18  0646 10/11/18  1417 10/10/18  1300 10/02/18  1002 05/27/18  0525 05/26/18  0445 05/25/18 2037  05/15/18  0801   WBC 10*3/mm3 4.75* 4.86 5.03 6.13  --  5.91 5.89 6.15  --   --    HEMOGLOBIN g/dL 11.4* 12.4* 11.8* 12.9* 13.3* 13.7* 13.8* 14.2  < > 15.4   HEMATOCRIT % 33.7* 36.0* 33.7* 38.7* 41.2 39.5* 40.2 42.3  < > 47.3   PLATELETS 10*3/mm3 198 173 212 224 201 175 179 182  < > 193   IRON mcg/dL  --   --  45  --   --   --   --   --   --  91   < > = values in this interval not displayed.   BMP/CMP:  Lab Results - Last 18 Months  Lab Units 10/13/18  0552 10/12/18  0622 10/11/18  1417 10/10/18  1300 10/02/18  1002 05/27/18  0525 05/26/18  0445  05/15/18  0801 09/07/17  0800   SODIUM mmol/L 137 135 135 137 139 137 139  < >  "138 139   POTASSIUM mmol/L 3.9 4.1 4.4 4.7 3.7 4.1 4.1  < > 4.3 4.5   CHLORIDE mmol/L 102 103 97* 97* 98 103 101  < > 97* 102   CO2 mmol/L 28.0 24.0 28.0 28.0  --  26.0 29.0  < >  --   --    TOTAL CO2, ARTERIAL mmol/L  --   --   --   --  26.0  --   --   --  30.0 26.0   GLUCOSE mg/dL  --   --   --   --  103*  --   --   --  103* 92   BUN mg/dL 23* 32* 40* 40* 31* 30* 37*  < > 40* 31*   CREATININE mg/dL 1.70* 1.92* 2.20* 2.19* 2.11* 1.55* 1.88*  < > 1.88* 1.67*   EGFR IF NONAFRICN AM mL/min/1.73 38* 33* 28* 28* 30* 42* 34*  < > 34* 39*   EGFR IF AFRICN AM mL/min/1.73  --   --   --   --  36*  --   --   --  41* 47*   CALCIUM mg/dL 8.6 8.8 9.1 9.6 9.4 8.8 9.0  < > 9.9 9.5   < > = values in this interval not displayed.  HEPATIC:  Lab Results - Last 18 Months  Lab Units 10/11/18  1417 10/02/18  1002 05/15/18  0801 09/07/17  0800   ALT (SGPT) U/L 22 16 20 30   AST (SGOT) U/L 27 20 22 19   ALK PHOS U/L 57 89 71 61     THYROID:  Lab Results - Last 18 Months  Lab Units 10/11/18  1417 05/15/18  0801   TSH mIU/mL 0.321* 1.330     A1C:No results for input(s): HGBA1C in the last 50004 hours.  PSA:No results for input(s): PSA in the last 25536 hours.        Objective   BP 94/62 (BP Location: Right arm, Patient Position: Sitting, Cuff Size: Adult)   Pulse 76   Temp 97.9 °F (36.6 °C) (Oral)   Resp 18   Ht 177.8 cm (70\")   Wt 77.1 kg (170 lb)   SpO2 98%   BMI 24.39 kg/m²   Body mass index is 24.39 kg/m².    Physical Exam  GENERAL:  Thin AFA developed in no acute distress.  SKIN: Turgor excellent, without rash; bandage LUE and wound under with scabbed ulcers.  No induration.   HEENT: Normal cephalic without trauma.  Pupils equal round reactive to light. Extraocular motions full without nystagmus.   External canals nonobstructive nontender without reddness. Tymphatic membranes calos with shelia structures intact.   Oral cavity without growths, exudates, and moist.  Posterior pharynx without mass, obstruction, redness.  No " thyromegaly, mass, tenderness, lymphadenopathy and supple.  CV: Regular rhythm.  No murmur, gallop; 1/4 pitting ankle edema. Posterior pulses intact.  No carotid bruits.  CHEST: Marked L mid/posterior chest wall tenderness or mass.   LUNGS: Symmetric motion with clear to auscultation.   ABD: Soft, nontender without mass.   ORTHO: Symmetric extremities without swelling/point tenderness.  Full gross range of motion.  Wheelchair.   NEURO: CN 2-12 grossly intact.  Symmetric facies. 1/4 x bicep equal reflexes.  UE/LE   3/5 strength throughout.  Nonfocal use extremities. Speech clear.   PSYCH: Oriented x 3.  Pleasant calm, well kept.  Purposeful/directed conservation with intact short/long gross memory.    Assessment/Plan     1. Acute renal failure, unspecified acute renal failure type (CMS/HCC)    2. Stage 3 chronic kidney disease (CMS/HCC)    3. Hypertension, unspecified type    4. Gait difficulty-cane/walker    5. Chronic atrial fibrillation (CMS/HCC)    6. Congestive heart failure with left ventricular diastolic dysfunction, NYHA class 3 (CMS/HCC)    7. Yeywioswghqdpd-yje-wjue/xarelto    8. Wound of left upper extremity, subsequent encounter    acute renal improved; not some leg edema.     Rx: reviewed/changes:      LAB/Testing/Referrals: reviewed/orders:   Today:   Orders Placed This Encounter   Procedures   • Basic Metabolic Panel     Usual:   above    Discussions:   May reduce wound care as heals.  Continue with home PT   Body mass index is 24.39 kg/m².   Patient's Body mass index is 24.39 kg/m². BMI is within normal parameters. No follow-up required.  Non-smoker      There are no Patient Instructions on file for this visit.    Follow up: Return for lab today;, lab;, Dr William-, as planned;.  Future Appointments  Date Time Provider Department Center   10/30/2018 9:00 AM Jorge A Marshall APRN [facility] None   11/1/2018 11:30 AM Hemal William MD [facility] None   11/21/2018 10:15 AM Skyler Trimble  MD [facility]   11/28/2018 10:15 AM Bill Church MD [facility] None   2/5/2019 9:20 AM Kiko Campos PA [facility] None              Current outpatient and discharge medications have been reconciled for the patient.

## 2018-10-25 NOTE — PROGRESS NOTES
Pt has stockings on and keeping legs elevated as much as possible. Swelling may be some better. Has been taking the Lasix 60mg since the weekend

## 2018-10-25 NOTE — PROGRESS NOTES
Pt says he is keeping his legs elevated and has his stockings on. He says the swelling may be some better. He is still taking the 60mg Lasix qd and [facility] has been notified to draw a BMP tomorrow.

## 2018-10-26 ENCOUNTER — FLU SHOT (OUTPATIENT)
Dept: FAMILY MEDICINE CLINIC | Facility: CLINIC | Age: 83
End: 2018-10-26

## 2018-10-26 DIAGNOSIS — R11.2 NAUSEA AND VOMITING, INTRACTABILITY OF VOMITING NOT SPECIFIED, UNSPECIFIED VOMITING TYPE: ICD-10-CM

## 2018-10-26 DIAGNOSIS — R11.0 NAUSEA: ICD-10-CM

## 2018-10-26 DIAGNOSIS — K21.9 GASTROESOPHAGEAL REFLUX DISEASE, ESOPHAGITIS PRESENCE NOT SPECIFIED: Chronic | ICD-10-CM

## 2018-10-26 RX ORDER — ESOMEPRAZOLE MAGNESIUM 40 MG/1
CAPSULE, DELAYED RELEASE ORAL
Qty: 90 CAPSULE | Refills: 2 | Status: SHIPPED | OUTPATIENT
Start: 2018-10-26 | End: 2019-03-06 | Stop reason: SDUPTHER

## 2018-10-27 PROCEDURE — G0008 ADMIN INFLUENZA VIRUS VAC: HCPCS | Performed by: FAMILY MEDICINE

## 2018-10-27 PROCEDURE — 90662 IIV NO PRSV INCREASED AG IM: CPT | Performed by: FAMILY MEDICINE

## 2018-11-01 ENCOUNTER — OFFICE VISIT (OUTPATIENT)
Dept: FAMILY MEDICINE CLINIC | Facility: CLINIC | Age: 83
End: 2018-11-01

## 2018-11-01 VITALS
WEIGHT: 170 LBS | SYSTOLIC BLOOD PRESSURE: 110 MMHG | TEMPERATURE: 97.9 F | OXYGEN SATURATION: 98 % | HEART RATE: 74 BPM | RESPIRATION RATE: 18 BRPM | BODY MASS INDEX: 24.34 KG/M2 | HEIGHT: 70 IN | DIASTOLIC BLOOD PRESSURE: 62 MMHG

## 2018-11-01 DIAGNOSIS — I10 HYPERTENSION, UNSPECIFIED TYPE: Chronic | ICD-10-CM

## 2018-11-01 DIAGNOSIS — J44.9 CHRONIC OBSTRUCTIVE PULMONARY DISEASE, UNSPECIFIED COPD TYPE (HCC): ICD-10-CM

## 2018-11-01 DIAGNOSIS — I48.20 CHRONIC ATRIAL FIBRILLATION (HCC): ICD-10-CM

## 2018-11-01 DIAGNOSIS — N18.30 STAGE 3 CHRONIC KIDNEY DISEASE (HCC): ICD-10-CM

## 2018-11-01 DIAGNOSIS — K21.9 GASTROESOPHAGEAL REFLUX DISEASE, ESOPHAGITIS PRESENCE NOT SPECIFIED: Chronic | ICD-10-CM

## 2018-11-01 DIAGNOSIS — I50.30 CONGESTIVE HEART FAILURE WITH LEFT VENTRICULAR DIASTOLIC DYSFUNCTION, NYHA CLASS 3 (HCC): ICD-10-CM

## 2018-11-01 DIAGNOSIS — E78.5 HYPERLIPIDEMIA, UNSPECIFIED HYPERLIPIDEMIA TYPE: Primary | Chronic | ICD-10-CM

## 2018-11-01 DIAGNOSIS — I83.93 VARICOSE VEINS OF BOTH LOWER EXTREMITIES, UNSPECIFIED WHETHER COMPLICATED: ICD-10-CM

## 2018-11-01 PROCEDURE — 99214 OFFICE O/P EST MOD 30 MIN: CPT | Performed by: FAMILY MEDICINE

## 2018-11-04 NOTE — PROGRESS NOTES
Subjective   James Birch is a 89 y.o. male presenting with chief complaint of:   Chief Complaint   Patient presents with   • Leg Swelling   • Acute Renal Failure   • Irregular Heart Beat       History of Present Illness :  With wife.   Has multiple chronic problems to consider that might have a bearing on today's issues; not an interval appointment.  Recent several visits and even hospitalization for excerbations of his chronic problems; associated with a fall, fracture of L wrist and skin tear L elbow that required home health.     Chronic/acute problems to review today:   1. Hyperlipidemia, unspecified hyperlipidemia type: Chronic/stable.  Tolerated use of statin with labs showing improved lipid values and tolerant liver labs. No muscle aches unexpected.       2. Chronic atrial fibrillation (CMS/HCC): chronic/stable a fib rate controlled with cardizem/others and xarelto for anticoagulation.  No significant palpitations without syncope, near.    3. Hypertension, unspecified type: Chronic/stable. Stable here/if home blood pressures.  No significant chest pain, SOB, LE edema, orthopnea, near syncope, dizziness/light headness.      4. Congestive heart failure with left ventricular diastolic dysfunction, NYHA class 3 (CMS/[facility]): Chronic/stable.  Denies significant sob, orthopnea, weight gain; but with CKD variable LE edema.  Increase diuretics results in drop GRF; then back/forth.  Aware of influence diet/salt and watching weight at home.        5. Varicose veins of both lower extremities, unspecified whether complicated: chronic/stable and part of LE swelling; but no LE pain.    6. Chronic obstructive pulmonary disease, unspecified COPD type (CMS/[facility]): Chronic/stable mild occ cough, sob, wheeze.  Rx/inhalers help.   No smoking.        7. Gastroesophageal reflux disease, esophagitis presence not specified: Chronic/stable.  Controlled heartburn, reflux; no dysphagia, melena.  Rx nexium helps.      8. Stage 3 chronic  kidney disease (CMS/[facility]): Chronic/stable.  Sees nephrology.  No dysuria.  Previously warned/reminded:   To avoid further kidney function decline  A. Treat any time you think you have infection  B. Stay hydrated (dont get dehydrated); drink at least 60 oz fluid every 24 hr (1800 cc or nearly a 2L)  C. Do not allow any xrays with dye WITHOUT the doctor ordering checking your renal function  D. Do not get new medications without the doctor considering your renal condition  E. Do not use motrin/ibuprofen, alleve/naprosyn and these types of medications       Has an/another acute issue today: .    The following portions of the patient's history were reviewed and updated as appropriate: allergies, current medications, past family history, past medical history, past social history, past surgical history and problem list.  Records acquired and reviewed; TCC migrated.      Current Outpatient Prescriptions:   •  azelastine (OPTIVAR) 0.05 % ophthalmic solution, INSTILL 1 DROP INTO BOTH EYES TWICE DAILY GENERIC FOR OPTIVAR, Disp: 6 mL, Rfl: 5  •  B Complex-C (SUPER B COMPLEX/VITAMIN C PO), Take  by mouth daily., Disp: , Rfl:   •  Calcium Polycarbophil (FIBER-CAPS PO), Take  by mouth 2 (Two) Times a Day., Disp: , Rfl:   •  cephalexin (KEFLEX) 500 MG capsule, 1 capsule 4 (Four) Times a Day., Disp: , Rfl:   •  Cholecalciferol (VITAMIN D3 PO), Take 2,000 Int'l Units by mouth Daily., Disp: , Rfl:   •  Coenzyme Q10 (CO Q-10 PO), Take  by mouth daily., Disp: , Rfl:   •  diltiaZEM CD (CARDIZEM CD) 120 MG 24 hr capsule, Take 1 capsule by mouth Daily., Disp: 30 capsule, Rfl: 5  •  dutasteride (AVODART) 0.5 MG capsule, TAKE 1 CAPSULE DAILY GENERIC FOR AVODART, Disp: 90 capsule, Rfl: 1  •  esomeprazole (nexIUM) 40 MG capsule, TAKE 1 CAPSULE DAILY GENERIC FOR NEXIUM, Disp: 90 capsule, Rfl: 2  •  furosemide (LASIX) 40 MG tablet, Take 0.5 tablets by mouth Daily. May take one half tablet additionally prn swelling (Patient taking differently:  Take 60 mg by mouth Daily. May take one half tablet additionally prn swelling), Disp: , Rfl:   •  guaiFENesin (MUCINEX) 600 MG 12 hr tablet, Take 600 mg by mouth Every 12 (Twelve) Hours., Disp: , Rfl:   •  levalbuterol (XOPENEX HFA) 45 MCG/ACT inhaler, Inhale 1-2 puffs Every 4 (Four) Hours As Needed for Wheezing., Disp: 1 inhaler, Rfl: 5  •  Multiple Vitamins-Minerals (MULTIVITAMIN PO), Take 1 tablet by mouth daily., Disp: , Rfl:   •  O2 (OXYGEN), Inhale 1 L/min Every Night. As needed at bedtime. , Disp: , Rfl:   •  Omega-3 Fatty Acids (FISH OIL PO), Take 1,000 mg by mouth 2 (Two) Times a Day., Disp: , Rfl:   •  ondansetron (ZOFRAN) 4 MG tablet, Take 1 tablet by mouth Every 8 (Eight) Hours As Needed for Nausea or Vomiting., Disp: 12 tablet, Rfl: 0  •  polyethylene glycol (MIRALAX) packet, Take 17 g by mouth Daily., Disp: 30 each, Rfl: 1  •  PROAIR  (90 Base) MCG/ACT inhaler, Every 4 (Four) Hours As Needed for Shortness of Air., Disp: , Rfl: 5  •  ramipril (ALTACE) 2.5 MG capsule, TAKE 1 CAPSULE AT BEDTIME GENERIC FOR ALTACE, Disp: 90 capsule, Rfl: 1  •  SYMBICORT 80-4.5 MCG/ACT inhaler, Inhale 2 puffs 2 (Two) Times a Day., Disp: , Rfl:   •  tamsulosin (FLOMAX) 0.4 MG capsule 24 hr capsule, TAKE 1 CAPSULE DAILY SHORTLY AFTER THE SAME MEAL GENERIC FOR FLOMAX, Disp: 90 capsule, Rfl: 1  •  traMADol (ULTRAM) 50 MG tablet, Take 1 tablet by mouth Every 8 (Eight) Hours As Needed for Moderate Pain ., Disp: 21 tablet, Rfl: 0  •  XARELTO 15 MG tablet, TAKE ONE TABLET DAILY, Disp: 90 tablet, Rfl: 3    No problems with medications.  Refills if needed done    No Known Allergies    Review of Systems  GENERAL:  Inactive/slower with limits, speed, stamina for age and gait . Sleep is ok with oxygen.  No fever now.  SKIN: wound L arm healed.    ENDO:  No syncope, near or diaphoretic sweaty spells.  HEENT: No head injury or headache,  No vision change.  Same hearing loss.  Ears without pain/drainage.  No sore throat.  No  significant nasal/sinus congestion/drainage. No epistaxis.  CHEST: Marked L posterior chest wall tenderness or mass. No change occ cough, without wheeze.  No SOB; no hemoptysis.  CV: No exertional chest pain, palpitations; end of day ankle edema.  GI: No  dysphagia.  No abdominal pain, diarrhea, constipation.  No rectal bleeding, or melena.  On/off nausea and vomiting/heartburn.    :  Voids without dysuria, or incontinence to completion.  ORTHO: No painful/swollen joints but various on /off sore.  No change occ sore neck or back.  No acute neck or back pain without recent injury.   NEURO: No dizziness, weakness of extremities.  No change UE/LE mild numbness/paresthesias.   PSYCH: Mild ? memory loss.  Mood good; occ anxious, depressed but/and not suicidal.  Tries to tolerate stress   Screening:  Mammogram: NA  Bone density: NA  Low dose CT chest: NA  GI:   Colonoscopy+nl/[facility]/9.5.12/5y-reminded  EGD+biop/[facility]/8.31.15  Prostate: urology past  Usual lab order  3m CBC, BMP  6m CBC, CMP, iron  12m CBC, CMP, LIPID, TSH, Vit D, iron, ferritin, B12, folate       Results for orders placed or performed in visit on 10/17/18   Basic Metabolic Panel   Result Value Ref Range    Glucose 78 70 - 100 mg/dL    BUN 20 5 - 21 mg/dL    Creatinine 1.65 (H) 0.50 - 1.40 mg/dL    eGFR Non African Am 39 (L) >60 mL/min/1.73    eGFR  Am 48 (L) >60 mL/min/1.73    BUN/Creatinine Ratio 12.1 7.0 - 25.0    Sodium 136 135 - 145 mmol/L    Potassium 3.9 3.5 - 5.3 mmol/L    Chloride 99 98 - 110 mmol/L    Total CO2 27.0 24.0 - 31.0 mmol/L    Calcium 9.2 8.4 - 10.4 mg/dL       Lab Results   Component Value Date    PSA 1.070 11/08/2016        Lab Results:  CBC:  Lab Results - Last 18 Months  Lab Units 10/13/18  0552 10/12/18  0646 10/11/18  1417 10/10/18  1300 10/02/18  1002 05/27/18  0525 05/26/18  0445 05/25/18  2037  05/15/18  0801   WBC 10*3/mm3 4.75* 4.86 5.03 6.13  --  5.91 5.89 6.15  --   --    HEMOGLOBIN g/dL 11.4*  "12.4* 11.8* 12.9* 13.3* 13.7* 13.8* 14.2  < > 15.4   HEMATOCRIT % 33.7* 36.0* 33.7* 38.7* 41.2 39.5* 40.2 42.3  < > 47.3   PLATELETS 10*3/mm3 198 173 212 224 201 175 179 182  < > 193   IRON mcg/dL  --   --  45  --   --   --   --   --   --  91   < > = values in this interval not displayed.   BMP/CMP:  Lab Results - Last 18 Months  Lab Units 10/17/18  1008 10/13/18  0552 10/12/18  0622 10/11/18  1417 10/10/18  1300 10/02/18  1002 05/27/18  0525  05/15/18  0801 09/07/17  0800   SODIUM mmol/L 136 137 135 135 137 139 137  < > 138 139   POTASSIUM mmol/L 3.9 3.9 4.1 4.4 4.7 3.7 4.1  < > 4.3 4.5   CHLORIDE mmol/L 99 102 103 97* 97* 98 103  < > 97* 102   CO2 mmol/L  --  28.0 24.0 28.0 28.0  --  26.0  < >  --   --    TOTAL CO2, ARTERIAL mmol/L 27.0  --   --   --   --  26.0  --   --  30.0 26.0   GLUCOSE mg/dL 78  --   --   --   --  103*  --   --  103* 92   BUN mg/dL 20 23* 32* 40* 40* 31* 30*  < > 40* 31*   CREATININE mg/dL 1.65* 1.70* 1.92* 2.20* 2.19* 2.11* 1.55*  < > 1.88* 1.67*   EGFR IF NONAFRICN AM mL/min/1.73 39* 38* 33* 28* 28* 30* 42*  < > 34* 39*   EGFR IF AFRICN AM mL/min/1.73 48*  --   --   --   --  36*  --   --  41* 47*   CALCIUM mg/dL 9.2 8.6 8.8 9.1 9.6 9.4 8.8  < > 9.9 9.5   < > = values in this interval not displayed.  HEPATIC:    Lab Results - Last 18 Months  Lab Units 10/11/18  1417 10/02/18  1002 05/15/18  0801 09/07/17  0800   ALT (SGPT) U/L 22 16 20 30   AST (SGOT) U/L 27 20 22 19   ALK PHOS U/L 57 89 71 61     THYROID:    Lab Results - Last 18 Months  Lab Units 10/11/18  1417 05/15/18  0801   TSH mIU/mL 0.321* 1.330     A1C:No results for input(s): HGBA1C in the last 04236 hours.  PSA:No results for input(s): PSA in the last 41317 hours.    Objective   /62   Pulse 74   Temp 97.9 °F (36.6 °C) (Oral)   Resp 18   Ht 177.8 cm (70\")   Wt 77.1 kg (170 lb)   SpO2 98%   BMI 24.39 kg/m²   Body mass index is 24.39 kg/m².    Physical Exam  GENERAL:  Thin AFA developed in no acute distress.  SKIN: " Turgor excellent, without rash lesion; LUE wound closed over.   HEENT: Normal cephalic without trauma.  Pupils equal round reactive to light. Extraocular motions full without nystagmus.   External canals nonobstructive nontender without reddness. Tymphatic membranes calos with shelia structures intact.   Oral cavity without growths, exudates, and moist.  Posterior pharynx without mass, obstruction, redness.  No thyromegaly, mass, tenderness, lymphadenopathy and supple.  CV: Irregular irregular rhythm.  No murmur, gallop; 1-2/4 pitting ankle edema. Posterior pulses intact.  No carotid bruits.  CHEST: No chest wall tenderness or mass.   LUNGS: Symmetric motion with clear/mild reduced to auscultation.   ABD: Soft, nontender without mass.   ORTHO: Symmetric extremities without swelling/point tenderness.  Full gross range of motion.  Wheelchair.   NEURO: CN 2-12 grossly intact.  Symmetric facies. 1/4 x bicep equal reflexes.  UE/LE   3/5 strength throughout.  Nonfocal use extremities. Speech clear.   PSYCH: Oriented x 3.  Pleasant calm, well kept.  Purposeful/directed conservation with intact short/long gross memory.       Assessment/Plan     1. Hyperlipidemia, unspecified hyperlipidemia type    2. Chronic atrial fibrillation (CMS/HCC)    3. Hypertension, unspecified type    4. Congestive heart failure with left ventricular diastolic dysfunction, NYHA class 3 (CMS/HCC)    5. Varicose veins of both lower extremities, unspecified whether complicated    6. Chronic obstructive pulmonary disease, unspecified COPD type (CMS/HCC)    7. Gastroesophageal reflux disease, esophagitis presence not specified    8. Stage 3 chronic kidney disease (CMS/HCC)    overall less unsteady with gait  L elbow wound resolved  L wrist fracture healing; pain resolving  Going to have to accept some dependent LE edema    Rx: reviewed/changes:  same    LAB/Testing/Referrals: reviewed/orders:   Today: back to usual  No orders of the defined types were  placed in this encounter.      Discussions:     Body mass index is 24.39 kg/m².   Patient's Body mass index is 24.39 kg/m². BMI is within normal parameters. No follow-up required.  Non-smoker      There are no Patient Instructions on file for this visit.    Follow up: Return for lab as planned;  lab/Dr William .  Future Appointments  Date Time Provider Department Center   11/21/2018 10:15 AM Skyler Trimble MD [facility]   11/28/2018 10:15 AM Bill Church MD [facility] None   2/4/2019 9:00 AM [facility] [place] [facility] None   2/5/2019 9:20 AM Kiko Campos PA [facility] None   2/7/2019 11:30 AM Hemal William MD [facility] None

## 2018-11-07 ENCOUNTER — TELEPHONE (OUTPATIENT)
Dept: FAMILY MEDICINE CLINIC | Facility: CLINIC | Age: 83
End: 2018-11-07

## 2018-11-07 NOTE — TELEPHONE ENCOUNTER
[facility] called and wanted Dr William to be aware that today will be pt's last day with [facility] pt is being dc so if pt needs to have weekly labs done still pt will have to come to office or to hosp to have labs done

## 2018-11-08 NOTE — TELEPHONE ENCOUNTER
You have had him on weekly labs since home health but he has no order for weekly lab if you want weekly cbc,bmp we can advise wife to come here

## 2018-11-08 NOTE — TELEPHONE ENCOUNTER
Tell wife if any significant change in edema that last couple days; to come in for labs otherwise monthly

## 2018-11-21 ENCOUNTER — OFFICE VISIT (OUTPATIENT)
Dept: CARDIOLOGY | Facility: CLINIC | Age: 83
End: 2018-11-21

## 2018-11-21 VITALS
DIASTOLIC BLOOD PRESSURE: 62 MMHG | HEART RATE: 85 BPM | BODY MASS INDEX: 24.2 KG/M2 | SYSTOLIC BLOOD PRESSURE: 100 MMHG | HEIGHT: 70 IN | WEIGHT: 169 LBS

## 2018-11-21 DIAGNOSIS — I50.30 CONGESTIVE HEART FAILURE WITH LEFT VENTRICULAR DIASTOLIC DYSFUNCTION, NYHA CLASS 3 (HCC): ICD-10-CM

## 2018-11-21 DIAGNOSIS — I10 HYPERTENSION, UNSPECIFIED TYPE: Chronic | ICD-10-CM

## 2018-11-21 DIAGNOSIS — I48.0 PAROXYSMAL ATRIAL FIBRILLATION (HCC): Primary | ICD-10-CM

## 2018-11-21 PROCEDURE — 93000 ELECTROCARDIOGRAM COMPLETE: CPT | Performed by: INTERNAL MEDICINE

## 2018-11-21 PROCEDURE — 99213 OFFICE O/P EST LOW 20 MIN: CPT | Performed by: INTERNAL MEDICINE

## 2018-11-21 NOTE — PROGRESS NOTES
Subjective    James Birch is a 89 y.o. male. -FU of rhythm and chf    History of Present Illness     PAR AFIB:  Has occ palpitatons but rare and short lived. Is compliant with NOAC and no known bleeding probs. Is in NH and is sedentary - is prone to falls but is being extra-careful now. EKG is sinus with pac's today and was afib at last visit.    HTN:  Is monitored at NH with good results and no light-headedness.    HFpEF:  Has no unusual sob or cp. Has bilat ankle edema that is stable on current Lasix dose. He has had probs with EB with increasing doses of Lasix. He has been advised to weigh daily and adjust his Lasix for 2 lb wt increase but he does not want to do this.        The following portions of the patient's history were reviewed and updated as appropriate: allergies, current medications, past family history, past medical history, past social history, past surgical history and problem list.    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   • Thrombosis of posterior inferior cerebellar artery   • DAMIAN-intolerant   • Autonomic dysfunction   • Paroxysmal atrial fibrillation (CMS/HCC)   • Congestive heart failure with left ventricular diastolic dysfunction, NYHA class 3 (CMS/HCC)   • Wellness examination-done   • Gait difficulty-cane/walker   • Asthma   • Csskfueednwkbq-mkk-dljg/xarelto   • Chronic kidney disease-3-IgA/nephrology   • Prostatism   • Urolithiasis   • Varicose veins of legs   • Hyperlipidemia-statin   • Hypertension   • Hypersomnia   • Gastroesophageal reflux disease   • COPD (chronic obstructive pulmonary disease) (CMS/HCC)   • Nocturnal hypoxia-oxygen advised   • Laboratory test*   • History of tobacco use   • Acute renal failure (CMS/HCC)   • Gait abnormality       No Known Allergies    Family History   Problem Relation Age of Onset   • No Known Problems Mother    • No Known Problems Father        Social History     Socioeconomic History   •  Marital status:      Spouse name: Not on file   • Number of children: 3   • Years of education: Not on file   • Highest education level: Not on file   Social Needs   • Financial resource strain: Not on file   • Food insecurity - worry: Not on file   • Food insecurity - inability: Not on file   • Transportation needs - medical: Not on file   • Transportation needs - non-medical: Not on file   Occupational History   • Not on file   Tobacco Use   • Smoking status: Former Smoker     Packs/day: 1.00     Years: 18.00     Pack years: 18.00     Types: Cigarettes     Last attempt to quit: 1967     Years since quittin.9   • Smokeless tobacco: Never Used   • Tobacco comment: already quit   Substance and Sexual Activity   • Alcohol use: No   • Drug use: No   • Sexual activity: Not Currently     Partners: Female     Birth control/protection: Abstinence   Other Topics Concern   • Not on file   Social History Narrative   • Not on file         Current Outpatient Medications:   •  azelastine (OPTIVAR) 0.05 % ophthalmic solution, INSTILL 1 DROP INTO BOTH EYES TWICE DAILY GENERIC FOR OPTIVAR, Disp: 6 mL, Rfl: 5  •  B Complex-C (SUPER B COMPLEX/VITAMIN C PO), Take  by mouth daily., Disp: , Rfl:   •  Calcium Polycarbophil (FIBER-CAPS PO), Take  by mouth 2 (Two) Times a Day., Disp: , Rfl:   •  Cholecalciferol (VITAMIN D3 PO), Take 2,000 Int'l Units by mouth Daily., Disp: , Rfl:   •  Coenzyme Q10 (CO Q-10 PO), Take  by mouth daily., Disp: , Rfl:   •  diltiaZEM CD (CARDIZEM CD) 120 MG 24 hr capsule, Take 1 capsule by mouth Daily., Disp: 30 capsule, Rfl: 5  •  dutasteride (AVODART) 0.5 MG capsule, TAKE 1 CAPSULE DAILY GENERIC FOR AVODART, Disp: 90 capsule, Rfl: 1  •  esomeprazole (nexIUM) 40 MG capsule, TAKE 1 CAPSULE DAILY GENERIC FOR NEXIUM, Disp: 90 capsule, Rfl: 2  •  furosemide (LASIX) 40 MG tablet, Take 0.5 tablets by mouth Daily. May take one half tablet additionally prn swelling (Patient taking differently: Take 60 mg by  mouth Daily. May take one half tablet additionally prn swelling), Disp: , Rfl:   •  guaiFENesin (MUCINEX) 600 MG 12 hr tablet, Take 600 mg by mouth Every 12 (Twelve) Hours., Disp: , Rfl:   •  levalbuterol (XOPENEX HFA) 45 MCG/ACT inhaler, Inhale 1-2 puffs Every 4 (Four) Hours As Needed for Wheezing., Disp: 1 inhaler, Rfl: 5  •  Multiple Vitamins-Minerals (MULTIVITAMIN PO), Take 1 tablet by mouth daily., Disp: , Rfl:   •  O2 (OXYGEN), Inhale 1 L/min Every Night. As needed at bedtime. , Disp: , Rfl:   •  Omega-3 Fatty Acids (FISH OIL PO), Take 1,000 mg by mouth 2 (Two) Times a Day., Disp: , Rfl:   •  polyethylene glycol (MIRALAX) packet, Take 17 g by mouth Daily., Disp: 30 each, Rfl: 1  •  PROAIR  (90 Base) MCG/ACT inhaler, Every 4 (Four) Hours As Needed for Shortness of Air., Disp: , Rfl: 5  •  ramipril (ALTACE) 2.5 MG capsule, TAKE 1 CAPSULE AT BEDTIME GENERIC FOR ALTACE, Disp: 90 capsule, Rfl: 1  •  SYMBICORT 80-4.5 MCG/ACT inhaler, Inhale 2 puffs 2 (Two) Times a Day., Disp: , Rfl:   •  tamsulosin (FLOMAX) 0.4 MG capsule 24 hr capsule, TAKE 1 CAPSULE DAILY SHORTLY AFTER THE SAME MEAL GENERIC FOR FLOMAX, Disp: 90 capsule, Rfl: 1  •  XARELTO 15 MG tablet, TAKE ONE TABLET DAILY, Disp: 90 tablet, Rfl: 3    Past Surgical History:   Procedure Laterality Date   • CATARACT EXTRACTION      left   • CATARACT EXTRACTION      left   • COLONOSCOPY  09/05/2012    normal   • ENDOSCOPY  08/31/2015    normal   • EYE SURGERY     • FRACTURE SURGERY     • HERNIA REPAIR     • HIP SURGERY     • SKIN BIOPSY         Review of Systems   Constitutional: Positive for fatigue. Negative for fever and unexpected weight change.   Respiratory: Negative for apnea, chest tightness and shortness of breath.    Cardiovascular: Negative for chest pain, palpitations and leg swelling.   Gastrointestinal: Negative for abdominal pain and blood in stool.   Genitourinary: Negative for dysuria and hematuria.   Musculoskeletal: Negative for myalgias.  "  Neurological: Positive for weakness. Negative for light-headedness.   Psychiatric/Behavioral: Negative for sleep disturbance.       /62   Pulse 85   Ht 177.8 cm (70\")   Wt 76.7 kg (169 lb)   BMI 24.25 kg/m²   Procedures    Objective   Physical Exam   Constitutional: He is oriented to person, place, and time. No distress.   Frail and elderly   HENT:   Head: Normocephalic.   Eyes: Pupils are equal, round, and reactive to light.   Neck: No thyromegaly present.   Cardiovascular: Normal rate and normal heart sounds. An irregular rhythm present. Exam reveals decreased pulses. Exam reveals no gallop and no friction rub.   No murmur heard.  Pulmonary/Chest: Effort normal and breath sounds normal. No stridor. No respiratory distress. He has no wheezes. He has no rales.   Abdominal: Soft. Bowel sounds are normal. He exhibits no distension and no mass. There is no tenderness. There is no guarding.   Musculoskeletal: He exhibits edema.   2+ ankle and 1+ pre-tibial edema bilat   Neurological: He is alert and oriented to person, place, and time.   Skin: Skin is warm and dry. He is not diaphoretic.   Psychiatric: He has a normal mood and affect.       Assessment/Plan   James was seen today for congestive heart failure, atrial fibrillation and hypertension.    Diagnoses and all orders for this visit:    Paroxysmal atrial fibrillation (CMS/HCC)  Comments:  ASYMPTOMATIC AND ON NOAC OK  Orders:  -     ECG 12 Lead    Congestive heart failure with left ventricular diastolic dysfunction, NYHA class 3 (CMS/HCC)  Comments:  APPEARS EUVOLEMIC - CPT - chronic ankle edema is partly d/t venous insuff    Hypertension, unspecified type  Comments:  TIGHT CONTROL                 Return in about 6 months (around 5/21/2019), or with apc.  No orders of the defined types were placed in this encounter.    "

## 2018-11-26 ENCOUNTER — TELEPHONE (OUTPATIENT)
Dept: FAMILY MEDICINE CLINIC | Facility: CLINIC | Age: 83
End: 2018-11-26

## 2018-11-26 DIAGNOSIS — J44.1 COPD EXACERBATION (HCC): ICD-10-CM

## 2018-11-26 DIAGNOSIS — J44.9 CHRONIC OBSTRUCTIVE PULMONARY DISEASE, UNSPECIFIED COPD TYPE (HCC): ICD-10-CM

## 2018-11-26 RX ORDER — METHYLPREDNISOLONE 4 MG/1
TABLET ORAL
Qty: 21 TABLET | Refills: 0 | Status: SHIPPED | OUTPATIENT
Start: 2018-11-26 | End: 2019-02-07

## 2018-11-26 RX ORDER — LEVOFLOXACIN 250 MG/1
250 TABLET ORAL DAILY
Qty: 5 TABLET | Refills: 0 | Status: SHIPPED | OUTPATIENT
Start: 2018-11-26 | End: 2019-03-11 | Stop reason: SDUPTHER

## 2018-11-26 NOTE — TELEPHONE ENCOUNTER
Repeat last Rx-including steroids  Apt Wed afternoon to see how he is then      Current Outpatient Medications:   •  azelastine (OPTIVAR) 0.05 % ophthalmic solution, INSTILL 1 DROP INTO BOTH EYES TWICE DAILY GENERIC FOR OPTIVAR, Disp: 6 mL, Rfl: 5  •  B Complex-C (SUPER B COMPLEX/VITAMIN C PO), Take  by mouth daily., Disp: , Rfl:   •  Calcium Polycarbophil (FIBER-CAPS PO), Take  by mouth 2 (Two) Times a Day., Disp: , Rfl:   •  Cholecalciferol (VITAMIN D3 PO), Take 2,000 Int'l Units by mouth Daily., Disp: , Rfl:   •  Coenzyme Q10 (CO Q-10 PO), Take  by mouth daily., Disp: , Rfl:   •  diltiaZEM CD (CARDIZEM CD) 120 MG 24 hr capsule, Take 1 capsule by mouth Daily., Disp: 30 capsule, Rfl: 5  •  dutasteride (AVODART) 0.5 MG capsule, TAKE 1 CAPSULE DAILY GENERIC FOR AVODART, Disp: 90 capsule, Rfl: 1  •  esomeprazole (nexIUM) 40 MG capsule, TAKE 1 CAPSULE DAILY GENERIC FOR NEXIUM, Disp: 90 capsule, Rfl: 2  •  furosemide (LASIX) 40 MG tablet, Take 0.5 tablets by mouth Daily. May take one half tablet additionally prn swelling (Patient taking differently: Take 60 mg by mouth Daily. May take one half tablet additionally prn swelling), Disp: , Rfl:   •  guaiFENesin (MUCINEX) 600 MG 12 hr tablet, Take 600 mg by mouth Every 12 (Twelve) Hours., Disp: , Rfl:   •  levalbuterol (XOPENEX HFA) 45 MCG/ACT inhaler, Inhale 1-2 puffs Every 4 (Four) Hours As Needed for Wheezing., Disp: 1 inhaler, Rfl: 5  •  Multiple Vitamins-Minerals (MULTIVITAMIN PO), Take 1 tablet by mouth daily., Disp: , Rfl:   •  O2 (OXYGEN), Inhale 1 L/min Every Night. As needed at bedtime. , Disp: , Rfl:   •  Omega-3 Fatty Acids (FISH OIL PO), Take 1,000 mg by mouth 2 (Two) Times a Day., Disp: , Rfl:   •  polyethylene glycol (MIRALAX) packet, Take 17 g by mouth Daily., Disp: 30 each, Rfl: 1  •  PROAIR  (90 Base) MCG/ACT inhaler, Every 4 (Four) Hours As Needed for Shortness of Air., Disp: , Rfl: 5  •  ramipril (ALTACE) 2.5 MG capsule, TAKE 1 CAPSULE AT BEDTIME  GENERIC FOR ALTACE, Disp: 90 capsule, Rfl: 1  •  SYMBICORT 80-4.5 MCG/ACT inhaler, Inhale 2 puffs 2 (Two) Times a Day., Disp: , Rfl:   •  tamsulosin (FLOMAX) 0.4 MG capsule 24 hr capsule, TAKE 1 CAPSULE DAILY SHORTLY AFTER THE SAME MEAL GENERIC FOR FLOMAX, Disp: 90 capsule, Rfl: 1  •  XARELTO 15 MG tablet, TAKE ONE TABLET DAILY, Disp: 90 tablet, Rfl: 3

## 2018-11-26 NOTE — TELEPHONE ENCOUNTER
"Vm from spouse \"He has a chest cold, head cold for several days, I have been giving him mucinex and stuff like that and it is not helping. Can Dr William call him something in or be seen?\"  "

## 2018-11-27 RX ORDER — DUTASTERIDE 0.5 MG/1
CAPSULE, LIQUID FILLED ORAL
Qty: 90 CAPSULE | Refills: 3 | Status: ON HOLD | OUTPATIENT
Start: 2018-11-27 | End: 2019-05-22

## 2018-11-27 RX ORDER — DILTIAZEM HYDROCHLORIDE 120 MG/1
CAPSULE, EXTENDED RELEASE ORAL
Qty: 90 CAPSULE | Refills: 3 | Status: ON HOLD | OUTPATIENT
Start: 2018-11-27 | End: 2019-05-22

## 2018-11-27 NOTE — PROGRESS NOTES
"Subjective   James Birch is a 89 y.o. male.     Chief Complaint   Patient presents with   • Follow-up     F/u of asthma        History of Present Illness   He has asthma copd overlap with moderate airflow obstruction and fev1 52% predicted with coexisting heart disease with heart failure and afib and a prior stroke.  Symbicort has been working well for him.  No increased wheezes or dyspnea.  Today he has developed worsened balance, after recently starting levaquin and some steroids after catching a \"cold\".  No presyncope or vertigo.  He fell and fractured his upper extremity 6 weeks ago, treated at ED, and was found then to have a bowel blockage.  Dr. William hospitalized him for that and he got evaluated by renal and cardiology.  Followed up with cardiology 1 week ago and no change in meds    Medical/Family/Social History   has a past medical history of Arthritis, Asthma, Atrial fibrillation (CMS/HCC), Cancer (CMS/HCC), CHF (congestive heart failure) (CMS/HCC), Conductive hearing loss, COPD (chronic obstructive pulmonary disease) (CMS/HCC), Eustachian tube dysfunction, GERD (gastroesophageal reflux disease), Hyperlipidemia, Hypertension, Obstructive sleep apnea, Prostate disease, Sensorineural hearing loss, Stroke (CMS/HCC), and Vertigo.   has a past surgical history that includes Hip surgery; Hernia repair; Eye surgery; Cataract extraction; Cataract extraction; Colonoscopy (09/05/2012); Skin biopsy; Fracture surgery; and Esophagogastroduodenoscopy (08/31/2015).  family history includes No Known Problems in his father and mother.   reports that he quit smoking about 51 years ago. His smoking use included cigarettes. He has a 18.00 pack-year smoking history. he has never used smokeless tobacco. He reports that he does not drink alcohol or use drugs.  No Known Allergies  Medications    Current Outpatient Medications:   •  azelastine (OPTIVAR) 0.05 % ophthalmic solution, INSTILL 1 DROP INTO BOTH EYES TWICE DAILY " GENERIC FOR OPTIVAR, Disp: 6 mL, Rfl: 5  •  B Complex-C (SUPER B COMPLEX/VITAMIN C PO), Take  by mouth daily., Disp: , Rfl:   •  Calcium Polycarbophil (FIBER-CAPS PO), Take  by mouth 2 (Two) Times a Day., Disp: , Rfl:   •  CARTIA  MG 24 hr capsule, TAKE 1 CAPSULE DAILY GENERIC FOR CARDIZEM CD, Disp: 90 capsule, Rfl: 3  •  Cholecalciferol (VITAMIN D3 PO), Take 2,000 Int'l Units by mouth Daily., Disp: , Rfl:   •  Coenzyme Q10 (CO Q-10 PO), Take  by mouth daily., Disp: , Rfl:   •  dutasteride (AVODART) 0.5 MG capsule, TAKE 1 CAPSULE DAILY GENERIC FOR AVODART, Disp: 90 capsule, Rfl: 3  •  esomeprazole (nexIUM) 40 MG capsule, TAKE 1 CAPSULE DAILY GENERIC FOR NEXIUM, Disp: 90 capsule, Rfl: 2  •  furosemide (LASIX) 40 MG tablet, Take 0.5 tablets by mouth Daily. May take one half tablet additionally prn swelling (Patient taking differently: Take 60 mg by mouth Daily. May take one half tablet additionally prn swelling), Disp: , Rfl:   •  guaiFENesin (MUCINEX) 600 MG 12 hr tablet, Take 600 mg by mouth Every 12 (Twelve) Hours., Disp: , Rfl:   •  levoFLOXacin (LEVAQUIN) 250 MG tablet, Take 1 tablet by mouth Daily., Disp: 5 tablet, Rfl: 0  •  MethylPREDNISolone (MEDROL, GUY,) 4 MG tablet, Take as directed on package instructions., Disp: 21 tablet, Rfl: 0  •  Multiple Vitamins-Minerals (MULTIVITAMIN PO), Take 1 tablet by mouth daily., Disp: , Rfl:   •  O2 (OXYGEN), Inhale 1 L/min Every Night. As needed at bedtime. , Disp: , Rfl:   •  Omega-3 Fatty Acids (FISH OIL PO), Take 1,000 mg by mouth 2 (Two) Times a Day., Disp: , Rfl:   •  polyethylene glycol (MIRALAX) packet, Take 17 g by mouth Daily., Disp: 30 each, Rfl: 1  •  PROAIR  (90 Base) MCG/ACT inhaler, Every 4 (Four) Hours As Needed for Shortness of Air., Disp: , Rfl: 5  •  ramipril (ALTACE) 2.5 MG capsule, TAKE 1 CAPSULE AT BEDTIME GENERIC FOR ALTACE, Disp: 90 capsule, Rfl: 1  •  SYMBICORT 80-4.5 MCG/ACT inhaler, Inhale 2 puffs 2 (Two) Times a Day., Disp: , Rfl:    •  tamsulosin (FLOMAX) 0.4 MG capsule 24 hr capsule, TAKE 1 CAPSULE DAILY SHORTLY AFTER THE SAME MEAL GENERIC FOR FLOMAX, Disp: 90 capsule, Rfl: 1  •  XARELTO 15 MG tablet, TAKE ONE TABLET DAILY, Disp: 90 tablet, Rfl: 3    Review of Systems   Constitutional: Negative for chills and fever.   HENT: Negative for trouble swallowing and voice change.    Respiratory: Negative for cough and shortness of breath.    Gastrointestinal: Negative for vomiting and indigestion.   Neurological: Positive for weakness. Negative for seizures, syncope and light-headedness.     ------------------------------------  Objective     Physical Exam   Constitutional: He appears well-developed and well-nourished. He does not appear ill. No distress.   HENT:   Head: Normocephalic and atraumatic.   Nose: Nose normal.   Eyes: Conjunctivae and EOM are normal.   Neck: Neck supple.   Cardiovascular: Normal rate, regular rhythm, S1 normal and S2 normal.   Pulmonary/Chest: Effort normal. He has no wheezes. He has no rales.   Abdominal: Soft. He exhibits no distension. There is no tenderness. There is no guarding.   Musculoskeletal: He exhibits no deformity.   Lymphadenopathy:     He has no cervical adenopathy.   Neurological: He is alert.   Skin: Skin is warm and dry. No rash noted.   Scab forehead.   Psychiatric: He has a normal mood and affect.         ------------------------------------  Assessment/Plan   James was seen today for follow-up.    Diagnoses and all orders for this visit:    Moderate persistent asthma without complication    COPD, group B, by GOLD 2013 classification     Congestive heart failure with left ventricular diastolic dysfunction, NYHA class 3 (CMS/[facility])    Essential hypertension    Bradycardia      Patient's Body mass index is 24.11 kg/m². BMI is within normal parameters. No follow-up required.    He is having mostly problems with imbalance at this point.  It does not seem like syncope and does not seem like vertigo.  He is  on new medicines including's steroids and levofloxacin.  He is also a little bradycardic and that could be contributing he has artery had his calcium blocker and both of the other medicines today.  I do not see anything else eminently treatable.  He has been in the ER and has had a fairly significant workup for the following in the recent past and Dr. William's been treating him as well.  He has an appointment with Dr. William this afternoon.  I will suggest not taking any more steroids or levofloxacin or calcium channel blocker until he is seen by Dr. sierra William but he has artery had those doses today.

## 2018-11-28 ENCOUNTER — OFFICE VISIT (OUTPATIENT)
Dept: FAMILY MEDICINE CLINIC | Facility: CLINIC | Age: 83
End: 2018-11-28

## 2018-11-28 ENCOUNTER — OFFICE VISIT (OUTPATIENT)
Dept: PULMONOLOGY | Facility: CLINIC | Age: 83
End: 2018-11-28

## 2018-11-28 VITALS
BODY MASS INDEX: 24.62 KG/M2 | WEIGHT: 172 LBS | HEIGHT: 70 IN | OXYGEN SATURATION: 98 % | DIASTOLIC BLOOD PRESSURE: 68 MMHG | HEART RATE: 67 BPM | TEMPERATURE: 98 F | SYSTOLIC BLOOD PRESSURE: 110 MMHG

## 2018-11-28 VITALS
HEART RATE: 56 BPM | BODY MASS INDEX: 24.05 KG/M2 | WEIGHT: 168 LBS | SYSTOLIC BLOOD PRESSURE: 116 MMHG | OXYGEN SATURATION: 98 % | DIASTOLIC BLOOD PRESSURE: 78 MMHG | HEIGHT: 70 IN

## 2018-11-28 DIAGNOSIS — J45.40 MODERATE PERSISTENT ASTHMA WITHOUT COMPLICATION: Chronic | ICD-10-CM

## 2018-11-28 DIAGNOSIS — I10 HYPERTENSION, UNSPECIFIED TYPE: Chronic | ICD-10-CM

## 2018-11-28 DIAGNOSIS — R00.1 BRADYCARDIA: ICD-10-CM

## 2018-11-28 DIAGNOSIS — G47.34 NOCTURNAL HYPOXIA: ICD-10-CM

## 2018-11-28 DIAGNOSIS — J44.9 CHRONIC OBSTRUCTIVE PULMONARY DISEASE, UNSPECIFIED COPD TYPE (HCC): ICD-10-CM

## 2018-11-28 DIAGNOSIS — J45.40 MODERATE PERSISTENT ASTHMA WITHOUT COMPLICATION: Primary | Chronic | ICD-10-CM

## 2018-11-28 DIAGNOSIS — R05.9 COUGH: Primary | ICD-10-CM

## 2018-11-28 DIAGNOSIS — J44.9 COPD, GROUP B, BY GOLD 2013 CLASSIFICATION (HCC): Chronic | ICD-10-CM

## 2018-11-28 DIAGNOSIS — I10 ESSENTIAL HYPERTENSION: Chronic | ICD-10-CM

## 2018-11-28 DIAGNOSIS — I50.30 CONGESTIVE HEART FAILURE WITH LEFT VENTRICULAR DIASTOLIC DYSFUNCTION, NYHA CLASS 3 (HCC): ICD-10-CM

## 2018-11-28 DIAGNOSIS — I48.0 PAROXYSMAL ATRIAL FIBRILLATION (HCC): ICD-10-CM

## 2018-11-28 DIAGNOSIS — J44.1 COPD EXACERBATION (HCC): ICD-10-CM

## 2018-11-28 DIAGNOSIS — N18.30 STAGE 3 CHRONIC KIDNEY DISEASE (HCC): ICD-10-CM

## 2018-11-28 DIAGNOSIS — J45.909 ASTHMA, UNSPECIFIED ASTHMA SEVERITY, UNSPECIFIED WHETHER COMPLICATED, UNSPECIFIED WHETHER PERSISTENT: ICD-10-CM

## 2018-11-28 PROCEDURE — 99214 OFFICE O/P EST MOD 30 MIN: CPT | Performed by: INTERNAL MEDICINE

## 2018-11-28 PROCEDURE — 99213 OFFICE O/P EST LOW 20 MIN: CPT | Performed by: FAMILY MEDICINE

## 2018-11-28 RX ORDER — IPRATROPIUM BROMIDE AND ALBUTEROL SULFATE 2.5; .5 MG/3ML; MG/3ML
3 SOLUTION RESPIRATORY (INHALATION) 4 TIMES DAILY
Qty: 40 VIAL | Refills: 1 | Status: SHIPPED | OUTPATIENT
Start: 2018-11-28 | End: 2019-02-07

## 2018-11-28 NOTE — PROGRESS NOTES
"Subjective   James Birch is a 89 y.o. male presenting with chief complaint of:   Chief Complaint   Patient presents with   • Cough     Still coughing and worse at night.       History of Present Illness :  With wife.  Here for primarily an acute issue today; onset cough.   Has multiple chronic problems to consider that might have a bearing on today's issues; not an interval appointment.       Called 11.26.18Vm from spouse \"He has a chest cold, head cold for several days, I have been giving him mucinex and stuff like that and it is not helping. Can Dr William call him something in or be seen  Levaquin 250 qd #5, medrol dose pack    Cough alittle better; no SOB.      Chronic/acute problems reviewed today:   1. Cough: acute-chronic/worse with above.    2. Hypertension, unspecified type: Chronic/stable. Stable here/if home blood pressures.  No significant chest pain, SOB, LE edema, orthopnea, near syncope, dizziness/light headness.      3. Paroxysmal atrial fibrillation (CMS/[facility]): chronic/stable occ palpitations without syncope/near.     4. Congestive heart failure with left ventricular diastolic dysfunction, NYHA class 3 (CMS/HCC): Chronic/stable.  Denies significant sob, orthopnea, weight gain; same leg edema.  Aware of influence diet/salt and watching weight at home.       5. Moderate persistent asthma without complication: chronic/usually stable till this; some wheeze.    6. COPD, group B, by GOLD 2013 classification : chronic/variable occ sob, cough.    7. Nocturnal hypoxia-oxygen advised: chornic/wearing oxygen without significant SOB   8. Stage 3 chronic kidney disease (CMS/[facility]): Chronic/stable.  Sees nephrology.  No dysuria.  Previously warned/reminded:   To avoid further kidney function decline  A. Treat any time you think you have infection  B. Stay hydrated (dont get dehydrated); drink at least 60 oz fluid every 24 hr (1800 cc or nearly a 2L)  C. Do not allow any xrays with dye WITHOUT the doctor ordering " checking your renal function  D. Do not get new medications without the doctor considering your renal condition  E. Do not use motrin/ibuprofen, alleve/naprosyn and these types of medications     9. COPD exacerbation (CMS/[facility]): chronic-acute/above.    10. Chronic obstructive pulmonary disease, unspecified COPD type (CMS/[facility]): Chronic/stable mild occ cough, sob, wheeze.  Rx helps.   No smoking.        11. Asthma, unspecified asthma severity, unspecified whether complicated, unspecified whether persistent: chronic/worse with this flare.      Has an/another acute issue today: none.    The following portions of the patient's history were reviewed and updated as appropriate: allergies, current medications, past family history, past medical history, past social history, past surgical history and problem list.      Current Outpatient Medications:   •  azelastine (OPTIVAR) 0.05 % ophthalmic solution, INSTILL 1 DROP INTO BOTH EYES TWICE DAILY GENERIC FOR OPTIVAR, Disp: 6 mL, Rfl: 5  •  B Complex-C (SUPER B COMPLEX/VITAMIN C PO), Take  by mouth daily., Disp: , Rfl:   •  Calcium Polycarbophil (FIBER-CAPS PO), Take  by mouth 2 (Two) Times a Day., Disp: , Rfl:   •  CARTIA  MG 24 hr capsule, TAKE 1 CAPSULE DAILY GENERIC FOR CARDIZEM CD, Disp: 90 capsule, Rfl: 3  •  Cholecalciferol (VITAMIN D3 PO), Take 2,000 Int'l Units by mouth Daily., Disp: , Rfl:   •  Coenzyme Q10 (CO Q-10 PO), Take  by mouth daily., Disp: , Rfl:   •  dutasteride (AVODART) 0.5 MG capsule, TAKE 1 CAPSULE DAILY GENERIC FOR AVODART, Disp: 90 capsule, Rfl: 3  •  esomeprazole (nexIUM) 40 MG capsule, TAKE 1 CAPSULE DAILY GENERIC FOR NEXIUM, Disp: 90 capsule, Rfl: 2  •  furosemide (LASIX) 40 MG tablet, Take 0.5 tablets by mouth Daily. May take one half tablet additionally prn swelling (Patient taking differently: Take 60 mg by mouth Daily. May take one half tablet additionally prn swelling), Disp: , Rfl:   •  guaiFENesin (MUCINEX) 600 MG 12 hr tablet, Take 600  mg by mouth Every 12 (Twelve) Hours., Disp: , Rfl:   •  levoFLOXacin (LEVAQUIN) 250 MG tablet, Take 1 tablet by mouth Daily., Disp: 5 tablet, Rfl: 0  •  MethylPREDNISolone (MEDROL, GUY,) 4 MG tablet, Take as directed on package instructions., Disp: 21 tablet, Rfl: 0  •  Multiple Vitamins-Minerals (MULTIVITAMIN PO), Take 1 tablet by mouth daily., Disp: , Rfl:   •  O2 (OXYGEN), Inhale 1 L/min Every Night. As needed at bedtime. , Disp: , Rfl:   •  Omega-3 Fatty Acids (FISH OIL PO), Take 1,000 mg by mouth 2 (Two) Times a Day., Disp: , Rfl:   •  polyethylene glycol (MIRALAX) packet, Take 17 g by mouth Daily., Disp: 30 each, Rfl: 1  •  PROAIR  (90 Base) MCG/ACT inhaler, Every 4 (Four) Hours As Needed for Shortness of Air., Disp: , Rfl: 5  •  ramipril (ALTACE) 2.5 MG capsule, TAKE 1 CAPSULE AT BEDTIME GENERIC FOR ALTACE, Disp: 90 capsule, Rfl: 1  •  SYMBICORT 80-4.5 MCG/ACT inhaler, Inhale 2 puffs 2 (Two) Times a Day., Disp: , Rfl:   •  tamsulosin (FLOMAX) 0.4 MG capsule 24 hr capsule, TAKE 1 CAPSULE DAILY SHORTLY AFTER THE SAME MEAL GENERIC FOR FLOMAX, Disp: 90 capsule, Rfl: 1  •  XARELTO 15 MG tablet, TAKE ONE TABLET DAILY, Disp: 90 tablet, Rfl: 3    No problems with medications.  Refills if needed done    No Known Allergies    Review of Systems  GENERAL:  Inactive/slower with limits, speed, stamina for age and gait . Sleep is ok with oxygen.  No fever now.  ENDO:  No syncope, near or diaphoretic sweaty spells.  HEENT: No head injury or headache.   No vision change.  Same hearing loss.  Ears without pain/drainage.  No sore throat.  No significant nasal/sinus congestion/drainage. No epistaxis.  CHEST: No chest wall tenderness or mass. More cough, with mild occ wheeze.  No SOB; no hemoptysis.  CV: No exertional chest pain, palpitations; end of day ankle edema.  GI: No  dysphagia.  No abdominal pain, diarrhea, constipation.  No rectal bleeding, or melena.  No nausea and vomiting/heartburn.    :  Voids without  dysuria, or incontinence to completion.  ORTHO: No painful/swollen joints but various on /off sore.  No change occ sore neck or back.  No acute neck or back pain without recent injury.   NEURO: No dizziness, weakness of extremities.  No change UE/LE mild numbness/paresthesias.   PSYCH: Mild ? memory loss.  Mood good; occ anxious, depressed but/and not suicidal.  Tries to tolerate stress   Screening:  Mammogram: NA  Bone density: NA  Low dose CT chest: NA  GI:   Colonoscopy+nl/[facility]/[place]/9.5.12/5y-reminded  EGD+biop/[facility]/[place]/8.31.15  Prostate: urology past  Usual lab order  3m CBC, BMP  6m CBC, CMP, iron  12m CBC, CMP, LIPID, TSH, Vit D, iron, ferritin, B12, folate       Results for orders placed or performed in visit on 10/17/18   Basic Metabolic Panel   Result Value Ref Range    Glucose 78 70 - 100 mg/dL    BUN 20 5 - 21 mg/dL    Creatinine 1.65 (H) 0.50 - 1.40 mg/dL    eGFR Non African Am 39 (L) >60 mL/min/1.73    eGFR  Am 48 (L) >60 mL/min/1.73    BUN/Creatinine Ratio 12.1 7.0 - 25.0    Sodium 136 135 - 145 mmol/L    Potassium 3.9 3.5 - 5.3 mmol/L    Chloride 99 98 - 110 mmol/L    Total CO2 27.0 24.0 - 31.0 mmol/L    Calcium 9.2 8.4 - 10.4 mg/dL       Lab Results   Component Value Date    PSA 1.070 11/08/2016        Lab Results:  CBC:  Lab Results - Last 18 Months   Lab Units  10/13/18   0552  10/12/18   0646  10/11/18   1417  10/10/18   1300  10/02/18   1002  05/27/18   0525  05/26/18   0445  05/25/18   2037   05/15/18   0801   WBC 10*3/mm3  4.75*  4.86  5.03  6.13   --   5.91  5.89  6.15   --    --    HEMOGLOBIN g/dL  11.4*  12.4*  11.8*  12.9*  13.3*  13.7*  13.8*  14.2   < >  15.4   HEMATOCRIT %  33.7*  36.0*  33.7*  38.7*  41.2  39.5*  40.2  42.3   < >  47.3   PLATELETS 10*3/mm3  198  173  212  224  201  175  179  182   < >  193   IRON mcg/dL   --    --   45   --    --    --    --    --    --   91    < > = values in this interval not displayed.      BMP/CMP:  Lab Results - Last 18 Months  "  Lab Units  10/17/18   1008  10/13/18   0552  10/12/18   0622  10/11/18   1417  10/10/18   1300  10/02/18   1002  05/27/18   0525   05/15/18   0801  09/07/17   0800   SODIUM mmol/L  136  137  135  135  137  139  137   < >  138  139   POTASSIUM mmol/L  3.9  3.9  4.1  4.4  4.7  3.7  4.1   < >  4.3  4.5   CHLORIDE mmol/L  99  102  103  97*  97*  98  103   < >  97*  102   CO2 mmol/L   --   28.0  24.0  28.0  28.0   --   26.0   < >   --    --    TOTAL CO2 mmol/L  27.0   --    --    --    --   26.0   --    --   30.0  26.0   GLUCOSE mg/dL  78   --    --    --    --   103*   --    --   103*  92   BUN mg/dL  20  23*  32*  40*  40*  31*  30*   < >  40*  31*   CREATININE mg/dL  1.65*  1.70*  1.92*  2.20*  2.19*  2.11*  1.55*   < >  1.88*  1.67*   EGFR IF NONAFRICN AM mL/min/1.73  39*  38*  33*  28*  28*  30*  42*   < >  34*  39*   EGFR IF AFRICN AM mL/min/1.73  48*   --    --    --    --   36*   --    --   41*  47*   CALCIUM mg/dL  9.2  8.6  8.8  9.1  9.6  9.4  8.8   < >  9.9  9.5    < > = values in this interval not displayed.     HEPATIC:  Lab Results - Last 18 Months   Lab Units  10/11/18   1417  10/02/18   1002  05/15/18   0801  09/07/17   0800   ALT (SGPT) U/L  22  16  20  30   AST (SGOT) U/L  27  20  22  19   ALK PHOS U/L  57  89  71  61     THYROID:  Lab Results - Last 18 Months   Lab Units  10/11/18   1417  05/15/18   0801   TSH mIU/mL  0.321*  1.330     A1C:No results for input(s): HGBA1C in the last 48637 hours.  PSA:No results for input(s): PSA in the last 00890 hours.    Objective   /68 (BP Location: Left arm, Patient Position: Sitting)   Pulse 67   Temp 98 °F (36.7 °C) (Oral)   Ht 177.8 cm (70\")   Wt 78 kg (172 lb)   SpO2 98%   BMI 24.68 kg/m²   Body mass index is 24.68 kg/m².    Physical Exam  GENERAL:  Thin AFA developed in no acute distress.  SKIN: Turgor excellent, without rash lesion.  HEENT: Normal cephalic without trauma.  Pupils equal round reactive to light. Extraocular motions full without " nystagmus.   External canals nonobstructive nontender without reddness. Tymphatic membranes calos with shelia structures intact.   Oral cavity without growths, exudates, and moist.  Posterior pharynx without mass, obstruction, redness.  No thyromegaly, mass, tenderness, lymphadenopathy and supple.  CV: Irregular irregular rhythm.  No murmur, gallop; 1-2/4 pitting ankle edema. Posterior pulses intact.  No carotid bruits.  CHEST: No chest wall tenderness or mass.   LUNGS: Symmetric motion with clear/mild reduced to auscultation.   ABD: Soft, nontender without mass.   ORTHO: Symmetric extremities without swelling/point tenderness.  Full gross range of motion.  Wheelchair.   NEURO: CN 2-12 grossly intact.  Symmetric facies. 1/4 x bicep equal reflexes.  UE/LE   3/5 strength throughout.  Nonfocal use extremities. Speech clear.   PSYCH: Oriented x 3.  Pleasant calm, well kept.  Purposeful/directed conservation with intact short/long gross memory.    Assessment/Plan     1. Cough    2. Hypertension, unspecified type    3. Paroxysmal atrial fibrillation (CMS/[facility])    4. Congestive heart failure with left ventricular diastolic dysfunction, NYHA class 3 (CMS/[facility])    5. Moderate persistent asthma without complication    6. COPD, group B, by GOLD 2013 classification     7. Nocturnal hypoxia-oxygen advised    8. Stage 3 chronic kidney disease (CMS/HCC)    9. COPD exacerbation (CMS/[facility])    10. Chronic obstructive pulmonary disease, unspecified COPD type (CMS/[facility])    11. Asthma, unspecified asthma severity, unspecified whether complicated, unspecified whether persistent        Rx: reviewed/changes:  Add duonebs  Neb machine/supplie written    LAB/Testing/Referrals: reviewed/orders:   Today:   Orders Placed This Encounter   Procedures   • Home Nebulizer       Discussions:     Body mass index is 24.68 kg/m².   Patient's Body mass index is 24.68 kg/m². BMI is within normal parameters. No follow-up required.  Non-smoker      Patient  Instructions   Your breathing treatments are to be used:   A. Along with your symbicort  B. You can reduce your need of proair to every four hours as needed (not regular); while using the breathing treatments.       Follow up: Return for Dr William-, as planned;.  Future Appointments   Date Time Provider Department Center   12/10/2018  1:30 PM [facility] [place] [facility] None   2/4/2019  9:00 AM [facility] [place] [facility] None   2/5/2019  9:20 AM Kiko Campos PA [facility] None   2/7/2019 11:30 AM Hemal William MD [facility] None   5/21/2019  9:15 AM Skyler Trimble MD [facility]   5/28/2019  2:30 PM Bill Church MD [facility] None

## 2018-11-28 NOTE — PATIENT INSTRUCTIONS
Your breathing treatments are to be used:   A. Along with your symbicort  B. You can reduce your need of proair to every four hours as needed (not regular); while using the breathing treatments.

## 2018-12-07 DIAGNOSIS — I10 HYPERTENSION, UNSPECIFIED TYPE: Primary | Chronic | ICD-10-CM

## 2018-12-07 DIAGNOSIS — D64.9 ANEMIA, UNSPECIFIED TYPE: ICD-10-CM

## 2018-12-07 DIAGNOSIS — N18.30 STAGE 3 CHRONIC KIDNEY DISEASE (HCC): ICD-10-CM

## 2018-12-07 RX ORDER — BUDESONIDE AND FORMOTEROL FUMARATE DIHYDRATE 80; 4.5 UG/1; UG/1
2 AEROSOL RESPIRATORY (INHALATION)
Qty: 30.6 G | Refills: 3 | Status: SHIPPED | OUTPATIENT
Start: 2018-12-07 | End: 2019-12-26

## 2018-12-07 RX ORDER — DILTIAZEM HYDROCHLORIDE 60 MG/1
2 TABLET, FILM COATED ORAL
Qty: 30.6 G | Refills: 3 | Status: SHIPPED | OUTPATIENT
Start: 2018-12-07 | End: 2018-12-07

## 2018-12-10 ENCOUNTER — RESULTS ENCOUNTER (OUTPATIENT)
Dept: FAMILY MEDICINE CLINIC | Facility: CLINIC | Age: 83
End: 2018-12-10

## 2018-12-10 DIAGNOSIS — I10 HYPERTENSION, UNSPECIFIED TYPE: Chronic | ICD-10-CM

## 2018-12-10 DIAGNOSIS — N18.30 STAGE 3 CHRONIC KIDNEY DISEASE (HCC): ICD-10-CM

## 2018-12-10 DIAGNOSIS — D64.9 ANEMIA, UNSPECIFIED TYPE: ICD-10-CM

## 2018-12-11 LAB
ALBUMIN SERPL-MCNC: 3.4 G/DL (ref 3.5–5)
ALBUMIN/GLOB SERPL: 1.4 G/DL (ref 1.1–2.5)
ALP SERPL-CCNC: 87 U/L (ref 24–120)
ALT SERPL-CCNC: 16 U/L (ref 0–54)
AST SERPL-CCNC: 15 U/L (ref 7–45)
BASOPHILS # BLD AUTO: 0.04 10*3/MM3 (ref 0–0.2)
BASOPHILS NFR BLD AUTO: 0.5 % (ref 0–2)
BILIRUB SERPL-MCNC: 0.5 MG/DL (ref 0.1–1)
BUN SERPL-MCNC: 29 MG/DL (ref 5–21)
BUN/CREAT SERPL: 14.2 (ref 7–25)
CALCIUM SERPL-MCNC: 8.7 MG/DL (ref 8.4–10.4)
CHLORIDE SERPL-SCNC: 97 MMOL/L (ref 98–110)
CO2 SERPL-SCNC: 26 MMOL/L (ref 24–31)
CREAT SERPL-MCNC: 2.04 MG/DL (ref 0.5–1.4)
EOSINOPHIL # BLD AUTO: 0.17 10*3/MM3 (ref 0–0.7)
EOSINOPHIL NFR BLD AUTO: 2.2 % (ref 0–4)
ERYTHROCYTE [DISTWIDTH] IN BLOOD BY AUTOMATED COUNT: 13.7 % (ref 12–15)
GLOBULIN SER CALC-MCNC: 2.5 GM/DL
GLUCOSE SERPL-MCNC: 96 MG/DL (ref 70–100)
HCT VFR BLD AUTO: 36.8 % (ref 40–52)
HGB BLD-MCNC: 11.9 G/DL (ref 14–18)
IMM GRANULOCYTES # BLD: 0.03 10*3/MM3 (ref 0–0.03)
IMM GRANULOCYTES NFR BLD: 0.4 % (ref 0–5)
IRON SERPL-MCNC: 36 MCG/DL (ref 42–180)
LYMPHOCYTES # BLD AUTO: 0.92 10*3/MM3 (ref 0.72–4.86)
LYMPHOCYTES NFR BLD AUTO: 11.8 % (ref 15–45)
MCH RBC QN AUTO: 29.5 PG (ref 28–32)
MCHC RBC AUTO-ENTMCNC: 32.3 G/DL (ref 33–36)
MCV RBC AUTO: 91.1 FL (ref 82–95)
MONOCYTES # BLD AUTO: 0.99 10*3/MM3 (ref 0.19–1.3)
MONOCYTES NFR BLD AUTO: 12.7 % (ref 4–12)
NEUTROPHILS # BLD AUTO: 5.66 10*3/MM3 (ref 1.87–8.4)
NEUTROPHILS NFR BLD AUTO: 72.4 % (ref 39–78)
NRBC BLD AUTO-RTO: 0 /100 WBC (ref 0–0)
PLATELET # BLD AUTO: 218 10*3/MM3 (ref 130–400)
POTASSIUM SERPL-SCNC: 4.3 MMOL/L (ref 3.5–5.3)
PROT SERPL-MCNC: 5.9 G/DL (ref 6.3–8.7)
RBC # BLD AUTO: 4.04 10*6/MM3 (ref 4.8–5.9)
SODIUM SERPL-SCNC: 136 MMOL/L (ref 135–145)
WBC # BLD AUTO: 7.81 10*3/MM3 (ref 4.8–10.8)

## 2018-12-14 DIAGNOSIS — D64.9 ANEMIA, UNSPECIFIED TYPE: Primary | ICD-10-CM

## 2019-01-14 ENCOUNTER — RESULTS ENCOUNTER (OUTPATIENT)
Dept: FAMILY MEDICINE CLINIC | Facility: CLINIC | Age: 84
End: 2019-01-14

## 2019-01-14 DIAGNOSIS — D64.9 ANEMIA, UNSPECIFIED TYPE: ICD-10-CM

## 2019-01-14 LAB
BASOPHILS # BLD AUTO: 0.04 10*3/MM3 (ref 0–0.2)
BASOPHILS NFR BLD AUTO: 0.6 % (ref 0–2)
BUN SERPL-MCNC: 34 MG/DL (ref 5–21)
BUN/CREAT SERPL: 17.5 (ref 7–25)
CALCIUM SERPL-MCNC: 9.4 MG/DL (ref 8.4–10.4)
CHLORIDE SERPL-SCNC: 98 MMOL/L (ref 98–110)
CO2 SERPL-SCNC: 29 MMOL/L (ref 24–31)
CREAT SERPL-MCNC: 1.94 MG/DL (ref 0.5–1.4)
EOSINOPHIL # BLD AUTO: 0.18 10*3/MM3 (ref 0–0.7)
EOSINOPHIL NFR BLD AUTO: 2.8 % (ref 0–4)
ERYTHROCYTE [DISTWIDTH] IN BLOOD BY AUTOMATED COUNT: 14.6 % (ref 12–15)
GLUCOSE SERPL-MCNC: 52 MG/DL (ref 70–100)
HCT VFR BLD AUTO: 41 % (ref 40–52)
HGB BLD-MCNC: 12.7 G/DL (ref 14–18)
IMM GRANULOCYTES # BLD AUTO: 0.02 10*3/MM3 (ref 0–0.03)
IMM GRANULOCYTES NFR BLD AUTO: 0.3 % (ref 0–5)
IRON SERPL-MCNC: 74 MCG/DL (ref 42–180)
LYMPHOCYTES # BLD AUTO: 1.07 10*3/MM3 (ref 0.72–4.86)
LYMPHOCYTES NFR BLD AUTO: 16.6 % (ref 15–45)
MCH RBC QN AUTO: 29 PG (ref 28–32)
MCHC RBC AUTO-ENTMCNC: 31 G/DL (ref 33–36)
MCV RBC AUTO: 93.6 FL (ref 82–95)
MONOCYTES # BLD AUTO: 0.8 10*3/MM3 (ref 0.19–1.3)
MONOCYTES NFR BLD AUTO: 12.4 % (ref 4–12)
NEUTROPHILS # BLD AUTO: 4.34 10*3/MM3 (ref 1.87–8.4)
NEUTROPHILS NFR BLD AUTO: 67.3 % (ref 39–78)
NRBC BLD AUTO-RTO: 0 /100 WBC (ref 0–0)
PLATELET # BLD AUTO: 215 10*3/MM3 (ref 130–400)
POTASSIUM SERPL-SCNC: 4.1 MMOL/L (ref 3.5–5.3)
RBC # BLD AUTO: 4.38 10*6/MM3 (ref 4.8–5.9)
SODIUM SERPL-SCNC: 138 MMOL/L (ref 135–145)
WBC # BLD AUTO: 6.45 10*3/MM3 (ref 4.8–10.8)

## 2019-01-29 RX ORDER — RIVAROXABAN 15 MG/1
TABLET, FILM COATED ORAL
Qty: 90 TABLET | Refills: 3 | Status: ON HOLD | OUTPATIENT
Start: 2019-01-29 | End: 2019-05-22

## 2019-02-04 DIAGNOSIS — N17.9 ACUTE RENAL FAILURE, UNSPECIFIED ACUTE RENAL FAILURE TYPE (HCC): ICD-10-CM

## 2019-02-04 DIAGNOSIS — R79.9 ABNORMAL FINDING OF BLOOD CHEMISTRY: ICD-10-CM

## 2019-02-04 DIAGNOSIS — Z86.73 HISTORY OF CVA (CEREBROVASCULAR ACCIDENT): ICD-10-CM

## 2019-02-04 DIAGNOSIS — I10 HYPERTENSION, UNSPECIFIED TYPE: Chronic | ICD-10-CM

## 2019-02-04 DIAGNOSIS — Z00.00 WELLNESS EXAMINATION: ICD-10-CM

## 2019-02-04 DIAGNOSIS — N18.30 STAGE 3 CHRONIC KIDNEY DISEASE (HCC): Primary | ICD-10-CM

## 2019-02-04 DIAGNOSIS — E78.5 HYPERLIPIDEMIA, UNSPECIFIED HYPERLIPIDEMIA TYPE: Chronic | ICD-10-CM

## 2019-02-04 LAB
BASOPHILS # BLD AUTO: 0.03 10*3/MM3 (ref 0–0.2)
BASOPHILS NFR BLD AUTO: 0.6 % (ref 0–2)
BUN SERPL-MCNC: 32 MG/DL (ref 5–21)
BUN/CREAT SERPL: 14.9 (ref 7–25)
CALCIUM SERPL-MCNC: 9.2 MG/DL (ref 8.4–10.4)
CHLORIDE SERPL-SCNC: 100 MMOL/L (ref 98–110)
CO2 SERPL-SCNC: 26 MMOL/L (ref 24–31)
CREAT SERPL-MCNC: 2.15 MG/DL (ref 0.5–1.4)
EOSINOPHIL # BLD AUTO: 0.1 10*3/MM3 (ref 0–0.7)
EOSINOPHIL NFR BLD AUTO: 1.8 % (ref 0–4)
ERYTHROCYTE [DISTWIDTH] IN BLOOD BY AUTOMATED COUNT: 14.6 % (ref 12–15)
GLUCOSE SERPL-MCNC: 66 MG/DL (ref 70–100)
HCT VFR BLD AUTO: 37.3 % (ref 40–52)
HGB BLD-MCNC: 12.1 G/DL (ref 14–18)
IMM GRANULOCYTES # BLD AUTO: 0.01 10*3/MM3 (ref 0–0.03)
IMM GRANULOCYTES NFR BLD AUTO: 0.2 % (ref 0–5)
IRON SERPL-MCNC: 80 MCG/DL (ref 42–180)
LYMPHOCYTES # BLD AUTO: 0.92 10*3/MM3 (ref 0.72–4.86)
LYMPHOCYTES NFR BLD AUTO: 16.9 % (ref 15–45)
MCH RBC QN AUTO: 29.7 PG (ref 28–32)
MCHC RBC AUTO-ENTMCNC: 32.4 G/DL (ref 33–36)
MCV RBC AUTO: 91.4 FL (ref 82–95)
MONOCYTES # BLD AUTO: 0.71 10*3/MM3 (ref 0.19–1.3)
MONOCYTES NFR BLD AUTO: 13.1 % (ref 4–12)
NEUTROPHILS # BLD AUTO: 3.66 10*3/MM3 (ref 1.87–8.4)
NEUTROPHILS NFR BLD AUTO: 67.4 % (ref 39–78)
NRBC BLD AUTO-RTO: 0 /100 WBC (ref 0–0)
PLATELET # BLD AUTO: 177 10*3/MM3 (ref 130–400)
POTASSIUM SERPL-SCNC: 4 MMOL/L (ref 3.5–5.3)
RBC # BLD AUTO: 4.08 10*6/MM3 (ref 4.8–5.9)
SODIUM SERPL-SCNC: 137 MMOL/L (ref 135–145)
WBC # BLD AUTO: 5.43 10*3/MM3 (ref 4.8–10.8)

## 2019-02-05 ENCOUNTER — OFFICE VISIT (OUTPATIENT)
Dept: NEUROLOGY | Facility: CLINIC | Age: 84
End: 2019-02-05

## 2019-02-05 VITALS
WEIGHT: 172 LBS | BODY MASS INDEX: 24.62 KG/M2 | SYSTOLIC BLOOD PRESSURE: 108 MMHG | HEART RATE: 66 BPM | HEIGHT: 70 IN | DIASTOLIC BLOOD PRESSURE: 70 MMHG

## 2019-02-05 DIAGNOSIS — G47.34 NOCTURNAL HYPOXIA: ICD-10-CM

## 2019-02-05 DIAGNOSIS — I67.9 CEREBROVASCULAR SMALL VESSEL DISEASE: ICD-10-CM

## 2019-02-05 DIAGNOSIS — I66.3: Primary | ICD-10-CM

## 2019-02-05 DIAGNOSIS — G90.9 AUTONOMIC DYSFUNCTION: ICD-10-CM

## 2019-02-05 PROCEDURE — 99213 OFFICE O/P EST LOW 20 MIN: CPT | Performed by: PHYSICIAN ASSISTANT

## 2019-02-05 RX ORDER — MIDODRINE HYDROCHLORIDE 5 MG/1
5 TABLET ORAL 3 TIMES DAILY
Qty: 90 TABLET | Refills: 6 | Status: SHIPPED | OUTPATIENT
Start: 2019-02-05 | End: 2019-04-08

## 2019-02-07 ENCOUNTER — OFFICE VISIT (OUTPATIENT)
Dept: FAMILY MEDICINE CLINIC | Facility: CLINIC | Age: 84
End: 2019-02-07

## 2019-02-07 VITALS
DIASTOLIC BLOOD PRESSURE: 70 MMHG | HEART RATE: 98 BPM | OXYGEN SATURATION: 98 % | HEIGHT: 70 IN | TEMPERATURE: 97.9 F | BODY MASS INDEX: 25.16 KG/M2 | SYSTOLIC BLOOD PRESSURE: 118 MMHG | RESPIRATION RATE: 18 BRPM | WEIGHT: 175.75 LBS

## 2019-02-07 DIAGNOSIS — R26.9 GAIT DIFFICULTY: Chronic | ICD-10-CM

## 2019-02-07 DIAGNOSIS — K21.9 GASTROESOPHAGEAL REFLUX DISEASE, ESOPHAGITIS PRESENCE NOT SPECIFIED: Chronic | ICD-10-CM

## 2019-02-07 DIAGNOSIS — I50.30 CONGESTIVE HEART FAILURE WITH LEFT VENTRICULAR DIASTOLIC DYSFUNCTION, NYHA CLASS 3 (HCC): ICD-10-CM

## 2019-02-07 DIAGNOSIS — G47.34 NOCTURNAL HYPOXIA: ICD-10-CM

## 2019-02-07 DIAGNOSIS — N18.30 STAGE 3 CHRONIC KIDNEY DISEASE (HCC): ICD-10-CM

## 2019-02-07 DIAGNOSIS — J44.9 COPD, GROUP B, BY GOLD 2013 CLASSIFICATION (HCC): Chronic | ICD-10-CM

## 2019-02-07 DIAGNOSIS — E78.2 MIXED HYPERLIPIDEMIA: Chronic | ICD-10-CM

## 2019-02-07 DIAGNOSIS — N40.0 PROSTATISM: Chronic | ICD-10-CM

## 2019-02-07 DIAGNOSIS — I10 ESSENTIAL HYPERTENSION: Primary | Chronic | ICD-10-CM

## 2019-02-07 DIAGNOSIS — Z79.01 ANTICOAGULATED: ICD-10-CM

## 2019-02-07 DIAGNOSIS — I48.0 PAROXYSMAL ATRIAL FIBRILLATION (HCC): ICD-10-CM

## 2019-02-07 PROBLEM — Z87.442 HISTORY OF KIDNEY STONES: Status: ACTIVE | Noted: 2017-02-15

## 2019-02-07 PROCEDURE — 99214 OFFICE O/P EST MOD 30 MIN: CPT | Performed by: FAMILY MEDICINE

## 2019-02-07 NOTE — PROGRESS NOTES
Subjective   James Birch is a 89 y.o. male presenting with chief complaint of:   Chief Complaint   Patient presents with   • Follow-up     3 month follow-up       History of Present Illness :  With wife.   Has multiple chronic problems to consider that might have a bearing on today's issues;  an interval appointment.       Chronic/acute problems reviewed today:   1. Essential hypertension: Chronic/stable. Stable here/if home blood pressures.  No significant chest pain, SOB, LE edema, orthopnea, near syncope, dizziness/light headness.      2. Mixed hyperlipidemia: Chronic/stable.  Tolerated use of fish oil with labs showing improved lipid values and tolerant liver labs. No muscle aches unexpected.      3. Paroxysmal atrial fibrillation (CMS/HCC): chronic/stable daily feeling of mild palpitations.   often dizzy and lightheaded.  No syncope near syncope.   4. Congestive heart failure with left ventricular diastolic dysfunction, NYHA class 3 (CMS/HCC): Chronic/stable.  Denies significant sob, orthopnea, leg edema, weight gain.  Aware of influence diet/salt and watching weight at home.       5. COPD, group B, by GOLD 2013 classification: Chronic/stable mild occ cough, sob, wheeze.  Rx helps.   No smoking.        6. Nocturnal hypoxia-oxygen advised: Chronic/stable use of oxygen while asleep.    7. Gastroesophageal reflux disease, esophagitis presence not specified: Chronic/stable.  Controlled heartburn, reflux; no dysphagia, melena.  Rx needed/nexium helps.      8. Prostatism: Chronic/stable.  Slower but tolerated stream with complete emptying.  Nocturia on occ; tolerated.  No desire to persure evaluations/surgery;      9. Stage 3 chronic kidney disease (CMS/[facility]): Chronic/stable.  Sees nephrology.  No dysuria.  Previously warned/reminded:   To avoid further kidney function decline  A. Treat any time you think you have infection  B. Stay hydrated (dont get dehydrated); drink at least 60 oz fluid every 24 hr (1800 cc  or nearly a 2L)  C. Do not allow any xrays with dye WITHOUT the doctor ordering checking your renal function  D. Do not get new medications without the doctor considering your renal condition  E. Do not use motrin/ibuprofen, alleve/naprosyn and these types of medications     10. Gait difficulty-cane/walker: chronic/worse unsteady gait; uses walker when out.  No falls.    11. Zkhheowgrdqtgc-ysi-avzb/xarelto: Chronic/stable reason and use of.  Denies bleeding issues; especially epistaxis, melena, hematochezia.  Upper arms/others bruise easily.        Has an/another acute issue today: none.    The following portions of the patient's history were reviewed and updated as appropriate: allergies, current medications, past family history, past medical history, past social history, past surgical history and problem list.      Current Outpatient Medications:   •  azelastine (OPTIVAR) 0.05 % ophthalmic solution, INSTILL 1 DROP INTO BOTH EYES TWICE DAILY GENERIC FOR OPTIVAR, Disp: 6 mL, Rfl: 5  •  B Complex-C (SUPER B COMPLEX/VITAMIN C PO), Take  by mouth daily., Disp: , Rfl:   •  budesonide-formoterol (SYMBICORT) 80-4.5 MCG/ACT inhaler, Inhale 2 puffs 2 (Two) Times a Day., Disp: 30.6 g, Rfl: 3  •  CARTIA  MG 24 hr capsule, TAKE 1 CAPSULE DAILY GENERIC FOR CARDIZEM CD, Disp: 90 capsule, Rfl: 3  •  Cholecalciferol (VITAMIN D3 PO), Take 2,000 Int'l Units by mouth Daily., Disp: , Rfl:   •  Coenzyme Q10 (CO Q-10 PO), Take  by mouth daily., Disp: , Rfl:   •  dutasteride (AVODART) 0.5 MG capsule, TAKE 1 CAPSULE DAILY GENERIC FOR AVODART, Disp: 90 capsule, Rfl: 3  •  esomeprazole (nexIUM) 40 MG capsule, TAKE 1 CAPSULE DAILY GENERIC FOR NEXIUM, Disp: 90 capsule, Rfl: 2  •  furosemide (LASIX) 40 MG tablet, Take 0.5 tablets by mouth Daily. May take one half tablet additionally prn swelling (Patient taking differently: Take 60 mg by mouth Daily. May take one half tablet additionally prn swelling), Disp: , Rfl:   •  guaiFENesin  (MUCINEX) 600 MG 12 hr tablet, Take 600 mg by mouth Every 12 (Twelve) Hours., Disp: , Rfl:   •  levoFLOXacin (LEVAQUIN) 250 MG tablet, Take 1 tablet by mouth Daily., Disp: 5 tablet, Rfl: 0  •  midodrine (PROAMATINE) 5 MG tablet, Take 1 tablet by mouth 3 (Three) Times a Day., Disp: 90 tablet, Rfl: 6  •  Multiple Vitamins-Minerals (MULTIVITAMIN PO), Take 1 tablet by mouth daily., Disp: , Rfl:   •  O2 (OXYGEN), Inhale 1 L/min Every Night. As needed at bedtime. , Disp: , Rfl:   •  Omega-3 Fatty Acids (FISH OIL PO), Take 1,000 mg by mouth 2 (Two) Times a Day., Disp: , Rfl:   •  polyethylene glycol (MIRALAX) packet, Take 17 g by mouth Daily., Disp: 30 each, Rfl: 1  •  PROAIR  (90 Base) MCG/ACT inhaler, Every 4 (Four) Hours As Needed for Shortness of Air., Disp: , Rfl: 5  •  ramipril (ALTACE) 2.5 MG capsule, TAKE 1 CAPSULE AT BEDTIME GENERIC FOR ALTACE, Disp: 90 capsule, Rfl: 1  •  tamsulosin (FLOMAX) 0.4 MG capsule 24 hr capsule, TAKE 1 CAPSULE DAILY SHORTLY AFTER THE SAME MEAL GENERIC FOR FLOMAX, Disp: 90 capsule, Rfl: 1  •  XARELTO 15 MG tablet, TAKE ONE TABLET DAILY, Disp: 90 tablet, Rfl: 3    No problems with medications.  Refills if needed done    No Known Allergies    Review of Systems  GENERAL:  Inactive/slower with limits, speed, stamina for age and gait . Sleep is ok with oxygen.  No fever now.  ENDO:  No syncope, near or diaphoretic sweaty spells.  HEENT: No head injury or headache.   No vision change.  Same hearing loss.  Ears without pain/drainage.  No sore throat.  No significant nasal/sinus congestion/drainage. No epistaxis.  CHEST: No chest wall tenderness or mass. More cough, with mild occ wheeze.  No SOB; no hemoptysis.  CV: No exertional chest pain, palpitations; end of day ankle edema.  GI: No  dysphagia.  No abdominal pain, diarrhea, constipation.  No rectal bleeding, or melena.  No nausea and vomiting/heartburn.    :  Voids without dysuria, or incontinence to completion.  ORTHO: No  painful/swollen joints but various on /off sore.  No change occ sore neck or back.  No acute neck or back pain without recent injury.   NEURO: No dizziness, weakness of extremities.  No change UE/LE mild numbness/paresthesias.   PSYCH: Mild ? memory loss.  Mood good; occ anxious, depressed but/and not suicidal.  Tries to tolerate stress   Screening:  Mammogram: NA  Bone density: NA  Low dose CT chest: NA  GI:   Colonoscopy+nl/[facility]/9.5.12/5y-reminded  EGD+biop/[facility]/8.31.15  Prostate: urology past  Usual lab order  3m CBC, BMP  6m CBC, CMP, iron  12m CBC, CMP, LIPID, TSH, Vit D, iron, ferritin, B12, folate        Lab Results:  Results for orders placed or performed in visit on 02/04/19   Basic Metabolic Panel   Result Value Ref Range    Glucose 66 (L) 70 - 100 mg/dL    BUN 32 (H) 5 - 21 mg/dL    Creatinine 2.15 (H) 0.50 - 1.40 mg/dL    eGFR Non African Am 29 (L) >60 mL/min/1.73    eGFR African Am 35 (L) >60 mL/min/1.73    BUN/Creatinine Ratio 14.9 7.0 - 25.0    Sodium 137 135 - 145 mmol/L    Potassium 4.0 3.5 - 5.3 mmol/L    Chloride 100 98 - 110 mmol/L    Total CO2 26.0 24.0 - 31.0 mmol/L    Calcium 9.2 8.4 - 10.4 mg/dL   Iron   Result Value Ref Range    Iron 80 42 - 180 mcg/dL   CBC & Differential   Result Value Ref Range    WBC 5.43 4.80 - 10.80 10*3/mm3    RBC 4.08 (L) 4.80 - 5.90 10*6/mm3    Hemoglobin 12.1 (L) 14.0 - 18.0 g/dL    Hematocrit 37.3 (L) 40.0 - 52.0 %    MCV 91.4 82.0 - 95.0 fL    MCH 29.7 28.0 - 32.0 pg    MCHC 32.4 (L) 33.0 - 36.0 g/dL    RDW 14.6 12.0 - 15.0 %    Platelets 177 130 - 400 10*3/mm3    Neutrophil Rel % 67.4 39.0 - 78.0 %    Lymphocyte Rel % 16.9 15.0 - 45.0 %    Monocyte Rel % 13.1 (H) 4.0 - 12.0 %    Eosinophil Rel % 1.8 0.0 - 4.0 %    Basophil Rel % 0.6 0.0 - 2.0 %    Neutrophils Absolute 3.66 1.87 - 8.40 10*3/mm3    Lymphocytes Absolute 0.92 0.72 - 4.86 10*3/mm3    Monocytes Absolute 0.71 0.19 - 1.30 10*3/mm3    Eosinophils Absolute 0.10 0.00 - 0.70 10*3/mm3     Basophils Absolute 0.03 0.00 - 0.20 10*3/mm3    Immature Granulocyte Rel % 0.2 0.0 - 5.0 %    Immature Grans Absolute 0.01 0.00 - 0.03 10*3/mm3    nRBC 0.0 0.0 - 0.0 /100 WBC     CBC:  Lab Results - Last 18 Months   Lab Units 02/04/19  0807 01/14/19  0855 12/10/18  1241 10/13/18  0552 10/12/18  0646 10/11/18  1417 10/10/18  1300  05/15/18  0801   WBC 10*3/mm3 5.43 6.45 7.81 4.75* 4.86 5.03 6.13   < > 6.32   HEMOGLOBIN g/dL 12.1* 12.7* 11.9* 11.4* 12.4* 11.8* 12.9*   < > 15.4   HEMATOCRIT % 37.3* 41.0 36.8* 33.7* 36.0* 33.7* 38.7*   < > 47.3   PLATELETS 10*3/mm3 177 215 218 198 173 212 224   < > 193   IRON mcg/dL 80 74 36*  --   --  45  --   --  91    < > = values in this interval not displayed.      BMP/CMP:  Lab Results - Last 18 Months   Lab Units 02/04/19  0807 01/14/19  0855 12/10/18  1241 10/17/18  1008 10/13/18  0552 10/12/18  0622 10/11/18  1417  10/02/18  1002  05/15/18  0801 09/07/17  0800   SODIUM mmol/L 137 138 136 136 137 135 135   < > 139   < > 138 139   POTASSIUM mmol/L 4.0 4.1 4.3 3.9 3.9 4.1 4.4   < > 3.7   < > 4.3 4.5   CHLORIDE mmol/L 100 98 97* 99 102 103 97*   < > 98   < > 97* 102   TOTAL CO2 mmol/L 26.0 29.0 26.0 27.0  --   --   --   --  26.0  --  30.0 26.0   CO2 mmol/L  --   --   --   --  28.0 24.0 28.0   < >  --    < >  --   --    GLUCOSE mg/dL 66* 52* 96 78  --   --   --   --  103*  --  103* 92   BUN mg/dL 32* 34* 29* 20 23* 32* 40*   < > 31*   < > 40* 31*   CREATININE mg/dL 2.15* 1.94* 2.04* 1.65* 1.70* 1.92* 2.20*   < > 2.11*   < > 1.88* 1.67*   EGFR IF NONAFRICN AM mL/min/1.73 29* 33* 31* 39* 38* 33* 28*   < > 30*   < > 34* 39*   EGFR IF AFRICN AM mL/min/1.73 35* 40* 37* 48*  --   --   --   --  36*  --  41* 47*   CALCIUM mg/dL 9.2 9.4 8.7 9.2 8.6 8.8 9.1   < > 9.4   < > 9.9 9.5    < > = values in this interval not displayed.     HEPATIC:  Lab Results - Last 18 Months   Lab Units 12/10/18  1241 10/11/18  1417 10/02/18  1002 05/15/18  0801 09/07/17  0800   ALT (SGPT) U/L 16 22 16 20 30  "  AST (SGOT) U/L 15 27 20 22 19   ALK PHOS U/L 87 57 89 71 61     THYROID:  Lab Results - Last 18 Months   Lab Units 10/11/18  1417 05/15/18  0801   TSH mIU/mL 0.321* 1.330       Objective   /70 (BP Location: Left arm, Patient Position: Sitting, Cuff Size: Adult)   Pulse 98   Temp 97.9 °F (36.6 °C) (Oral)   Resp 18   Ht 177.8 cm (70\")   Wt 79.7 kg (175 lb 12 oz)   SpO2 98%   BMI 25.22 kg/m²   Body mass index is 25.22 kg/m².    Physical Exam  GENERAL:  Thin AFA developed in no acute distress.  SKIN: Turgor excellent, without rash lesion.  HEENT: Normal cephalic without trauma.  Pupils equal round reactive to light. Extraocular motions full without nystagmus.   External canals nonobstructive nontender without reddness. Tymphatic membranes calos with shelia structures intact.   Oral cavity without growths, exudates, and moist.  Posterior pharynx without mass, obstruction, redness.  No thyromegaly, mass, tenderness, lymphadenopathy and supple.  CV: Irregular irregular rhythm.  No murmur, gallop; 1-2/4 pitting ankle edema. Posterior pulses intact.  No carotid bruits.  CHEST: No chest wall tenderness or mass.   LUNGS: Symmetric motion with clear/mild reduced to auscultation.   ABD: Soft, nontender without mass.   ORTHO: Symmetric extremities without swelling/point tenderness.  Full gross range of motion.  Wheelchair.   NEURO: CN 2-12 grossly intact.  Symmetric facies. 1/4 x bicep equal reflexes.  UE/LE   3/5 strength throughout.  Nonfocal use extremities. Speech clear.   PSYCH: Oriented x 3.  Pleasant calm, well kept.  Purposeful/directed conservation with intact short/long gross memory.       Assessment/Plan     1. Essential hypertension    2. Mixed hyperlipidemia    3. Paroxysmal atrial fibrillation (CMS/HCC)    4. Congestive heart failure with left ventricular diastolic dysfunction, NYHA class 3 (CMS/HCC)    5. COPD, group B, by GOLD 2013 classification     6. Nocturnal hypoxia-oxygen advised    7. " Gastroesophageal reflux disease, esophagitis presence not specified    8. Prostatism    9. Stage 3 chronic kidney disease (CMS/HCC)    10. Gait difficulty-cane/walker    11. Zyfgygsookjkha-hqz-zaap/xarelto       Overall doing well for his list of medical problems.  Perhaps reviewed by therapy could help his gait be a little more steady; he became less active and is not doing exercises he has been taught in the past.    Rx: reviewed/changes:  No orders of the defined types were placed in this encounter.  above    LAB/Testing/Referrals: reviewed/orders:   Today:   Orders Placed This Encounter   Procedures   • Ambulatory Referral to Physical Therapy     Chronic/recurrent labs above or change to:   same     Discussions:   See if PT helps  Body mass index is 25.22 kg/m².  Patient's Body mass index is 25.22 kg/m². BMI is within normal parameters. No follow-up required..  Non-smoker    There are no Patient Instructions on file for this visit.    Follow up: Return for lab during/or just before next apt;, Dr William-.  Future Appointments   Date Time Provider Department Center   5/6/2019  8:40 AM LAB [facility] None   5/9/2019  1:45 PM Hemal William MD [facility] None   5/21/2019  9:15 AM Skyler Trimble MD [facility]   5/28/2019  2:30 PM Bill Church MD [facility] None   6/11/2019 10:40 AM Kiko Campos PA [facility] None

## 2019-02-11 NOTE — TELEPHONE ENCOUNTER
"Vm \"he was seen by Dr William and was given some medication for pneumonia a couple weeks ago and he is starting to get choked up again and a lot of congestion. Can Dr William call him something in?\"    Pt seen in office 5-25-17  1. Chronic obstructive pulmonary disease, unspecified COPD type  - MethylPREDNISolone (MEDROL, GUY,) 4 MG tablet; Take as directed on package instructions. Dispense: 21 tablet; Refill: 0  - levoFLOXacin (LEVAQUIN) 250 MG tablet; Take 1 tablet by mouth Daily. Dispense: 7 tablet; Refill: 0  "
Repeat the medrol  5 more days levaquin  
Rx's to pharmacy and notified pt spouse  
56

## 2019-02-25 NOTE — PROGRESS NOTES
Subjective   James Birch is a 89 y.o. male is here today for follow-up.    Progressive decline.      Stroke   This is a chronic problem. The current episode started more than 1 year ago. The problem occurs daily. The problem has been gradually worsening. Associated symptoms include arthralgias, fatigue, myalgias, numbness and weakness. Pertinent negatives include no coughing. The symptoms are aggravated by exertion and stress. Treatments tried: Supportive care antiplatelet therapy. The treatment provided no relief.       The following portions of the patient's history were reviewed and updated as appropriate: allergies, current medications, past family history, past medical history, past social history, past surgical history and problem list.    Review of Systems   Constitutional: Positive for fatigue.   HENT: Positive for hearing loss. Negative for trouble swallowing.    Eyes: Positive for visual disturbance.   Respiratory: Positive for shortness of breath. Negative for cough.    Cardiovascular: Negative.    Gastrointestinal: Negative.    Endocrine: Negative.    Genitourinary: Negative.    Musculoskeletal: Positive for arthralgias, gait problem and myalgias.   Skin: Negative.    Allergic/Immunologic: Negative.    Neurological: Positive for dizziness, weakness, light-headedness and numbness. Negative for syncope.   Hematological: Negative.    Psychiatric/Behavioral: Positive for confusion, decreased concentration and sleep disturbance.       Objective   Physical Exam   Constitutional: He is oriented to person, place, and time. He appears well-developed and well-nourished.   HENT:   Head: Normocephalic.   Eyes: Pupils are equal, round, and reactive to light.   Neck: Normal range of motion. Neck supple. No JVD present.   Cardiovascular: Normal rate, regular rhythm and normal heart sounds. Exam reveals no gallop and no friction rub.   No murmur heard.  Pulmonary/Chest: Effort normal and breath sounds normal. He  has no wheezes. He has no rales.   Abdominal: Soft. Bowel sounds are normal. There is no tenderness.   Musculoskeletal: Normal range of motion. He exhibits no edema.   Lymphadenopathy:     He has no cervical adenopathy.   Neurological: He is alert and oriented to person, place, and time. He has normal strength. He displays no tremor. No cranial nerve deficit or sensory deficit. He exhibits normal muscle tone. He displays a negative Romberg sign. Coordination normal.   Reflex Scores:       Tricep reflexes are 1+ on the right side and 1+ on the left side.       Bicep reflexes are 1+ on the right side and 1+ on the left side.       Brachioradialis reflexes are 1+ on the right side and 1+ on the left side.       Patellar reflexes are 1+ on the right side and 1+ on the left side.       Achilles reflexes are 1+ on the right side and 1+ on the left side.  Skin: Skin is warm and dry.   Psychiatric: He has a normal mood and affect. Thought content normal. His speech is delayed. He is slowed. Cognition and memory are impaired. He expresses impulsivity. He is inattentive.         Assessment/Plan   James was seen today for stroke.    Diagnoses and all orders for this visit:    Thrombosis of posterior inferior cerebellar artery    Cerebrovascular small vessel disease    Autonomic dysfunction  -     midodrine (PROAMATINE) 5 MG tablet; Take 1 tablet by mouth 3 (Three) Times a Day.    Nocturnal hypoxia-oxygen advised    Patient is neurologically stable.  Continue midodrine.    Clinical findings, medication recommendations, medication and family questions are reviewed in detail.  10 minutes of the 15-minute visit was spent in coordination of care.    Dictated utilizing Dragon dictation.

## 2019-02-26 RX ORDER — AZELASTINE HYDROCHLORIDE 0.5 MG/ML
SOLUTION/ DROPS OPHTHALMIC
Qty: 6 ML | Refills: 6 | Status: ON HOLD | OUTPATIENT
Start: 2019-02-26 | End: 2019-05-22

## 2019-02-27 RX ORDER — TAMSULOSIN HYDROCHLORIDE 0.4 MG/1
1 CAPSULE ORAL DAILY
Qty: 90 CAPSULE | Refills: 3 | Status: SHIPPED | OUTPATIENT
Start: 2019-02-27 | End: 2020-02-19

## 2019-03-06 DIAGNOSIS — K21.9 GASTROESOPHAGEAL REFLUX DISEASE, ESOPHAGITIS PRESENCE NOT SPECIFIED: Chronic | ICD-10-CM

## 2019-03-06 DIAGNOSIS — R11.2 NAUSEA AND VOMITING, INTRACTABILITY OF VOMITING NOT SPECIFIED, UNSPECIFIED VOMITING TYPE: ICD-10-CM

## 2019-03-06 DIAGNOSIS — R11.0 NAUSEA: ICD-10-CM

## 2019-03-06 RX ORDER — ESOMEPRAZOLE MAGNESIUM 40 MG/1
40 CAPSULE, DELAYED RELEASE ORAL
Qty: 90 CAPSULE | Refills: 2 | Status: SHIPPED | OUTPATIENT
Start: 2019-03-06 | End: 2019-12-02 | Stop reason: SDUPTHER

## 2019-03-11 ENCOUNTER — TELEPHONE (OUTPATIENT)
Dept: FAMILY MEDICINE CLINIC | Facility: CLINIC | Age: 84
End: 2019-03-11

## 2019-03-11 DIAGNOSIS — J45.909 ASTHMA, UNSPECIFIED ASTHMA SEVERITY, UNSPECIFIED WHETHER COMPLICATED, UNSPECIFIED WHETHER PERSISTENT: ICD-10-CM

## 2019-03-11 DIAGNOSIS — J44.1 COPD EXACERBATION (HCC): ICD-10-CM

## 2019-03-11 DIAGNOSIS — J44.9 CHRONIC OBSTRUCTIVE PULMONARY DISEASE, UNSPECIFIED COPD TYPE (HCC): ICD-10-CM

## 2019-03-11 RX ORDER — METHYLPREDNISOLONE 4 MG/1
TABLET ORAL
Qty: 21 TABLET | Refills: 0 | Status: SHIPPED | OUTPATIENT
Start: 2019-03-11 | End: 2019-03-18 | Stop reason: SDUPTHER

## 2019-03-11 RX ORDER — LEVOFLOXACIN 250 MG/1
250 TABLET ORAL DAILY
Qty: 5 TABLET | Refills: 0 | Status: SHIPPED | OUTPATIENT
Start: 2019-03-11 | End: 2019-03-18

## 2019-03-11 NOTE — TELEPHONE ENCOUNTER
Best options to treat this at home and be successful    60 oz fluid/24 hr; very important  duonebs qid for 7 days  Refill levaquin 250 one a day #7  Medrol dose pack  When using steroids  A. Do not use anti-inflammatories such as Motrin/ibuprofen, Alleve/naprosyn, Mobic and like medications  B. Stay on your stomach medicines for ulcer/like (like prilosec, protonix, nexium)  C. If you are diabetic or pre-diabetic; it will raise your blood sugar.  For most people this will be a minimal and very tolerated elevation.  If you check your blood sugars (or have the ability to check) you should consider for the first days of the steroid Rx to check your blood sugar more often (1-3/days).  Call if you see blood sugars over 350.  Being more strict on your diabetic diet while using the steroid will help.     If using insulin: add sliding scale to your program:     Based on BS give this much insulin EXTRA to any other insulin/diabetic medication being used.     150-199 2 units  200-249 3 units  250-299 4 units  300-349 5 units  350-400 6 units  Greater 400 7 units    ########################    Current Outpatient Medications:   •  azelastine (OPTIVAR) 0.05 % ophthalmic solution, INSTILL 1 DROP INTO BOTH EYES TWICE DAILY GENERIC FOR OPTIVAR, Disp: 6 mL, Rfl: 6  •  B Complex-C (SUPER B COMPLEX/VITAMIN C PO), Take  by mouth daily., Disp: , Rfl:   •  budesonide-formoterol (SYMBICORT) 80-4.5 MCG/ACT inhaler, Inhale 2 puffs 2 (Two) Times a Day., Disp: 30.6 g, Rfl: 3  •  CARTIA  MG 24 hr capsule, TAKE 1 CAPSULE DAILY GENERIC FOR CARDIZEM CD, Disp: 90 capsule, Rfl: 3  •  Cholecalciferol (VITAMIN D3 PO), Take 2,000 Int'l Units by mouth Daily., Disp: , Rfl:   •  Coenzyme Q10 (CO Q-10 PO), Take  by mouth daily., Disp: , Rfl:   •  dutasteride (AVODART) 0.5 MG capsule, TAKE 1 CAPSULE DAILY GENERIC FOR AVODART, Disp: 90 capsule, Rfl: 3  •  esomeprazole (nexIUM) 40 MG capsule, Take 1 capsule by mouth Every Morning Before Breakfast.,  Disp: 90 capsule, Rfl: 2  •  furosemide (LASIX) 40 MG tablet, Take 0.5 tablets by mouth Daily. May take one half tablet additionally prn swelling (Patient taking differently: Take 60 mg by mouth Daily. May take one half tablet additionally prn swelling), Disp: , Rfl:   •  guaiFENesin (MUCINEX) 600 MG 12 hr tablet, Take 600 mg by mouth Every 12 (Twelve) Hours., Disp: , Rfl:   •  midodrine (PROAMATINE) 5 MG tablet, Take 1 tablet by mouth 3 (Three) Times a Day., Disp: 90 tablet, Rfl: 6  •  Multiple Vitamins-Minerals (MULTIVITAMIN PO), Take 1 tablet by mouth daily., Disp: , Rfl:   •  O2 (OXYGEN), Inhale 1 L/min Every Night. As needed at bedtime. , Disp: , Rfl:   •  Omega-3 Fatty Acids (FISH OIL PO), Take 1,000 mg by mouth 2 (Two) Times a Day., Disp: , Rfl:   •  polyethylene glycol (MIRALAX) packet, Take 17 g by mouth Daily., Disp: 30 each, Rfl: 1  •  PROAIR  (90 Base) MCG/ACT inhaler, Every 4 (Four) Hours As Needed for Shortness of Air., Disp: , Rfl: 5  •  ramipril (ALTACE) 2.5 MG capsule, TAKE 1 CAPSULE AT BEDTIME GENERIC FOR ALTACE, Disp: 90 capsule, Rfl: 1  •  tamsulosin (FLOMAX) 0.4 MG capsule 24 hr capsule, Take 1 capsule by mouth Daily., Disp: 90 capsule, Rfl: 3  •  XARELTO 15 MG tablet, TAKE ONE TABLET DAILY, Disp: 90 tablet, Rfl: 3

## 2019-03-11 NOTE — TELEPHONE ENCOUNTER
"Vm \"he isnt feeling well the last 3-4 days, he doesn't have a fever, but he is coughing and has a lot of gunk in his head. Would Dr William want to see him or call him in something?\"  "

## 2019-03-11 NOTE — TELEPHONE ENCOUNTER
Spoke to pt alise and advised, he has the neb solutions at home and doesn't need refill of them, did sent in the Levaquin and medrol to [facility]

## 2019-03-18 DIAGNOSIS — J44.9 CHRONIC OBSTRUCTIVE PULMONARY DISEASE, UNSPECIFIED COPD TYPE (HCC): ICD-10-CM

## 2019-03-18 DIAGNOSIS — J44.1 COPD EXACERBATION (HCC): ICD-10-CM

## 2019-03-18 DIAGNOSIS — J45.909 ASTHMA, UNSPECIFIED ASTHMA SEVERITY, UNSPECIFIED WHETHER COMPLICATED, UNSPECIFIED WHETHER PERSISTENT: ICD-10-CM

## 2019-03-18 RX ORDER — METHYLPREDNISOLONE 4 MG/1
TABLET ORAL
Qty: 21 TABLET | Refills: 0 | Status: SHIPPED | OUTPATIENT
Start: 2019-03-18 | End: 2019-04-08

## 2019-03-18 NOTE — TELEPHONE ENCOUNTER
Confirm no fever  May repeat the medrol  Continue the steroids  Get CXR    IF he feels he is alittle better and mostly just a cough  OR   ER at anytime

## 2019-03-18 NOTE — TELEPHONE ENCOUNTER
Wants to get chest xray at [facility] and order given and medrol to md, order done and given to pt

## 2019-03-18 NOTE — TELEPHONE ENCOUNTER
"Pt is here for labs, wife filled out nurse form \"his cough is bad, he took all the prescriptions that Dr William gave him, and he is not any better\"  "

## 2019-03-29 DIAGNOSIS — D64.9 ANEMIA, UNSPECIFIED TYPE: ICD-10-CM

## 2019-03-29 DIAGNOSIS — I10 ESSENTIAL HYPERTENSION: Primary | ICD-10-CM

## 2019-03-29 DIAGNOSIS — E55.9 VITAMIN D INSUFFICIENCY: ICD-10-CM

## 2019-03-29 DIAGNOSIS — N18.30 STAGE 3 CHRONIC KIDNEY DISEASE (HCC): ICD-10-CM

## 2019-03-30 LAB
25(OH)D3+25(OH)D2 SERPL-MCNC: 68.7 NG/ML (ref 30–100)
ALBUMIN SERPL-MCNC: 3.4 G/DL (ref 3.5–5.2)
ALBUMIN/GLOB SERPL: 1.5 G/DL
ALP SERPL-CCNC: 72 U/L (ref 39–117)
ALT SERPL-CCNC: 9 U/L (ref 1–41)
APPEARANCE UR: CLEAR
AST SERPL-CCNC: 10 U/L (ref 1–40)
BACTERIA #/AREA URNS HPF: ABNORMAL /HPF
BASOPHILS # BLD AUTO: 0.01 10*3/MM3 (ref 0–0.2)
BASOPHILS NFR BLD AUTO: 0.1 % (ref 0–1.5)
BILIRUB SERPL-MCNC: 0.5 MG/DL (ref 0.2–1.2)
BILIRUB UR QL STRIP: NEGATIVE
BUN SERPL-MCNC: 42 MG/DL (ref 8–23)
BUN/CREAT SERPL: 17.5 (ref 7–25)
CALCIUM SERPL-MCNC: 8.6 MG/DL (ref 8.2–9.6)
CASTS URNS MICRO: ABNORMAL
CHLORIDE SERPL-SCNC: 97 MMOL/L (ref 98–107)
CO2 SERPL-SCNC: 23.7 MMOL/L (ref 22–29)
COLOR UR: YELLOW
CREAT SERPL-MCNC: 2.4 MG/DL (ref 0.76–1.27)
EOSINOPHIL # BLD AUTO: 0.17 10*3/MM3 (ref 0–0.4)
EOSINOPHIL NFR BLD AUTO: 2 % (ref 0.3–6.2)
EPI CELLS #/AREA URNS HPF: ABNORMAL /HPF
ERYTHROCYTE [DISTWIDTH] IN BLOOD BY AUTOMATED COUNT: 14.1 % (ref 12.3–15.4)
GLOBULIN SER CALC-MCNC: 2.2 GM/DL
GLUCOSE SERPL-MCNC: 94 MG/DL (ref 65–99)
GLUCOSE UR QL: NEGATIVE
HCT VFR BLD AUTO: 39.6 % (ref 37.5–51)
HGB BLD-MCNC: 12.4 G/DL (ref 13–17.7)
HGB UR QL STRIP: NEGATIVE
IMM GRANULOCYTES # BLD AUTO: 0.04 10*3/MM3 (ref 0–0.05)
IMM GRANULOCYTES NFR BLD AUTO: 0.5 % (ref 0–0.5)
IRON SERPL-MCNC: 34 MCG/DL (ref 59–158)
KETONES UR QL STRIP: NEGATIVE
LEUKOCYTE ESTERASE UR QL STRIP: (no result)
LYMPHOCYTES # BLD AUTO: 0.91 10*3/MM3 (ref 0.7–3.1)
LYMPHOCYTES NFR BLD AUTO: 10.5 % (ref 19.6–45.3)
MAGNESIUM SERPL-MCNC: 2.1 MG/DL (ref 1.6–2.4)
MCH RBC QN AUTO: 29.5 PG (ref 26.6–33)
MCHC RBC AUTO-ENTMCNC: 31.3 G/DL (ref 31.5–35.7)
MCV RBC AUTO: 94.1 FL (ref 79–97)
MONOCYTES # BLD AUTO: 1.1 10*3/MM3 (ref 0.1–0.9)
MONOCYTES NFR BLD AUTO: 12.7 % (ref 5–12)
NEUTROPHILS # BLD AUTO: 6.46 10*3/MM3 (ref 1.4–7)
NEUTROPHILS NFR BLD AUTO: 74.2 % (ref 42.7–76)
NITRITE UR QL STRIP: NEGATIVE
NRBC BLD AUTO-RTO: 0 /100 WBC (ref 0–0)
PH UR STRIP: 5.5 [PH] (ref 5–8)
PHOSPHATE SERPL-MCNC: 2.7 MG/DL (ref 2.5–4.5)
PLATELET # BLD AUTO: 154 10*3/MM3 (ref 140–450)
POTASSIUM SERPL-SCNC: 4.1 MMOL/L (ref 3.5–5.2)
PROT SERPL-MCNC: 5.6 G/DL (ref 6–8.5)
PROT UR QL STRIP: NEGATIVE
PTH-INTACT SERPL-MCNC: 54 PG/ML (ref 15–65)
RBC # BLD AUTO: 4.21 10*6/MM3 (ref 4.14–5.8)
RBC #/AREA URNS HPF: ABNORMAL /HPF
SODIUM SERPL-SCNC: 138 MMOL/L (ref 136–145)
SP GR UR: 1.02 (ref 1–1.03)
URATE SERPL-MCNC: 8 MG/DL (ref 3.4–7)
UROBILINOGEN UR STRIP-MCNC: (no result) MG/DL
WBC # BLD AUTO: 8.69 10*3/MM3 (ref 3.4–10.8)
WBC #/AREA URNS HPF: ABNORMAL /HPF

## 2019-04-08 ENCOUNTER — OFFICE VISIT (OUTPATIENT)
Dept: FAMILY MEDICINE CLINIC | Facility: CLINIC | Age: 84
End: 2019-04-08

## 2019-04-08 VITALS
TEMPERATURE: 97.5 F | HEIGHT: 70 IN | BODY MASS INDEX: 23.05 KG/M2 | WEIGHT: 161 LBS | DIASTOLIC BLOOD PRESSURE: 66 MMHG | RESPIRATION RATE: 18 BRPM | OXYGEN SATURATION: 93 % | SYSTOLIC BLOOD PRESSURE: 112 MMHG | HEART RATE: 83 BPM

## 2019-04-08 DIAGNOSIS — I50.30 CONGESTIVE HEART FAILURE WITH LEFT VENTRICULAR DIASTOLIC DYSFUNCTION, NYHA CLASS 3 (HCC): ICD-10-CM

## 2019-04-08 DIAGNOSIS — R11.0 NAUSEA: ICD-10-CM

## 2019-04-08 DIAGNOSIS — R63.4 WEIGHT LOSS: ICD-10-CM

## 2019-04-08 DIAGNOSIS — R60.1 GENERALIZED EDEMA: ICD-10-CM

## 2019-04-08 DIAGNOSIS — R06.02 SHORTNESS OF BREATH: Primary | ICD-10-CM

## 2019-04-08 DIAGNOSIS — I10 ESSENTIAL HYPERTENSION: Chronic | ICD-10-CM

## 2019-04-08 DIAGNOSIS — Z79.01 ANTICOAGULATED: ICD-10-CM

## 2019-04-08 DIAGNOSIS — K21.9 GASTROESOPHAGEAL REFLUX DISEASE, ESOPHAGITIS PRESENCE NOT SPECIFIED: Chronic | ICD-10-CM

## 2019-04-08 DIAGNOSIS — J44.9 COPD, GROUP B, BY GOLD 2013 CLASSIFICATION (HCC): Chronic | ICD-10-CM

## 2019-04-08 DIAGNOSIS — R60.9 EDEMA, UNSPECIFIED TYPE: ICD-10-CM

## 2019-04-08 DIAGNOSIS — N18.30 STAGE 3 CHRONIC KIDNEY DISEASE (HCC): ICD-10-CM

## 2019-04-08 PROCEDURE — 99213 OFFICE O/P EST LOW 20 MIN: CPT | Performed by: FAMILY MEDICINE

## 2019-04-08 NOTE — PROGRESS NOTES
Subjective   James Birch is a 89 y.o. male presenting with chief complaint of:   Chief Complaint   Patient presents with   • Hypertension       History of Present Illness :  With wife/son.  Here for some acute issues: recent frequent/?recurrent URIs.  Along with this has had tree in for decreased appetite and therefore oral intake and developing weight loss; ongoing more recent worsening leg edema.  As we suggested saw nephrology last week and was told he was approaching dialysis; to elevate legs encourage fluids and may be others (no note yet received).   Has multiple chronic problems to consider that might have a bearing on today's issues; somewhat an interval appointment.       Review for last echo  Results for orders placed during the hospital encounter of 05/25/18   Adult Transthoracic Echo Limited W/ Cont if Necessary Per Protocol    Narrative · Left ventricular systolic function is normal. Estimated ejection   fraction is 61-65%.  · Right ventricular cavity is mildly dilated. Systolic function is normal.  · There is aortic valve sclerosis without significant stensois.        10.2018 admit review  DISCHARGE ASSESSMENT:  Reasons for admit/problems address while here:   Acute renal injury (with CKD) GFR 28 usually 30s  Dehydration (b/cr 18.3)  Nausea-may constipation, pain Rx; ? others  Nausea/vomiting-same  Gait decline-acute  Recent fall  Risk falls  Recent UE skin tear  Recent distal radius fracture  Constipation-narcotic influenced    Chronic/acute problems reviewed today:   1. SOB: copd: Chronic/variable.  His shortness of breath is driven by COPD, diastolic changes, volume overload (related to his chronic kidney disease) as a major players.  Currently is tolerated without respiratory failure.   2. Weight loss chronic may be part of his COPD he is going into phase again of weight loss is seems to be somewhat driven by anorexia and even at times nausea.   3. Congestive heart failure with left  ventricular diastolic dysfunction, NYHA class 3 (CMS/[facility]) : Chronic/stable.  Denies significant orthopnea, weight gain but on/off sob mild and leg edema..  Aware of influence diet/salt and watching weight at home.       4. Essential hypertension: Chronic/stable. Stable here/if home blood pressures.  No significant chest pain, SOB, LE edema, orthopnea, near syncope, dizziness/light headness.      5. COPD, group B, by GOLD 2013 classification : Chronic/stable mild occ cough, sob, wheeze.  Rx helps.   No smoking.       6. Gastroesophageal reflux disease, esophagitis presence not specified: Chronic/stable.  Controlled heartburn, reflux; no dysphagia, melena.  Rx helps.     7. Stage 3 chronic kidney disease (CMS/[facility]): Chronic/variable.  His recent GFR was in decline and I am concerned he is moving from 3-4.  I think it is a primary player in his volume overload leg edema and even his shortness of breath anorexia nausea.   8. Ugajxbglwyvgpd-ahg-xxwf/xarelto: Chronic/stable reason and use of.  Denies bleeding issues; especially epistaxis, melena, hematochezia.  Upper arms bruise easily.      9. Nausea:chronic/variable and recently more bothersome.    10. Edema, unspecified type: chronic/variable and recently more bothersome.      Has an/another acute issue today: none.    The following portions of the patient's history were reviewed and updated as appropriate: allergies, current medications, past family history, past medical history, past social history, past surgical history and problem list.      Current Outpatient Medications:   •  azelastine (OPTIVAR) 0.05 % ophthalmic solution, INSTILL 1 DROP INTO BOTH EYES TWICE DAILY GENERIC FOR OPTIVAR, Disp: 6 mL, Rfl: 6  •  B Complex-C (SUPER B COMPLEX/VITAMIN C PO), Take  by mouth daily., Disp: , Rfl:   •  budesonide-formoterol (SYMBICORT) 80-4.5 MCG/ACT inhaler, Inhale 2 puffs 2 (Two) Times a Day., Disp: 30.6 g, Rfl: 3  •  carbonyl iron (FEOSOL) 45 MG tablet tablet, Take 2  tablets by mouth 1 (One) Time Per Week., Disp: , Rfl:   •  CARTIA  MG 24 hr capsule, TAKE 1 CAPSULE DAILY GENERIC FOR CARDIZEM CD, Disp: 90 capsule, Rfl: 3  •  Cholecalciferol (VITAMIN D3 PO), Take 2,000 Int'l Units by mouth Daily., Disp: , Rfl:   •  Coenzyme Q10 (CO Q-10 PO), Take  by mouth daily., Disp: , Rfl:   •  dutasteride (AVODART) 0.5 MG capsule, TAKE 1 CAPSULE DAILY GENERIC FOR AVODART, Disp: 90 capsule, Rfl: 3  •  esomeprazole (nexIUM) 40 MG capsule, Take 1 capsule by mouth Every Morning Before Breakfast., Disp: 90 capsule, Rfl: 2  •  furosemide (LASIX) 40 MG tablet, Take 0.5 tablets by mouth Daily. May take one half tablet additionally prn swelling (Patient taking differently: Take 60 mg by mouth Daily. May take one half tablet additionally prn swelling), Disp: , Rfl:   •  guaiFENesin (MUCINEX) 600 MG 12 hr tablet, Take 600 mg by mouth Every 12 (Twelve) Hours., Disp: , Rfl:   •  Multiple Vitamins-Minerals (MULTIVITAMIN PO), Take 1 tablet by mouth daily., Disp: , Rfl:   •  O2 (OXYGEN), Inhale 1 L/min Every Night. As needed at bedtime. , Disp: , Rfl:   •  Omega-3 Fatty Acids (FISH OIL PO), Take 1,000 mg by mouth 2 (Two) Times a Day., Disp: , Rfl:   •  polyethylene glycol (MIRALAX) packet, Take 17 g by mouth Daily., Disp: 30 each, Rfl: 1  •  PROAIR  (90 Base) MCG/ACT inhaler, Every 4 (Four) Hours As Needed for Shortness of Air., Disp: , Rfl: 5  •  ramipril (ALTACE) 2.5 MG capsule, TAKE 1 CAPSULE AT BEDTIME GENERIC FOR ALTACE, Disp: 90 capsule, Rfl: 1  •  tamsulosin (FLOMAX) 0.4 MG capsule 24 hr capsule, Take 1 capsule by mouth Daily., Disp: 90 capsule, Rfl: 3  •  XARELTO 15 MG tablet, TAKE ONE TABLET DAILY, Disp: 90 tablet, Rfl: 3    No problems with medications.  Refills if needed done    No Known Allergies    Review of Systems  GENERAL:  Inactive/slower with limits, speed, stamina for age and gait . Sleep is ok with oxygen.  No fever now.  ENDO:  No syncope, near or diaphoretic sweaty  spells.  HEENT: No head injury or headache.   No vision change.  Same hearing loss.  Ears without pain/drainage.  No sore throat.  No significant nasal/sinus congestion/drainage. No epistaxis.  CHEST: No chest wall tenderness or mass. More cough, with mild occ wheeze.  No SOB; no hemoptysis.  CV: No exertional chest pain, palpitations; end of day ankle edema.  GI: No  dysphagia.  No abdominal pain, diarrhea, constipation.  No rectal bleeding, or melena.  No nausea and vomiting/heartburn.    :  Voids without dysuria, or incontinence to completion.  ORTHO: No painful/swollen joints but various on /off sore.  No change occ sore neck or back.  No acute neck or back pain without recent injury.   NEURO: No dizziness, weakness of extremities.  No change UE/LE mild numbness/paresthesias.   PSYCH: Mild ? memory loss.  Mood good; occ anxious, depressed but/and not suicidal.  Tries to tolerate stress   Screening:  Mammogram: NA  Bone density: NA  Low dose CT chest: NA  GI:   Colonoscopy+nl/[facility]/9.5.12/5y-reminded  EGD+biop/[facility]/8.31.15  Prostate: urology past  Usual lab order  3m CBC, BMP  6m CBC, CMP, iron  12m CBC, CMP, LIPID, TSH, Vit D, iron, ferritin, B12, folate       Lab Results:  Results for orders placed or performed in visit on 03/29/19   Comprehensive Metabolic Panel   Result Value Ref Range    Glucose 94 65 - 99 mg/dL    BUN 42 (H) 8 - 23 mg/dL    Creatinine 2.40 (H) 0.76 - 1.27 mg/dL    eGFR Non African Am 26 (L) >60 mL/min/1.73    eGFR African Am 31 (L) >60 mL/min/1.73    BUN/Creatinine Ratio 17.5 7.0 - 25.0    Sodium 138 136 - 145 mmol/L    Potassium 4.1 3.5 - 5.2 mmol/L    Chloride 97 (L) 98 - 107 mmol/L    Total CO2 23.7 22.0 - 29.0 mmol/L    Calcium 8.6 8.2 - 9.6 mg/dL    Total Protein 5.6 (L) 6.0 - 8.5 g/dL    Albumin 3.40 (L) 3.50 - 5.20 g/dL    Globulin 2.2 gm/dL    A/G Ratio 1.5 g/dL    Total Bilirubin 0.5 0.2 - 1.2 mg/dL    Alkaline Phosphatase 72 39 - 117 U/L    AST (SGOT) 10 1  - 40 U/L    ALT (SGPT) 9 1 - 41 U/L   Iron   Result Value Ref Range    Iron 34 (L) 59 - 158 mcg/dL   Magnesium   Result Value Ref Range    Magnesium 2.1 1.6 - 2.4 mg/dL   Phosphorus   Result Value Ref Range    Phosphorus 2.7 2.5 - 4.5 mg/dL   Uric Acid   Result Value Ref Range    Uric Acid 8.0 (H) 3.4 - 7.0 mg/dL   Vitamin D 25 hydroxy   Result Value Ref Range    25 Hydroxy, Vitamin D 68.7 30.0 - 100.0 ng/ml   PTH, Intact   Result Value Ref Range    PTH, Intact 54 15 - 65 pg/mL   Microscopic Examination -   Result Value Ref Range    WBC, UA 3-5 (A) /HPF    RBC, UA 0-2 /HPF    Epithelial Cells (non renal) 0-2 /HPF    Cast Type Comment     Bacteria, UA Comment None Seen /HPF   CBC & Differential   Result Value Ref Range    WBC 8.69 3.40 - 10.80 10*3/mm3    RBC 4.21 4.14 - 5.80 10*6/mm3    Hemoglobin 12.4 (L) 13.0 - 17.7 g/dL    Hematocrit 39.6 37.5 - 51.0 %    MCV 94.1 79.0 - 97.0 fL    MCH 29.5 26.6 - 33.0 pg    MCHC 31.3 (L) 31.5 - 35.7 g/dL    RDW 14.1 12.3 - 15.4 %    Platelets 154 140 - 450 10*3/mm3    Neutrophil Rel % 74.2 42.7 - 76.0 %    Lymphocyte Rel % 10.5 (L) 19.6 - 45.3 %    Monocyte Rel % 12.7 (H) 5.0 - 12.0 %    Eosinophil Rel % 2.0 0.3 - 6.2 %    Basophil Rel % 0.1 0.0 - 1.5 %    Neutrophils Absolute 6.46 1.40 - 7.00 10*3/mm3    Lymphocytes Absolute 0.91 0.70 - 3.10 10*3/mm3    Monocytes Absolute 1.10 (H) 0.10 - 0.90 10*3/mm3    Eosinophils Absolute 0.17 0.00 - 0.40 10*3/mm3    Basophils Absolute 0.01 0.00 - 0.20 10*3/mm3    Immature Granulocyte Rel % 0.5 0.0 - 0.5 %    Immature Grans Absolute 0.04 0.00 - 0.05 10*3/mm3    nRBC 0.0 0.0 - 0.0 /100 WBC   Urinalysis With Microscopic - Urine, Clean Catch   Result Value Ref Range    Specific Gravity, UA 1.020 1.005 - 1.030    pH, UA 5.5 5.0 - 8.0    Color, UA Yellow     Appearance, UA Clear Clear    Leukocytes, UA See below: (A) Negative    Protein Negative Negative    Glucose, UA Negative Negative    Ketones Negative Negative    Blood, UA Negative Negative     Bilirubin, UA Negative Negative    Urobilinogen, UA Comment     Nitrite, UA Negative Negative       A1C:No results for input(s): HGBA1C in the last 96275 hours.  PSA:No results for input(s): PSA in the last 82773 hours.  CBC:  Lab Results - Last 18 Months   Lab Units 03/29/19  0800 02/04/19  0807 01/14/19  0855 12/10/18  1241 10/13/18  0552 10/12/18  0646 10/11/18  1417  05/15/18  0801   WBC 10*3/mm3 8.69 5.43 6.45 7.81 4.75* 4.86 5.03   < > 6.32   HEMOGLOBIN g/dL 12.4* 12.1* 12.7* 11.9* 11.4* 12.4* 11.8*   < > 15.4   HEMATOCRIT % 39.6 37.3* 41.0 36.8* 33.7* 36.0* 33.7*   < > 47.3   PLATELETS 10*3/mm3 154 177 215 218 198 173 212   < > 193   IRON mcg/dL 34* 80 74 36*  --   --  45  --  91    < > = values in this interval not displayed.      BMP/CMP:  Lab Results - Last 18 Months   Lab Units 03/29/19  0800 02/04/19  0807 01/14/19  0855 12/10/18  1241 10/17/18  1008 10/13/18  0552 10/12/18  0622  10/02/18  1002  05/15/18  0801   SODIUM mmol/L 138 137 138 136 136 137 135   < > 139   < > 138   POTASSIUM mmol/L 4.1 4.0 4.1 4.3 3.9 3.9 4.1   < > 3.7   < > 4.3   CHLORIDE mmol/L 97* 100 98 97* 99 102 103   < > 98   < > 97*   TOTAL CO2 mmol/L 23.7 26.0 29.0 26.0 27.0  --   --   --  26.0  --  30.0   CO2 mmol/L  --   --   --   --   --  28.0 24.0   < >  --    < >  --    GLUCOSE mg/dL 94 66* 52* 96 78  --   --   --  103*  --  103*   BUN mg/dL 42* 32* 34* 29* 20 23* 32*   < > 31*   < > 40*   CREATININE mg/dL 2.40* 2.15* 1.94* 2.04* 1.65* 1.70* 1.92*   < > 2.11*   < > 1.88*   EGFR IF NONAFRICN AM mL/min/1.73 26* 29* 33* 31* 39* 38* 33*   < > 30*   < > 34*   EGFR IF AFRICN AM mL/min/1.73 31* 35* 40* 37* 48*  --   --   --  36*  --  41*   CALCIUM mg/dL 8.6 9.2 9.4 8.7 9.2 8.6 8.8   < > 9.4   < > 9.9    < > = values in this interval not displayed.     HEPATIC:  Lab Results - Last 18 Months   Lab Units 03/29/19  0800 12/10/18  1241 10/11/18  1417 10/02/18  1002 05/15/18  0801   ALT (SGPT) U/L 9 16 22 16 20   AST (SGOT) U/L 10 15 27 20  "22   ALK PHOS U/L 72 87 57 89 71     THYROID:  Lab Results - Last 18 Months   Lab Units 10/11/18  1417 05/15/18  0801   TSH mIU/mL 0.321* 1.330       Objective   /66 (BP Location: Left arm, Patient Position: Sitting, Cuff Size: Adult)   Pulse 83   Temp 97.5 °F (36.4 °C) (Oral)   Resp 18   Ht 177.8 cm (70\")   Wt 73 kg (161 lb)   SpO2 93%   BMI 23.10 kg/m²   Body mass index is 23.1 kg/m².    Physical Exam  GENERAL:  Thin AFA developed in no acute distress.  SKIN: Turgor excellent, without rash lesion.  HEENT: Normal cephalic without trauma.  Pupils equal round reactive to light. Extraocular motions full without nystagmus.   External canals nonobstructive nontender without reddness. Tymphatic membranes calos with shelia structures intact.   Oral cavity without growths, exudates, and moist.  Posterior pharynx without mass, obstruction, redness.  No thyromegaly, mass, tenderness, lymphadenopathy and supple.  CV: Irregular irregular rhythm.  No murmur, gallop; 1-2/4 pitting ankle lower leg edema. Posterior pulses intact.  No carotid bruits.  CHEST: No chest wall tenderness or mass.   LUNGS: Symmetric motion with clear/mild reduced to auscultation.   ABD: Soft, nontender without mass.   ORTHO: Symmetric extremities without swelling/point tenderness.  Full gross range of motion.   NEURO: CN 2-12 grossly intact.  Symmetric facies. 1/4 x bicep equal reflexes.  UE/LE   3/5 strength throughout.  Nonfocal use extremities. Speech clear.   PSYCH: Oriented x 3.  Pleasant calm, well kept.  Purposeful/directed conservation with intact short/long gross memory.       Assessment/Plan     1. SOB: copd,     2. Weight loss    3. Congestive heart failure with left ventricular diastolic dysfunction, NYHA class 3 (CMS/[facility])    4. Essential hypertension    5. COPD, group B, by GOLD 2013 classification     6. Gastroesophageal reflux disease, esophagitis presence not specified    7. Stage 3 chronic kidney disease (CMS/[facility])    8. " Atzhrpfdiistte-lhb-lqbb/xarelto    9. Nausea    10. Edema, unspecified type    11. Generalized edema       Real concern for stage 3 to 4 renal (nausea, edema, fatigue)    Rx: reviewed/changes:  No orders of the defined types were placed in this encounter.    LAB/Testing/Referrals: reviewed/orders:   Today:   Orders Placed This Encounter   Procedures   • Comprehensive Metabolic Panel   • BNP   • Adult Transthoracic Echo Complete W/ Cont if Necessary Per Protocol   • CBC & Differential     Chronic/recurrent labs above or change to:   same    Discussions:   Maybe back to renal  Body mass index is 23.1 kg/m².  Patient's Body mass index is 23.1 kg/m². BMI is within normal parameters. No follow-up required..    Tobacco use reviewed:  Non-smoker      There are no Patient Instructions on file for this visit.    Follow up: Return for lab today/, lab;, Dr William-, as planned;.  Future Appointments   Date Time Provider Department Center   5/6/2019  8:40 AM LAB PC [facility] None   5/9/2019  1:45 PM Hemal William MD [facility] None   5/21/2019  9:15 AM Skyler Trimble MD [facility]   6/11/2019 10:40 AM Kiko Campos PA [facility] N [facility] None   6/13/2019  3:45 PM Bill Church MD [facility] None

## 2019-04-09 LAB
ALBUMIN SERPL-MCNC: 3.5 G/DL (ref 3.2–4.6)
ALBUMIN/GLOB SERPL: 1.5 {RATIO} (ref 1.2–2.2)
ALP SERPL-CCNC: 93 IU/L (ref 39–117)
ALT SERPL-CCNC: 13 IU/L (ref 0–44)
AST SERPL-CCNC: 17 IU/L (ref 0–40)
BASOPHILS # BLD AUTO: 0 X10E3/UL (ref 0–0.2)
BASOPHILS NFR BLD AUTO: 0 %
BILIRUB SERPL-MCNC: <0.2 MG/DL (ref 0–1.2)
BNP SERPL-MCNC: 103.3 PG/ML (ref 0–100)
BUN SERPL-MCNC: 27 MG/DL (ref 10–36)
BUN/CREAT SERPL: 14 (ref 10–24)
CALCIUM SERPL-MCNC: 8.7 MG/DL (ref 8.6–10.2)
CHLORIDE SERPL-SCNC: 97 MMOL/L (ref 96–106)
CO2 SERPL-SCNC: 23 MMOL/L (ref 20–29)
CREAT SERPL-MCNC: 1.94 MG/DL (ref 0.76–1.27)
EOSINOPHIL # BLD AUTO: 0.2 X10E3/UL (ref 0–0.4)
EOSINOPHIL NFR BLD AUTO: 5 %
ERYTHROCYTE [DISTWIDTH] IN BLOOD BY AUTOMATED COUNT: 14.2 % (ref 12.3–15.4)
GLOBULIN SER CALC-MCNC: 2.4 G/DL (ref 1.5–4.5)
GLUCOSE SERPL-MCNC: 108 MG/DL (ref 65–99)
HCT VFR BLD AUTO: 36.2 % (ref 37.5–51)
HGB BLD-MCNC: 11.9 G/DL (ref 13–17.7)
IMM GRANULOCYTES # BLD AUTO: 0 X10E3/UL (ref 0–0.1)
IMM GRANULOCYTES NFR BLD AUTO: 0 %
LYMPHOCYTES # BLD AUTO: 1 X10E3/UL (ref 0.7–3.1)
LYMPHOCYTES NFR BLD AUTO: 23 %
MCH RBC QN AUTO: 28.3 PG (ref 26.6–33)
MCHC RBC AUTO-ENTMCNC: 32.9 G/DL (ref 31.5–35.7)
MCV RBC AUTO: 86 FL (ref 79–97)
MONOCYTES # BLD AUTO: 0.3 X10E3/UL (ref 0.1–0.9)
MONOCYTES NFR BLD AUTO: 8 %
NEUTROPHILS # BLD AUTO: 2.9 X10E3/UL (ref 1.4–7)
NEUTROPHILS NFR BLD AUTO: 64 %
PLATELET # BLD AUTO: 259 X10E3/UL (ref 150–379)
POTASSIUM SERPL-SCNC: 4.2 MMOL/L (ref 3.5–5.2)
PROT SERPL-MCNC: 5.9 G/DL (ref 6–8.5)
RBC # BLD AUTO: 4.21 X10E6/UL (ref 4.14–5.8)
SODIUM SERPL-SCNC: 135 MMOL/L (ref 134–144)
WBC # BLD AUTO: 4.5 X10E3/UL (ref 3.4–10.8)

## 2019-04-09 NOTE — PROGRESS NOTES
Wife informed renal alittle better. BNP heart measure is not that bad, Echo pending. Has an appt this Thursday for it.Dr. William wants us to call Nephrology and get an appt with them again and see their thoughts on his edema, nausea

## 2019-04-11 ENCOUNTER — HOSPITAL ENCOUNTER (OUTPATIENT)
Dept: CARDIOLOGY | Facility: HOSPITAL | Age: 84
Discharge: HOME | End: 2019-04-11
Admitting: FAMILY MEDICINE

## 2019-04-11 VITALS
WEIGHT: 161 LBS | DIASTOLIC BLOOD PRESSURE: 64 MMHG | BODY MASS INDEX: 23.05 KG/M2 | HEIGHT: 70 IN | SYSTOLIC BLOOD PRESSURE: 101 MMHG

## 2019-04-11 DIAGNOSIS — R11.0 NAUSEA: ICD-10-CM

## 2019-04-11 DIAGNOSIS — R60.1 GENERALIZED EDEMA: ICD-10-CM

## 2019-04-11 DIAGNOSIS — I50.30 CONGESTIVE HEART FAILURE WITH LEFT VENTRICULAR DIASTOLIC DYSFUNCTION, NYHA CLASS 3 (HCC): ICD-10-CM

## 2019-04-11 DIAGNOSIS — R60.9 EDEMA, UNSPECIFIED TYPE: ICD-10-CM

## 2019-04-11 DIAGNOSIS — N18.30 STAGE 3 CHRONIC KIDNEY DISEASE (HCC): ICD-10-CM

## 2019-04-11 PROCEDURE — 93306 TTE W/DOPPLER COMPLETE: CPT | Performed by: INTERNAL MEDICINE

## 2019-04-11 PROCEDURE — 93306 TTE W/DOPPLER COMPLETE: CPT

## 2019-04-12 ENCOUNTER — TELEPHONE (OUTPATIENT)
Dept: FAMILY MEDICINE CLINIC | Facility: CLINIC | Age: 84
End: 2019-04-12

## 2019-04-12 LAB
BH CV ECHO MEAS - AO MAX PG (FULL): 9.5 MMHG
BH CV ECHO MEAS - AO MAX PG: 13.5 MMHG
BH CV ECHO MEAS - AO MEAN PG (FULL): 3.2 MMHG
BH CV ECHO MEAS - AO MEAN PG: 6.2 MMHG
BH CV ECHO MEAS - AO ROOT AREA (BSA CORRECTED): 1.8
BH CV ECHO MEAS - AO ROOT AREA: 9.6 CM^2
BH CV ECHO MEAS - AO ROOT DIAM: 4.2 CM
BH CV ECHO MEAS - AO V2 MAX: 184 CM/SEC
BH CV ECHO MEAS - AO V2 MEAN: 115.6 CM/SEC
BH CV ECHO MEAS - AO V2 VTI: 30.2 CM
BH CV ECHO MEAS - ASC AORTA: 3.5 CM
BH CV ECHO MEAS - AVA(I,A): 3.1 CM^2
BH CV ECHO MEAS - AVA(I,D): 3.1 CM^2
BH CV ECHO MEAS - AVA(V,A): 2.5 CM^2
BH CV ECHO MEAS - AVA(V,D): 2.5 CM^2
BH CV ECHO MEAS - BSA(HAYCOCK): 1.9 M^2
BH CV ECHO MEAS - BSA: 1.9 M^2
BH CV ECHO MEAS - BZI_BMI: 23.1 KILOGRAMS/M^2
BH CV ECHO MEAS - BZI_METRIC_HEIGHT: 177.8 CM
BH CV ECHO MEAS - BZI_METRIC_WEIGHT: 73 KG
BH CV ECHO MEAS - EDV(CUBED): 73.6 ML
BH CV ECHO MEAS - EDV(MOD-SP4): 27.7 ML
BH CV ECHO MEAS - EDV(TEICH): 78.1 ML
BH CV ECHO MEAS - EF(CUBED): 80.3 %
BH CV ECHO MEAS - EF(MOD-SP4): 66.5 %
BH CV ECHO MEAS - EF(TEICH): 73.1 %
BH CV ECHO MEAS - ESV(CUBED): 14.5 ML
BH CV ECHO MEAS - ESV(MOD-SP4): 9.3 ML
BH CV ECHO MEAS - ESV(TEICH): 21 ML
BH CV ECHO MEAS - FS: 41.8 %
BH CV ECHO MEAS - IVS/LVPW: 0.97
BH CV ECHO MEAS - IVSD: 1 CM
BH CV ECHO MEAS - LA DIMENSION: 3 CM
BH CV ECHO MEAS - LA/AO: 0.86
BH CV ECHO MEAS - LAT PEAK E' VEL: 12.6 CM/SEC
BH CV ECHO MEAS - LV DIASTOLIC VOL/BSA (35-75): 14.6 ML/M^2
BH CV ECHO MEAS - LV MASS(C)D: 139.6 GRAMS
BH CV ECHO MEAS - LV MASS(C)DI: 73.3 GRAMS/M^2
BH CV ECHO MEAS - LV MAX PG: 4 MMHG
BH CV ECHO MEAS - LV MEAN PG: 3 MMHG
BH CV ECHO MEAS - LV SYSTOLIC VOL/BSA (12-30): 4.9 ML/M^2
BH CV ECHO MEAS - LV V1 MAX: 100.3 CM/SEC
BH CV ECHO MEAS - LV V1 MEAN: 78.8 CM/SEC
BH CV ECHO MEAS - LV V1 VTI: 21 CM
BH CV ECHO MEAS - LVIDD: 4.2 CM
BH CV ECHO MEAS - LVIDS: 2.4 CM
BH CV ECHO MEAS - LVLD AP4: 7.1 CM
BH CV ECHO MEAS - LVLS AP4: 6.1 CM
BH CV ECHO MEAS - LVOT AREA (M): 4.5 CM^2
BH CV ECHO MEAS - LVOT AREA: 4.5 CM^2
BH CV ECHO MEAS - LVOT DIAM: 2.4 CM
BH CV ECHO MEAS - LVPWD: 1 CM
BH CV ECHO MEAS - MED PEAK E' VEL: 10.1 CM/SEC
BH CV ECHO MEAS - MV DEC TIME: 0.2 SEC
BH CV ECHO MEAS - MV E MAX VEL: 108.2 CM/SEC
BH CV ECHO MEAS - PA MAX PG: 1.2 MMHG
BH CV ECHO MEAS - PA V2 MAX: 55.8 CM/SEC
BH CV ECHO MEAS - RAP SYSTOLE: 5 MMHG
BH CV ECHO MEAS - RVSP: 34.2 MMHG
BH CV ECHO MEAS - SI(AO): 152.5 ML/M^2
BH CV ECHO MEAS - SI(CUBED): 31 ML/M^2
BH CV ECHO MEAS - SI(LVOT): 49.9 ML/M^2
BH CV ECHO MEAS - SI(MOD-SP4): 9.7 ML/M^2
BH CV ECHO MEAS - SI(TEICH): 30 ML/M^2
BH CV ECHO MEAS - SV(AO): 290.4 ML
BH CV ECHO MEAS - SV(CUBED): 59 ML
BH CV ECHO MEAS - SV(LVOT): 95 ML
BH CV ECHO MEAS - SV(MOD-SP4): 18.4 ML
BH CV ECHO MEAS - SV(TEICH): 57.1 ML
BH CV ECHO MEAS - TR MAX VEL: 270 CM/SEC
BH CV ECHO MEASUREMENTS AVERAGE E/E' RATIO: 9.53
LEFT ATRIUM VOLUME INDEX: 22 ML/M2
LEFT ATRIUM VOLUME: 41.8 CM3
MAXIMAL PREDICTED HEART RATE: 130 BPM
STRESS TARGET HR: 111 BPM

## 2019-04-12 RX ORDER — METOLAZONE 2.5 MG/1
TABLET ORAL
Qty: 20 TABLET | Refills: 0 | Status: SHIPPED | OUTPATIENT
Start: 2019-04-12 | End: 2019-05-27 | Stop reason: HOSPADM

## 2019-04-12 NOTE — TELEPHONE ENCOUNTER
Wife informed of the Dinesh. She is to call Nephrology and get an appt with them next week. Keep appt here for 4/23/2019 BMP then.

## 2019-04-12 NOTE — TELEPHONE ENCOUNTER
ro spoke to Dr Valente; his suggestion add zaroxyln and they will see next week.  (wife needs to call them and get apt and remember lab here 4.23.19)    Zaroxyln 2.5 mg one qod #20 NR  Extra 2w BMP around 4.23.19 as planned  1m CBC, BMP, iron  12m CBC, CMP, LIPID, TSH, Vit D, iron, ferritin, B12, folate    BMP/CMP:                 Lab Results - Last 18 Months   Lab Units 03/29/19  0800 02/04/19  0807 01/14/19  0855 12/10/18  1241 10/17/18  1008 10/13/18  0552 10/12/18  0622   10/02/18  1002   05/15/18  0801   SODIUM mmol/L 138 137 138 136 136 137 135   < > 139   < > 138   POTASSIUM mmol/L 4.1 4.0 4.1 4.3 3.9 3.9 4.1   < > 3.7   < > 4.3   CHLORIDE mmol/L 97* 100 98 97* 99 102 103   < > 98   < > 97*   TOTAL CO2 mmol/L 23.7 26.0 29.0 26.0 27.0  --   --   --  26.0  --  30.0   CO2 mmol/L  --   --   --   --   --  28.0 24.0   < >  --    < >  --    GLUCOSE mg/dL 94 66* 52* 96 78  --   --   --  103*  --  103*   BUN mg/dL 42* 32* 34* 29* 20 23* 32*   < > 31*   < > 40*   CREATININE mg/dL 2.40* 2.15* 1.94* 2.04* 1.65* 1.70* 1.92*   < > 2.11*   < > 1.88*   EGFR IF NONAFRICN AM mL/min/1.73 26* 29* 33* 31* 39* 38* 33*   < > 30*   < > 34*   EGFR IF AFRICN AM mL/min/1.73 31* 35* 40* 37* 48*  --   --   --  36*  --  41*   CALCIUM mg/dL 8.6 9.2 9.4 8.7 9.2 8.6 8.8   < > 9.4   < > 9.9    < > = values in this interval not displayed.        No Known Allergies      Current Outpatient Medications:   •  azelastine (OPTIVAR) 0.05 % ophthalmic solution, INSTILL 1 DROP INTO BOTH EYES TWICE DAILY GENERIC FOR OPTIVAR, Disp: 6 mL, Rfl: 6  •  B Complex-C (SUPER B COMPLEX/VITAMIN C PO), Take  by mouth daily., Disp: , Rfl:   •  budesonide-formoterol (SYMBICORT) 80-4.5 MCG/ACT inhaler, Inhale 2 puffs 2 (Two) Times a Day., Disp: 30.6 g, Rfl: 3  •  carbonyl iron (FEOSOL) 45 MG tablet tablet, Take 2 tablets by mouth 1 (One) Time Per Week., Disp: , Rfl:   •  CARTIA  MG 24 hr capsule, TAKE 1 CAPSULE DAILY GENERIC FOR CARDIZEM CD, Disp: 90 capsule,  Rfl: 3  •  Cholecalciferol (VITAMIN D3 PO), Take 2,000 Int'l Units by mouth Daily., Disp: , Rfl:   •  Coenzyme Q10 (CO Q-10 PO), Take  by mouth daily., Disp: , Rfl:   •  dutasteride (AVODART) 0.5 MG capsule, TAKE 1 CAPSULE DAILY GENERIC FOR AVODART, Disp: 90 capsule, Rfl: 3  •  esomeprazole (nexIUM) 40 MG capsule, Take 1 capsule by mouth Every Morning Before Breakfast., Disp: 90 capsule, Rfl: 2  •  furosemide (LASIX) 40 MG tablet, Take 0.5 tablets by mouth Daily. May take one half tablet additionally prn swelling (Patient taking differently: Take 60 mg by mouth Daily. May take one half tablet additionally prn swelling), Disp: , Rfl:   •  guaiFENesin (MUCINEX) 600 MG 12 hr tablet, Take 600 mg by mouth Every 12 (Twelve) Hours., Disp: , Rfl:   •  Multiple Vitamins-Minerals (MULTIVITAMIN PO), Take 1 tablet by mouth daily., Disp: , Rfl:   •  O2 (OXYGEN), Inhale 1 L/min Every Night. As needed at bedtime. , Disp: , Rfl:   •  Omega-3 Fatty Acids (FISH OIL PO), Take 1,000 mg by mouth 2 (Two) Times a Day., Disp: , Rfl:   •  polyethylene glycol (MIRALAX) packet, Take 17 g by mouth Daily., Disp: 30 each, Rfl: 1  •  PROAIR  (90 Base) MCG/ACT inhaler, Every 4 (Four) Hours As Needed for Shortness of Air., Disp: , Rfl: 5  •  ramipril (ALTACE) 2.5 MG capsule, TAKE 1 CAPSULE AT BEDTIME GENERIC FOR ALTACE, Disp: 90 capsule, Rfl: 1  •  tamsulosin (FLOMAX) 0.4 MG capsule 24 hr capsule, Take 1 capsule by mouth Daily., Disp: 90 capsule, Rfl: 3  •  XARELTO 15 MG tablet, TAKE ONE TABLET DAILY, Disp: 90 tablet, Rfl: 3

## 2019-04-12 NOTE — PROGRESS NOTES
Wife informed heart is ok. His main issue is his kidneys/lungs. He needs to review edema again with nephrology.She says she is not to see them again for 3 mos. They tell him he needs to drink more water.

## 2019-04-22 DIAGNOSIS — R60.9 EDEMA, UNSPECIFIED TYPE: Primary | ICD-10-CM

## 2019-04-23 ENCOUNTER — RESULTS ENCOUNTER (OUTPATIENT)
Dept: FAMILY MEDICINE CLINIC | Facility: CLINIC | Age: 84
End: 2019-04-23

## 2019-04-23 DIAGNOSIS — R60.9 EDEMA, UNSPECIFIED TYPE: ICD-10-CM

## 2019-04-24 RX ORDER — RAMIPRIL 2.5 MG/1
CAPSULE ORAL
Qty: 90 CAPSULE | Refills: 1 | Status: ON HOLD | OUTPATIENT
Start: 2019-04-24 | End: 2019-05-22

## 2019-04-27 LAB
BUN SERPL-MCNC: 32 MG/DL (ref 8–23)
BUN/CREAT SERPL: 14.1 (ref 7–25)
CALCIUM SERPL-MCNC: 9.8 MG/DL (ref 8.2–9.6)
CHLORIDE SERPL-SCNC: 96 MMOL/L (ref 98–107)
CO2 SERPL-SCNC: 26.6 MMOL/L (ref 22–29)
CREAT SERPL-MCNC: 2.27 MG/DL (ref 0.76–1.27)
GLUCOSE SERPL-MCNC: 99 MG/DL (ref 65–99)
POTASSIUM SERPL-SCNC: 4.2 MMOL/L (ref 3.5–5.2)
SODIUM SERPL-SCNC: 136 MMOL/L (ref 136–145)

## 2019-05-01 RX ORDER — IPRATROPIUM BROMIDE AND ALBUTEROL SULFATE 2.5; .5 MG/3ML; MG/3ML
SOLUTION RESPIRATORY (INHALATION)
Qty: 180 VIAL | Refills: 6 | Status: SHIPPED | OUTPATIENT
Start: 2019-05-01 | End: 2019-12-09 | Stop reason: SDUPTHER

## 2019-05-06 DIAGNOSIS — I10 ESSENTIAL HYPERTENSION: Primary | ICD-10-CM

## 2019-05-06 DIAGNOSIS — N18.30 STAGE 3 CHRONIC KIDNEY DISEASE (HCC): ICD-10-CM

## 2019-05-07 LAB
BUN SERPL-MCNC: 36 MG/DL (ref 8–23)
BUN/CREAT SERPL: 15.3 (ref 7–25)
CALCIUM SERPL-MCNC: 9.5 MG/DL (ref 8.2–9.6)
CHLORIDE SERPL-SCNC: 98 MMOL/L (ref 98–107)
CO2 SERPL-SCNC: 27.9 MMOL/L (ref 22–29)
CREAT SERPL-MCNC: 2.35 MG/DL (ref 0.76–1.27)
GLUCOSE SERPL-MCNC: 87 MG/DL (ref 65–99)
POTASSIUM SERPL-SCNC: 4.1 MMOL/L (ref 3.5–5.2)
SODIUM SERPL-SCNC: 139 MMOL/L (ref 136–145)

## 2019-05-09 ENCOUNTER — OFFICE VISIT (OUTPATIENT)
Dept: FAMILY MEDICINE CLINIC | Facility: CLINIC | Age: 84
End: 2019-05-09

## 2019-05-09 VITALS
SYSTOLIC BLOOD PRESSURE: 80 MMHG | HEART RATE: 88 BPM | RESPIRATION RATE: 18 BRPM | DIASTOLIC BLOOD PRESSURE: 60 MMHG | HEIGHT: 70 IN | OXYGEN SATURATION: 96 % | BODY MASS INDEX: 23.77 KG/M2 | TEMPERATURE: 98.3 F | WEIGHT: 166 LBS

## 2019-05-09 DIAGNOSIS — I48.0 PAROXYSMAL ATRIAL FIBRILLATION (HCC): ICD-10-CM

## 2019-05-09 DIAGNOSIS — R60.0 LOCALIZED EDEMA: ICD-10-CM

## 2019-05-09 DIAGNOSIS — N18.30 STAGE 3 CHRONIC KIDNEY DISEASE (HCC): ICD-10-CM

## 2019-05-09 DIAGNOSIS — I10 ESSENTIAL HYPERTENSION: Chronic | ICD-10-CM

## 2019-05-09 DIAGNOSIS — I95.9 HYPOTENSION, UNSPECIFIED HYPOTENSION TYPE: Primary | ICD-10-CM

## 2019-05-09 DIAGNOSIS — R60.9 EDEMA, UNSPECIFIED TYPE: ICD-10-CM

## 2019-05-09 DIAGNOSIS — I50.30 CONGESTIVE HEART FAILURE WITH LEFT VENTRICULAR DIASTOLIC DYSFUNCTION, NYHA CLASS 3 (HCC): ICD-10-CM

## 2019-05-09 PROCEDURE — 99214 OFFICE O/P EST MOD 30 MIN: CPT | Performed by: FAMILY MEDICINE

## 2019-05-09 RX ORDER — TORSEMIDE 20 MG/1
1 TABLET ORAL 2 TIMES DAILY
Status: ON HOLD | COMMUNITY
Start: 2019-04-17 | End: 2019-05-27 | Stop reason: SDUPTHER

## 2019-05-09 NOTE — PROGRESS NOTES
Subjective   James Birch is a 89 y.o. male presenting with chief complaint of:   Chief Complaint   Patient presents with   • Hypertension     3 month follow-up.       History of Present Illness :  With wife/son.   Has multiple chronic problems to consider that might have a bearing on today's issues;  an interval appointment.   Some acute issue as has been mildly sob, increased leg edema and GFR drop; from CKD3 to 4.  Recent saw Sidra; advised to try another diuretic (no mention of declining).  Weak.     Chronic/acute problems reviewed today:   1. Hypotension, unspecified hypotension type: acute/variable.  Unaware other than weakness that bp was low.    2. Essential hypertension: Acute-chronic/variable with low bp home today.  Has been dizzy, light headed.  Recent change in diuretic from nephrology.     3. Congestive heart failure with left ventricular diastolic dysfunction, NYHA class 3 (CMS/HCC): Chronic/stable.  Denies significant sob, orthopnea, weight gain; less leg edema.  Aware of influence diet/salt and watching weight at home.       4. Stage 3 chronic kidney disease (CMS/HCC): chronic/worse probably stage 4 now.    5. Paroxysmal atrial fibrillation (CMS/[facility]): chronic/relegated to rate control and anticoagulation.   6. Edema, unspecified type: Function chronic/variable.  Influence by his CHF his renal and medications.  He is now keeping some level of edema all the time.   7. Localized edema chronic/worse-same.     Has an/another acute issue today: none.    The following portions of the patient's history were reviewed and updated as appropriate: allergies, current medications, past family history, past medical history, past social history, past surgical history and problem list.      Current Outpatient Medications:   •  azelastine (OPTIVAR) 0.05 % ophthalmic solution, INSTILL 1 DROP INTO BOTH EYES TWICE DAILY GENERIC FOR OPTIVAR, Disp: 6 mL, Rfl: 6  •  B Complex-C (SUPER B COMPLEX/VITAMIN C PO), Take   by mouth daily., Disp: , Rfl:   •  budesonide-formoterol (SYMBICORT) 80-4.5 MCG/ACT inhaler, Inhale 2 puffs 2 (Two) Times a Day., Disp: 30.6 g, Rfl: 3  •  carbonyl iron (FEOSOL) 45 MG tablet tablet, Take 2 tablets by mouth 1 (One) Time Per Week., Disp: , Rfl:   •  CARTIA  MG 24 hr capsule, TAKE 1 CAPSULE DAILY GENERIC FOR CARDIZEM CD, Disp: 90 capsule, Rfl: 3  •  Cholecalciferol (VITAMIN D3 PO), Take 2,000 Int'l Units by mouth Daily., Disp: , Rfl:   •  Coenzyme Q10 (CO Q-10 PO), Take  by mouth daily., Disp: , Rfl:   •  dutasteride (AVODART) 0.5 MG capsule, TAKE 1 CAPSULE DAILY GENERIC FOR AVODART, Disp: 90 capsule, Rfl: 3  •  esomeprazole (nexIUM) 40 MG capsule, Take 1 capsule by mouth Every Morning Before Breakfast., Disp: 90 capsule, Rfl: 2  •  guaiFENesin (MUCINEX) 600 MG 12 hr tablet, Take 600 mg by mouth Every 12 (Twelve) Hours., Disp: , Rfl:   •  ipratropium-albuterol (DUO-NEB) 0.5-2.5 mg/3 ml nebulizer, USE 1 VIAL IN NEBULIZER FOUR TIMES DAILY GENERIC FOR, Disp: 180 vial, Rfl: 6  •  metOLazone (ZAROXOLYN) 2.5 MG tablet, Take one tablet every other day, Disp: 20 tablet, Rfl: 0  •  Multiple Vitamins-Minerals (MULTIVITAMIN PO), Take 1 tablet by mouth daily., Disp: , Rfl:   •  O2 (OXYGEN), Inhale 1 L/min Every Night. As needed at bedtime. , Disp: , Rfl:   •  Omega-3 Fatty Acids (FISH OIL PO), Take 1,000 mg by mouth 2 (Two) Times a Day., Disp: , Rfl:   •  polyethylene glycol (MIRALAX) packet, Take 17 g by mouth Daily., Disp: 30 each, Rfl: 1  •  PROAIR  (90 Base) MCG/ACT inhaler, Every 4 (Four) Hours As Needed for Shortness of Air., Disp: , Rfl: 5  •  ramipril (ALTACE) 2.5 MG capsule, TAKE 1 CAPSULE AT BEDTIME GENERIC FOR ALTACE, Disp: 90 capsule, Rfl: 1  •  tamsulosin (FLOMAX) 0.4 MG capsule 24 hr capsule, Take 1 capsule by mouth Daily., Disp: 90 capsule, Rfl: 3  •  torsemide (DEMADEX) 20 MG tablet, 1 tablet 2 (Two) Times a Day., Disp: , Rfl:   •  XARELTO 15 MG tablet, TAKE ONE TABLET DAILY, Disp: 90  tablet, Rfl: 3    No problems with medications.  Refills if needed done    No Known Allergies    Review of Systems  GENERAL:  Inactive/slower with limits, speed, stamina for age and gait . Sleep is ok with oxygen.  No fever now.  ENDO:  No syncope, near or diaphoretic sweaty spells.  HEENT: No head injury or headache.   No vision change.  Same hearing loss.  Ears without pain/drainage.  No sore throat.  No significant nasal/sinus congestion/drainage. No epistaxis.  CHEST: No chest wall tenderness or mass. On/off cough, with mild occ wheeze.  No SOB; no hemoptysis.  CV: No exertional chest pain, palpitations; wors end of day ankle edema.  GI: No  dysphagia.  No abdominal pain, diarrhea, constipation.  No rectal bleeding, or melena.  No nausea and vomiting/heartburn.    :  Voids without dysuria, or incontinence to completion.  ORTHO: No painful/swollen joints but various on /off sore.  No change occ sore neck or back.  No acute neck or back pain without recent injury.   NEURO: Mild dizziness, weakness of extremities.  No change UE/LE mild numbness/paresthesias.   PSYCH: Mild ? memory loss.  Mood good; occ anxious, depressed but/and not suicidal.  Tries to tolerate stress   Screening:  Mammogram: NA  Bone density: NA  Low dose CT chest: NA  GI:   Colonoscopy+nl/[facility]/9.5.12/5y-reminded  EGD+biop/[facility]/8.31.15  Prostate: urology past  Usual lab order  3m CBC, BMP  6m CBC, CMP, iron  12m CBC, CMP, LIPID, TSH, Vit D, iron, ferritin, B12, folate       Lab Results:  Results for orders placed or performed in visit on 05/06/19   Basic Metabolic Panel   Result Value Ref Range    Glucose 87 65 - 99 mg/dL    BUN 36 (H) 8 - 23 mg/dL    Creatinine 2.35 (H) 0.76 - 1.27 mg/dL    eGFR Non African Am 26 (L) >60 mL/min/1.73    eGFR  Am 32 (L) >60 mL/min/1.73    BUN/Creatinine Ratio 15.3 7.0 - 25.0    Sodium 139 136 - 145 mmol/L    Potassium 4.1 3.5 - 5.2 mmol/L    Chloride 98 98 - 107 mmol/L    Total CO2  "27.9 22.0 - 29.0 mmol/L    Calcium 9.5 8.2 - 9.6 mg/dL       A1C:No results for input(s): HGBA1C in the last 88083 hours.  PSA:No results for input(s): PSA in the last 79181 hours.  CBC:  Lab Results - Last 18 Months   Lab Units 04/08/19  1329 03/29/19  0800 02/04/19  0807 01/14/19  0855 12/10/18  1241 10/13/18  0552 10/12/18  0646 10/11/18  1417  05/15/18  0801   WBC x10E3/uL 4.5 8.69 5.43 6.45 7.81 4.75* 4.86 5.03   < > 6.32   HEMOGLOBIN g/dL 11.9* 12.4* 12.1* 12.7* 11.9* 11.4* 12.4* 11.8*   < > 15.4   HEMATOCRIT % 36.2* 39.6 37.3* 41.0 36.8* 33.7* 36.0* 33.7*   < > 47.3   PLATELETS x10E3/uL 259 154 177 215 218 198 173 212   < > 193   IRON mcg/dL  --  34* 80 74 36*  --   --  45  --  91    < > = values in this interval not displayed.      BMP/CMP:  Lab Results - Last 18 Months   Lab Units 05/06/19  0735 04/26/19  0747 04/08/19  1329 03/29/19  0800 02/04/19  0807 01/14/19  0855 12/10/18  1241   SODIUM mmol/L 139 136 135 138 137 138 136   POTASSIUM mmol/L 4.1 4.2 4.2 4.1 4.0 4.1 4.3   CHLORIDE mmol/L 98 96* 97 97* 100 98 97*   TOTAL CO2 mmol/L 27.9 26.6 23 23.7 26.0 29.0 26.0   GLUCOSE mg/dL 87 99 108* 94 66* 52* 96   BUN mg/dL 36* 32* 27 42* 32* 34* 29*   CREATININE mg/dL 2.35* 2.27* 1.94* 2.40* 2.15* 1.94* 2.04*   EGFR IF NONAFRICN AM mL/min/1.73 26* 27* 30* 26* 29* 33* 31*   EGFR IF AFRICN AM mL/min/1.73 32* 33* 34* 31* 35* 40* 37*   CALCIUM mg/dL 9.5 9.8* 8.7 8.6 9.2 9.4 8.7     HEPATIC:  Lab Results - Last 18 Months   Lab Units 04/08/19  1329 03/29/19  0800 12/10/18  1241 10/11/18  1417 10/02/18  1002 05/15/18  0801   ALT (SGPT) IU/L 13 9 16 22 16 20   AST (SGOT) IU/L 17 10 15 27 20 22   ALK PHOS IU/L 93 72 87 57 89 71     THYROID:  Lab Results - Last 18 Months   Lab Units 10/11/18  1417 05/15/18  0801   TSH mIU/mL 0.321* 1.330       Objective   BP (!) 80/60 (BP Location: Left arm, Patient Position: Sitting, Cuff Size: Adult)   Pulse 88   Temp 98.3 °F (36.8 °C) (Oral)   Resp 18   Ht 177.8 cm (70\")   Wt 75.3 " kg (166 lb)   SpO2 96%   BMI 23.82 kg/m²   Body mass index is 23.82 kg/m².    Physical Exam  GENERAL:  Thin AFA developed in no acute distress.  SKIN: Turgor excellent, without rash lesion.  HEENT: Normal cephalic without trauma.  Pupils equal round reactive to light. Extraocular motions full without nystagmus.   External canals nonobstructive nontender without reddness. Tymphatic membranes calos with shelia structures intact.   Oral cavity without growths, exudates, and moist.  Posterior pharynx without mass, obstruction, redness.  No thyromegaly, mass, tenderness, lymphadenopathy and supple.  CV: Irregular irregular rhythm.  No murmur, gallop; 1-2/4 pitting ankle lower leg edema. Posterior pulses intact.  No carotid bruits.  CHEST: No chest wall tenderness or mass.   LUNGS: Symmetric motion with clear/mild reduced to auscultation.   ABD: Soft, nontender without mass.   ORTHO: Symmetric extremities without swelling/point tenderness.  Full gross range of motion.   NEURO: CN 2-12 grossly intact.  Symmetric facies. 1/4 x bicep equal reflexes.  UE/LE   3/5 strength throughout.  Nonfocal use extremities. Speech clear.   PSYCH: Oriented x 3.  Pleasant calm, well kept.  Purposeful/directed conservation with intact short/long gross memory.     Assessment/Plan     1. Hypotension, unspecified hypotension type    2. Essential hypertension    3. Congestive heart failure with left ventricular diastolic dysfunction, NYHA class 3 (CMS/HCC)    4. Stage 3 chronic kidney disease (CMS/[facility])    5. Paroxysmal atrial fibrillation (CMS/HCC)    6. Edema, unspecified type    7. Localized edema       Is getting increasingly difficult to control the edema (from CHF and chronic kidney disease (close and at the same time maintain blood pressure and eliminate fatigue.  Again he is went from chronic kidney disease 3-4 (probably a new permanent problem for him that he will now continue to live with) since he is on good Pearl I do not know whether  that could be stopped and with his renal insufficiency if it would be safe to try him on a rate control medicine such as digital alone.. We are going to ask cardiology how they feel about that.  I am asking the family patient to contact Dr. Valente and make him aware of today's blood pressure.  Just to be sure there is no DVT I am asking for scan.      It appears that his increase in diuretics is now caused hypotension.  Rx: reviewed/changes:  No orders of the defined types were placed in this encounter.    LAB/Testing/Referrals: reviewed/orders:   Today:   Orders Placed This Encounter   Procedures   • US Venous Doppler Lower Extremity Bilateral (duplex)     Chronic/recurrent labs above or change to:   same     Discussions:   All the above  Body mass index is 23.82 kg/m².  Patient's Body mass index is 23.82 kg/m². BMI is within normal parameters. No follow-up required..    Tobacco use reviewed:  Non-smoker      There are no Patient Instructions on file for this visit.    Follow up: Return for Dr William 3-4m.  Future Appointments   Date Time Provider Department Center   5/21/2019  9:15 AM Skyler Trimble MD [facility]   6/11/2019 10:40 AM Kiko Campos PA [facility] None   6/13/2019  3:45 PM Bill Church MD [facility] None   8/27/2019  2:15 PM Hemal William MD [facility] None

## 2019-05-10 ENCOUNTER — TELEPHONE (OUTPATIENT)
Dept: CARDIOLOGY | Facility: CLINIC | Age: 84
End: 2019-05-10

## 2019-05-10 DIAGNOSIS — R60.9 EDEMA, UNSPECIFIED TYPE: ICD-10-CM

## 2019-05-10 DIAGNOSIS — R60.0 LOCALIZED EDEMA: ICD-10-CM

## 2019-05-21 ENCOUNTER — APPOINTMENT (OUTPATIENT)
Dept: MRI IMAGING | Facility: HOSPITAL | Age: 84
End: 2019-05-21

## 2019-05-21 ENCOUNTER — OFFICE VISIT (OUTPATIENT)
Dept: CARDIOLOGY | Facility: CLINIC | Age: 84
End: 2019-05-21

## 2019-05-21 ENCOUNTER — HOSPITAL ENCOUNTER (INPATIENT)
Facility: HOSPITAL | Age: 84
LOS: 5 days | Discharge: NURSING, REHAB OR LONG-TERM CARE | End: 2019-05-27
Attending: FAMILY MEDICINE | Admitting: FAMILY MEDICINE

## 2019-05-21 VITALS
DIASTOLIC BLOOD PRESSURE: 50 MMHG | BODY MASS INDEX: 23.34 KG/M2 | HEIGHT: 70 IN | SYSTOLIC BLOOD PRESSURE: 100 MMHG | WEIGHT: 163 LBS | HEART RATE: 83 BPM

## 2019-05-21 DIAGNOSIS — Z86.73 HISTORY OF CVA (CEREBROVASCULAR ACCIDENT): ICD-10-CM

## 2019-05-21 DIAGNOSIS — E78.2 MIXED HYPERLIPIDEMIA: Chronic | ICD-10-CM

## 2019-05-21 DIAGNOSIS — I10 ESSENTIAL HYPERTENSION: Chronic | ICD-10-CM

## 2019-05-21 DIAGNOSIS — N18.30 STAGE 3 CHRONIC KIDNEY DISEASE (HCC): ICD-10-CM

## 2019-05-21 DIAGNOSIS — I48.0 PAROXYSMAL ATRIAL FIBRILLATION (HCC): ICD-10-CM

## 2019-05-21 DIAGNOSIS — Z74.09 IMPAIRED FUNCTIONAL MOBILITY, BALANCE, GAIT, AND ENDURANCE: ICD-10-CM

## 2019-05-21 DIAGNOSIS — I67.9 CEREBROVASCULAR SMALL VESSEL DISEASE: ICD-10-CM

## 2019-05-21 DIAGNOSIS — S22.000A COMPRESSION FRACTURE OF BODY OF THORACIC VERTEBRA (HCC): Primary | ICD-10-CM

## 2019-05-21 DIAGNOSIS — Z79.01 ANTICOAGULATED: ICD-10-CM

## 2019-05-21 DIAGNOSIS — Z78.9 IMPAIRED MOBILITY AND ADLS: ICD-10-CM

## 2019-05-21 DIAGNOSIS — Z74.09 IMPAIRED MOBILITY AND ADLS: ICD-10-CM

## 2019-05-21 DIAGNOSIS — I50.32 CHF (CONGESTIVE HEART FAILURE), NYHA CLASS III, CHRONIC, DIASTOLIC (HCC): Primary | Chronic | ICD-10-CM

## 2019-05-21 DIAGNOSIS — J44.9 COPD, GROUP B, BY GOLD 2013 CLASSIFICATION (HCC): Chronic | ICD-10-CM

## 2019-05-21 LAB
BACTERIA UR QL AUTO: ABNORMAL /HPF
BILIRUB UR QL STRIP: NEGATIVE
CLARITY UR: CLEAR
COLOR UR: YELLOW
GLUCOSE UR STRIP-MCNC: NEGATIVE MG/DL
HGB UR QL STRIP.AUTO: NEGATIVE
HYALINE CASTS UR QL AUTO: ABNORMAL /LPF
KETONES UR QL STRIP: NEGATIVE
LEUKOCYTE ESTERASE UR QL STRIP.AUTO: ABNORMAL
NITRITE UR QL STRIP: NEGATIVE
NT-PROBNP SERPL-MCNC: 1050 PG/ML (ref 0–1800)
PH UR STRIP.AUTO: 5.5 [PH] (ref 5–8)
PROT UR QL STRIP: NEGATIVE
RBC # UR: ABNORMAL /HPF
REF LAB TEST METHOD: ABNORMAL
SP GR UR STRIP: 1.01 (ref 1–1.03)
SQUAMOUS #/AREA URNS HPF: ABNORMAL /HPF
UROBILINOGEN UR QL STRIP: ABNORMAL
WBC UR QL AUTO: ABNORMAL /HPF

## 2019-05-21 PROCEDURE — 81001 URINALYSIS AUTO W/SCOPE: CPT | Performed by: FAMILY MEDICINE

## 2019-05-21 PROCEDURE — G0378 HOSPITAL OBSERVATION PER HR: HCPCS

## 2019-05-21 PROCEDURE — 94640 AIRWAY INHALATION TREATMENT: CPT

## 2019-05-21 PROCEDURE — 83880 ASSAY OF NATRIURETIC PEPTIDE: CPT | Performed by: INTERNAL MEDICINE

## 2019-05-21 PROCEDURE — 72148 MRI LUMBAR SPINE W/O DYE: CPT

## 2019-05-21 PROCEDURE — 94799 UNLISTED PULMONARY SVC/PX: CPT

## 2019-05-21 PROCEDURE — 93000 ELECTROCARDIOGRAM COMPLETE: CPT | Performed by: NURSE PRACTITIONER

## 2019-05-21 PROCEDURE — 99214 OFFICE O/P EST MOD 30 MIN: CPT | Performed by: INTERNAL MEDICINE

## 2019-05-21 PROCEDURE — 99217 PR OBSERVATION CARE DISCHARGE MANAGEMENT: CPT | Performed by: NURSE PRACTITIONER

## 2019-05-21 PROCEDURE — 94760 N-INVAS EAR/PLS OXIMETRY 1: CPT

## 2019-05-21 RX ORDER — FINASTERIDE 5 MG/1
5 TABLET, FILM COATED ORAL DAILY
Status: DISCONTINUED | OUTPATIENT
Start: 2019-05-21 | End: 2019-05-27 | Stop reason: HOSPADM

## 2019-05-21 RX ORDER — BUDESONIDE AND FORMOTEROL FUMARATE DIHYDRATE 80; 4.5 UG/1; UG/1
2 AEROSOL RESPIRATORY (INHALATION)
Status: DISCONTINUED | OUTPATIENT
Start: 2019-05-21 | End: 2019-05-27 | Stop reason: HOSPADM

## 2019-05-21 RX ORDER — TORSEMIDE 20 MG/1
20 TABLET ORAL
Status: DISCONTINUED | OUTPATIENT
Start: 2019-05-21 | End: 2019-05-22

## 2019-05-21 RX ORDER — ACETAMINOPHEN 500 MG
500 TABLET ORAL EVERY 4 HOURS PRN
Status: DISCONTINUED | OUTPATIENT
Start: 2019-05-21 | End: 2019-05-27 | Stop reason: HOSPADM

## 2019-05-21 RX ORDER — RAMIPRIL 2.5 MG/1
2.5 CAPSULE ORAL NIGHTLY
Status: DISCONTINUED | OUTPATIENT
Start: 2019-05-21 | End: 2019-05-22

## 2019-05-21 RX ORDER — POLYETHYLENE GLYCOL 3350 17 G/17G
17 POWDER, FOR SOLUTION ORAL DAILY
Status: DISCONTINUED | OUTPATIENT
Start: 2019-05-21 | End: 2019-05-27 | Stop reason: HOSPADM

## 2019-05-21 RX ORDER — SODIUM CHLORIDE 0.9 % (FLUSH) 0.9 %
3 SYRINGE (ML) INJECTION EVERY 12 HOURS SCHEDULED
Status: DISCONTINUED | OUTPATIENT
Start: 2019-05-21 | End: 2019-05-27

## 2019-05-21 RX ORDER — TAMSULOSIN HYDROCHLORIDE 0.4 MG/1
0.4 CAPSULE ORAL DAILY
Status: DISCONTINUED | OUTPATIENT
Start: 2019-05-21 | End: 2019-05-27 | Stop reason: HOSPADM

## 2019-05-21 RX ORDER — RAMIPRIL 2.5 MG/1
2.5 CAPSULE ORAL
Status: DISCONTINUED | OUTPATIENT
Start: 2019-05-21 | End: 2019-05-21

## 2019-05-21 RX ORDER — METOLAZONE 2.5 MG/1
2.5 TABLET ORAL DAILY
Status: DISCONTINUED | OUTPATIENT
Start: 2019-05-21 | End: 2019-05-22

## 2019-05-21 RX ORDER — IPRATROPIUM BROMIDE AND ALBUTEROL SULFATE 2.5; .5 MG/3ML; MG/3ML
3 SOLUTION RESPIRATORY (INHALATION)
Status: DISCONTINUED | OUTPATIENT
Start: 2019-05-21 | End: 2019-05-27 | Stop reason: HOSPADM

## 2019-05-21 RX ORDER — SODIUM CHLORIDE 0.9 % (FLUSH) 0.9 %
3-10 SYRINGE (ML) INJECTION AS NEEDED
Status: DISCONTINUED | OUTPATIENT
Start: 2019-05-21 | End: 2019-05-27 | Stop reason: HOSPADM

## 2019-05-21 RX ORDER — DILTIAZEM HYDROCHLORIDE 120 MG/1
120 CAPSULE, COATED, EXTENDED RELEASE ORAL
Status: DISCONTINUED | OUTPATIENT
Start: 2019-05-21 | End: 2019-05-22

## 2019-05-21 RX ORDER — PANTOPRAZOLE SODIUM 40 MG/1
40 TABLET, DELAYED RELEASE ORAL
Status: DISCONTINUED | OUTPATIENT
Start: 2019-05-22 | End: 2019-05-27 | Stop reason: HOSPADM

## 2019-05-21 RX ORDER — KETOTIFEN FUMARATE 0.35 MG/ML
1 SOLUTION/ DROPS OPHTHALMIC 2 TIMES DAILY
Status: DISCONTINUED | OUTPATIENT
Start: 2019-05-21 | End: 2019-05-27 | Stop reason: HOSPADM

## 2019-05-21 RX ADMIN — IPRATROPIUM BROMIDE AND ALBUTEROL SULFATE 3 ML: 2.5; .5 SOLUTION RESPIRATORY (INHALATION) at 20:54

## 2019-05-21 RX ADMIN — SODIUM CHLORIDE, PRESERVATIVE FREE 3 ML: 5 INJECTION INTRAVENOUS at 21:44

## 2019-05-21 RX ADMIN — KETOTIFEN FUMARATE 1 DROP: 0.35 SOLUTION/ DROPS OPHTHALMIC at 21:44

## 2019-05-21 RX ADMIN — BUDESONIDE AND FORMOTEROL FUMARATE DIHYDRATE 2 PUFF: 80; 4.5 AEROSOL RESPIRATORY (INHALATION) at 20:55

## 2019-05-21 RX ADMIN — RIVAROXABAN 15 MG: 15 TABLET, FILM COATED ORAL at 17:53

## 2019-05-21 RX ADMIN — TORSEMIDE 20 MG: 20 TABLET ORAL at 18:59

## 2019-05-21 RX ADMIN — ACETAMINOPHEN 500 MG: 500 TABLET, FILM COATED ORAL at 21:40

## 2019-05-21 RX ADMIN — FINASTERIDE 5 MG: 5 TABLET, FILM COATED ORAL at 17:53

## 2019-05-21 NOTE — PROGRESS NOTES
Subjective:     Encounter Date:05/21/2019      Patient ID: James Birch is a 89 y.o. male. He presents today with complaints of weakness, fatigue, shortness of breath, edema and back pain. He has a history of NYHA class III chronic diastolic congestive heart failure, paroxysmal atrial fibrillation on chronic anticoagulation, hypertension, hyperlipidemia, cerebrovascular disease, COPD and chronic kidney disease. He is accompanied by his son, who reports that the pt fell at home this morning. The pt denies hitting his head but complains of back pain. His son reports that he is going for xrays after his appointment today. He reports a two month history of increased shortness of breath, swelling, weakness and fatigue. He reports multiple visits to nephrology and his PCP during which time his diuretic dose was adjusted several times. He reports dizziness with position changes.He denies chest pain, palpitations, syncope, orthopnea or PND. He denies any signs of bleeding.     Chief Complaint: weakness  Congestive Heart Failure   Presents for follow-up visit. Associated symptoms include edema, fatigue and shortness of breath. Pertinent negatives include no abdominal pain, chest pain, chest pressure, claudication, muscle weakness, near-syncope, nocturia, orthopnea, palpitations, paroxysmal nocturnal dyspnea or unexpected weight change. The symptoms have been worsening. Compliance with total regimen is 51-75%. Compliance with diet is 51-75%. Compliance with exercise is 51-75%. Compliance with medications is %.   Atrial Fibrillation   Presents for follow-up visit. Symptoms include shortness of breath. Symptoms are negative for an AICD problem, bradycardia, chest pain, dizziness, hemodynamic instability, hypertension, hypotension, pacemaker problem, palpitations, syncope, tachycardia and weakness. The symptoms have been stable. Past medical history includes atrial fibrillation, CHF and hyperlipidemia.    Hypertension   This is a chronic problem. The current episode started more than 1 year ago. The problem is controlled. Associated symptoms include malaise/fatigue, peripheral edema and shortness of breath. Pertinent negatives include no anxiety, blurred vision, chest pain, headaches, neck pain, orthopnea, palpitations, PND or sweats. Risk factors for coronary artery disease include male gender. Current antihypertension treatment includes ACE inhibitors and diuretics.   Hyperlipidemia   Associated symptoms include shortness of breath. Pertinent negatives include no chest pain.       The following portions of the patient's history were reviewed and updated as appropriate: allergies, current medications, past family history, past medical history, past social history, past surgical history and problem list.     No Known Allergies    Current Outpatient Medications:   •  azelastine (OPTIVAR) 0.05 % ophthalmic solution, INSTILL 1 DROP INTO BOTH EYES TWICE DAILY GENERIC FOR OPTIVAR, Disp: 6 mL, Rfl: 6  •  B Complex-C (SUPER B COMPLEX/VITAMIN C PO), Take  by mouth daily., Disp: , Rfl:   •  budesonide-formoterol (SYMBICORT) 80-4.5 MCG/ACT inhaler, Inhale 2 puffs 2 (Two) Times a Day., Disp: 30.6 g, Rfl: 3  •  carbonyl iron (FEOSOL) 45 MG tablet tablet, Take 2 tablets by mouth 1 (One) Time Per Week., Disp: , Rfl:   •  CARTIA  MG 24 hr capsule, TAKE 1 CAPSULE DAILY GENERIC FOR CARDIZEM CD, Disp: 90 capsule, Rfl: 3  •  Cholecalciferol (VITAMIN D3 PO), Take 2,000 Int'l Units by mouth Daily., Disp: , Rfl:   •  Coenzyme Q10 (CO Q-10 PO), Take  by mouth daily., Disp: , Rfl:   •  dutasteride (AVODART) 0.5 MG capsule, TAKE 1 CAPSULE DAILY GENERIC FOR AVODART, Disp: 90 capsule, Rfl: 3  •  esomeprazole (nexIUM) 40 MG capsule, Take 1 capsule by mouth Every Morning Before Breakfast., Disp: 90 capsule, Rfl: 2  •  guaiFENesin (MUCINEX) 600 MG 12 hr tablet, Take 600 mg by mouth Every 12 (Twelve) Hours., Disp: , Rfl:   •   ipratropium-albuterol (DUO-NEB) 0.5-2.5 mg/3 ml nebulizer, USE 1 VIAL IN NEBULIZER FOUR TIMES DAILY GENERIC FOR, Disp: 180 vial, Rfl: 6  •  metOLazone (ZAROXOLYN) 2.5 MG tablet, Take one tablet every other day, Disp: 20 tablet, Rfl: 0  •  Multiple Vitamins-Minerals (MULTIVITAMIN PO), Take 1 tablet by mouth daily., Disp: , Rfl:   •  O2 (OXYGEN), Inhale 1 L/min Every Night. As needed at bedtime. , Disp: , Rfl:   •  Omega-3 Fatty Acids (FISH OIL PO), Take 1,000 mg by mouth 2 (Two) Times a Day., Disp: , Rfl:   •  polyethylene glycol (MIRALAX) packet, Take 17 g by mouth Daily., Disp: 30 each, Rfl: 1  •  PROAIR  (90 Base) MCG/ACT inhaler, Every 4 (Four) Hours As Needed for Shortness of Air., Disp: , Rfl: 5  •  ramipril (ALTACE) 2.5 MG capsule, TAKE 1 CAPSULE AT BEDTIME GENERIC FOR ALTACE, Disp: 90 capsule, Rfl: 1  •  tamsulosin (FLOMAX) 0.4 MG capsule 24 hr capsule, Take 1 capsule by mouth Daily., Disp: 90 capsule, Rfl: 3  •  torsemide (DEMADEX) 20 MG tablet, 1 tablet 2 (Two) Times a Day., Disp: , Rfl:   •  XARELTO 15 MG tablet, TAKE ONE TABLET DAILY, Disp: 90 tablet, Rfl: 3  Past Medical History:   Diagnosis Date   • Arthritis    • Asthma    • Atrial fibrillation (CMS/HCC)    • Cancer (CMS/HCC)     SKIN   • CHF (congestive heart failure) (CMS/HCC)    • Conductive hearing loss    • COPD (chronic obstructive pulmonary disease) (CMS/HCC)    • Eustachian tube dysfunction    • GERD (gastroesophageal reflux disease)    • Hyperlipidemia    • Hypertension    • Obstructive sleep apnea    • Prostate disease    • Sensorineural hearing loss    • Stroke (CMS/HCC)    • Vertigo      Past Surgical History:   Procedure Laterality Date   • CATARACT EXTRACTION      left   • CATARACT EXTRACTION      left   • COLONOSCOPY  09/05/2012    normal   • ENDOSCOPY  08/31/2015    normal   • EYE SURGERY     • FRACTURE SURGERY     • HERNIA REPAIR     • HIP SURGERY     • SKIN BIOPSY       Family History   Problem Relation Age of Onset   • No  Known Problems Mother    • No Known Problems Father      Social History     Socioeconomic History   • Marital status:      Spouse name: Not on file   • Number of children: 3   • Years of education: Not on file   • Highest education level: Not on file   Tobacco Use   • Smoking status: Former Smoker     Packs/day: 1.00     Years: 18.00     Pack years: 18.00     Types: Cigarettes     Last attempt to quit:      Years since quittin.4   • Smokeless tobacco: Never Used   • Tobacco comment: already quit   Substance and Sexual Activity   • Alcohol use: No   • Drug use: No   • Sexual activity: Not Currently     Partners: Female     Birth control/protection: Abstinence         Review of Systems   Constitution: Positive for fatigue and malaise/fatigue. Negative for chills, decreased appetite, fever, weakness, night sweats, unexpected weight change, weight gain and weight loss.   HENT: Negative for nosebleeds.    Eyes: Negative for blurred vision and visual disturbance.   Cardiovascular: Negative for chest pain, claudication, dyspnea on exertion, leg swelling, near-syncope, orthopnea, palpitations, paroxysmal nocturnal dyspnea and syncope.   Respiratory: Positive for shortness of breath. Negative for cough, hemoptysis, snoring and wheezing.    Endocrine: Negative for cold intolerance and heat intolerance.   Hematologic/Lymphatic: Does not bruise/bleed easily.   Skin: Negative for rash.   Musculoskeletal: Negative for back pain, falls, muscle weakness and neck pain.   Gastrointestinal: Negative for abdominal pain, change in bowel habit, constipation, diarrhea, dysphagia, heartburn, nausea and vomiting.   Genitourinary: Negative for hematuria and nocturia.   Neurological: Negative for dizziness, headaches and light-headedness.   Psychiatric/Behavioral: Negative for altered mental status.   Allergic/Immunologic: Negative for persistent infections.         ECG 12 Lead  Date/Time: 2019 12:49 PM  Performed by:  Alvina Saunders APRN  Authorized by: Alvina Saunders APRN   Comparison: not compared with previous ECG   Rhythm: sinus rhythm  Ectopy: atrial premature contractions  Rate: normal  BPM: 83  Conduction: 1st degree AV block    Clinical impression: abnormal EKG               Objective:     Physical Exam   Constitutional: He is oriented to person, place, and time. Vital signs are normal. He appears well-developed and well-nourished. No distress.   HENT:   Head: Normocephalic and atraumatic.   Right Ear: External ear normal.   Left Ear: External ear normal.   Eyes: Conjunctivae are normal. Pupils are equal, round, and reactive to light. Right eye exhibits no discharge. Left eye exhibits no discharge.   Neck: Normal range of motion. Neck supple. JVD present. Carotid bruit is not present. No thyromegaly present.   Cardiovascular: Normal rate, normal heart sounds and intact distal pulses. An irregularly irregular rhythm present. PMI is not displaced. Exam reveals no gallop, no friction rub and no decreased pulses.   No murmur heard.  Pulses:       Radial pulses are 2+ on the right side, and 2+ on the left side.        Dorsalis pedis pulses are 2+ on the right side, and 2+ on the left side.        Posterior tibial pulses are 2+ on the right side, and 2+ on the left side.   Pulmonary/Chest: Effort normal. No respiratory distress. He has no decreased breath sounds. He has wheezes in the right upper field, the right middle field, the right lower field, the left upper field, the left middle field and the left lower field. He has no rhonchi. He has no rales. He exhibits no tenderness.   Abdominal: Soft. Bowel sounds are normal. He exhibits no distension. There is no tenderness.   Musculoskeletal: Normal range of motion. He exhibits edema (4+ pitting edema in bilateral lower extremities).   Neurological: He is alert and oriented to person, place, and time.   Skin: Skin is warm and dry. No rash noted. He is not diaphoretic. No  erythema. No pallor.   Psychiatric: He has a normal mood and affect. His behavior is normal. Judgment and thought content normal.   Vitals reviewed.      Lab Review:   Lab Results   Component Value Date    WBC 4.5 04/08/2019    HGB 11.9 (L) 04/08/2019    HCT 36.2 (L) 04/08/2019    MCV 86 04/08/2019     04/08/2019     Lab Results   Component Value Date    GLUCOSE 82 10/13/2018    BUN 36 (H) 05/06/2019    CREATININE 2.35 (H) 05/06/2019    EGFRIFNONA 26 (L) 05/06/2019    EGFRIFAFRI 32 (L) 05/06/2019    BCR 15.3 05/06/2019    K 4.1 05/06/2019    CO2 27.9 05/06/2019    CALCIUM 9.5 05/06/2019    PROTENTOTREF 5.9 (L) 04/08/2019    ALBUMIN 3.5 04/08/2019    LABIL2 1.5 04/08/2019    AST 17 04/08/2019    ALT 13 04/08/2019     Lab Results   Component Value Date    CHOL 216 (H) 05/26/2018    CHLPL 265 (H) 05/15/2018    CHLPL 224 (H) 11/08/2016     Lab Results   Component Value Date    TRIG 157 (H) 05/26/2018    TRIG 154 (H) 05/15/2018    TRIG 215 (H) 11/08/2016     Lab Results   Component Value Date    HDL 39 (L) 05/26/2018    HDL 52 05/15/2018    HDL 43 11/08/2016     Lab Results   Component Value Date     (H) 05/26/2018     (H) 05/15/2018     (H) 11/08/2016           Assessment:          Diagnosis Plan   1. CHF (congestive heart failure), NYHA class III, chronic, diastolic (CMS/[facility])  Increase torsemide to 40 mg daily for three days. Basic Metabolic Panel in one week.    2. Paroxysmal atrial fibrillation (CMS/[facility])  Maintaining sinus rhythm. Anticoagulated.    3. Mmapocqzmuuzbu-pxk-wlxe/xarelto  Denies bleeding.    4. Essential hypertension  Well controlled.    5. Mixed hyperlipidemia  Managed by PCP. On statin.    6. Cerebrovascular small vessel disease     7. COPD, group B, by GOLD 2013 classification      8. Stage 3 chronic kidney disease (CMS/[facility])  Follows with nephrology.    9. 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 Increase torsemide to 40 mg daily for three  days. BMP in one week. Continue other medications as previously prescribed.  2. Report any worsening symptoms.  3. Report any signs of bleeding.  4. Continue heart healthy diet and regular exercise as tolerated.   5. Follow up with PCP for blood pressure and cholesterol management and routine lab work.  6. Follow up with CHF clinic in one week, or sooner if needed.   7. Reviewed signs and symptoms of CHF and what to report with the patient. Patient instructed to restrict sodium and weigh daily. Report weight gain of greater than 2 lbs overnight or 5 lbs in 1 week. Pt verbalized understanding of instructions and plan of care.

## 2019-05-22 PROBLEM — I50.9 DYSPNEA DUE TO CONGESTIVE HEART FAILURE (HCC): Status: ACTIVE | Noted: 2019-05-22

## 2019-05-22 LAB
ANION GAP SERPL CALCULATED.3IONS-SCNC: 8 MMOL/L (ref 4–13)
ARTICHOKE IGE QN: 101 MG/DL (ref 0–99)
BASOPHILS # BLD AUTO: 0.03 10*3/MM3 (ref 0–0.2)
BASOPHILS NFR BLD AUTO: 0.4 % (ref 0–2)
BUN BLD-MCNC: 37 MG/DL (ref 5–21)
BUN/CREAT SERPL: 17.1 (ref 7–25)
CALCIUM SPEC-SCNC: 8.7 MG/DL (ref 8.4–10.4)
CHLORIDE SERPL-SCNC: 100 MMOL/L (ref 98–110)
CHOLEST SERPL-MCNC: 157 MG/DL (ref 130–200)
CO2 SERPL-SCNC: 27 MMOL/L (ref 24–31)
CREAT BLD-MCNC: 2.17 MG/DL (ref 0.5–1.4)
DEPRECATED RDW RBC AUTO: 44.4 FL (ref 40–54)
EOSINOPHIL # BLD AUTO: 0.18 10*3/MM3 (ref 0–0.7)
EOSINOPHIL NFR BLD AUTO: 2.7 % (ref 0–4)
ERYTHROCYTE [DISTWIDTH] IN BLOOD BY AUTOMATED COUNT: 14.3 % (ref 12–15)
GFR SERPL CREATININE-BSD FRML MDRD: 29 ML/MIN/1.73
GLUCOSE BLD-MCNC: 87 MG/DL (ref 70–100)
HCT VFR BLD AUTO: 31.5 % (ref 40–52)
HDLC SERPL-MCNC: 36 MG/DL
HGB BLD-MCNC: 10.9 G/DL (ref 14–18)
IMM GRANULOCYTES # BLD AUTO: 0.03 10*3/MM3 (ref 0–0.05)
IMM GRANULOCYTES NFR BLD AUTO: 0.4 % (ref 0–5)
LDLC/HDLC SERPL: 2.78 {RATIO}
LYMPHOCYTES # BLD AUTO: 0.66 10*3/MM3 (ref 0.72–4.86)
LYMPHOCYTES NFR BLD AUTO: 9.9 % (ref 15–45)
MCH RBC QN AUTO: 29.5 PG (ref 28–32)
MCHC RBC AUTO-ENTMCNC: 34.6 G/DL (ref 33–36)
MCV RBC AUTO: 85.4 FL (ref 82–95)
MONOCYTES # BLD AUTO: 0.58 10*3/MM3 (ref 0.19–1.3)
MONOCYTES NFR BLD AUTO: 8.7 % (ref 4–12)
NEUTROPHILS # BLD AUTO: 5.2 10*3/MM3 (ref 1.87–8.4)
NEUTROPHILS NFR BLD AUTO: 77.9 % (ref 39–78)
NRBC BLD AUTO-RTO: 0 /100 WBC (ref 0–0.2)
PLATELET # BLD AUTO: 160 10*3/MM3 (ref 130–400)
PMV BLD AUTO: 9.9 FL (ref 6–12)
POTASSIUM BLD-SCNC: 3.9 MMOL/L (ref 3.5–5.3)
RBC # BLD AUTO: 3.69 10*6/MM3 (ref 4.8–5.9)
SODIUM BLD-SCNC: 135 MMOL/L (ref 135–145)
TRIGL SERPL-MCNC: 104 MG/DL (ref 0–149)
WBC NRBC COR # BLD: 6.68 10*3/MM3 (ref 4.8–10.8)

## 2019-05-22 PROCEDURE — 97162 PT EVAL MOD COMPLEX 30 MIN: CPT | Performed by: PHYSICAL THERAPIST

## 2019-05-22 PROCEDURE — 97116 GAIT TRAINING THERAPY: CPT

## 2019-05-22 PROCEDURE — 99222 1ST HOSP IP/OBS MODERATE 55: CPT | Performed by: FAMILY MEDICINE

## 2019-05-22 PROCEDURE — 97166 OT EVAL MOD COMPLEX 45 MIN: CPT

## 2019-05-22 PROCEDURE — 94799 UNLISTED PULMONARY SVC/PX: CPT

## 2019-05-22 PROCEDURE — 85025 COMPLETE CBC W/AUTO DIFF WBC: CPT | Performed by: FAMILY MEDICINE

## 2019-05-22 PROCEDURE — 80048 BASIC METABOLIC PNL TOTAL CA: CPT | Performed by: FAMILY MEDICINE

## 2019-05-22 PROCEDURE — 80061 LIPID PANEL: CPT | Performed by: NURSE PRACTITIONER

## 2019-05-22 PROCEDURE — 99221 1ST HOSP IP/OBS SF/LOW 40: CPT | Performed by: INTERNAL MEDICINE

## 2019-05-22 RX ORDER — RAMIPRIL 1.25 MG/1
1.25 CAPSULE ORAL NIGHTLY
Status: DISCONTINUED | OUTPATIENT
Start: 2019-05-22 | End: 2019-05-25

## 2019-05-22 RX ORDER — DIGOXIN 125 MCG
125 TABLET ORAL
Status: DISCONTINUED | OUTPATIENT
Start: 2019-05-22 | End: 2019-05-27 | Stop reason: HOSPADM

## 2019-05-22 RX ORDER — RAMIPRIL 2.5 MG/1
2.5 CAPSULE ORAL DAILY
COMMUNITY
End: 2019-05-27 | Stop reason: HOSPADM

## 2019-05-22 RX ORDER — DILTIAZEM HYDROCHLORIDE 120 MG/1
120 CAPSULE, COATED, EXTENDED RELEASE ORAL DAILY
COMMUNITY
End: 2019-05-27 | Stop reason: HOSPADM

## 2019-05-22 RX ORDER — TORSEMIDE 20 MG/1
20 TABLET ORAL DAILY
Status: DISCONTINUED | OUTPATIENT
Start: 2019-05-23 | End: 2019-05-27

## 2019-05-22 RX ORDER — DUTASTERIDE 0.5 MG/1
0.5 CAPSULE, LIQUID FILLED ORAL DAILY
COMMUNITY
End: 2019-12-02 | Stop reason: SDUPTHER

## 2019-05-22 RX ORDER — AZELASTINE HYDROCHLORIDE 0.5 MG/ML
1 SOLUTION/ DROPS OPHTHALMIC 2 TIMES DAILY
COMMUNITY
End: 2021-03-16

## 2019-05-22 RX ADMIN — ACETAMINOPHEN 500 MG: 500 TABLET, FILM COATED ORAL at 03:33

## 2019-05-22 RX ADMIN — PANTOPRAZOLE SODIUM 40 MG: 40 TABLET, DELAYED RELEASE ORAL at 06:21

## 2019-05-22 RX ADMIN — BUDESONIDE AND FORMOTEROL FUMARATE DIHYDRATE 2 PUFF: 80; 4.5 AEROSOL RESPIRATORY (INHALATION) at 21:55

## 2019-05-22 RX ADMIN — TAMSULOSIN HYDROCHLORIDE 0.4 MG: 0.4 CAPSULE ORAL at 09:07

## 2019-05-22 RX ADMIN — KETOTIFEN FUMARATE 1 DROP: 0.35 SOLUTION/ DROPS OPHTHALMIC at 09:07

## 2019-05-22 RX ADMIN — IPRATROPIUM BROMIDE AND ALBUTEROL SULFATE 3 ML: 2.5; .5 SOLUTION RESPIRATORY (INHALATION) at 15:10

## 2019-05-22 RX ADMIN — TORSEMIDE 20 MG: 20 TABLET ORAL at 09:06

## 2019-05-22 RX ADMIN — RIVAROXABAN 15 MG: 15 TABLET, FILM COATED ORAL at 18:20

## 2019-05-22 RX ADMIN — DILTIAZEM HYDROCHLORIDE 120 MG: 120 CAPSULE, COATED, EXTENDED RELEASE ORAL at 09:07

## 2019-05-22 RX ADMIN — POLYETHYLENE GLYCOL (3350) 17 G: 17 POWDER, FOR SOLUTION ORAL at 09:07

## 2019-05-22 RX ADMIN — BUDESONIDE AND FORMOTEROL FUMARATE DIHYDRATE 2 PUFF: 80; 4.5 AEROSOL RESPIRATORY (INHALATION) at 06:39

## 2019-05-22 RX ADMIN — IPRATROPIUM BROMIDE AND ALBUTEROL SULFATE 3 ML: 2.5; .5 SOLUTION RESPIRATORY (INHALATION) at 10:47

## 2019-05-22 RX ADMIN — KETOTIFEN FUMARATE 1 DROP: 0.35 SOLUTION/ DROPS OPHTHALMIC at 20:58

## 2019-05-22 RX ADMIN — IPRATROPIUM BROMIDE AND ALBUTEROL SULFATE 3 ML: 2.5; .5 SOLUTION RESPIRATORY (INHALATION) at 21:54

## 2019-05-22 RX ADMIN — DIGOXIN 125 MCG: 125 TABLET ORAL at 12:09

## 2019-05-22 RX ADMIN — IPRATROPIUM BROMIDE AND ALBUTEROL SULFATE 3 ML: 2.5; .5 SOLUTION RESPIRATORY (INHALATION) at 06:39

## 2019-05-22 RX ADMIN — FINASTERIDE 5 MG: 5 TABLET, FILM COATED ORAL at 09:07

## 2019-05-22 RX ADMIN — ACETAMINOPHEN 500 MG: 500 TABLET, FILM COATED ORAL at 18:20

## 2019-05-22 RX ADMIN — METOLAZONE 2.5 MG: 2.5 TABLET ORAL at 09:06

## 2019-05-23 ENCOUNTER — APPOINTMENT (OUTPATIENT)
Dept: GENERAL RADIOLOGY | Facility: HOSPITAL | Age: 84
End: 2019-05-23

## 2019-05-23 PROBLEM — I48.92 ATRIAL FLUTTER WITH CONTROLLED RESPONSE (HCC): Status: ACTIVE | Noted: 2019-05-23

## 2019-05-23 LAB
ANION GAP SERPL CALCULATED.3IONS-SCNC: 9 MMOL/L (ref 4–13)
BASOPHILS # BLD AUTO: 0.03 10*3/MM3 (ref 0–0.2)
BASOPHILS NFR BLD AUTO: 0.4 % (ref 0–2)
BUN BLD-MCNC: 37 MG/DL (ref 5–21)
BUN/CREAT SERPL: 17.9 (ref 7–25)
CALCIUM SPEC-SCNC: 9.1 MG/DL (ref 8.4–10.4)
CHLORIDE SERPL-SCNC: 97 MMOL/L (ref 98–110)
CO2 SERPL-SCNC: 29 MMOL/L (ref 24–31)
CREAT BLD-MCNC: 2.07 MG/DL (ref 0.5–1.4)
DEPRECATED RDW RBC AUTO: 43.9 FL (ref 40–54)
EOSINOPHIL # BLD AUTO: 0.16 10*3/MM3 (ref 0–0.7)
EOSINOPHIL NFR BLD AUTO: 2.1 % (ref 0–4)
ERYTHROCYTE [DISTWIDTH] IN BLOOD BY AUTOMATED COUNT: 14.3 % (ref 12–15)
GFR SERPL CREATININE-BSD FRML MDRD: 30 ML/MIN/1.73
GLUCOSE BLD-MCNC: 109 MG/DL (ref 70–100)
HCT VFR BLD AUTO: 36.4 % (ref 40–52)
HGB BLD-MCNC: 12.6 G/DL (ref 14–18)
IMM GRANULOCYTES # BLD AUTO: 0.02 10*3/MM3 (ref 0–0.05)
IMM GRANULOCYTES NFR BLD AUTO: 0.3 % (ref 0–5)
LYMPHOCYTES # BLD AUTO: 0.77 10*3/MM3 (ref 0.72–4.86)
LYMPHOCYTES NFR BLD AUTO: 10.1 % (ref 15–45)
MCH RBC QN AUTO: 29.3 PG (ref 28–32)
MCHC RBC AUTO-ENTMCNC: 34.6 G/DL (ref 33–36)
MCV RBC AUTO: 84.7 FL (ref 82–95)
MONOCYTES # BLD AUTO: 0.63 10*3/MM3 (ref 0.19–1.3)
MONOCYTES NFR BLD AUTO: 8.2 % (ref 4–12)
NEUTROPHILS # BLD AUTO: 6.03 10*3/MM3 (ref 1.87–8.4)
NEUTROPHILS NFR BLD AUTO: 78.9 % (ref 39–78)
NRBC BLD AUTO-RTO: 0 /100 WBC (ref 0–0.2)
PLATELET # BLD AUTO: 178 10*3/MM3 (ref 130–400)
PMV BLD AUTO: 9.7 FL (ref 6–12)
POTASSIUM BLD-SCNC: 4 MMOL/L (ref 3.5–5.3)
RBC # BLD AUTO: 4.3 10*6/MM3 (ref 4.8–5.9)
SODIUM BLD-SCNC: 135 MMOL/L (ref 135–145)
WBC NRBC COR # BLD: 7.64 10*3/MM3 (ref 4.8–10.8)

## 2019-05-23 PROCEDURE — 72100 X-RAY EXAM L-S SPINE 2/3 VWS: CPT

## 2019-05-23 PROCEDURE — 97116 GAIT TRAINING THERAPY: CPT

## 2019-05-23 PROCEDURE — 97535 SELF CARE MNGMENT TRAINING: CPT

## 2019-05-23 PROCEDURE — 85025 COMPLETE CBC W/AUTO DIFF WBC: CPT | Performed by: FAMILY MEDICINE

## 2019-05-23 PROCEDURE — 93005 ELECTROCARDIOGRAM TRACING: CPT | Performed by: NURSE PRACTITIONER

## 2019-05-23 PROCEDURE — 99232 SBSQ HOSP IP/OBS MODERATE 35: CPT | Performed by: NURSE PRACTITIONER

## 2019-05-23 PROCEDURE — 94799 UNLISTED PULMONARY SVC/PX: CPT

## 2019-05-23 PROCEDURE — 93010 ELECTROCARDIOGRAM REPORT: CPT | Performed by: INTERNAL MEDICINE

## 2019-05-23 PROCEDURE — 99232 SBSQ HOSP IP/OBS MODERATE 35: CPT | Performed by: FAMILY MEDICINE

## 2019-05-23 PROCEDURE — 80048 BASIC METABOLIC PNL TOTAL CA: CPT | Performed by: FAMILY MEDICINE

## 2019-05-23 RX ADMIN — IPRATROPIUM BROMIDE AND ALBUTEROL SULFATE 3 ML: 2.5; .5 SOLUTION RESPIRATORY (INHALATION) at 15:27

## 2019-05-23 RX ADMIN — IPRATROPIUM BROMIDE AND ALBUTEROL SULFATE 3 ML: 2.5; .5 SOLUTION RESPIRATORY (INHALATION) at 11:14

## 2019-05-23 RX ADMIN — ACETAMINOPHEN 500 MG: 500 TABLET, FILM COATED ORAL at 11:22

## 2019-05-23 RX ADMIN — KETOTIFEN FUMARATE 1 DROP: 0.35 SOLUTION/ DROPS OPHTHALMIC at 21:35

## 2019-05-23 RX ADMIN — TAMSULOSIN HYDROCHLORIDE 0.4 MG: 0.4 CAPSULE ORAL at 10:26

## 2019-05-23 RX ADMIN — BUDESONIDE AND FORMOTEROL FUMARATE DIHYDRATE 2 PUFF: 80; 4.5 AEROSOL RESPIRATORY (INHALATION) at 06:40

## 2019-05-23 RX ADMIN — IPRATROPIUM BROMIDE AND ALBUTEROL SULFATE 3 ML: 2.5; .5 SOLUTION RESPIRATORY (INHALATION) at 06:40

## 2019-05-23 RX ADMIN — PANTOPRAZOLE SODIUM 40 MG: 40 TABLET, DELAYED RELEASE ORAL at 06:07

## 2019-05-23 RX ADMIN — RIVAROXABAN 15 MG: 15 TABLET, FILM COATED ORAL at 18:48

## 2019-05-23 RX ADMIN — DIGOXIN 125 MCG: 125 TABLET ORAL at 11:22

## 2019-05-23 RX ADMIN — TORSEMIDE 20 MG: 20 TABLET ORAL at 10:26

## 2019-05-23 RX ADMIN — ACETAMINOPHEN 500 MG: 500 TABLET, FILM COATED ORAL at 22:50

## 2019-05-23 RX ADMIN — ACETAMINOPHEN 500 MG: 500 TABLET, FILM COATED ORAL at 18:48

## 2019-05-23 RX ADMIN — FINASTERIDE 5 MG: 5 TABLET, FILM COATED ORAL at 10:26

## 2019-05-23 RX ADMIN — IPRATROPIUM BROMIDE AND ALBUTEROL SULFATE 3 ML: 2.5; .5 SOLUTION RESPIRATORY (INHALATION) at 20:50

## 2019-05-23 RX ADMIN — ACETAMINOPHEN 500 MG: 500 TABLET, FILM COATED ORAL at 06:07

## 2019-05-23 RX ADMIN — BUDESONIDE AND FORMOTEROL FUMARATE DIHYDRATE 2 PUFF: 80; 4.5 AEROSOL RESPIRATORY (INHALATION) at 20:51

## 2019-05-24 LAB
ANION GAP SERPL CALCULATED.3IONS-SCNC: 11 MMOL/L (ref 4–13)
BASOPHILS # BLD AUTO: 0.03 10*3/MM3 (ref 0–0.2)
BASOPHILS NFR BLD AUTO: 0.4 % (ref 0–2)
BUN BLD-MCNC: 38 MG/DL (ref 5–21)
BUN/CREAT SERPL: 19.3 (ref 7–25)
CALCIUM SPEC-SCNC: 9.1 MG/DL (ref 8.4–10.4)
CHLORIDE SERPL-SCNC: 95 MMOL/L (ref 98–110)
CO2 SERPL-SCNC: 28 MMOL/L (ref 24–31)
CREAT BLD-MCNC: 1.97 MG/DL (ref 0.5–1.4)
DEPRECATED RDW RBC AUTO: 44.4 FL (ref 40–54)
EOSINOPHIL # BLD AUTO: 0.24 10*3/MM3 (ref 0–0.7)
EOSINOPHIL NFR BLD AUTO: 3.5 % (ref 0–4)
ERYTHROCYTE [DISTWIDTH] IN BLOOD BY AUTOMATED COUNT: 14.2 % (ref 12–15)
GFR SERPL CREATININE-BSD FRML MDRD: 32 ML/MIN/1.73
GLUCOSE BLD-MCNC: 97 MG/DL (ref 70–100)
HCT VFR BLD AUTO: 36.8 % (ref 40–52)
HGB BLD-MCNC: 12.6 G/DL (ref 14–18)
IMM GRANULOCYTES # BLD AUTO: 0.02 10*3/MM3 (ref 0–0.05)
IMM GRANULOCYTES NFR BLD AUTO: 0.3 % (ref 0–5)
LYMPHOCYTES # BLD AUTO: 0.75 10*3/MM3 (ref 0.72–4.86)
LYMPHOCYTES NFR BLD AUTO: 10.8 % (ref 15–45)
MCH RBC QN AUTO: 29.4 PG (ref 28–32)
MCHC RBC AUTO-ENTMCNC: 34.2 G/DL (ref 33–36)
MCV RBC AUTO: 85.8 FL (ref 82–95)
MONOCYTES # BLD AUTO: 0.73 10*3/MM3 (ref 0.19–1.3)
MONOCYTES NFR BLD AUTO: 10.5 % (ref 4–12)
NEUTROPHILS # BLD AUTO: 5.15 10*3/MM3 (ref 1.87–8.4)
NEUTROPHILS NFR BLD AUTO: 74.5 % (ref 39–78)
NRBC BLD AUTO-RTO: 0 /100 WBC (ref 0–0.2)
PLATELET # BLD AUTO: 170 10*3/MM3 (ref 130–400)
PMV BLD AUTO: 9.5 FL (ref 6–12)
POTASSIUM BLD-SCNC: 3.8 MMOL/L (ref 3.5–5.3)
RBC # BLD AUTO: 4.29 10*6/MM3 (ref 4.8–5.9)
SODIUM BLD-SCNC: 134 MMOL/L (ref 135–145)
WBC NRBC COR # BLD: 6.92 10*3/MM3 (ref 4.8–10.8)

## 2019-05-24 PROCEDURE — 97535 SELF CARE MNGMENT TRAINING: CPT

## 2019-05-24 PROCEDURE — 80048 BASIC METABOLIC PNL TOTAL CA: CPT | Performed by: FAMILY MEDICINE

## 2019-05-24 PROCEDURE — 94799 UNLISTED PULMONARY SVC/PX: CPT

## 2019-05-24 PROCEDURE — 97530 THERAPEUTIC ACTIVITIES: CPT

## 2019-05-24 PROCEDURE — 94760 N-INVAS EAR/PLS OXIMETRY 1: CPT

## 2019-05-24 PROCEDURE — 85025 COMPLETE CBC W/AUTO DIFF WBC: CPT | Performed by: FAMILY MEDICINE

## 2019-05-24 PROCEDURE — 97116 GAIT TRAINING THERAPY: CPT

## 2019-05-24 PROCEDURE — 99232 SBSQ HOSP IP/OBS MODERATE 35: CPT | Performed by: FAMILY MEDICINE

## 2019-05-24 RX ADMIN — IPRATROPIUM BROMIDE AND ALBUTEROL SULFATE 3 ML: 2.5; .5 SOLUTION RESPIRATORY (INHALATION) at 21:35

## 2019-05-24 RX ADMIN — TORSEMIDE 20 MG: 20 TABLET ORAL at 08:28

## 2019-05-24 RX ADMIN — ACETAMINOPHEN 500 MG: 500 TABLET, FILM COATED ORAL at 22:16

## 2019-05-24 RX ADMIN — IPRATROPIUM BROMIDE AND ALBUTEROL SULFATE 3 ML: 2.5; .5 SOLUTION RESPIRATORY (INHALATION) at 10:10

## 2019-05-24 RX ADMIN — FINASTERIDE 5 MG: 5 TABLET, FILM COATED ORAL at 08:28

## 2019-05-24 RX ADMIN — ACETAMINOPHEN 500 MG: 500 TABLET, FILM COATED ORAL at 05:41

## 2019-05-24 RX ADMIN — PANTOPRAZOLE SODIUM 40 MG: 40 TABLET, DELAYED RELEASE ORAL at 05:41

## 2019-05-24 RX ADMIN — IPRATROPIUM BROMIDE AND ALBUTEROL SULFATE 3 ML: 2.5; .5 SOLUTION RESPIRATORY (INHALATION) at 15:11

## 2019-05-24 RX ADMIN — SODIUM CHLORIDE, PRESERVATIVE FREE 3 ML: 5 INJECTION INTRAVENOUS at 08:29

## 2019-05-24 RX ADMIN — KETOTIFEN FUMARATE 1 DROP: 0.35 SOLUTION/ DROPS OPHTHALMIC at 19:57

## 2019-05-24 RX ADMIN — POLYETHYLENE GLYCOL (3350) 17 G: 17 POWDER, FOR SOLUTION ORAL at 08:28

## 2019-05-24 RX ADMIN — TAMSULOSIN HYDROCHLORIDE 0.4 MG: 0.4 CAPSULE ORAL at 08:28

## 2019-05-24 RX ADMIN — DIGOXIN 125 MCG: 125 TABLET ORAL at 12:12

## 2019-05-24 RX ADMIN — KETOTIFEN FUMARATE 1 DROP: 0.35 SOLUTION/ DROPS OPHTHALMIC at 08:28

## 2019-05-24 RX ADMIN — BUDESONIDE AND FORMOTEROL FUMARATE DIHYDRATE 2 PUFF: 80; 4.5 AEROSOL RESPIRATORY (INHALATION) at 21:35

## 2019-05-24 RX ADMIN — IPRATROPIUM BROMIDE AND ALBUTEROL SULFATE 3 ML: 2.5; .5 SOLUTION RESPIRATORY (INHALATION) at 06:26

## 2019-05-24 RX ADMIN — ACETAMINOPHEN 500 MG: 500 TABLET, FILM COATED ORAL at 17:52

## 2019-05-24 RX ADMIN — BUDESONIDE AND FORMOTEROL FUMARATE DIHYDRATE 2 PUFF: 80; 4.5 AEROSOL RESPIRATORY (INHALATION) at 06:31

## 2019-05-24 RX ADMIN — RIVAROXABAN 15 MG: 15 TABLET, FILM COATED ORAL at 17:52

## 2019-05-25 LAB
ANION GAP SERPL CALCULATED.3IONS-SCNC: 9 MMOL/L (ref 4–13)
BASOPHILS # BLD AUTO: 0.03 10*3/MM3 (ref 0–0.2)
BASOPHILS NFR BLD AUTO: 0.4 % (ref 0–2)
BUN BLD-MCNC: 41 MG/DL (ref 5–21)
BUN/CREAT SERPL: 18.6 (ref 7–25)
CALCIUM SPEC-SCNC: 9 MG/DL (ref 8.4–10.4)
CHLORIDE SERPL-SCNC: 96 MMOL/L (ref 98–110)
CO2 SERPL-SCNC: 29 MMOL/L (ref 24–31)
CREAT BLD-MCNC: 2.2 MG/DL (ref 0.5–1.4)
DEPRECATED RDW RBC AUTO: 43.4 FL (ref 40–54)
EOSINOPHIL # BLD AUTO: 0.28 10*3/MM3 (ref 0–0.7)
EOSINOPHIL NFR BLD AUTO: 4.2 % (ref 0–4)
ERYTHROCYTE [DISTWIDTH] IN BLOOD BY AUTOMATED COUNT: 13.9 % (ref 12–15)
GFR SERPL CREATININE-BSD FRML MDRD: 28 ML/MIN/1.73
GLUCOSE BLD-MCNC: 92 MG/DL (ref 70–100)
HCT VFR BLD AUTO: 37.3 % (ref 40–52)
HGB BLD-MCNC: 12.8 G/DL (ref 14–18)
IMM GRANULOCYTES # BLD AUTO: 0.02 10*3/MM3 (ref 0–0.05)
IMM GRANULOCYTES NFR BLD AUTO: 0.3 % (ref 0–5)
LYMPHOCYTES # BLD AUTO: 0.95 10*3/MM3 (ref 0.72–4.86)
LYMPHOCYTES NFR BLD AUTO: 14.2 % (ref 15–45)
MCH RBC QN AUTO: 29.3 PG (ref 28–32)
MCHC RBC AUTO-ENTMCNC: 34.3 G/DL (ref 33–36)
MCV RBC AUTO: 85.4 FL (ref 82–95)
MONOCYTES # BLD AUTO: 0.88 10*3/MM3 (ref 0.19–1.3)
MONOCYTES NFR BLD AUTO: 13.2 % (ref 4–12)
NEUTROPHILS # BLD AUTO: 4.51 10*3/MM3 (ref 1.87–8.4)
NEUTROPHILS NFR BLD AUTO: 67.7 % (ref 39–78)
NRBC BLD AUTO-RTO: 0 /100 WBC (ref 0–0.2)
PLATELET # BLD AUTO: 173 10*3/MM3 (ref 130–400)
PMV BLD AUTO: 9.6 FL (ref 6–12)
POTASSIUM BLD-SCNC: 3.9 MMOL/L (ref 3.5–5.3)
RBC # BLD AUTO: 4.37 10*6/MM3 (ref 4.8–5.9)
SODIUM BLD-SCNC: 134 MMOL/L (ref 135–145)
WBC NRBC COR # BLD: 6.67 10*3/MM3 (ref 4.8–10.8)

## 2019-05-25 PROCEDURE — 94799 UNLISTED PULMONARY SVC/PX: CPT

## 2019-05-25 PROCEDURE — 99232 SBSQ HOSP IP/OBS MODERATE 35: CPT | Performed by: INTERNAL MEDICINE

## 2019-05-25 PROCEDURE — 94760 N-INVAS EAR/PLS OXIMETRY 1: CPT

## 2019-05-25 PROCEDURE — 97116 GAIT TRAINING THERAPY: CPT

## 2019-05-25 PROCEDURE — 99232 SBSQ HOSP IP/OBS MODERATE 35: CPT | Performed by: FAMILY MEDICINE

## 2019-05-25 PROCEDURE — 80048 BASIC METABOLIC PNL TOTAL CA: CPT | Performed by: FAMILY MEDICINE

## 2019-05-25 PROCEDURE — 85025 COMPLETE CBC W/AUTO DIFF WBC: CPT | Performed by: FAMILY MEDICINE

## 2019-05-25 RX ORDER — MIDODRINE HYDROCHLORIDE 2.5 MG/1
5 TABLET ORAL
Status: DISCONTINUED | OUTPATIENT
Start: 2019-05-25 | End: 2019-05-25

## 2019-05-25 RX ORDER — MIDODRINE HYDROCHLORIDE 10 MG/1
10 TABLET ORAL
Status: DISCONTINUED | OUTPATIENT
Start: 2019-05-25 | End: 2019-05-27 | Stop reason: HOSPADM

## 2019-05-25 RX ORDER — MIDODRINE HYDROCHLORIDE 2.5 MG/1
2.5 TABLET ORAL
Status: DISCONTINUED | OUTPATIENT
Start: 2019-05-25 | End: 2019-05-25

## 2019-05-25 RX ADMIN — MIDODRINE HYDROCHLORIDE 10 MG: 10 TABLET ORAL at 17:23

## 2019-05-25 RX ADMIN — FINASTERIDE 5 MG: 5 TABLET, FILM COATED ORAL at 09:29

## 2019-05-25 RX ADMIN — IPRATROPIUM BROMIDE AND ALBUTEROL SULFATE 3 ML: 2.5; .5 SOLUTION RESPIRATORY (INHALATION) at 14:08

## 2019-05-25 RX ADMIN — BUDESONIDE AND FORMOTEROL FUMARATE DIHYDRATE 2 PUFF: 80; 4.5 AEROSOL RESPIRATORY (INHALATION) at 21:32

## 2019-05-25 RX ADMIN — RAMIPRIL 1.25 MG: 1.25 CAPSULE ORAL at 21:14

## 2019-05-25 RX ADMIN — IPRATROPIUM BROMIDE AND ALBUTEROL SULFATE 3 ML: 2.5; .5 SOLUTION RESPIRATORY (INHALATION) at 06:12

## 2019-05-25 RX ADMIN — IPRATROPIUM BROMIDE AND ALBUTEROL SULFATE 3 ML: 2.5; .5 SOLUTION RESPIRATORY (INHALATION) at 11:05

## 2019-05-25 RX ADMIN — KETOTIFEN FUMARATE 1 DROP: 0.35 SOLUTION/ DROPS OPHTHALMIC at 09:30

## 2019-05-25 RX ADMIN — BUDESONIDE AND FORMOTEROL FUMARATE DIHYDRATE 2 PUFF: 80; 4.5 AEROSOL RESPIRATORY (INHALATION) at 06:12

## 2019-05-25 RX ADMIN — IPRATROPIUM BROMIDE AND ALBUTEROL SULFATE 3 ML: 2.5; .5 SOLUTION RESPIRATORY (INHALATION) at 21:32

## 2019-05-25 RX ADMIN — PANTOPRAZOLE SODIUM 40 MG: 40 TABLET, DELAYED RELEASE ORAL at 05:24

## 2019-05-25 RX ADMIN — TAMSULOSIN HYDROCHLORIDE 0.4 MG: 0.4 CAPSULE ORAL at 09:29

## 2019-05-25 RX ADMIN — KETOTIFEN FUMARATE 1 DROP: 0.35 SOLUTION/ DROPS OPHTHALMIC at 21:14

## 2019-05-25 RX ADMIN — ACETAMINOPHEN 500 MG: 500 TABLET, FILM COATED ORAL at 05:24

## 2019-05-25 RX ADMIN — TORSEMIDE 20 MG: 20 TABLET ORAL at 09:31

## 2019-05-25 RX ADMIN — DIGOXIN 125 MCG: 125 TABLET ORAL at 12:01

## 2019-05-25 RX ADMIN — MILK OF MAGNESIA 10 ML: 2400 CONCENTRATE ORAL at 12:01

## 2019-05-25 RX ADMIN — ACETAMINOPHEN 500 MG: 500 TABLET, FILM COATED ORAL at 18:31

## 2019-05-25 RX ADMIN — RIVAROXABAN 15 MG: 15 TABLET, FILM COATED ORAL at 17:23

## 2019-05-25 RX ADMIN — POLYETHYLENE GLYCOL (3350) 17 G: 17 POWDER, FOR SOLUTION ORAL at 09:29

## 2019-05-26 LAB
ANION GAP SERPL CALCULATED.3IONS-SCNC: 9 MMOL/L (ref 4–13)
BASOPHILS # BLD AUTO: 0.03 10*3/MM3 (ref 0–0.2)
BASOPHILS NFR BLD AUTO: 0.5 % (ref 0–2)
BUN BLD-MCNC: 48 MG/DL (ref 5–21)
BUN/CREAT SERPL: 23.4 (ref 7–25)
CALCIUM SPEC-SCNC: 9.2 MG/DL (ref 8.4–10.4)
CHLORIDE SERPL-SCNC: 97 MMOL/L (ref 98–110)
CO2 SERPL-SCNC: 28 MMOL/L (ref 24–31)
CREAT BLD-MCNC: 2.05 MG/DL (ref 0.5–1.4)
DEPRECATED RDW RBC AUTO: 43.1 FL (ref 40–54)
EOSINOPHIL # BLD AUTO: 0.27 10*3/MM3 (ref 0–0.7)
EOSINOPHIL NFR BLD AUTO: 4.4 % (ref 0–4)
ERYTHROCYTE [DISTWIDTH] IN BLOOD BY AUTOMATED COUNT: 13.9 % (ref 12–15)
GFR SERPL CREATININE-BSD FRML MDRD: 31 ML/MIN/1.73
GLUCOSE BLD-MCNC: 95 MG/DL (ref 70–100)
HCT VFR BLD AUTO: 38.5 % (ref 40–52)
HGB BLD-MCNC: 13.5 G/DL (ref 14–18)
IMM GRANULOCYTES # BLD AUTO: 0.01 10*3/MM3 (ref 0–0.05)
IMM GRANULOCYTES NFR BLD AUTO: 0.2 % (ref 0–5)
LYMPHOCYTES # BLD AUTO: 0.92 10*3/MM3 (ref 0.72–4.86)
LYMPHOCYTES NFR BLD AUTO: 15 % (ref 15–45)
MCH RBC QN AUTO: 29.8 PG (ref 28–32)
MCHC RBC AUTO-ENTMCNC: 35.1 G/DL (ref 33–36)
MCV RBC AUTO: 85 FL (ref 82–95)
MONOCYTES # BLD AUTO: 0.83 10*3/MM3 (ref 0.19–1.3)
MONOCYTES NFR BLD AUTO: 13.6 % (ref 4–12)
NEUTROPHILS # BLD AUTO: 4.06 10*3/MM3 (ref 1.87–8.4)
NEUTROPHILS NFR BLD AUTO: 66.3 % (ref 39–78)
NRBC BLD AUTO-RTO: 0 /100 WBC (ref 0–0.2)
PLATELET # BLD AUTO: 190 10*3/MM3 (ref 130–400)
PMV BLD AUTO: 9.8 FL (ref 6–12)
POTASSIUM BLD-SCNC: 4 MMOL/L (ref 3.5–5.3)
RBC # BLD AUTO: 4.53 10*6/MM3 (ref 4.8–5.9)
SODIUM BLD-SCNC: 134 MMOL/L (ref 135–145)
WBC NRBC COR # BLD: 6.12 10*3/MM3 (ref 4.8–10.8)

## 2019-05-26 PROCEDURE — 99232 SBSQ HOSP IP/OBS MODERATE 35: CPT | Performed by: FAMILY MEDICINE

## 2019-05-26 PROCEDURE — 80048 BASIC METABOLIC PNL TOTAL CA: CPT | Performed by: FAMILY MEDICINE

## 2019-05-26 PROCEDURE — 85025 COMPLETE CBC W/AUTO DIFF WBC: CPT | Performed by: FAMILY MEDICINE

## 2019-05-26 PROCEDURE — 99232 SBSQ HOSP IP/OBS MODERATE 35: CPT | Performed by: NURSE PRACTITIONER

## 2019-05-26 PROCEDURE — 94799 UNLISTED PULMONARY SVC/PX: CPT

## 2019-05-26 PROCEDURE — 97116 GAIT TRAINING THERAPY: CPT

## 2019-05-26 RX ADMIN — MILK OF MAGNESIA 10 ML: 2400 CONCENTRATE ORAL at 14:15

## 2019-05-26 RX ADMIN — BUDESONIDE AND FORMOTEROL FUMARATE DIHYDRATE 2 PUFF: 80; 4.5 AEROSOL RESPIRATORY (INHALATION) at 06:55

## 2019-05-26 RX ADMIN — DIGOXIN 125 MCG: 125 TABLET ORAL at 12:01

## 2019-05-26 RX ADMIN — PANTOPRAZOLE SODIUM 40 MG: 40 TABLET, DELAYED RELEASE ORAL at 06:27

## 2019-05-26 RX ADMIN — ACETAMINOPHEN 500 MG: 500 TABLET, FILM COATED ORAL at 16:17

## 2019-05-26 RX ADMIN — ACETAMINOPHEN 500 MG: 500 TABLET, FILM COATED ORAL at 00:49

## 2019-05-26 RX ADMIN — IPRATROPIUM BROMIDE AND ALBUTEROL SULFATE 3 ML: 2.5; .5 SOLUTION RESPIRATORY (INHALATION) at 14:38

## 2019-05-26 RX ADMIN — BUDESONIDE AND FORMOTEROL FUMARATE DIHYDRATE 2 PUFF: 80; 4.5 AEROSOL RESPIRATORY (INHALATION) at 21:37

## 2019-05-26 RX ADMIN — FINASTERIDE 5 MG: 5 TABLET, FILM COATED ORAL at 08:50

## 2019-05-26 RX ADMIN — IPRATROPIUM BROMIDE AND ALBUTEROL SULFATE 3 ML: 2.5; .5 SOLUTION RESPIRATORY (INHALATION) at 21:30

## 2019-05-26 RX ADMIN — TAMSULOSIN HYDROCHLORIDE 0.4 MG: 0.4 CAPSULE ORAL at 08:51

## 2019-05-26 RX ADMIN — POLYETHYLENE GLYCOL (3350) 17 G: 17 POWDER, FOR SOLUTION ORAL at 08:50

## 2019-05-26 RX ADMIN — ACETAMINOPHEN 500 MG: 500 TABLET, FILM COATED ORAL at 06:30

## 2019-05-26 RX ADMIN — KETOTIFEN FUMARATE 1 DROP: 0.35 SOLUTION/ DROPS OPHTHALMIC at 08:51

## 2019-05-26 RX ADMIN — IPRATROPIUM BROMIDE AND ALBUTEROL SULFATE 3 ML: 2.5; .5 SOLUTION RESPIRATORY (INHALATION) at 06:55

## 2019-05-26 RX ADMIN — MIDODRINE HYDROCHLORIDE 10 MG: 10 TABLET ORAL at 08:51

## 2019-05-26 RX ADMIN — IPRATROPIUM BROMIDE AND ALBUTEROL SULFATE 3 ML: 2.5; .5 SOLUTION RESPIRATORY (INHALATION) at 10:33

## 2019-05-26 RX ADMIN — MIDODRINE HYDROCHLORIDE 10 MG: 10 TABLET ORAL at 17:24

## 2019-05-26 RX ADMIN — KETOTIFEN FUMARATE 1 DROP: 0.35 SOLUTION/ DROPS OPHTHALMIC at 20:12

## 2019-05-26 RX ADMIN — TORSEMIDE 20 MG: 20 TABLET ORAL at 08:51

## 2019-05-26 RX ADMIN — RIVAROXABAN 15 MG: 15 TABLET, FILM COATED ORAL at 17:24

## 2019-05-27 VITALS
WEIGHT: 163 LBS | RESPIRATION RATE: 18 BRPM | OXYGEN SATURATION: 97 % | TEMPERATURE: 97.8 F | HEIGHT: 70 IN | SYSTOLIC BLOOD PRESSURE: 99 MMHG | BODY MASS INDEX: 23.34 KG/M2 | HEART RATE: 82 BPM | DIASTOLIC BLOOD PRESSURE: 60 MMHG

## 2019-05-27 LAB
ANION GAP SERPL CALCULATED.3IONS-SCNC: 8 MMOL/L (ref 4–13)
BASOPHILS # BLD AUTO: 0.04 10*3/MM3 (ref 0–0.2)
BASOPHILS NFR BLD AUTO: 0.7 % (ref 0–2)
BUN BLD-MCNC: 53 MG/DL (ref 5–21)
BUN/CREAT SERPL: 22.8 (ref 7–25)
CALCIUM SPEC-SCNC: 9 MG/DL (ref 8.4–10.4)
CHLORIDE SERPL-SCNC: 95 MMOL/L (ref 98–110)
CO2 SERPL-SCNC: 30 MMOL/L (ref 24–31)
CREAT BLD-MCNC: 2.32 MG/DL (ref 0.5–1.4)
DEPRECATED RDW RBC AUTO: 42.1 FL (ref 40–54)
EOSINOPHIL # BLD AUTO: 0.3 10*3/MM3 (ref 0–0.7)
EOSINOPHIL NFR BLD AUTO: 5.1 % (ref 0–4)
ERYTHROCYTE [DISTWIDTH] IN BLOOD BY AUTOMATED COUNT: 13.8 % (ref 12–15)
GFR SERPL CREATININE-BSD FRML MDRD: 27 ML/MIN/1.73
GLUCOSE BLD-MCNC: 91 MG/DL (ref 70–100)
HCT VFR BLD AUTO: 35.5 % (ref 40–52)
HGB BLD-MCNC: 12.6 G/DL (ref 14–18)
IMM GRANULOCYTES # BLD AUTO: 0.02 10*3/MM3 (ref 0–0.05)
IMM GRANULOCYTES NFR BLD AUTO: 0.3 % (ref 0–5)
LYMPHOCYTES # BLD AUTO: 1.04 10*3/MM3 (ref 0.72–4.86)
LYMPHOCYTES NFR BLD AUTO: 17.6 % (ref 15–45)
MCH RBC QN AUTO: 29.6 PG (ref 28–32)
MCHC RBC AUTO-ENTMCNC: 35.5 G/DL (ref 33–36)
MCV RBC AUTO: 83.3 FL (ref 82–95)
MONOCYTES # BLD AUTO: 0.87 10*3/MM3 (ref 0.19–1.3)
MONOCYTES NFR BLD AUTO: 14.7 % (ref 4–12)
NEUTROPHILS # BLD AUTO: 3.63 10*3/MM3 (ref 1.87–8.4)
NEUTROPHILS NFR BLD AUTO: 61.6 % (ref 39–78)
NRBC BLD AUTO-RTO: 0 /100 WBC (ref 0–0.2)
PLATELET # BLD AUTO: 201 10*3/MM3 (ref 130–400)
PMV BLD AUTO: 9.8 FL (ref 6–12)
POTASSIUM BLD-SCNC: 4 MMOL/L (ref 3.5–5.3)
RBC # BLD AUTO: 4.26 10*6/MM3 (ref 4.8–5.9)
SODIUM BLD-SCNC: 133 MMOL/L (ref 135–145)
WBC NRBC COR # BLD: 5.9 10*3/MM3 (ref 4.8–10.8)

## 2019-05-27 PROCEDURE — 97110 THERAPEUTIC EXERCISES: CPT

## 2019-05-27 PROCEDURE — 80048 BASIC METABOLIC PNL TOTAL CA: CPT | Performed by: FAMILY MEDICINE

## 2019-05-27 PROCEDURE — 85025 COMPLETE CBC W/AUTO DIFF WBC: CPT | Performed by: FAMILY MEDICINE

## 2019-05-27 PROCEDURE — 99305 1ST NF CARE MODERATE MDM 35: CPT | Performed by: FAMILY MEDICINE

## 2019-05-27 PROCEDURE — 99239 HOSP IP/OBS DSCHRG MGMT >30: CPT | Performed by: FAMILY MEDICINE

## 2019-05-27 PROCEDURE — 94799 UNLISTED PULMONARY SVC/PX: CPT

## 2019-05-27 PROCEDURE — 97116 GAIT TRAINING THERAPY: CPT

## 2019-05-27 RX ORDER — DIGOXIN 125 MCG
125 TABLET ORAL
Start: 2019-05-28 | End: 2019-06-06 | Stop reason: SDUPTHER

## 2019-05-27 RX ORDER — FUROSEMIDE 20 MG/1
20 TABLET ORAL DAILY
Status: DISCONTINUED | OUTPATIENT
Start: 2019-05-27 | End: 2019-05-27 | Stop reason: HOSPADM

## 2019-05-27 RX ORDER — TORSEMIDE 20 MG/1
20 TABLET ORAL DAILY
Start: 2019-05-27 | End: 2019-07-02 | Stop reason: SDUPTHER

## 2019-05-27 RX ORDER — MIDODRINE HYDROCHLORIDE 10 MG/1
10 TABLET ORAL
Start: 2019-05-27 | End: 2019-06-06 | Stop reason: SDUPTHER

## 2019-05-27 RX ORDER — ACETAMINOPHEN 500 MG
500 TABLET ORAL EVERY 4 HOURS PRN
Start: 2019-05-27

## 2019-05-27 RX ADMIN — IPRATROPIUM BROMIDE AND ALBUTEROL SULFATE 3 ML: 2.5; .5 SOLUTION RESPIRATORY (INHALATION) at 06:56

## 2019-05-27 RX ADMIN — KETOTIFEN FUMARATE 1 DROP: 0.35 SOLUTION/ DROPS OPHTHALMIC at 08:52

## 2019-05-27 RX ADMIN — TAMSULOSIN HYDROCHLORIDE 0.4 MG: 0.4 CAPSULE ORAL at 08:52

## 2019-05-27 RX ADMIN — MIDODRINE HYDROCHLORIDE 10 MG: 10 TABLET ORAL at 08:52

## 2019-05-27 RX ADMIN — ACETAMINOPHEN 500 MG: 500 TABLET, FILM COATED ORAL at 01:49

## 2019-05-27 RX ADMIN — PANTOPRAZOLE SODIUM 40 MG: 40 TABLET, DELAYED RELEASE ORAL at 05:13

## 2019-05-27 RX ADMIN — IPRATROPIUM BROMIDE AND ALBUTEROL SULFATE 3 ML: 2.5; .5 SOLUTION RESPIRATORY (INHALATION) at 10:18

## 2019-05-27 RX ADMIN — DIGOXIN 125 MCG: 125 TABLET ORAL at 11:40

## 2019-05-27 RX ADMIN — TORSEMIDE 20 MG: 20 TABLET ORAL at 08:52

## 2019-05-27 RX ADMIN — FINASTERIDE 5 MG: 5 TABLET, FILM COATED ORAL at 08:52

## 2019-05-27 RX ADMIN — POLYETHYLENE GLYCOL (3350) 17 G: 17 POWDER, FOR SOLUTION ORAL at 08:52

## 2019-05-27 RX ADMIN — BUDESONIDE AND FORMOTEROL FUMARATE DIHYDRATE 2 PUFF: 80; 4.5 AEROSOL RESPIRATORY (INHALATION) at 06:56

## 2019-05-28 ENCOUNTER — DOCUMENTATION (OUTPATIENT)
Dept: FAMILY MEDICINE CLINIC | Facility: CLINIC | Age: 84
End: 2019-05-28

## 2019-05-28 ENCOUNTER — OUTSIDE FACILITY SERVICE (OUTPATIENT)
Dept: FAMILY MEDICINE CLINIC | Facility: CLINIC | Age: 84
End: 2019-05-28

## 2019-05-28 PROCEDURE — 99307 SBSQ NF CARE SF MDM 10: CPT | Performed by: FAMILY MEDICINE

## 2019-05-29 ENCOUNTER — DOCUMENTATION (OUTPATIENT)
Dept: FAMILY MEDICINE CLINIC | Facility: CLINIC | Age: 84
End: 2019-05-29

## 2019-05-29 PROCEDURE — 99307 SBSQ NF CARE SF MDM 10: CPT | Performed by: FAMILY MEDICINE

## 2019-05-29 NOTE — PROGRESS NOTES
[facility]  SWING BED PROGRESS NOTE     James Birch  MR: [id number]  Account: [id number]  Admit Date: 5.27.19  Date of visit: 5.28.19  Date of service: 5.28.19    Patient Care Team:  GARETH William MD    Chief Complaint:  Back pain    Subjective      HISTORY OF PRESENT ILLNESS: he has recently had a great deal of difficulty with dizziness lightheadedness and documented low blood pressure.  We have suggested that maybe he needs stop his Cardizem and for rate control of his atrial fib consider dig; unfortunately he has not had the ability to work this out with cardiology.  I have also ask and have him see nephrology because along with this he has had a drop in his GFR and increasing leg swelling which is bothersome to him.  I really do not think there is a good answer for that.  At any rate he was attempting to stand or walk the day of admission when he fell; landing on his buttocks with immediate pain in his mid to lower back.  He was taken to [place] ED where the only problem found was a compression fracture inferior aspect of T12.  He was in pain could not walk and we were is concerned about his low blood pressure and his risk for further falls that we quickly recommended transfer to /[place] and admission.  It was hoped nephrology and cardiology could  work on his medications and we can control heart rhythm may be his lower extremity edema maintain a GFR and get his blood pressure to be higher.  Hopefully this will lead to better gait and less chance for falls.  This gentleman lives on [facility] which increases the chance of significant injury from falls.    HOSPITAL COARSE: He was seen promptly by neurosurgery.  His T12 compression fracture was felt to be stable and should be treated with an initial attempt and a conservative approach of pain management and physical therapy.  He was also suggested he have bracing; he got a TLSO brace and wore it when he was up.  I did hold his [place] briefly in case he  needed a kyphoplasty; it was decided this was not going to happen in his Xarelto was put back.  He was taken off cardio exam and put on dig; other medicines were adjusted in attempt to improve his constantly low blood pressure.  When this did not work he was put on Pro-Banthine.  Fortunately he did not develop any fluid retention or even leg edema or shortness of breath with that.  He did have some difficulty with an increase in constipation from his baseline that required various medicines to help.  He was moving around some with therapy but was not felt ready to return even to assisted living and he choose to go to [place] swing bed program.  Once it was felt he was improved enough to go to [place] the discharge is being accomplished.  He continued to run a rather low GFR compared to the past; but we expect this is his new normal.  It was felt he might be a candidate for cardioversion later.    Interval History: Successful transfer from [facility] to [place] yesterday; the only issue was his Pro-Banthine was not available for couple doses.  He actually had physical therapy today.  He says he still weak and tires easily.  His back pain is reasonably controlled at rest or with activity.  Eating and stooling; drinking and voiding.  His right eye has became quite red and a little sore; his vision he says is fine. Having trouble getting HOME eye drops.     Allergy/Meds reviewed:     Review of Systems:   GENERAL:  Inactive/slower with limits, speed, stamina for age and fatigue; not sure of his walk. Sleep is ok. No fever.  ENDO:  No syncope, near or diaphoretic sweaty spells.  BS Ok here.  HEENT: No head injury occ/same headache.  Minor decreased/ vision change; red eye R > L (from oxygen mask rubbing it). Same aide treated hearing loss.  Ears without pain/drainage.  No sore throat.  No significant nasal/sinus congestion/drainage. No epistaxis.  CHEST: No chest wall tenderness or mass. No change occ cough, wheeze, SOB; no  hemoptysis.  CV: No chest pain, palpitations; no LE/ ankle edema.  Hx atrial fib.  GI: No heartburn, dysphagia.  No abdominal pain, diarrhea,  rectal bleeding, or melena; tends towards constipation.  No recent nausea and vomiting even before Norco.   :  Voids without dysuria, or incontinence to completion.  ORTHO: No painful/swollen joints but various on /off sore.  Usual mild sore neck or back; not much increased back.  No acute neck but acute back pain with above recent injury.   NEURO: Often dizziness without weakness of extremities.  No change LE numbness/paresthesias.   PSYCH: Mild/stable memory loss.  Mood good; occ mildly anxious, depressed but/and not suicidal.  Tolerated stress.    Objective     Vital Signs  Temp 98.0 Fahrenheit pulse 82 regular respirations 20 unlabored blood pressure 109/70 sitting.    Lab Results:  Reviewed      Physical Exam:   GENERAL:  Well nourished/developed in no acute distress.  SKIN: Turgor excellent, without wound, rash, lesion beyond small bruise mid/lower back blue.  HEENT: Normal cephalic without trauma.  Pupils equal round reactive to light; injection R bulbar/palpebral conjunctiva. Extraocular motions full without nystagmus.     External canals nonobstructive nontender without reddness.  Oral cavity without growths, exudates, and moist.  Posterior pharynx without mass, obstruction, redness.  No thyromegaly, mass, tenderness, lymphadenopathy and supple.  CV: Regular rhythm.  No gallop but 1/6 systolic, 1-2/4 pitting edema. Posterior pulses intact.  No carotid bruits.   CHEST: No chest wall tenderness or mass.   LUNGS: Symmetric motion with clear to auscultation.   ABD: Soft, nontender without mass.   ORTHO: Symmetric extremities without swelling/point tenderness.  Full gross range of motion.    NEURO: CN 2-12 grossly intact.  Symmetric facies. 1/4 x bicep equal reflexes.  UE/LE   3/5 strength throughout.  Nonfocal use extremities. Speech clear.    PSYCH: Oriented x 3.   Pleasant calm, well kept.  Purposeful/directed conservation with intact short/long gross memory.    Results Review:    White count 5.97 hemoglobin 13 platelets 194,000.  Chemistry shows sodium 132 potassium 3.8 BUN 56 creatinine 2.5 blood sugar 98.    ASSESSMENT/PLAN:  Reason for admit/problems addressing acutely:  T12 compression fracture  Back pain-acute  Gait difficulty-acute/chronic  Hypotension-symptomatic dizzy, falls-contribution of autonomatic dysfunction?  Nausea-on/off  Fall-acute  Fall-chronic risk  Leg edema; no longer able to resolve  Constipation-acute on chronic  Atrial flutter  conjunctivitis     Chronic problems to review/consider during care:  Allergy/intolerance: see above  Procedural history: see above  Family history: see above  History tobacco-stopped  COPD  Asthma  Shortness of breath-chronic (copd, CHF, anemia)  Hyperlipidemia-reserved to statin  Hypertension  IgA nephropathy  CKD3  Obstructive sleep apnea-intolerant tx  Nocturnal hypoxemia-oxygen treated  hypersomnia  Cardiac arrhythmia-pa fib  Congestive heart failure-diastolic-chronic  Valvular heart  Edema (copd, CKD, CHF, pAfib)  Anticoagulated Hx CVA, a fib/(past coumadin), xarelto  Anemia-chronic (CKD, xarelto  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  Labs reviewed, considered and changes made as needed: no changes/twice a week chemistry and once a week CBC  Imaging reviewed, and need for considered: no changes  Consults needed: none  Diet reviewed, considered and changes  made as needed: no changes  Fluids reviewed, considered and changes made as needed: oral/encouraged  Increase activity: working with PT; TLSO when up  Code status: full  Discussed possible/future discharge plans: patient expects home after this  Case discussed with patient, nursing  Available to talk if needed with POA/caregiver and members care team.    Hemal William MD

## 2019-05-30 ENCOUNTER — TELEPHONE (OUTPATIENT)
Dept: FAMILY MEDICINE CLINIC | Facility: CLINIC | Age: 84
End: 2019-05-30

## 2019-05-30 NOTE — TELEPHONE ENCOUNTER
"Sheree LEONE \"he is throwing up all over the place, can he have some zofran? He doesn't have an iv.\" verbal per Dr Blake Hopkins 4mg po Q8 hour prn and advised Sheree LEONE ext [phone]  "

## 2019-05-30 NOTE — PROGRESS NOTES
[facility]  PROGRESS NOTE     James Birch  MR: [id number]  Account: [id number]  Admit Date: 5.27.19  Date of visit: 5.29.19  Date of service: 5.29.19    Patient Care Team:  GARETH William MD    Chief Complaint:  Back pain    Subjective      HISTORY OF PRESENT ILLNESS: he has recently had a great deal of difficulty with dizziness lightheadedness and documented low blood pressure.  We have suggested that maybe he needs stop his Cardizem and for rate control of his atrial fib consider dig; unfortunately he has not had the ability to work this out with cardiology.  I have also ask and have him see nephrology because along with this he has had a drop in his GFR and increasing leg swelling which is bothersome to him.  I really do not think there is a good answer for that.  At any rate he was attempting to stand or walk the day of admission when he fell; landing on his buttocks with immediate pain in his mid to lower back.  He was taken to [place] ED where the only problem found was a compression fracture inferior aspect of T12.  He was in pain could not walk and we were is concerned about his low blood pressure and his risk for further falls that we quickly recommended transfer to /[place] and admission.  It was hoped nephrology and cardiology could  work on his medications and we can control heart rhythm may be his lower extremity edema maintain a GFR and get his blood pressure to be higher.  Hopefully this will lead to better gait and less chance for falls.  This gentleman lives on [facility] which increases the chance of significant injury from falls.     HOSPITAL COARSE: He was seen promptly by neurosurgery.  His T12 compression fracture was felt to be stable and should be treated with an initial attempt and a conservative approach of pain management and physical therapy.  He was also suggested he have bracing; he got a TLSO brace and wore it when he was up.  I did hold his [place] briefly in case he needed a  kyphoplasty; it was decided this was not going to happen in his Xarelto was put back.  He was taken off cardio exam and put on dig; other medicines were adjusted in attempt to improve his constantly low blood pressure.  When this did not work he was put on Pro-Banthine.  Fortunately he did not develop any fluid retention or even leg edema or shortness of breath with that.  He did have some difficulty with an increase in constipation from his baseline that required various medicines to help.  He was moving around some with therapy but was not felt ready to return even to assisted living and he choose to go to [place] swing bed program.  Once it was felt he was improved enough to go to [place] the discharge is being accomplished.  He continued to run a rather low GFR compared to the past; but we expect this is his new normal.  It was felt he might be a candidate for cardioversion later.     Interval History: Slept ok.  Walked this AM/fatigues easily.  Back on/off sore rest/and exertion.  Voiding to completion.  Stooling ok. R eye not as red; got correct drops.     Allergy/Meds reviewed:     Review of Systems:   GENERAL:  Inactive/slower with limits, speed, stamina for age and fatigue; not sure of his walk. Sleep is ok. No fever.  ENDO:  No syncope, near or diaphoretic sweaty spells.  BS Ok here.  HEENT: No head injury occ/same headache.  Minor decreased/ vision change; red eye R > L (from oxygen mask rubbing it). Same aide treated hearing loss.  Ears without pain/drainage.  No sore throat.  No significant nasal/sinus congestion/drainage. No epistaxis.  CHEST: No chest wall tenderness or mass. No change occ cough, wheeze, SOB; no hemoptysis.  CV: No chest pain, palpitations; no LE/ ankle edema.  Hx atrial fib.  GI: No heartburn, dysphagia.  No abdominal pain, diarrhea,  rectal bleeding, or melena; tends towards constipation.  No recent nausea and vomiting even before Norco.   :  Voids without dysuria, or incontinence  to completion.  ORTHO: No painful/swollen joints but various on /off sore.  Usual mild sore neck or back; not much increased back.  No acute neck but acute back pain with above recent injury.   NEURO: Often dizziness without weakness of extremities.  No change LE numbness/paresthesias.   PSYCH: Mild/stable memory loss.  Mood good; occ mildly anxious, depressed but/and not suicidal.  Tolerated stress.    Objective     Vital Signs  T:  97.6  Pulse: 84  RR: 12  BP: 116/73    Lab Results:  Reviewed; none today      Physical Exam:  GENERAL:  Well nourished/developed in no acute distress.  SKIN: Turgor excellent, without wound, rash, lesion beyond small bruise mid/lower back blue.  HEENT: Normal cephalic without trauma.  Pupils equal round reactive to light; less injection R bulbar/palpebral conjunctiva. Extraocular motions full without nystagmus.     External canals nonobstructive nontender without reddness.  Oral cavity without growths, exudates, and moist.  Posterior pharynx without mass, obstruction, redness.  No thyromegaly, mass, tenderness, lymphadenopathy and supple.  CV: Regular rhythm.  No gallop but 1/6 systolic, 1-2/4 pitting edema. Posterior pulses intact.  No carotid bruits.   CHEST: No chest wall tenderness or mass.   LUNGS: Symmetric motion with clear to auscultation.   ABD: Soft, nontender without mass.   ORTHO: Symmetric extremities without swelling/point tenderness.  Full gross range of motion.    NEURO: CN 2-12 grossly intact.  Symmetric facies. 1/4 x bicep equal reflexes.  UE/LE   3/5 strength throughout.  Nonfocal use extremities. Speech clear.    PSYCH: Oriented x 3.  Pleasant calm, well kept.  Purposeful/directed conservation with intact short/long gross memory.    Results Review:    above    ASSESSMENT/PLAN:  Reason for admit/problems addressing acutely:  T12 compression fracture  Back pain-acute  Gait difficulty-acute/chronic  Hypotension-symptomatic dizzy, falls-contribution of autonomatic  dysfunction?  Nausea-on/off  Fall-acute  Fall-chronic risk  Leg edema; no longer able to resolve  Constipation-acute on chronic  Atrial flutter  Conjunctivitis-improved     Chronic problems to review/consider during care:  Allergy/intolerance: see above  Procedural history: see above  Family history: see above  History tobacco-stopped  COPD  Asthma  Shortness of breath-chronic (copd, CHF, anemia)  Hyperlipidemia-reserved to statin  Hypertension  IgA nephropathy  CKD3  Obstructive sleep apnea-intolerant tx  Nocturnal hypoxemia-oxygen treated  hypersomnia  Cardiac arrhythmia-pa fib  Congestive heart failure-diastolic-chronic  Valvular heart  Edema (copd, CKD, CHF, pAfib)  Anticoagulated Hx CVA, a fib/(past coumadin), xarelto  Anemia-chronic (CKD, xarelto  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  Cerebral small vessel disease  Late effects CVA  Autonomic dysfunction-neuro opinion  Recurrent dizziness  History seizure disorder  Peripheral neuropathy  Gait difficulties  Fall risk-history falls  Prostatism  BPH  History urolithiasis  Varicose veins  GE reflux (heartburn)  History cholinergic urticaria  Hearing loss-conductive  Degenerative joint disease    Rx-reviewed, considered with changes as needed: no changes  Labs reviewed, considered and changes made as needed: no changes  Imaging reviewed, and need for considered: no changes  Consults needed: none  Diet reviewed, considered and changes made as needed: no changes/encouraged  Fluids reviewed, considered and changes made as needed: no changes/encouraged  Increase activity: with PT/TLSO brace  Code status: full  Discussed possible/future discharge plans: patient expects home   Case discussed with patient  Available to talk if needed with POA/caregiver and members care team.    Hemal William MD

## 2019-06-03 ENCOUNTER — TELEPHONE (OUTPATIENT)
Dept: FAMILY MEDICINE CLINIC | Facility: CLINIC | Age: 84
End: 2019-06-03

## 2019-06-03 NOTE — TELEPHONE ENCOUNTER
April- at [facility] called & said that Mr. Birch's duo neb is scheduled for Q6H and he is req to decrease the frequency.  She said that he said that he is only using it twice daily most days.  Is this ok.  [phone]  xt  [phone]

## 2019-06-04 ENCOUNTER — TELEPHONE (OUTPATIENT)
Dept: FAMILY MEDICINE CLINIC | Facility: CLINIC | Age: 84
End: 2019-06-04

## 2019-06-04 NOTE — TELEPHONE ENCOUNTER
roma pearson called and said that this pt will need to be dc Thursday his insurance [facility] will not pay for him to stay past thrusday and physical therapy has dc this pt and he will go back to [place]

## 2019-06-06 ENCOUNTER — TELEPHONE (OUTPATIENT)
Dept: FAMILY MEDICINE CLINIC | Facility: CLINIC | Age: 84
End: 2019-06-06

## 2019-06-06 RX ORDER — MIDODRINE HYDROCHLORIDE 10 MG/1
10 TABLET ORAL
Qty: 60 TABLET | Refills: 5 | Status: SHIPPED | OUTPATIENT
Start: 2019-06-06 | End: 2019-11-19 | Stop reason: SDUPTHER

## 2019-06-06 RX ORDER — ONDANSETRON 4 MG/1
4 TABLET, FILM COATED ORAL EVERY 8 HOURS PRN
Qty: 20 TABLET | Refills: 0 | Status: SHIPPED | OUTPATIENT
Start: 2019-06-06 | End: 2020-02-06

## 2019-06-06 RX ORDER — DIGOXIN 125 MCG
125 TABLET ORAL
Qty: 30 TABLET | Refills: 5 | Status: SHIPPED | OUTPATIENT
Start: 2019-06-06 | End: 2019-06-11

## 2019-06-06 NOTE — TELEPHONE ENCOUNTER
April @ [facility] called & said that per therapy he is almost ready to discharge.  She also said that Mr. Birch is ready to go home.

## 2019-06-06 NOTE — TELEPHONE ENCOUNTER
April [facility] called and pt is being discharged and need refill Lanoxin 0.125 mcg, Midodrine 10 mg bid, Zofran 4 mg po 8 hours #20 to md2 , appt made for follow up 6-14-19 @10:15a

## 2019-06-07 ENCOUNTER — HOSPITAL ENCOUNTER (OUTPATIENT)
Dept: GENERAL RADIOLOGY | Facility: HOSPITAL | Age: 84
Discharge: HOME | End: 2019-06-07
Admitting: NURSE PRACTITIONER

## 2019-06-07 DIAGNOSIS — S22.000A COMPRESSION FRACTURE OF BODY OF THORACIC VERTEBRA (HCC): ICD-10-CM

## 2019-06-07 PROCEDURE — 72100 X-RAY EXAM L-S SPINE 2/3 VWS: CPT
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
## 2019-06-11 ENCOUNTER — LAB (OUTPATIENT)
Dept: LAB | Facility: HOSPITAL | Age: 84
End: 2019-06-11

## 2019-06-11 ENCOUNTER — OFFICE VISIT (OUTPATIENT)
Dept: NEUROLOGY | Facility: CLINIC | Age: 84
End: 2019-06-11

## 2019-06-11 ENCOUNTER — OFFICE VISIT (OUTPATIENT)
Dept: NEUROSURGERY | Facility: CLINIC | Age: 84
End: 2019-06-11

## 2019-06-11 VITALS
BODY MASS INDEX: 22.9 KG/M2 | HEIGHT: 70 IN | DIASTOLIC BLOOD PRESSURE: 60 MMHG | SYSTOLIC BLOOD PRESSURE: 100 MMHG | HEART RATE: 82 BPM | WEIGHT: 160 LBS

## 2019-06-11 VITALS
BODY MASS INDEX: 22.9 KG/M2 | DIASTOLIC BLOOD PRESSURE: 80 MMHG | SYSTOLIC BLOOD PRESSURE: 100 MMHG | WEIGHT: 160 LBS | HEIGHT: 70 IN

## 2019-06-11 DIAGNOSIS — S22.000A COMPRESSION FRACTURE OF BODY OF THORACIC VERTEBRA (HCC): ICD-10-CM

## 2019-06-11 DIAGNOSIS — Z91.89 AT RISK FOR OSTEOPOROSIS: ICD-10-CM

## 2019-06-11 DIAGNOSIS — M89.9 DISORDER OF BONE: ICD-10-CM

## 2019-06-11 DIAGNOSIS — Z78.9 CURRENT NON-SMOKER: ICD-10-CM

## 2019-06-11 DIAGNOSIS — S22.000A COMPRESSION FRACTURE OF BODY OF THORACIC VERTEBRA (HCC): Primary | ICD-10-CM

## 2019-06-11 DIAGNOSIS — G90.9 AUTONOMIC DYSFUNCTION: Primary | ICD-10-CM

## 2019-06-11 DIAGNOSIS — I66.3: ICD-10-CM

## 2019-06-11 LAB
25(OH)D3 SERPL-MCNC: 96.3 NG/ML (ref 30–100)
CALCIUM SPEC-SCNC: 9 MG/DL (ref 8.4–10.4)

## 2019-06-11 PROCEDURE — 99213 OFFICE O/P EST LOW 20 MIN: CPT | Performed by: PHYSICIAN ASSISTANT

## 2019-06-11 PROCEDURE — 99214 OFFICE O/P EST MOD 30 MIN: CPT | Performed by: NURSE PRACTITIONER

## 2019-06-11 PROCEDURE — 82310 ASSAY OF CALCIUM: CPT | Performed by: NURSE PRACTITIONER

## 2019-06-11 PROCEDURE — 82306 VITAMIN D 25 HYDROXY: CPT | Performed by: NURSE PRACTITIONER

## 2019-06-11 PROCEDURE — 36415 COLL VENOUS BLD VENIPUNCTURE: CPT

## 2019-06-12 NOTE — PROGRESS NOTES
Subjective   James Birch is a 89 y.o. male is here today for follow-up.    Progressive decline.  The patient experienced a recent fall secondary to orthostatic symptoms and experienced a stable T12 compression fracture.  He is utilizing a TLSO brace and following with neurosurgery.  The patient's orthostasis is not controlled on midodrine.  Previously, the patient was on Northera with much better control of his orthostasis.      Stroke   This is a chronic problem. The current episode started more than 1 year ago. The problem occurs daily. The problem has been gradually worsening. Associated symptoms include fatigue, numbness and weakness. Pertinent negatives include no coughing. The symptoms are aggravated by exertion and stress. Treatments tried: Supportive care antiplatelet therapy. The treatment provided mild relief.       The following portions of the patient's history were reviewed and updated as appropriate: allergies, current medications, past family history, past medical history, past social history, past surgical history and problem list.    Review of Systems   Constitutional: Positive for fatigue.   HENT: Positive for hearing loss. Negative for trouble swallowing.    Eyes: Positive for visual disturbance.   Respiratory: Positive for shortness of breath. Negative for cough.    Cardiovascular: Negative.    Gastrointestinal: Negative.    Endocrine: Negative.    Genitourinary: Negative.    Musculoskeletal: Positive for back pain and gait problem.   Skin: Negative.    Allergic/Immunologic: Negative.    Neurological: Positive for dizziness, syncope, weakness, light-headedness and numbness.   Hematological: Negative.    Psychiatric/Behavioral: Positive for confusion, decreased concentration and sleep disturbance.       Objective   Physical Exam   Constitutional: He is oriented to person, place, and time. He appears well-developed and well-nourished.   HENT:   Head: Normocephalic.   Right Ear: External ear  normal. Decreased hearing is noted.   Left Ear: External ear normal. Decreased hearing is noted.   Nose: Nose normal.   Mouth/Throat: Uvula is midline, oropharynx is clear and moist and mucous membranes are normal.   Eyes: Conjunctivae, EOM and lids are normal. Pupils are equal, round, and reactive to light. No scleral icterus.   Neck: Normal range of motion. No JVD present. Carotid bruit is not present.   Cardiovascular: Normal rate, regular rhythm and normal heart sounds.   Pulmonary/Chest: Effort normal and breath sounds normal.   Musculoskeletal: Normal range of motion. He exhibits no edema.   Lymphadenopathy:     He has no cervical adenopathy.   Neurological: He is alert and oriented to person, place, and time. He has normal strength. He displays no tremor. No cranial nerve deficit or sensory deficit. He exhibits normal muscle tone. He displays a negative Romberg sign. Coordination normal.   Reflex Scores:       Tricep reflexes are 1+ on the right side and 1+ on the left side.       Bicep reflexes are 1+ on the right side and 1+ on the left side.       Brachioradialis reflexes are 1+ on the right side and 1+ on the left side.       Patellar reflexes are 1+ on the right side and 1+ on the left side.       Achilles reflexes are 1+ on the right side and 1+ on the left side.  Skin: Skin is warm, dry and intact.   Psychiatric: He has a normal mood and affect. His speech is delayed. He is slowed. Cognition and memory are impaired.   Nursing note and vitals reviewed.        Assessment/Plan   James was seen today for stroke.    Diagnoses and all orders for this visit:    Autonomic dysfunction    Thrombosis of posterior inferior cerebellar artery    The patient has had a recent fall with a T12 compression fracture.  I feel as though this fall was directly related to his autonomic dysfunction and orthostasis.  We will once again try to obtain and restart Northera.  His orthostasis was better controlled when he was able  to be on this in the past.  He has had significant risk for further injury falling secondary to orthostatic issues and is not responding satisfactorily to midodrine.    He will follow-up with neurosurgery today regarding his fracture and is wearing his TLSO brace.  He relates that he is not having much in the way of postural discomfort.    Today's findings and recommendations are discussed with the patient and his son, Dr.Terry Birch.  10 minutes of a 15-minute outpatient visit was spent in counseling and coordination of care today.    Dictated utilizing Dragon dictation.

## 2019-06-13 ENCOUNTER — OFFICE VISIT (OUTPATIENT)
Dept: PULMONOLOGY | Facility: CLINIC | Age: 84
End: 2019-06-13

## 2019-06-13 VITALS
WEIGHT: 162 LBS | SYSTOLIC BLOOD PRESSURE: 120 MMHG | OXYGEN SATURATION: 100 % | BODY MASS INDEX: 23.19 KG/M2 | DIASTOLIC BLOOD PRESSURE: 70 MMHG | HEART RATE: 61 BPM | HEIGHT: 70 IN

## 2019-06-13 DIAGNOSIS — J44.9 COPD, GROUP B, BY GOLD 2013 CLASSIFICATION (HCC): Primary | ICD-10-CM

## 2019-06-13 DIAGNOSIS — J45.40 MODERATE PERSISTENT ASTHMA WITHOUT COMPLICATION: ICD-10-CM

## 2019-06-13 DIAGNOSIS — I10 ESSENTIAL HYPERTENSION: ICD-10-CM

## 2019-06-13 DIAGNOSIS — I50.30 CONGESTIVE HEART FAILURE WITH LEFT VENTRICULAR DIASTOLIC DYSFUNCTION, NYHA CLASS 3 (HCC): ICD-10-CM

## 2019-06-13 PROCEDURE — 99213 OFFICE O/P EST LOW 20 MIN: CPT | Performed by: INTERNAL MEDICINE

## 2019-06-13 NOTE — PROGRESS NOTES
Subjective   James Birch is a 89 y.o. male.     Background: Pt with asthma copd overlap  fev1 52% pred 2018    Chief Complaint   Patient presents with   • F/U ASTHMA        History of Present Illness   He is in a back brace now and new xrays following the back fracture, and he did not need kyphoplasty.  He has been dealing with chf and fluid issues.  Breathing is good now.  He is using the symbicort bid.  He broke his wrist and rib trying to kill a spider in the past.    Medical/Family/Social History   has a past medical history of Arthritis, Asthma, Atrial fibrillation (CMS/HCC), Cancer (CMS/HCC), CHF (congestive heart failure) (CMS/HCC), Conductive hearing loss, COPD (chronic obstructive pulmonary disease) (CMS/HCC), Eustachian tube dysfunction, GERD (gastroesophageal reflux disease), Hyperlipidemia, Hypertension, Obstructive sleep apnea, Prostate disease, Sensorineural hearing loss, Stroke (CMS/HCC), and Vertigo.   has a past surgical history that includes Hip surgery; Hernia repair; Eye surgery; Cataract extraction; Cataract extraction; Colonoscopy (09/05/2012); Skin biopsy; Fracture surgery; and Esophagogastroduodenoscopy (08/31/2015).  family history includes No Known Problems in his father and mother.   reports that he quit smoking about 52 years ago. His smoking use included cigarettes. He has a 18.00 pack-year smoking history. He has never used smokeless tobacco. He reports that he does not drink alcohol or use drugs.  No Known Allergies  Medications    Current Outpatient Medications:   •  acetaminophen (TYLENOL) 500 MG tablet, Take 1 tablet by mouth Every 4 (Four) Hours As Needed for Mild Pain ., Disp: , Rfl:   •  azelastine (OPTIVAR) 0.05 % ophthalmic solution, Administer 1 drop to both eyes 2 (Two) Times a Day., Disp: , Rfl:   •  B Complex-C (SUPER B COMPLEX/VITAMIN C PO), Take 1 capsule by mouth Daily., Disp: , Rfl:   •  budesonide-formoterol (SYMBICORT) 80-4.5 MCG/ACT inhaler, Inhale 2 puffs 2  (Two) Times a Day., Disp: 30.6 g, Rfl: 3  •  carbonyl iron (FEOSOL) 45 MG tablet tablet, Take 2 tablets by mouth 1 (One) Time Per Week., Disp: , Rfl:   •  Cholecalciferol (VITAMIN D3 PO), Take 2,000 Int'l Units by mouth Daily., Disp: , Rfl:   •  Coenzyme Q10 (CO Q-10 PO), Take 1 tablet by mouth Daily., Disp: , Rfl:   •  dutasteride (AVODART) 0.5 MG capsule, Take 0.5 mg by mouth Daily., Disp: , Rfl:   •  esomeprazole (nexIUM) 40 MG capsule, Take 1 capsule by mouth Every Morning Before Breakfast., Disp: 90 capsule, Rfl: 2  •  guaiFENesin (MUCINEX) 600 MG 12 hr tablet, Take 600 mg by mouth Every 12 (Twelve) Hours., Disp: , Rfl:   •  ipratropium-albuterol (DUO-NEB) 0.5-2.5 mg/3 ml nebulizer, USE 1 VIAL IN NEBULIZER FOUR TIMES DAILY GENERIC FOR, Disp: 180 vial, Rfl: 6  •  magnesium hydroxide (MILK OF MAGNESIA) 2400 MG/10ML suspension suspension, Take 10 mL by mouth Daily As Needed (constipation)., Disp: 10 mL, Rfl:   •  midodrine (PROAMATINE) 10 MG tablet, Take 1 tablet by mouth 2 (Two) Times a Day Before Meals., Disp: 60 tablet, Rfl: 5  •  Multiple Vitamins-Minerals (MULTIVITAMIN PO), Take 1 tablet by mouth daily., Disp: , Rfl:   •  O2 (OXYGEN), Inhale 1 L/min Every Night. As needed at bedtime. , Disp: , Rfl:   •  Omega-3 Fatty Acids (FISH OIL PO), Take 1,000 mg by mouth 2 (Two) Times a Day., Disp: , Rfl:   •  ondansetron (ZOFRAN) 4 MG tablet, Take 1 tablet by mouth Every 8 (Eight) Hours As Needed for Nausea or Vomiting., Disp: 20 tablet, Rfl: 0  •  polyethylene glycol (MIRALAX) packet, Take 17 g by mouth Daily., Disp: 30 each, Rfl: 1  •  PROAIR  (90 Base) MCG/ACT inhaler, Inhale 2 puffs Every 4 (Four) Hours As Needed for Shortness of Air., Disp: , Rfl: 5  •  rivaroxaban (XARELTO) 15 MG tablet, Take 15 mg by mouth Daily., Disp: , Rfl:   •  tamsulosin (FLOMAX) 0.4 MG capsule 24 hr capsule, Take 1 capsule by mouth Daily., Disp: 90 capsule, Rfl: 3  •  torsemide (DEMADEX) 20 MG tablet, Take 1 tablet by mouth Daily.,  "Disp: , Rfl:     Review of Systems   Constitutional: Positive for fatigue.   Musculoskeletal: Positive for back pain (much better).     Objective   /70   Pulse 61   Ht 177.8 cm (70\")   Wt 73.5 kg (162 lb)   SpO2 100% Comment: RA  BMI 23.24 kg/m²   Physical Exam   Constitutional: He appears well-developed and well-nourished. He does not appear ill. No distress.   HENT:   Head: Normocephalic and atraumatic.   Nose: Nose normal.   Eyes: Conjunctivae and EOM are normal.   Neck: Neck supple.   Cardiovascular: Normal rate, regular rhythm, S1 normal and S2 normal.   Pulmonary/Chest: Effort normal. No respiratory distress. He has no wheezes. He has no rales.   Abdominal: Soft. He exhibits no distension. There is no tenderness. There is no guarding.   Musculoskeletal: He exhibits no deformity.   Back brace on   Neurological: He is alert.   Skin: Skin is warm and dry. No rash noted.   Psychiatric: He has a normal mood and affect.     -----------------------------------------------------------------------------------------------  Recent Imaging:  Xr Spine Lumbar 2 Or 3 View    Result Date: 5/23/2019  Impression: 1. T12 compression fracture with similar loss of height in the supine and upright orientation as described above. 2. Multilevel degenerative disc disease and facet osteoarthropathy. Grade 1 spondylolisthesis at L3/L4. This report was finalized on 05/23/2019 09:25 by Dr. Joy De La Fuente MD.    Mri Lumbar Spine Without Contrast    Result Date: 5/22/2019  Impression: 1. 10% compression deformity due to compression fracture centrum of T12. 2. Degenerative spondylosis is noted at described above.   This report was finalized on 05/22/2019 08:08 by Dr. Oskar Blanc MD.       Assessment/Plan   Problem List Items Addressed This Visit        Cardiovascular and Mediastinum    Hypertension (Chronic)       Respiratory    Asthma (Chronic)    COPD, group B, by GOLD 2013 classification  - Primary (Chronic)      Other " Visit Diagnoses     Congestive heart failure with left ventricular diastolic dysfunction, NYHA class 3 (CMS/[facility])            Patient's Body mass index is 23.24 kg/m². BMI is within normal parameters. No follow-up required..      He is stable from pulmonary point of view.  Continue symbicort.  Monitor for tachycardia related to beta agonist       Electronically signed by Bill Church MD, 6/13/2019, 3:50 PM

## 2019-06-14 ENCOUNTER — OFFICE VISIT (OUTPATIENT)
Dept: FAMILY MEDICINE CLINIC | Facility: CLINIC | Age: 84
End: 2019-06-14

## 2019-06-14 VITALS
DIASTOLIC BLOOD PRESSURE: 70 MMHG | TEMPERATURE: 97.7 F | HEIGHT: 70 IN | SYSTOLIC BLOOD PRESSURE: 100 MMHG | RESPIRATION RATE: 18 BRPM | OXYGEN SATURATION: 97 % | WEIGHT: 160 LBS | HEART RATE: 72 BPM | BODY MASS INDEX: 22.9 KG/M2

## 2019-06-14 DIAGNOSIS — N18.30 STAGE 3 CHRONIC KIDNEY DISEASE (HCC): ICD-10-CM

## 2019-06-14 DIAGNOSIS — M81.0 OSTEOPOROSIS, UNSPECIFIED OSTEOPOROSIS TYPE, UNSPECIFIED PATHOLOGICAL FRACTURE PRESENCE: ICD-10-CM

## 2019-06-14 DIAGNOSIS — I50.32 CHF (CONGESTIVE HEART FAILURE), NYHA CLASS III, CHRONIC, DIASTOLIC (HCC): Chronic | ICD-10-CM

## 2019-06-14 DIAGNOSIS — S22.000A COMPRESSION FRACTURE OF BODY OF THORACIC VERTEBRA (HCC): ICD-10-CM

## 2019-06-14 DIAGNOSIS — G90.9 AUTONOMIC DYSFUNCTION: ICD-10-CM

## 2019-06-14 DIAGNOSIS — R06.02 SHORTNESS OF BREATH: ICD-10-CM

## 2019-06-14 DIAGNOSIS — I10 ESSENTIAL HYPERTENSION: Primary | Chronic | ICD-10-CM

## 2019-06-14 PROCEDURE — 99214 OFFICE O/P EST MOD 30 MIN: CPT | Performed by: FAMILY MEDICINE

## 2019-06-14 NOTE — PROGRESS NOTES
Transitional Care Follow Up Visit  Subjective     James Birch is a 89 y.o. male who presents for a transitional care management visit.  Kade/son and wife.    Within 48 business hours after discharge our office contacted him via telephone to coordinate his care and needs.      I reviewed and discussed the details of that call along with the discharge summary, hospital problems, inpatient lab results, inpatient diagnostic studies, and consultation reports with James.     Current outpatient and discharge medications have been reconciled for the patient.    No flowsheet data found.     [facility] 5.27.19-6.6.19 admit swing bed  T12 compression fracture  Back pain-acute  Gait difficulty-acute/chronic  Hypotension-symptomatic dizzy, falls-contribution of autonomatic dysfunction?  Nausea-on/off  Fall-acute  Fall-chronic risk  Leg edema; no longer able to resolve  Constipation-acute on chronic  Atrial flutter    Risk for Readmission (LACE) No Data Recorded    History of Present Illness  (origional admit history):  he has recently had a great deal of difficulty with dizziness lightheadedness and documented low blood pressure.  We have suggested that maybe he needs stop his Cardizem and for rate control of his atrial fib consider dig; unfortunately he has not had the ability to work this out with cardiology.  I have also ask and have him see nephrology because along with this he has had a drop in his GFR and increasing leg swelling which is bothersome to him.  I really do not think there is a good answer for that.  At any rate he was attempting to stand or walk the day of admission when he fell; landing on his buttocks with immediate pain in his mid to lower back.  He was taken to [place] ED where the only problem found was a compression fracture inferior aspect of T12.  He was in pain could not walk and we were is concerned about his low blood pressure and his risk for further falls that we quickly recommended transfer to  this facility and admission.  It is hoped that I can get nephrology cardiology to work on his medications and we can control heart rhythm may be his lower extremity edema maintain a GFR and get his blood pressure to be higher.  Hopefully this will lead to better gait and less chance for falls.  This gentleman lives on Xarelto which increases the chance of significant injury from falls.     Course During Hospital Stay:    BH: coarse:  He was seen promptly by neurosurgery.  His T12 compression fracture was felt to be stable and should be treated with an initial attempt and a conservative approach of pain management and physical therapy.  He was also suggested he have bracing; he got a TLSO brace and wore it when he was up.  I did hold his [place] briefly in case he needed a kyphoplasty; it was decided this was not going to happen in his Xarelto was put back.  He was taken off cardio exam and put on dig; other medicines were adjusted in attempt to improve his constantly low blood pressure.  When this did not work he was put on Pro-Banthine.  Fortunately he did not develop any fluid retention or even leg edema or shortness of breath with that.  He did have some difficulty with an increase in constipation from his baseline that required various medicines to help.  He was moving around some with therapy but was not felt ready to return even to assisted living and he choose to go to [facility] swing bed program.  Once it was felt he was improved enough to go to [facility] the discharge is being accomplished.  He continued to run a rather low GFR compared to the past; but we expect this is his new normal.  It was felt he might be a candidate for cardioversion later.    [facility] swing: The first couple days he was very stable on while I was still here.  He started out with therapy and was making some improvement with back pain and his strength and ambulation.  He was not having any significant edema hypotension arrhythmias.  He indicates  after I left Dr. Nassar and he had no significant issues and he continued to show some improvement with particularly back pain and gait.  He was supposed to have home health but apparently has not been arranged yet.  He has upcoming appointments with nephrology in [place] cardiology.  He has since seen [facility] and with his autonomic dysfunction they would like to get him back on Northera; they told him to stop proamatine and he has been off one day.      The following portions of the patient's history were reviewed and updated as appropriate: allergies, current medications, past family history, past medical history, past social history, past surgical history and problem list.    Review of Systems  GENERAL:  Inactive/slower with limits, speed, stamina for age and gait . Sleep is ok with oxygen.  No fever now.  ENDO:  No syncope, near or diaphoretic sweaty spells.  HEENT: No head injury or headache.   No vision change.  Same hearing loss.  Ears without pain/drainage.  No sore throat.  No significant nasal/sinus congestion/drainage. No epistaxis.  CHEST: No chest wall tenderness or mass. On/off cough, with mild occ wheeze.  No SOB; no hemoptysis.  CV: No exertional chest pain, palpitations; worse end of day ankle edema but tolerated.    GI: No  dysphagia.  No abdominal pain, diarrhea, constipation.  No rectal bleeding, or melena.  No nausea and vomiting/heartburn.    :  Voids without dysuria, or incontinence to completion.  ORTHO: No painful/swollen joints but various on /off sore.  No change occ sore neck or back; hates the TLSO but less back pain.  Neurosurgery ordered bone density.  No acute neck or back pain without recent injury.   NEURO: Mild dizziness, weakness of extremities.  No change UE/LE mild numbness/paresthesias.   PSYCH: Mild ? memory loss.  Mood good; occ anxious, depressed but/and not suicidal.  Tries to tolerate stress   Screening:  Mammogram: NA  Bone density: NA  Low dose CT  chest: NA  GI:   Colonoscopy+nl/[facility]/9.5.12/5y-reminded  EGD+biop/[facility]/8.31.15  Prostate: urology past  Usual lab order  3m CBC, BMP  6m CBC, CMP, iron  12m CBC, CMP, LIPID, TSH, Vit D, iron, ferritin, B12, folate    Results for orders placed or performed in visit on 06/11/19   Calcium   Result Value Ref Range    Calcium 9.0 8.4 - 10.4 mg/dL   Vitamin D 25 Hydroxy   Result Value Ref Range    25 Hydroxy, Vitamin D 96.3 30.0 - 100.0 ng/ml       Lab Results   Component Value Date    PSA 1.070 11/08/2016        Lab Results:  CBC:  Lab Results - Last 18 Months   Lab Units 05/27/19  0413 05/26/19  0449 05/25/19  0637 05/24/19  0619 05/23/19  0735 05/22/19  0523 04/08/19  1329 03/29/19  0800 02/04/19  0807 01/14/19  0855 12/10/18  1241  10/11/18  1417  05/15/18  0801   WBC 10*3/mm3 5.90 6.12 6.67 6.92 7.64 6.68 4.5 8.69 5.43 6.45 7.81   < > 5.03   < > 6.32   HEMOGLOBIN g/dL 12.6* 13.5* 12.8* 12.6* 12.6* 10.9* 11.9* 12.4* 12.1* 12.7* 11.9*   < > 11.8*   < > 15.4   HEMATOCRIT % 35.5* 38.5* 37.3* 36.8* 36.4* 31.5* 36.2* 39.6 37.3* 41.0 36.8*   < > 33.7*   < > 47.3   PLATELETS 10*3/mm3 201 190 173 170 178 160 259 154 177 215 218   < > 212   < > 193   IRON mcg/dL  --   --   --   --   --   --   --  34* 80 74 36*  --  45  --  91    < > = values in this interval not displayed.      BMP/CMP:  Lab Results - Last 18 Months   Lab Units 06/11/19  1232 05/27/19  0413 05/26/19  0449 05/25/19  0637 05/24/19  0619 05/23/19  0735 05/22/19  0523 05/06/19  0735 04/26/19  0747 04/08/19  1329 03/29/19  0800 02/04/19  0807 01/14/19  0855 12/10/18  1241   SODIUM mmol/L  --  133* 134* 134* 134* 135 135 139 136 135 138 137 138 136   POTASSIUM mmol/L  --  4.0 4.0 3.9 3.8 4.0 3.9 4.1 4.2 4.2 4.1 4.0 4.1 4.3   CHLORIDE mmol/L  --  95* 97* 96* 95* 97* 100 98 96* 97 97* 100 98 97*   TOTAL CO2 mmol/L  --   --   --   --   --   --   --  27.9 26.6 23 23.7 26.0 29.0 26.0   CO2 mmol/L  --  30.0 28.0 29.0 28.0 29.0 27.0  --   --   --    "--   --   --   --    GLUCOSE mg/dL  --   --   --   --   --   --   --  87 99 108* 94 66* 52* 96   BUN mg/dL  --  53* 48* 41* 38* 37* 37* 36* 32* 27 42* 32* 34* 29*   CREATININE mg/dL  --  2.32* 2.05* 2.20* 1.97* 2.07* 2.17* 2.35* 2.27* 1.94* 2.40* 2.15* 1.94* 2.04*   EGFR IF NONAFRICN AM mL/min/1.73  --  27* 31* 28* 32* 30* 29* 26* 27* 30* 26* 29* 33* 31*   EGFR IF AFRICN AM mL/min/1.73  --   --   --   --   --   --   --  32* 33* 34* 31* 35* 40* 37*   CALCIUM mg/dL 9.0 9.0 9.2 9.0 9.1 9.1 8.7 9.5 9.8* 8.7 8.6 9.2 9.4 8.7     HEPATIC:    THYROID:  Lab Results - Last 18 Months   Lab Units 10/11/18  1417 05/15/18  0801   TSH mIU/mL 0.321* 1.330     A1C:No results for input(s): HGBA1C in the last 06586 hours.  PSA:No results for input(s): PSA in the last 03020 hours.        Objective   /70 (BP Location: Left arm, Patient Position: Sitting, Cuff Size: Adult)   Pulse 72   Temp 97.7 °F (36.5 °C) (Oral)   Resp 18   Ht 177.8 cm (70\")   Wt 72.6 kg (160 lb)   SpO2 97%   BMI 22.96 kg/m²   Body mass index is 22.96 kg/m².    Physical Exam  GENERAL:  Thin AFA developed in no acute distress.  SKIN: Turgor excellent, without rash lesion.  HEENT: Normal cephalic without trauma.  Pupils equal round reactive to light. Extraocular motions full without nystagmus.   External canals nonobstructive nontender without reddness. Tymphatic membranes calos with shelia structures intact.   Oral cavity without growths, exudates, and moist.  Posterior pharynx without mass, obstruction, redness.  No thyromegaly, mass, tenderness, lymphadenopathy and supple.  CV: Irregular irregular rhythm.  No murmur, gallop; 1-2/4 pitting ankle lower leg edema. Posterior pulses intact.  No carotid bruits.  CHEST: No chest wall tenderness or mass.   LUNGS: Symmetric motion with clear/mild reduced to auscultation.   ABD: Soft, nontender without mass.   ORTHO: Symmetric extremities without swelling/point tenderness.  Full gross range of motion.   NEURO: CN " 2-12 grossly intact.  Symmetric facies. 1/4 x bicep equal reflexes.  UE/LE   3/5 strength throughout.  Nonfocal use extremities. Speech clear.   PSYCH: Oriented x 3.  Pleasant calm, well kept.  Purposeful/directed conservation with intact short/long gross memory.     Assessment/Plan     1. Essential hypertension    2. CHF (congestive heart failure), NYHA class III, chronic, diastolic (CMS/[facility])    3. SOB: copd,     4. Stage 3 chronic kidney disease (CMS/[facility])    5. Autonomic dysfunction    6. Compression fracture of body of thoracic vertebra (CMS/[facility])    7. Osteoporosis, unspecified osteoporosis type, unspecified pathological fracture presence      He is having less back pain with his compression fracture is getting towards the end of that treatment.  I am not sure bone density is going to add a lot as he meets criteria for consideration for some kind of medicine for osteoporosis based on the compression fracture alone (even though there was a fall and traumatic component).  He certainly be at high risk to have another compression fracture.  Problematically though it is difficult to fix and find a medication that he might use for this with his GI and renal issues.    His blood pressure seems to be trending better which should help with falls based upon how much of the falls were related to orthostasis.  Medicine changes made while he was in bed discontinued while he was in swing bed.  I think he should stay on the Pro-Banthine until or if neurology can get Northera.     He seems to be now except over the fact he is going to have some leg edema as he definitely is in a new phase of renal disease; i.e. lower GFR worse and more swelling than 6 months ago.  Tolerating the edema makes it easier to not have to increase diuretics and run the risk of hypotension and falls.    Rx: reviewed/changes:  No orders of the defined types were placed in this encounter.    LAB/Testing/Referrals: reviewed/orders:   Today: We plan to try  to work out home health labs before his next nephrology appointment.  No orders of the defined types were placed in this encounter.    Usual:   same    Discussions:   All the above  Body mass index is 22.96 kg/m².   Patient's Body mass index is 22.96 kg/m². BMI is within normal parameters. No follow-up required..  Non-smoker      There are no Patient Instructions on file for this visit.    Follow up: No Follow-up on file.  Future Appointments   Date Time Provider Department Center   7/10/2019 11:00 AM Demetrius Chavez APRN [facility] None   8/27/2019  2:15 PM Hemal William MD [facility] None   9/17/2019  1:30 PM Bill Church MD [facility] None   9/26/2019  2:30 PM [facility] NP [facility]   12/12/2019 11:30 AM Kiko Campos PA [facility] None              Current outpatient and discharge medications have been reconciled for the patient.

## 2019-06-18 ENCOUNTER — TELEPHONE (OUTPATIENT)
Dept: FAMILY MEDICINE CLINIC | Facility: CLINIC | Age: 84
End: 2019-06-18

## 2019-06-18 DIAGNOSIS — R06.02 SHORTNESS OF BREATH: ICD-10-CM

## 2019-06-18 DIAGNOSIS — J44.9 COPD, GROUP B, BY GOLD 2013 CLASSIFICATION (HCC): ICD-10-CM

## 2019-06-18 DIAGNOSIS — I50.32 CHF (CONGESTIVE HEART FAILURE), NYHA CLASS III, CHRONIC, DIASTOLIC (HCC): ICD-10-CM

## 2019-06-18 DIAGNOSIS — S22.000A COMPRESSION FRACTURE OF BODY OF THORACIC VERTEBRA (HCC): Primary | ICD-10-CM

## 2019-06-18 DIAGNOSIS — R26.9 GAIT DIFFICULTY: ICD-10-CM

## 2019-06-18 NOTE — TELEPHONE ENCOUNTER
"Vm \"when we seen Dr William he mentioned home health? I wanted to know when it is going to start?\"    Checked office visit and did not see home health?  "

## 2019-06-21 ENCOUNTER — TELEPHONE (OUTPATIENT)
Dept: FAMILY MEDICINE CLINIC | Facility: CLINIC | Age: 84
End: 2019-06-21

## 2019-06-21 NOTE — TELEPHONE ENCOUNTER
Spoke with Summer at [facility] regarding current med list. Clarified that digoxin was discontinued on 6/11/19 by Dr. Campos. Faxed updated med list to [facility]

## 2019-06-24 ENCOUNTER — TELEPHONE (OUTPATIENT)
Dept: FAMILY MEDICINE CLINIC | Facility: CLINIC | Age: 84
End: 2019-06-24

## 2019-06-24 NOTE — TELEPHONE ENCOUNTER
Current medication list as of As of 6/14/2019 10:56 AM      Acetaminophen 500 mg Oral Every 4 Hours PRN       Albuterol Sulfate 108 (90 Base) MCG/ACT 2 puffs Inhalation Every 4 Hours PRN       Azelastine HCl 0.05 % 1 drop Both Eyes 2 Times Daily       B Complex-C 1 capsule Oral Daily      Budesonide-Formoterol Fumarate 80-4.5 MCG/ACT 2 puffs Inhalation 2 Times Daily - RT       Carbonyl Iron 45 MG 2 tablets Oral Weekly       Cholecalciferol 2,000 Int'l Units Oral Daily       Coenzyme Q10,CoenzymeQ10-Isoleucine-Glycine 1 tablet Oral Daily       Dutasteride 0.5 mg Oral Daily      Esomeprazole Magnesium 40 mg Oral Every Morning Before Breakfast       guaiFENesin 600 mg Oral Every 12 Hours Scheduled      Ipratropium-Albuterol 0.5-2.5 mg/3 ml USE 1 VIAL IN NEBULIZER FOUR TIMES DAILY GENERIC FOR      Magnesium Hydroxide 2400 MG/10ML 10 mL Oral Daily PRN      Midodrine HCl 10 mg Oral 2 Times Daily Before Meals       Multiple Vitamins-Minerals 1 tablet Oral Daily       O2 1 L/min Inhalation Nightly, As needed at bedtime.       Omega-3 Fatty Acids 1,000 mg Oral 2 Times Daily      Ondansetron HCl 4 mg Oral Every 8 Hours PRN      Polyethylene Glycol 3350 17 g Oral Daily       Rivaroxaban 15 mg Oral Daily      Tamsulosin HCl 0.4 mg Oral Daily      Torsemide 20 mg Oral Daily

## 2019-06-24 NOTE — TELEPHONE ENCOUNTER
Silvana that called was not a nurse she was OT, and she was just reporting his low bp. She says after he went to the restroom and walked around alittle bit and got him to drink fluids his bp came up. She thinks he doesn't drink enough.She says Summer the Nurse will be there this week for a visit and can see how he is then.

## 2019-06-24 NOTE — TELEPHONE ENCOUNTER
Lizbeth--im not sure what the nurse is wanting here---I see he takes midodrine which is prescribed for low bp---if everytihng else is ok (no chest pain or fever) do they want to increase the midodrine from 10mg bid to 10mg tid and observe?

## 2019-06-24 NOTE — TELEPHONE ENCOUNTER
Spoke with [facility] nurse regarding pt BP 89/55. He is complaining of feeling tired. Nurse states that she is calling to notify Dr. William because he has had falls with low blood pressure previously. Other vitals were WNL Temp: 98.4, pulse 64 and O2 98%.  Nurse requested return call to advise.

## 2019-06-26 NOTE — TELEPHONE ENCOUNTER
Physical Therapist with [facility], EFLIPE umanzor reporting low BP 88/60. States that he increased his fluid intake while he was there and BP went up. ARTUR

## 2019-07-02 RX ORDER — TORSEMIDE 20 MG/1
20 TABLET ORAL DAILY
Qty: 30 TABLET | Refills: 5 | Status: SHIPPED | OUTPATIENT
Start: 2019-07-02 | End: 2022-01-01 | Stop reason: HOSPADM

## 2019-07-10 ENCOUNTER — HOSPITAL ENCOUNTER (OUTPATIENT)
Dept: GENERAL RADIOLOGY | Facility: HOSPITAL | Age: 84
Discharge: HOME | End: 2019-07-10
Admitting: NURSE PRACTITIONER

## 2019-07-10 ENCOUNTER — HOSPITAL ENCOUNTER (OUTPATIENT)
Dept: BONE DENSITY | Facility: HOSPITAL | Age: 84
Discharge: HOME | End: 2019-07-10

## 2019-07-10 DIAGNOSIS — S22.000A COMPRESSION FRACTURE OF BODY OF THORACIC VERTEBRA (HCC): ICD-10-CM

## 2019-07-10 DIAGNOSIS — Z91.89 AT RISK FOR OSTEOPOROSIS: ICD-10-CM

## 2019-07-10 PROCEDURE — 72070 X-RAY EXAM THORAC SPINE 2VWS: CPT

## 2019-07-10 PROCEDURE — 77080 DXA BONE DENSITY AXIAL: CPT

## 2019-07-11 ENCOUNTER — OUTSIDE FACILITY SERVICE (OUTPATIENT)
Dept: FAMILY MEDICINE CLINIC | Facility: CLINIC | Age: 84
End: 2019-07-11

## 2019-07-11 PROCEDURE — G0180 MD CERTIFICATION HHA PATIENT: HCPCS | Performed by: FAMILY MEDICINE
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
## 2019-07-17 ENCOUNTER — OFFICE VISIT (OUTPATIENT)
Dept: NEUROSURGERY | Facility: CLINIC | Age: 84
End: 2019-07-17

## 2019-07-17 VITALS
HEIGHT: 70 IN | SYSTOLIC BLOOD PRESSURE: 90 MMHG | WEIGHT: 162 LBS | DIASTOLIC BLOOD PRESSURE: 50 MMHG | BODY MASS INDEX: 23.19 KG/M2

## 2019-07-17 DIAGNOSIS — S22.000A COMPRESSION FRACTURE OF BODY OF THORACIC VERTEBRA (HCC): Primary | ICD-10-CM

## 2019-07-17 PROCEDURE — 99213 OFFICE O/P EST LOW 20 MIN: CPT | Performed by: NURSE PRACTITIONER

## 2019-07-17 RX ORDER — OFLOXACIN 3 MG/ML
1 SOLUTION/ DROPS OPHTHALMIC EVERY 4 HOURS
COMMUNITY
Start: 2019-07-01 | End: 2020-07-20

## 2019-07-19 DIAGNOSIS — R26.9 GAIT ABNORMALITY: ICD-10-CM

## 2019-07-19 DIAGNOSIS — S22.000A COMPRESSION FRACTURE OF BODY OF THORACIC VERTEBRA (HCC): Primary | ICD-10-CM

## 2019-07-31 ENCOUNTER — TELEPHONE (OUTPATIENT)
Dept: FAMILY MEDICINE CLINIC | Facility: CLINIC | Age: 84
End: 2019-07-31

## 2019-07-31 NOTE — TELEPHONE ENCOUNTER
Suspect it is just his copd/reflux cough  Not surprised worse after neb tx  If no fever; sob lets wait

## 2019-07-31 NOTE — TELEPHONE ENCOUNTER
"Vm from [facility] \"he reported having a cough for few weeks, it is productive at times with yellow sputum mostly after a breathing treatment. He doesn't have a fever, vm  WNL's. Didn't know if Dr William wanted to order anything for him?\"  "

## 2019-08-05 ENCOUNTER — TELEPHONE (OUTPATIENT)
Dept: FAMILY MEDICINE CLINIC | Facility: CLINIC | Age: 84
End: 2019-08-05

## 2019-08-05 NOTE — TELEPHONE ENCOUNTER
A. He needs to get apt with his nephrologist this week; or as soon as they will allow  B. He needs to increase fluid Rx 2 days  demadex 20 mg one a day to one AM, one noon  C. Home health needs to get BMP today/tomorrow      Current Outpatient Medications:   •  acetaminophen (TYLENOL) 500 MG tablet, Take 1 tablet by mouth Every 4 (Four) Hours As Needed for Mild Pain ., Disp: , Rfl:   •  azelastine (OPTIVAR) 0.05 % ophthalmic solution, Administer 1 drop to both eyes 2 (Two) Times a Day., Disp: , Rfl:   •  B Complex-C (SUPER B COMPLEX/VITAMIN C PO), Take 1 capsule by mouth Daily., Disp: , Rfl:   •  budesonide-formoterol (SYMBICORT) 80-4.5 MCG/ACT inhaler, Inhale 2 puffs 2 (Two) Times a Day., Disp: 30.6 g, Rfl: 3  •  carbonyl iron (FEOSOL) 45 MG tablet tablet, Take 2 tablets by mouth 1 (One) Time Per Week., Disp: , Rfl:   •  Cholecalciferol (VITAMIN D3 PO), Take 2,000 Int'l Units by mouth Daily., Disp: , Rfl:   •  Coenzyme Q10 (CO Q-10 PO), Take 1 tablet by mouth Daily., Disp: , Rfl:   •  dutasteride (AVODART) 0.5 MG capsule, Take 0.5 mg by mouth Daily., Disp: , Rfl:   •  esomeprazole (nexIUM) 40 MG capsule, Take 1 capsule by mouth Every Morning Before Breakfast., Disp: 90 capsule, Rfl: 2  •  guaiFENesin (MUCINEX) 600 MG 12 hr tablet, Take 600 mg by mouth Every 12 (Twelve) Hours., Disp: , Rfl:   •  ipratropium-albuterol (DUO-NEB) 0.5-2.5 mg/3 ml nebulizer, USE 1 VIAL IN NEBULIZER FOUR TIMES DAILY GENERIC FOR, Disp: 180 vial, Rfl: 6  •  magnesium hydroxide (MILK OF MAGNESIA) 2400 MG/10ML suspension suspension, Take 10 mL by mouth Daily As Needed (constipation)., Disp: 10 mL, Rfl:   •  midodrine (PROAMATINE) 10 MG tablet, Take 1 tablet by mouth 2 (Two) Times a Day Before Meals., Disp: 60 tablet, Rfl: 5  •  Multiple Vitamins-Minerals (MULTIVITAMIN PO), Take 1 tablet by mouth daily., Disp: , Rfl:   •  O2 (OXYGEN), Inhale 1 L/min Every Night. As needed at bedtime. , Disp: , Rfl:   •  ofloxacin (OCUFLOX) 0.3 % ophthalmic  solution, , Disp: , Rfl:   •  Omega-3 Fatty Acids (FISH OIL PO), Take 1,000 mg by mouth 2 (Two) Times a Day., Disp: , Rfl:   •  ondansetron (ZOFRAN) 4 MG tablet, Take 1 tablet by mouth Every 8 (Eight) Hours As Needed for Nausea or Vomiting., Disp: 20 tablet, Rfl: 0  •  polyethylene glycol (MIRALAX) packet, Take 17 g by mouth Daily., Disp: 30 each, Rfl: 1  •  PROAIR  (90 Base) MCG/ACT inhaler, Inhale 2 puffs Every 4 (Four) Hours As Needed for Shortness of Air., Disp: , Rfl: 5  •  rivaroxaban (XARELTO) 15 MG tablet, Take 15 mg by mouth Daily., Disp: , Rfl:   •  tamsulosin (FLOMAX) 0.4 MG capsule 24 hr capsule, Take 1 capsule by mouth Daily., Disp: 90 capsule, Rfl: 3  •  torsemide (DEMADEX) 20 MG tablet, Take 1 tablet by mouth Daily., Disp: 30 tablet, Rfl: 5

## 2019-08-05 NOTE — TELEPHONE ENCOUNTER
"[facility] PT \"he has had a 2 lb weight gain since yesterday, weighed same time, same scale. Also his resp at rest were 20 and they are usually not that high. His edema has went from 2+ to 3+ now. He is c/o cough quite a bit at night, and was coughing during our visit. He doesn't have a fever, his b/p 96/66 p-78 and others wnl's. His sats 98%\"  "

## 2019-08-05 NOTE — TELEPHONE ENCOUNTER
Brian  PT called back and will put order in for the BMP, attempted to call wife and pt answered and advised to have wife call office when she gets back

## 2019-08-09 ENCOUNTER — TELEPHONE (OUTPATIENT)
Dept: FAMILY MEDICINE CLINIC | Facility: CLINIC | Age: 84
End: 2019-08-09

## 2019-08-09 DIAGNOSIS — I13.0 HYPERTENSIVE HEART AND CHRONIC KIDNEY DISEASE WITH HEART FAILURE AND STAGE 1 THROUGH STAGE 4 CHRONIC KIDNEY DISEASE, OR CHRONIC KIDNEY DISEASE (HCC): ICD-10-CM

## 2019-08-09 DIAGNOSIS — R60.9 EDEMA, UNSPECIFIED TYPE: ICD-10-CM

## 2019-08-09 DIAGNOSIS — N18.30 STAGE 3 CHRONIC KIDNEY DISEASE (HCC): Primary | ICD-10-CM

## 2019-08-09 NOTE — TELEPHONE ENCOUNTER
"Spoke to wife questioned swelling \"he has lost a pound or two, it is some better. But he is really weak, he cant hardly walk to dinning room\"  "

## 2019-08-12 ENCOUNTER — RESULTS ENCOUNTER (OUTPATIENT)
Dept: FAMILY MEDICINE CLINIC | Facility: CLINIC | Age: 84
End: 2019-08-12

## 2019-08-12 DIAGNOSIS — R60.9 EDEMA, UNSPECIFIED TYPE: ICD-10-CM

## 2019-08-12 DIAGNOSIS — I13.0 HYPERTENSIVE HEART AND CHRONIC KIDNEY DISEASE WITH HEART FAILURE AND STAGE 1 THROUGH STAGE 4 CHRONIC KIDNEY DISEASE, OR CHRONIC KIDNEY DISEASE (HCC): ICD-10-CM

## 2019-08-12 DIAGNOSIS — N18.30 STAGE 3 CHRONIC KIDNEY DISEASE (HCC): ICD-10-CM

## 2019-08-13 LAB
BNP SERPL-MCNC: 149.1 PG/ML (ref 0–100)
BUN SERPL-MCNC: 25 MG/DL (ref 10–36)
BUN/CREAT SERPL: 12 (ref 10–24)
CALCIUM SERPL-MCNC: 9.2 MG/DL (ref 8.6–10.2)
CHLORIDE SERPL-SCNC: 102 MMOL/L (ref 96–106)
CO2 SERPL-SCNC: 24 MMOL/L (ref 20–29)
CREAT SERPL-MCNC: 2.05 MG/DL (ref 0.76–1.27)
GLUCOSE SERPL-MCNC: 81 MG/DL (ref 65–99)
POTASSIUM SERPL-SCNC: 4 MMOL/L (ref 3.5–5.2)
SODIUM SERPL-SCNC: 142 MMOL/L (ref 134–144)

## 2019-08-27 ENCOUNTER — OFFICE VISIT (OUTPATIENT)
Dept: FAMILY MEDICINE CLINIC | Facility: CLINIC | Age: 84
End: 2019-08-27

## 2019-08-27 VITALS
BODY MASS INDEX: 23.05 KG/M2 | HEART RATE: 98 BPM | TEMPERATURE: 97.6 F | OXYGEN SATURATION: 96 % | SYSTOLIC BLOOD PRESSURE: 106 MMHG | HEIGHT: 70 IN | RESPIRATION RATE: 18 BRPM | DIASTOLIC BLOOD PRESSURE: 70 MMHG | WEIGHT: 161 LBS

## 2019-08-27 DIAGNOSIS — Z79.01 ANTICOAGULATED: ICD-10-CM

## 2019-08-27 DIAGNOSIS — R05.9 COUGH: ICD-10-CM

## 2019-08-27 DIAGNOSIS — R60.9 EDEMA, UNSPECIFIED TYPE: ICD-10-CM

## 2019-08-27 DIAGNOSIS — I50.32 CHF (CONGESTIVE HEART FAILURE), NYHA CLASS III, CHRONIC, DIASTOLIC (HCC): Chronic | ICD-10-CM

## 2019-08-27 DIAGNOSIS — J44.9 COPD, GROUP B, BY GOLD 2013 CLASSIFICATION (HCC): Primary | Chronic | ICD-10-CM

## 2019-08-27 DIAGNOSIS — I10 ESSENTIAL HYPERTENSION: Chronic | ICD-10-CM

## 2019-08-27 DIAGNOSIS — N18.30 STAGE 3 CHRONIC KIDNEY DISEASE (HCC): ICD-10-CM

## 2019-08-27 PROCEDURE — 99214 OFFICE O/P EST MOD 30 MIN: CPT | Performed by: FAMILY MEDICINE

## 2019-08-27 NOTE — PROGRESS NOTES
Subjective   James Birch is a 89 y.o. male presenting with chief complaint of:   Chief Complaint   Patient presents with   • Hypertension       History of Present Illness :  With son/wife.       Has multiple chronic problems to consider that might have a bearing on today's issues;  an interval appointment.       Chronic/acute problems reviewed today:   1. COPD, group B, by GOLD 2013 classification: Chronic/stable mild occ cough, sob, wheeze.  Rx helps/including oxygen used occ.   No smoking.        2. Essential hypertension: Chronic/stable. Stable here/if home blood pressures.  No significant chest pain, SOB, LE edema, orthopnea, near syncope, dizziness/light headness.      3. CHF (congestive heart failure), NYHA class III, chronic, diastolic (CMS/HCC): Chronic/stable.  Denies significant sob, orthopnea, leg edema, weight gain.  Aware of influence diet/salt and watching weight at home.       4. Stage 3 chronic kidney disease (CMS/HCC): Chronic/stable now.  Sees nephrology; new GFR to accept.  No dysuria.  Previously warned/reminded:   To avoid further kidney function decline  A. Treat any time you think you have infection  B. Stay hydrated (dont get dehydrated); drink at least 60 oz fluid every 24 hr (1800 cc or nearly a 2L)  C. Do not allow any xrays with dye WITHOUT the doctor ordering checking your renal function  D. Do not get new medications without the doctor considering your renal condition  E. Do not use motrin/ibuprofen, alleve/naprosyn and these types of medications     5. Edema, unspecified type: chronic/worse since drop in GFR; BNPs run around 100.    6. Kuwpnfwxsynhwa-kfh-jkkp/xarelto: Chronic/stable reason and use of.  Denies bleeding issues; especially epistaxis, melena, hematochezia.  Upper arms bruise easily.       7. Cough: chronic/variable worse at night.  Sleeps in recliner/sitting up.       Has an/another acute issue today: none.    The following portions of the patient's history were  reviewed and updated as appropriate: allergies, current medications, past family history, past medical history, past social history, past surgical history and problem list.      Current Outpatient Medications:   •  azelastine (OPTIVAR) 0.05 % ophthalmic solution, Administer 1 drop to both eyes 2 (Two) Times a Day., Disp: , Rfl:   •  B Complex-C (SUPER B COMPLEX/VITAMIN C PO), Take 1 capsule by mouth Daily., Disp: , Rfl:   •  budesonide-formoterol (SYMBICORT) 80-4.5 MCG/ACT inhaler, Inhale 2 puffs 2 (Two) Times a Day., Disp: 30.6 g, Rfl: 3  •  carbonyl iron (FEOSOL) 45 MG tablet tablet, Take 2 tablets by mouth 1 (One) Time Per Week., Disp: , Rfl:   •  Coenzyme Q10 (CO Q-10 PO), Take 1 tablet by mouth Daily., Disp: , Rfl:   •  dutasteride (AVODART) 0.5 MG capsule, Take 0.5 mg by mouth Daily., Disp: , Rfl:   •  esomeprazole (nexIUM) 40 MG capsule, Take 1 capsule by mouth Every Morning Before Breakfast., Disp: 90 capsule, Rfl: 2  •  guaiFENesin (MUCINEX) 600 MG 12 hr tablet, Take 600 mg by mouth Every 12 (Twelve) Hours., Disp: , Rfl:   •  ipratropium-albuterol (DUO-NEB) 0.5-2.5 mg/3 ml nebulizer, USE 1 VIAL IN NEBULIZER FOUR TIMES DAILY GENERIC FOR, Disp: 180 vial, Rfl: 6  •  midodrine (PROAMATINE) 10 MG tablet, Take 1 tablet by mouth 2 (Two) Times a Day Before Meals., Disp: 60 tablet, Rfl: 5  •  Multiple Vitamins-Minerals (MULTIVITAMIN PO), Take 1 tablet by mouth daily., Disp: , Rfl:   •  O2 (OXYGEN), Inhale 1 L/min Every Night. As needed at bedtime. , Disp: , Rfl:   •  Omega-3 Fatty Acids (FISH OIL PO), Take 1,000 mg by mouth 2 (Two) Times a Day., Disp: , Rfl:   •  PROAIR  (90 Base) MCG/ACT inhaler, Inhale 2 puffs Every 4 (Four) Hours As Needed for Shortness of Air., Disp: , Rfl: 5  •  rivaroxaban (XARELTO) 15 MG tablet, Take 15 mg by mouth Daily., Disp: , Rfl:   •  tamsulosin (FLOMAX) 0.4 MG capsule 24 hr capsule, Take 1 capsule by mouth Daily., Disp: 90 capsule, Rfl: 3  •  torsemide (DEMADEX) 20 MG tablet,  Take 1 tablet by mouth Daily., Disp: 30 tablet, Rfl: 5  •  acetaminophen (TYLENOL) 500 MG tablet, Take 1 tablet by mouth Every 4 (Four) Hours As Needed for Mild Pain ., Disp: , Rfl:   •  Cholecalciferol (VITAMIN D3 PO), Take 2,000 Int'l Units by mouth Daily., Disp: , Rfl:   •  magnesium hydroxide (MILK OF MAGNESIA) 2400 MG/10ML suspension suspension, Take 10 mL by mouth Daily As Needed (constipation)., Disp: 10 mL, Rfl:   •  ofloxacin (OCUFLOX) 0.3 % ophthalmic solution, , Disp: , Rfl:   •  ondansetron (ZOFRAN) 4 MG tablet, Take 1 tablet by mouth Every 8 (Eight) Hours As Needed for Nausea or Vomiting., Disp: 20 tablet, Rfl: 0  •  polyethylene glycol (MIRALAX) packet, Take 17 g by mouth Daily., Disp: 30 each, Rfl: 1    No problems with medications.  Refills if needed done    No Known Allergies    Review of Systems  GENERAL:  Inactive/slower with limits, speed, stamina for age and gait and SOB. Sleep is ok with oxygen.  No fever now.  ENDO:  No syncope, near or diaphoretic sweaty spells.  HEENT: No head injury or headache.   No vision change.  Same hearing loss.  Ears without pain/drainage.  No sore throat.  No significant nasal/sinus congestion/drainage. No epistaxis.  CHEST: No chest wall tenderness or mass. On/off cough, with mild occ wheeze.  Variable SOB; no hemoptysis.  CV: No exertional chest pain, palpitations; worse end of day ankle edema but tolerated.    GI: No  dysphagia.  No abdominal pain, diarrhea, constipation.  No rectal bleeding, or melena.  No nausea and vomiting/heartburn.    :  Voids without dysuria, or incontinence to completion.  ORTHO: No painful/swollen joints but various on /off sore.  No change occ sore neck or back; hates the TLSO but less back pain.  Neurosurgery ordered bone density.  No acute neck or back pain without recent injury.   NEURO: Mild dizziness, weakness of extremities.  No change UE/LE mild numbness/paresthesias.   PSYCH: Mild ? memory loss.  Mood good; occ anxious,  depressed but/and not suicidal.  Tries to tolerate stress   Screening:  Mammogram: NA  Bone density: NA  Low dose CT chest: NA  GI:   Colonoscopy+nl/[facility]/9.5.12/5y-reminded  EGD+biop/[facility]/8.31.15  Prostate: urology past  Usual lab order  1m CBC, BMP, iron  6m CBC, CMP, iron  12m CBC, CMP, LIPID, TSH, Vit D, iron, ferritin, B12, folate       Lab Results:  Results for orders placed or performed in visit on 08/12/19   Basic Metabolic Panel   Result Value Ref Range    Glucose 81 65 - 99 mg/dL    BUN 25 10 - 36 mg/dL    Creatinine 2.05 (H) 0.76 - 1.27 mg/dL    eGFR Non African Am 28 (L) >59 mL/min/1.73    eGFR  Am 32 (L) >59 mL/min/1.73    BUN/Creatinine Ratio 12 10 - 24    Sodium 142 134 - 144 mmol/L    Potassium 4.0 3.5 - 5.2 mmol/L    Chloride 102 96 - 106 mmol/L    Total CO2 24 20 - 29 mmol/L    Calcium 9.2 8.6 - 10.2 mg/dL   BNP   Result Value Ref Range    .1 (H) 0.0 - 100.0 pg/mL       A1C:No results for input(s): HGBA1C in the last 79726 hours.  PSA:No results for input(s): PSA in the last 69011 hours.  CBC:  Lab Results - Last 18 Months   Lab Units 05/27/19  0413 05/26/19  0449 05/25/19  0637 05/24/19  0619 05/23/19  0735 05/22/19  0523 04/08/19  1329 03/29/19  0800 02/04/19  0807 01/14/19  0855 12/10/18  1241  10/11/18  1417  05/15/18  0801   WBC 10*3/mm3 5.90 6.12 6.67 6.92 7.64 6.68 4.5 8.69 5.43 6.45 7.81   < > 5.03   < > 6.32   HEMOGLOBIN g/dL 12.6* 13.5* 12.8* 12.6* 12.6* 10.9* 11.9* 12.4* 12.1* 12.7* 11.9*   < > 11.8*   < > 15.4   HEMATOCRIT % 35.5* 38.5* 37.3* 36.8* 36.4* 31.5* 36.2* 39.6 37.3* 41.0 36.8*   < > 33.7*   < > 47.3   PLATELETS 10*3/mm3 201 190 173 170 178 160 259 154 177 215 218   < > 212   < > 193   IRON mcg/dL  --   --   --   --   --   --   --  34* 80 74 36*  --  45  --  91    < > = values in this interval not displayed.      BMP/CMP:  Lab Results - Last 18 Months   Lab Units 08/12/19  0806 06/11/19  1232 05/27/19  0413 05/26/19  0449 05/25/19  0637  "05/24/19  0619 05/23/19  0735 05/22/19  0523 05/06/19  0735 04/26/19  0747 04/08/19  1329 03/29/19  0800 02/04/19  0807 01/14/19  0855   SODIUM mmol/L 142  --  133* 134* 134* 134* 135 135 139 136 135 138 137 138   POTASSIUM mmol/L 4.0  --  4.0 4.0 3.9 3.8 4.0 3.9 4.1 4.2 4.2 4.1 4.0 4.1   CHLORIDE mmol/L 102  --  95* 97* 96* 95* 97* 100 98 96* 97 97* 100 98   TOTAL CO2 mmol/L 24  --   --   --   --   --   --   --  27.9 26.6 23 23.7 26.0 29.0   CO2 mmol/L  --   --  30.0 28.0 29.0 28.0 29.0 27.0  --   --   --   --   --   --    GLUCOSE mg/dL 81  --   --   --   --   --   --   --  87 99 108* 94 66* 52*   BUN mg/dL 25  --  53* 48* 41* 38* 37* 37* 36* 32* 27 42* 32* 34*   CREATININE mg/dL 2.05*  --  2.32* 2.05* 2.20* 1.97* 2.07* 2.17* 2.35* 2.27* 1.94* 2.40* 2.15* 1.94*   EGFR IF NONAFRICN AM mL/min/1.73 28*  --  27* 31* 28* 32* 30* 29* 26* 27* 30* 26* 29* 33*   EGFR IF AFRICN AM mL/min/1.73 32*  --   --   --   --   --   --   --  32* 33* 34* 31* 35* 40*   CALCIUM mg/dL 9.2 9.0 9.0 9.2 9.0 9.1 9.1 8.7 9.5 9.8* 8.7 8.6 9.2 9.4     HEPATIC:  Lab Results - Last 18 Months   Lab Units 04/08/19  1329 03/29/19  0800 12/10/18  1241 10/11/18  1417 10/02/18  1002 05/15/18  0801   ALT (SGPT) IU/L 13 9 16 22 16 20   AST (SGOT) IU/L 17 10 15 27 20 22   ALK PHOS IU/L 93 72 87 57 89 71     THYROID:  Lab Results - Last 18 Months   Lab Units 10/11/18  1417 05/15/18  0801   TSH mIU/mL 0.321* 1.330       Objective   /70 (BP Location: Left arm, Patient Position: Sitting, Cuff Size: Adult)   Pulse 98   Temp 97.6 °F (36.4 °C) (Oral)   Resp 18   Ht 177.8 cm (70\")   Wt 73 kg (161 lb)   SpO2 96%   BMI 23.10 kg/m²   Body mass index is 23.1 kg/m².    Physical Exam  GENERAL:  Thin AFA developed in no acute distress.  SKIN: Turgor excellent, without rash lesion.  HEENT: Normal cephalic without trauma.  Pupils equal round reactive to light. Extraocular motions full without nystagmus.   External canals nonobstructive nontender without " reddness. Tymphatic membranes calos with shelia structures intact.   Oral cavity without growths, exudates, and moist.  Posterior pharynx without mass, obstruction, redness.  No thyromegaly, mass, tenderness, lymphadenopathy and supple.  CV: Irregular irregular rhythm.  No murmur, gallop; 1-2/4 pitting ankle lower leg edema. Posterior pulses intact.  No carotid bruits.  CHEST: No chest wall tenderness or mass.   LUNGS: Symmetric motion with clear/mild reduced to auscultation.   ABD: Soft, nontender without mass.   ORTHO: Symmetric extremities without swelling/point tenderness.  Full gross range of motion.   NEURO: CN 2-12 grossly intact.  Symmetric facies. 1/4 x bicep equal reflexes.  UE/LE   3/5 strength throughout.  Nonfocal use extremities. Speech clear.   PSYCH: Oriented x 3.  Pleasant calm, well kept.  Purposeful/directed conservation with intact short/long gross memory.    Assessment/Plan     1. COPD, group B, by GOLD 2013 classification     2. Essential hypertension    3. CHF (congestive heart failure), NYHA class III, chronic, diastolic (CMS/[facility])    4. Stage 3 chronic kidney disease (CMS/[facility])    5. Edema, unspecified type    6. Srdwtiuzhyekyz-evx-laep/xarelto    7. Cough      He seems to becoming somewhat adjusted to his lower extremity edema and the fact that his GFR is now may be even in the stage IV level.  If he goes to 5 he is obviously going to raise a lot of issues.  His cough could be a component of heart failure; but I wonder if there is even a component of dysphagia or GI cough related to the fact these had a previous stroke.  And he also has a history of COPD    Rx: reviewed/changes:  No orders of the defined types were placed in this encounter.      LAB/Testing/Referrals: reviewed/orders:   Today: same  No orders of the defined types were placed in this encounter.    Chronic/recurrent labs above or change to:   2m CBC, BMP, iron  6m CBC, CMP, iron  12m CBC, CMP, LIPID, TSH, Vit D, iron, ferritin,  B12, folate    Discussions:   Tolerated status of CHF, COPD, CKD; result SOB, fatigue, edema  Body mass index is 23.1 kg/m².  Patient's Body mass index is 23.1 kg/m². BMI is within normal parameters. No follow-up required..      Tobacco use reviewed:    Non-smoker    Patient Instructions   Additional instructions:  *Weigh yourself without clothing at the same time each day.  Record your weight. Call the office if you gain 3 pounds or more in 2 to 3 days or 5 pounds in one week. A sudden weight gain may mean that your heart is giving you problems.  *Sodium causes your body to hold on to extra water. This may cause your heart, kidneys, or swelling symptoms to get worse. Limit sodium to 2,000-4000 milligrams (mg) a day or less. That is less than 1 -2 teaspoon of salt a day, including all the salt you eat in cooking or in packaged foods.  If problems call the office.         Follow up: Return for lab 2m;  lab/Dr William 6m.  Future Appointments   Date Time Provider Department Center   9/17/2019  1:30 PM Bill Church MD [facility] None   9/26/2019  2:30 PM [facility] NP [facility]   10/28/2019 11:40 AM [facility] [place] [facility] None   12/12/2019 11:30 AM Kiko Campos PA [facility] None   2/24/2020  8:30 AM [facility] [place] [facility] None   2/25/2020  1:45 PM Hemal William MD [facility] None

## 2019-08-27 NOTE — PATIENT INSTRUCTIONS
Additional instructions:  *Weigh yourself without clothing at the same time each day.  Record your weight. Call the office if you gain 3 pounds or more in 2 to 3 days or 5 pounds in one week. A sudden weight gain may mean that your heart is giving you problems.  *Sodium causes your body to hold on to extra water. This may cause your heart, kidneys, or swelling symptoms to get worse. Limit sodium to 2,000-4000 milligrams (mg) a day or less. That is less than 1 -2 teaspoon of salt a day, including all the salt you eat in cooking or in packaged foods.  If problems call the office.

## 2019-09-16 ENCOUNTER — OFFICE VISIT (OUTPATIENT)
Dept: FAMILY MEDICINE CLINIC | Facility: CLINIC | Age: 84
End: 2019-09-16

## 2019-09-16 VITALS
HEIGHT: 70 IN | OXYGEN SATURATION: 95 % | DIASTOLIC BLOOD PRESSURE: 72 MMHG | WEIGHT: 153 LBS | HEART RATE: 91 BPM | TEMPERATURE: 98 F | SYSTOLIC BLOOD PRESSURE: 108 MMHG | BODY MASS INDEX: 21.9 KG/M2 | RESPIRATION RATE: 18 BRPM

## 2019-09-16 DIAGNOSIS — R26.9 GAIT ABNORMALITY: ICD-10-CM

## 2019-09-16 DIAGNOSIS — T07.XXXA MULTIPLE CONTUSIONS: ICD-10-CM

## 2019-09-16 DIAGNOSIS — Z79.01 ANTICOAGULATED: ICD-10-CM
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
## 2019-09-16 DIAGNOSIS — T07.XXXA MULTIPLE ABRASIONS: ICD-10-CM

## 2019-09-16 PROCEDURE — 99213 OFFICE O/P EST LOW 20 MIN: CPT | Performed by: FAMILY MEDICINE

## 2019-09-16 NOTE — PROGRESS NOTES
Subjective   James Birch is a 89 y.o. male presenting with chief complaint of:   Chief Complaint   Patient presents with   • Follow-up     ER visit fell       History of Present Illness :  With wife.  Here for primarily an acute issue today; fall/ER visit f/u.   Sitting on porch/Edie nguyen 9.13.19 and fell asleep in chair and fell out of chair striking face.    No injury beyond abrasions, hematomas, and ? R 1st distal phalynx fx.     Has multiple chronic problems to consider that might have a bearing on today's issues; somewhat an interval appointment.       Chronic/acute problems reviewed today: acute above.   1. Multiple abrasions: acute/above   2. Multiple contusions: acute/above   3. Gait abnormality: chronic/variable on/off.  Uses walker/others.    4. Fall, initial encounter 9.13.19/above   5. Lqahgkfhisnjkf-tve-qnzi/xarelto: Chronic/stable reason and use of.  Denies usual bleeding issues; especially epistaxis, melena, hematochezia.  Upper arms/others bruise easily.  Fall above/acute without pattern.       Has an/another acute issue today: none.    The following portions of the patient's history were reviewed and updated as appropriate: allergies, current medications, past family history, past medical history, past social history, past surgical history and problem list.      Current Outpatient Medications:   •  acetaminophen (TYLENOL) 500 MG tablet, Take 1 tablet by mouth Every 4 (Four) Hours As Needed for Mild Pain ., Disp: , Rfl:   •  azelastine (OPTIVAR) 0.05 % ophthalmic solution, Administer 1 drop to both eyes 2 (Two) Times a Day., Disp: , Rfl:   •  B Complex-C (SUPER B COMPLEX/VITAMIN C PO), Take 1 capsule by mouth Daily., Disp: , Rfl:   •  budesonide-formoterol (SYMBICORT) 80-4.5 MCG/ACT inhaler, Inhale 2 puffs 2 (Two) Times a Day., Disp: 30.6 g, Rfl: 3  •  carbonyl iron (FEOSOL) 45 MG tablet tablet, Take 2 tablets by mouth 1 (One) Time Per Week., Disp: , Rfl:   •  Cholecalciferol (VITAMIN D3  PO), Take 2,000 Int'l Units by mouth Daily., Disp: , Rfl:   •  Coenzyme Q10 (CO Q-10 PO), Take 1 tablet by mouth Daily., Disp: , Rfl:   •  dutasteride (AVODART) 0.5 MG capsule, Take 0.5 mg by mouth Daily., Disp: , Rfl:   •  esomeprazole (nexIUM) 40 MG capsule, Take 1 capsule by mouth Every Morning Before Breakfast., Disp: 90 capsule, Rfl: 2  •  guaiFENesin (MUCINEX) 600 MG 12 hr tablet, Take 600 mg by mouth Every 12 (Twelve) Hours., Disp: , Rfl:   •  ipratropium-albuterol (DUO-NEB) 0.5-2.5 mg/3 ml nebulizer, USE 1 VIAL IN NEBULIZER FOUR TIMES DAILY GENERIC FOR, Disp: 180 vial, Rfl: 6  •  magnesium hydroxide (MILK OF MAGNESIA) 2400 MG/10ML suspension suspension, Take 10 mL by mouth Daily As Needed (constipation)., Disp: 10 mL, Rfl:   •  midodrine (PROAMATINE) 10 MG tablet, Take 1 tablet by mouth 2 (Two) Times a Day Before Meals., Disp: 60 tablet, Rfl: 5  •  Multiple Vitamins-Minerals (MULTIVITAMIN PO), Take 1 tablet by mouth daily., Disp: , Rfl:   •  O2 (OXYGEN), Inhale 1 L/min Every Night. As needed at bedtime. , Disp: , Rfl:   •  ofloxacin (OCUFLOX) 0.3 % ophthalmic solution, , Disp: , Rfl:   •  Omega-3 Fatty Acids (FISH OIL PO), Take 1,000 mg by mouth 2 (Two) Times a Day., Disp: , Rfl:   •  ondansetron (ZOFRAN) 4 MG tablet, Take 1 tablet by mouth Every 8 (Eight) Hours As Needed for Nausea or Vomiting., Disp: 20 tablet, Rfl: 0  •  polyethylene glycol (MIRALAX) packet, Take 17 g by mouth Daily., Disp: 30 each, Rfl: 1  •  PROAIR  (90 Base) MCG/ACT inhaler, Inhale 2 puffs Every 4 (Four) Hours As Needed for Shortness of Air., Disp: , Rfl: 5  •  rivaroxaban (XARELTO) 15 MG tablet, Take 15 mg by mouth Daily., Disp: , Rfl:   •  tamsulosin (FLOMAX) 0.4 MG capsule 24 hr capsule, Take 1 capsule by mouth Daily., Disp: 90 capsule, Rfl: 3  •  torsemide (DEMADEX) 20 MG tablet, Take 1 tablet by mouth Daily., Disp: 30 tablet, Rfl: 5    No problems with medications.  Refills if needed done    No Known Allergies    Review of  Systems  GENERAL:  Inactive/slower with limits, speed, stamina for age and gait and SOB. Sleep is ok with oxygen.  No fever now.  ENDO:  No syncope, near or diaphoretic sweaty spells.  HEENT: No head injury or headache usually; sore facial brusing.   No vision change.  Same hearing loss.  Ears without pain/drainage.  No sore throat.  No significant nasal/sinus congestion/drainage. No epistaxis.  CHEST: No chest wall tenderness or mass. On/off cough, with mild occ wheeze.  Variable SOB; no hemoptysis.  CV: No exertional chest pain, palpitations; worse end of day ankle edema but tolerated.    GI: No  dysphagia.  No abdominal pain, diarrhea, constipation.  No rectal bleeding, or melena.  No nausea and vomiting/heartburn.    :  Voids without dysuria, or incontinence to completion.  ORTHO: No painful/swollen joints but various on /off sore; especially hands/fingers with motion.  No change occ sore neck or back; hates the TLSO but less back pain.  Neurosurgery ordered bone density.  No acute neck or back pain despite recent injury.   NEURO: Mild dizziness, weakness of extremities.  No change UE/LE mild numbness/paresthesias.   PSYCH: Mild ? memory loss.  Mood good; occ anxious, depressed but/and not suicidal.  Tries to tolerate stress   Screening:  Mammogram: NA  Bone density: NA  Low dose CT chest: NA  GI:   Colonoscopy+nl/[facility]/9.5.12/5y-reminded  EGD+biop/[facility]/8.31.15  Prostate: urology past  Usual lab order  1m CBC, BMP, iron  6m CBC, CMP, iron  12m CBC, CMP, LIPID, TSH, Vit D, iron, ferritin, B12, folate    Lab Results:  Results for orders placed or performed in visit on 08/12/19   Basic Metabolic Panel   Result Value Ref Range    Glucose 81 65 - 99 mg/dL    BUN 25 10 - 36 mg/dL    Creatinine 2.05 (H) 0.76 - 1.27 mg/dL    eGFR Non African Am 28 (L) >59 mL/min/1.73    eGFR  Am 32 (L) >59 mL/min/1.73    BUN/Creatinine Ratio 12 10 - 24    Sodium 142 134 - 144 mmol/L    Potassium 4.0 3.5 -  5.2 mmol/L    Chloride 102 96 - 106 mmol/L    Total CO2 24 20 - 29 mmol/L    Calcium 9.2 8.6 - 10.2 mg/dL   BNP   Result Value Ref Range    .1 (H) 0.0 - 100.0 pg/mL       A1C:No results for input(s): HGBA1C in the last 06431 hours.  PSA:No results for input(s): PSA in the last 32551 hours.  CBC:  Lab Results - Last 18 Months   Lab Units 05/27/19  0413 05/26/19  0449 05/25/19  0637 05/24/19  0619 05/23/19  0735 05/22/19  0523 04/08/19  1329 03/29/19  0800 02/04/19  0807 01/14/19  0855 12/10/18  1241  10/11/18  1417  05/15/18  0801   WBC 10*3/mm3 5.90 6.12 6.67 6.92 7.64 6.68 4.5 8.69 5.43 6.45 7.81   < > 5.03   < > 6.32   HEMOGLOBIN g/dL 12.6* 13.5* 12.8* 12.6* 12.6* 10.9* 11.9* 12.4* 12.1* 12.7* 11.9*   < > 11.8*   < > 15.4   HEMATOCRIT % 35.5* 38.5* 37.3* 36.8* 36.4* 31.5* 36.2* 39.6 37.3* 41.0 36.8*   < > 33.7*   < > 47.3   PLATELETS 10*3/mm3 201 190 173 170 178 160 259 154 177 215 218   < > 212   < > 193   IRON mcg/dL  --   --   --   --   --   --   --  34* 80 74 36*  --  45  --  91    < > = values in this interval not displayed.      BMP/CMP:  Lab Results - Last 18 Months   Lab Units 08/12/19  0806 06/11/19  1232 05/27/19  0413 05/26/19  0449 05/25/19  0637 05/24/19  0619 05/23/19  0735 05/22/19  0523 05/06/19  0735 04/26/19  0747 04/08/19  1329 03/29/19  0800 02/04/19  0807 01/14/19  0855   SODIUM mmol/L 142  --  133* 134* 134* 134* 135 135 139 136 135 138 137 138   POTASSIUM mmol/L 4.0  --  4.0 4.0 3.9 3.8 4.0 3.9 4.1 4.2 4.2 4.1 4.0 4.1   CHLORIDE mmol/L 102  --  95* 97* 96* 95* 97* 100 98 96* 97 97* 100 98   TOTAL CO2 mmol/L 24  --   --   --   --   --   --   --  27.9 26.6 23 23.7 26.0 29.0   CO2 mmol/L  --   --  30.0 28.0 29.0 28.0 29.0 27.0  --   --   --   --   --   --    GLUCOSE mg/dL 81  --   --   --   --   --   --   --  87 99 108* 94 66* 52*   BUN mg/dL 25  --  53* 48* 41* 38* 37* 37* 36* 32* 27 42* 32* 34*   CREATININE mg/dL 2.05*  --  2.32* 2.05* 2.20* 1.97* 2.07* 2.17* 2.35* 2.27* 1.94* 2.40*  "2.15* 1.94*   EGFR IF NONAFRICN AM mL/min/1.73 28*  --  27* 31* 28* 32* 30* 29* 26* 27* 30* 26* 29* 33*   EGFR IF AFRICN AM mL/min/1.73 32*  --   --   --   --   --   --   --  32* 33* 34* 31* 35* 40*   CALCIUM mg/dL 9.2 9.0 9.0 9.2 9.0 9.1 9.1 8.7 9.5 9.8* 8.7 8.6 9.2 9.4     HEPATIC:  Lab Results - Last 18 Months   Lab Units 04/08/19  1329 03/29/19  0800 12/10/18  1241 10/11/18  1417 10/02/18  1002 05/15/18  0801   ALT (SGPT) IU/L 13 9 16 22 16 20   AST (SGOT) IU/L 17 10 15 27 20 22   ALK PHOS IU/L 93 72 87 57 89 71     THYROID:  Lab Results - Last 18 Months   Lab Units 10/11/18  1417 05/15/18  0801   TSH mIU/mL 0.321* 1.330       Objective   /72   Pulse 91   Temp 98 °F (36.7 °C)   Resp 18   Ht 177.8 cm (70\")   Wt 69.4 kg (153 lb)   SpO2 95%   BMI 21.95 kg/m²   Body mass index is 21.95 kg/m².    Physical Exam  GENERAL:  Thin AFA developed in no acute distress.  SKIN: Turgor excellent, without rash lesion beyond bruising; PHOTO facial.   HEENT: Normal cephalic without trauma.  Pupils equal round reactive to light. Extraocular motions full without nystagmus.   External canals nonobstructive nontender without reddness. Tymphatic membranes calos with shelia structures intact.   Oral cavity without growths, exudates, and moist.  Posterior pharynx without mass, obstruction, redness.  No thyromegaly, mass, tenderness, lymphadenopathy and supple.  CV: Irregular irregular rhythm.  No murmur, gallop; 1-2/4 pitting ankle lower leg edema. Posterior pulses intact.  No carotid bruits.  CHEST: No chest wall tenderness or mass.   LUNGS: Symmetric motion with clear/mild reduced to auscultation.   ABD: Soft, nontender without mass.   ORTHO: Symmetric extremities without swelling/point tenderness.  Full gross range of motion.   NEURO: CN 2-12 grossly intact.  Symmetric facies. 1/4 x bicep equal reflexes.  UE/LE   3/5 strength throughout.  Nonfocal use extremities. Speech clear.   PSYCH: Oriented x 3.  Pleasant calm, well " kept.  Purposeful/directed conservation with intact short/long gross memory.       Assessment/Plan     1. Multiple abrasions    2. Multiple contusions    3. Gait abnormality    4. Fall, initial encounter    5. Nofmvqfbencrok-mex-qapl/xarelto      Rx: reviewed/changes:  No orders of the defined types were placed in this encounter.    LAB/Testing/Referrals: reviewed/orders:   Today:   Orders Placed This Encounter   Procedures   • Ambulatory Referral to Home Health     Chronic/recurrent labs above or change to:   same     Discussions:   No changes  Home health to help with bandages hands/others  Body mass index is 21.95 kg/m².  Patient's Body mass index is 21.95 kg/m². BMI is within normal parameters. No follow-up required..    Tobacco use reviewed:    Non-smoker    There are no Patient Instructions on file for this visit.    Follow up: Return for lab;, Dr William-, as planned;.  Future Appointments   Date Time Provider Department Center   9/17/2019  1:30 PM Bill Church MD [facility] None   9/26/2019  2:30 PM [facility] NP [facility]   10/28/2019 11:40 AM [facility] [place] [facility] None   12/12/2019 11:30 AM Kiko Campos PA [facility] None   2/24/2020  8:30 AM [facility] [place] [facility] None   2/25/2020  1:45 PM Hemal William MD [facility] None

## 2019-09-17 ENCOUNTER — OFFICE VISIT (OUTPATIENT)
Dept: PULMONOLOGY | Facility: CLINIC | Age: 84
End: 2019-09-17

## 2019-09-17 VITALS
HEIGHT: 70 IN | DIASTOLIC BLOOD PRESSURE: 66 MMHG | OXYGEN SATURATION: 93 % | HEART RATE: 88 BPM | SYSTOLIC BLOOD PRESSURE: 108 MMHG | BODY MASS INDEX: 21.95 KG/M2

## 2019-09-17 DIAGNOSIS — G47.34 NOCTURNAL HYPOXIA: ICD-10-CM

## 2019-09-17 DIAGNOSIS — J45.40 MODERATE PERSISTENT ASTHMA WITHOUT COMPLICATION: Primary | ICD-10-CM

## 2019-09-17 DIAGNOSIS — J44.9 COPD, GROUP B, BY GOLD 2013 CLASSIFICATION (HCC): ICD-10-CM

## 2019-09-17 DIAGNOSIS — G47.33 OBSTRUCTIVE SLEEP APNEA: ICD-10-CM

## 2019-09-17 PROCEDURE — 99213 OFFICE O/P EST LOW 20 MIN: CPT | Performed by: INTERNAL MEDICINE

## 2019-09-17 NOTE — PROGRESS NOTES
Subjective   James Birch is a 89 y.o. male.     Background: Pt with asthma copd overlap  fev1 52% pred 2018    Chief Complaint   Patient presents with   • Follow up COPD        History of Present Illness   He coughs a lot at night related to fluid.  He has been doing well except for falling and getting a lot of soft tissue injuries.    Medical/Family/Social History   has a past medical history of Arthritis, Asthma, Atrial fibrillation (CMS/HCC), Cancer (CMS/HCC), CHF (congestive heart failure) (CMS/HCC), Conductive hearing loss, COPD (chronic obstructive pulmonary disease) (CMS/HCC), Eustachian tube dysfunction, GERD (gastroesophageal reflux disease), Hyperlipidemia, Hypertension, Obstructive sleep apnea, Prostate disease, Sensorineural hearing loss, Stroke (CMS/HCC), and Vertigo.   has a past surgical history that includes Hip surgery; Hernia repair; Eye surgery; Cataract extraction; Cataract extraction; Colonoscopy (09/05/2012); Skin biopsy; Fracture surgery; and Esophagogastroduodenoscopy (08/31/2015).  family history includes No Known Problems in his father and mother.   reports that he quit smoking about 52 years ago. His smoking use included cigarettes. He has a 18.00 pack-year smoking history. He has never used smokeless tobacco. He reports that he does not drink alcohol or use drugs.  No Known Allergies  Medications    Current Outpatient Medications:   •  acetaminophen (TYLENOL) 500 MG tablet, Take 1 tablet by mouth Every 4 (Four) Hours As Needed for Mild Pain ., Disp: , Rfl:   •  azelastine (OPTIVAR) 0.05 % ophthalmic solution, Administer 1 drop to both eyes 2 (Two) Times a Day., Disp: , Rfl:   •  B Complex-C (SUPER B COMPLEX/VITAMIN C PO), Take 1 capsule by mouth Daily., Disp: , Rfl:   •  budesonide-formoterol (SYMBICORT) 80-4.5 MCG/ACT inhaler, Inhale 2 puffs 2 (Two) Times a Day., Disp: 30.6 g, Rfl: 3  •  carbonyl iron (FEOSOL) 45 MG tablet tablet, Take 2 tablets by mouth 1 (One) Time Per Week.,  Disp: , Rfl:   •  Cholecalciferol (VITAMIN D3 PO), Take 2,000 Int'l Units by mouth Daily., Disp: , Rfl:   •  Coenzyme Q10 (CO Q-10 PO), Take 1 tablet by mouth Daily., Disp: , Rfl:   •  dutasteride (AVODART) 0.5 MG capsule, Take 0.5 mg by mouth Daily., Disp: , Rfl:   •  esomeprazole (nexIUM) 40 MG capsule, Take 1 capsule by mouth Every Morning Before Breakfast., Disp: 90 capsule, Rfl: 2  •  guaiFENesin (MUCINEX) 600 MG 12 hr tablet, Take 600 mg by mouth Every 12 (Twelve) Hours., Disp: , Rfl:   •  ipratropium-albuterol (DUO-NEB) 0.5-2.5 mg/3 ml nebulizer, USE 1 VIAL IN NEBULIZER FOUR TIMES DAILY GENERIC FOR, Disp: 180 vial, Rfl: 6  •  magnesium hydroxide (MILK OF MAGNESIA) 2400 MG/10ML suspension suspension, Take 10 mL by mouth Daily As Needed (constipation)., Disp: 10 mL, Rfl:   •  midodrine (PROAMATINE) 10 MG tablet, Take 1 tablet by mouth 2 (Two) Times a Day Before Meals., Disp: 60 tablet, Rfl: 5  •  Multiple Vitamins-Minerals (MULTIVITAMIN PO), Take 1 tablet by mouth daily., Disp: , Rfl:   •  O2 (OXYGEN), Inhale 1 L/min Every Night. As needed at bedtime. , Disp: , Rfl:   •  ofloxacin (OCUFLOX) 0.3 % ophthalmic solution, , Disp: , Rfl:   •  Omega-3 Fatty Acids (FISH OIL PO), Take 1,000 mg by mouth 2 (Two) Times a Day., Disp: , Rfl:   •  ondansetron (ZOFRAN) 4 MG tablet, Take 1 tablet by mouth Every 8 (Eight) Hours As Needed for Nausea or Vomiting., Disp: 20 tablet, Rfl: 0  •  polyethylene glycol (MIRALAX) packet, Take 17 g by mouth Daily., Disp: 30 each, Rfl: 1  •  PROAIR  (90 Base) MCG/ACT inhaler, Inhale 2 puffs Every 4 (Four) Hours As Needed for Shortness of Air., Disp: , Rfl: 5  •  rivaroxaban (XARELTO) 15 MG tablet, Take 15 mg by mouth Daily., Disp: , Rfl:   •  tamsulosin (FLOMAX) 0.4 MG capsule 24 hr capsule, Take 1 capsule by mouth Daily., Disp: 90 capsule, Rfl: 3  •  torsemide (DEMADEX) 20 MG tablet, Take 1 tablet by mouth Daily., Disp: 30 tablet, Rfl: 5    Review of Systems   Constitutional:  "Negative for chills and fever.   Cardiovascular: Negative for chest pain.   Gastrointestinal: Negative for nausea and vomiting.   Hematological: Bruises/bleeds easily.         Objective   /66   Pulse 88   Ht 177.8 cm (70\")   SpO2 93% Comment: RA  BMI 21.95 kg/m²   Physical Exam   Constitutional: He appears well-developed and well-nourished. He does not appear ill. No distress.   HENT:   Head: Normocephalic and atraumatic.   Nose: Nose normal.   Eyes: Conjunctivae and EOM are normal.   Neck: Neck supple.   Cardiovascular: Normal rate, regular rhythm, S1 normal and S2 normal.   Pulmonary/Chest: Effort normal. He has no wheezes. He has no rales.   Abdominal: Soft. He exhibits no distension. There is no tenderness. There is no guarding.   Musculoskeletal: He exhibits no edema or deformity.   Lymphadenopathy:     He has no cervical adenopathy.   Neurological: He is alert.   Skin: Skin is warm and dry. No rash noted.   Bruising and bandages hands, bruising face   Psychiatric: He has a normal mood and affect.          -----------------------------------------------------------------------------------------------  Assessment/Plan   Problem List Items Addressed This Visit        Respiratory    Asthma - Primary (Chronic)    COPD, group B, by GOLD 2013 classification  (Chronic)    DAMIAN-intolerant    Overview     03--problem list  cpap    04--temporary problem list syncope (? hypersolumance-provigil started)  6.2.10-sleep study-obstructive- mod periodic limb motions,   06- [place]-obstructive..assume has cpap still..///Has no C PAP at home and sleep study lab didn't say anything about getting one...should follow sleep study with cpap titration..suppose to be automatic..[facility] will confirm../ larisa at [facility] said pt does not qualify for c pap for ins to pay and pt would have to pay out of pocket due to guidlines of total apnea/hypopena has to be 5 or greater and his was 3.3 therefore the second study " "will not be set up unless pt is paying for out of pocket ks..confirm they are saying..he has obstructive apnea..but \"not bad\"..ie does not need treatment?/ yes per larisa he has it but not enough to meet criteria for tx for ins to pay for ks..advise pt/wife..not \"bad\" enough for cpap..advise we are asking an oxygen company to do an over night oxygen sat..to see if without cpap..oxygen would be needed.   06- Fri- over night sats..    2.15.11-discussed..could not tolerated treatments/any mask...           Nocturnal hypoxia-oxygen advised        Patient's Body mass index is 21.95 kg/m². BMI is within normal parameters. No follow-up required..      He appears stable  No changes.  Follow up spirometry next.       Electronically signed by Bill Church MD, 9/17/2019, 2:58 PM    "

## 2019-09-19 ENCOUNTER — TELEPHONE (OUTPATIENT)
Dept: FAMILY MEDICINE CLINIC | Facility: CLINIC | Age: 84
End: 2019-09-19

## 2019-09-19 NOTE — TELEPHONE ENCOUNTER
Dr. William routed the [facility] records and wanted the TDAP to be flowed. I have put that into his Immunizations.

## 2019-09-20 ENCOUNTER — TELEPHONE (OUTPATIENT)
Dept: FAMILY MEDICINE CLINIC | Facility: CLINIC | Age: 84
End: 2019-09-20

## 2019-09-26 ENCOUNTER — OFFICE VISIT (OUTPATIENT)
Dept: CARDIOLOGY | Facility: CLINIC | Age: 84
End: 2019-09-26

## 2019-09-26 VITALS
OXYGEN SATURATION: 95 % | HEIGHT: 70 IN | HEART RATE: 86 BPM | DIASTOLIC BLOOD PRESSURE: 64 MMHG | WEIGHT: 154 LBS | SYSTOLIC BLOOD PRESSURE: 98 MMHG | BODY MASS INDEX: 22.05 KG/M2

## 2019-09-26 DIAGNOSIS — I50.32 CHF (CONGESTIVE HEART FAILURE), NYHA CLASS III, CHRONIC, DIASTOLIC (HCC): Primary | Chronic | ICD-10-CM

## 2019-09-26 DIAGNOSIS — J44.9 COPD, GROUP B, BY GOLD 2013 CLASSIFICATION (HCC): Chronic | ICD-10-CM

## 2019-09-26 DIAGNOSIS — I48.19 PERSISTENT ATRIAL FIBRILLATION (HCC): ICD-10-CM

## 2019-09-26 DIAGNOSIS — I10 ESSENTIAL HYPERTENSION: Chronic | ICD-10-CM

## 2019-09-26 DIAGNOSIS — N18.4 CHRONIC KIDNEY DISEASE (CKD), STAGE IV (SEVERE) (HCC): ICD-10-CM

## 2019-09-26 PROBLEM — R06.02 SHORTNESS OF BREATH: Status: RESOLVED | Noted: 2019-04-08 | Resolved: 2019-09-26

## 2019-09-26 PROBLEM — R60.9 EDEMA: Status: RESOLVED | Noted: 2019-04-08 | Resolved: 2019-09-26

## 2019-09-26 PROCEDURE — 93000 ELECTROCARDIOGRAM COMPLETE: CPT | Performed by: NURSE PRACTITIONER

## 2019-09-26 PROCEDURE — 99214 OFFICE O/P EST MOD 30 MIN: CPT | Performed by: NURSE PRACTITIONER

## 2019-09-26 NOTE — PROGRESS NOTES
"    Subjective:     Encounter Date:09/26/2019      Patient ID: James Birch is a 89 y.o. male.    Chief Complaint: weakness and fatigue   The patient presents today to follow up regarding his atrial fibrillation and chronic diastolic CHF. He admits chronic weakness and fatigue, as well as lower extremity edema (stable). He is no longer on antihypertensives and he is on midodrine due to his history of hypotension and falls. He is now using walker instead of his cane and reports he is trying to be more cautious. He denies chest pain, shortness of breath, orthopnea, PND, palpitations, syncope or pre syncope. He also follows closely with nephrology for stage IV CKD and pulmonology for COPD and asthma.         The following portions of the patient's history were reviewed and updated as appropriate: allergies, current medications, past family history, past medical history, past social history, past surgical history and problem list.  BP 98/64   Pulse 86   Ht 177.8 cm (70\")   Wt 69.9 kg (154 lb)   SpO2 95%   BMI 22.10 kg/m²   No Known Allergies    Current Outpatient Medications:   •  acetaminophen (TYLENOL) 500 MG tablet, Take 1 tablet by mouth Every 4 (Four) Hours As Needed for Mild Pain ., Disp: , Rfl:   •  azelastine (OPTIVAR) 0.05 % ophthalmic solution, Administer 1 drop to both eyes 2 (Two) Times a Day., Disp: , Rfl:   •  B Complex-C (SUPER B COMPLEX/VITAMIN C PO), Take 1 capsule by mouth Daily., Disp: , Rfl:   •  budesonide-formoterol (SYMBICORT) 80-4.5 MCG/ACT inhaler, Inhale 2 puffs 2 (Two) Times a Day., Disp: 30.6 g, Rfl: 3  •  carbonyl iron (FEOSOL) 45 MG tablet tablet, Take 2 tablets by mouth 1 (One) Time Per Week., Disp: , Rfl:   •  Cholecalciferol (VITAMIN D3 PO), Take 2,000 Int'l Units by mouth Daily., Disp: , Rfl:   •  Coenzyme Q10 (CO Q-10 PO), Take 1 tablet by mouth Daily., Disp: , Rfl:   •  dutasteride (AVODART) 0.5 MG capsule, Take 0.5 mg by mouth Daily., Disp: , Rfl:   •  esomeprazole (nexIUM) " 40 MG capsule, Take 1 capsule by mouth Every Morning Before Breakfast., Disp: 90 capsule, Rfl: 2  •  guaiFENesin (MUCINEX) 600 MG 12 hr tablet, Take 600 mg by mouth Every 12 (Twelve) Hours., Disp: , Rfl:   •  ipratropium-albuterol (DUO-NEB) 0.5-2.5 mg/3 ml nebulizer, USE 1 VIAL IN NEBULIZER FOUR TIMES DAILY GENERIC FOR, Disp: 180 vial, Rfl: 6  •  magnesium hydroxide (MILK OF MAGNESIA) 2400 MG/10ML suspension suspension, Take 10 mL by mouth Daily As Needed (constipation)., Disp: 10 mL, Rfl:   •  midodrine (PROAMATINE) 10 MG tablet, Take 1 tablet by mouth 2 (Two) Times a Day Before Meals., Disp: 60 tablet, Rfl: 5  •  Multiple Vitamins-Minerals (MULTIVITAMIN PO), Take 1 tablet by mouth daily., Disp: , Rfl:   •  O2 (OXYGEN), Inhale 1 L/min Every Night. As needed at bedtime. , Disp: , Rfl:   •  ofloxacin (OCUFLOX) 0.3 % ophthalmic solution, , Disp: , Rfl:   •  Omega-3 Fatty Acids (FISH OIL PO), Take 1,000 mg by mouth 2 (Two) Times a Day., Disp: , Rfl:   •  ondansetron (ZOFRAN) 4 MG tablet, Take 1 tablet by mouth Every 8 (Eight) Hours As Needed for Nausea or Vomiting., Disp: 20 tablet, Rfl: 0  •  polyethylene glycol (MIRALAX) packet, Take 17 g by mouth Daily., Disp: 30 each, Rfl: 1  •  PROAIR  (90 Base) MCG/ACT inhaler, Inhale 2 puffs Every 4 (Four) Hours As Needed for Shortness of Air., Disp: , Rfl: 5  •  rivaroxaban (XARELTO) 15 MG tablet, Take 15 mg by mouth Daily., Disp: , Rfl:   •  tamsulosin (FLOMAX) 0.4 MG capsule 24 hr capsule, Take 1 capsule by mouth Daily., Disp: 90 capsule, Rfl: 3  •  torsemide (DEMADEX) 20 MG tablet, Take 1 tablet by mouth Daily., Disp: 30 tablet, Rfl: 5  Past Medical History:   Diagnosis Date   • Arthritis    • Asthma    • Atrial fibrillation (CMS/HCC)    • Cancer (CMS/HCC)     SKIN   • CHF (congestive heart failure) (CMS/HCC)    • Conductive hearing loss    • COPD (chronic obstructive pulmonary disease) (CMS/HCC)    • Eustachian tube dysfunction    • GERD (gastroesophageal reflux  disease)    • Hyperlipidemia    • Hypertension    • Obstructive sleep apnea    • Prostate disease    • Sensorineural hearing loss    • Stroke (CMS/HCC)    • Vertigo      Past Surgical History:   Procedure Laterality Date   • CATARACT EXTRACTION      left   • CATARACT EXTRACTION      left   • COLONOSCOPY  2012    normal   • ENDOSCOPY  2015    normal   • EYE SURGERY     • FRACTURE SURGERY     • HERNIA REPAIR     • HIP SURGERY     • SKIN BIOPSY       Social History     Socioeconomic History   • Marital status:      Spouse name: Not on file   • Number of children: 3   • Years of education: Not on file   • Highest education level: Not on file   Tobacco Use   • Smoking status: Former Smoker     Packs/day: 1.00     Years: 18.00     Pack years: 18.00     Types: Cigarettes     Last attempt to quit: 1967     Years since quittin.7   • Smokeless tobacco: Never Used   • Tobacco comment: already quit   Substance and Sexual Activity   • Alcohol use: No   • Drug use: No   • Sexual activity: Defer     Partners: Female     Family History   Problem Relation Age of Onset   • No Known Problems Mother    • No Known Problems Father        Review of Systems   Constitution: Positive for weakness and malaise/fatigue. Negative for chills, diaphoresis and fever.   HENT: Negative for nosebleeds.    Eyes: Negative for visual disturbance.   Cardiovascular: Positive for leg swelling. Negative for chest pain, claudication, cyanosis, dyspnea on exertion, irregular heartbeat, near-syncope, orthopnea, palpitations, paroxysmal nocturnal dyspnea and syncope.   Respiratory: Negative for cough, hemoptysis, shortness of breath, sputum production and wheezing.    Hematologic/Lymphatic: Negative for bleeding problem.   Skin: Negative for color change and flushing.   Musculoskeletal: Positive for falls.   Gastrointestinal: Negative for bloating, abdominal pain, hematemesis, hematochezia, melena, nausea and vomiting.   Genitourinary:  Negative for hematuria.   Neurological: Negative for dizziness and light-headedness.   Psychiatric/Behavioral: Negative for altered mental status.         ECG 12 Lead  Date/Time: 9/26/2019 3:22 PM  Performed by: Fauzia Vines APRN  Authorized by: Fauzia Vines APRN   Comparison: compared with previous ECG from 5/23/2019  Similar to previous ECG  Rhythm: atrial fibrillation               Objective:     Physical Exam   Constitutional: He is oriented to person, place, and time. He appears well-developed and well-nourished. No distress.   HENT:   Head: Normocephalic and atraumatic.   Eyes: Pupils are equal, round, and reactive to light.   Neck: Normal range of motion. Neck supple. No JVD present. No thyromegaly present.   Cardiovascular: Normal rate, normal heart sounds and intact distal pulses. An irregularly irregular rhythm present. Exam reveals no gallop and no friction rub.   No murmur heard.  Pulmonary/Chest: Effort normal and breath sounds normal. No respiratory distress. He has no wheezes. He has no rales. He exhibits no tenderness.   Abdominal: Soft. Bowel sounds are normal. He exhibits no distension. There is no tenderness.   Musculoskeletal: Normal range of motion. He exhibits edema (1-2+ BLE edema ).   Neurological: He is alert and oriented to person, place, and time. No cranial nerve deficit.   Skin: Skin is warm and dry. He is not diaphoretic.   Scattered facial ecchymosis    Psychiatric: He has a normal mood and affect. His behavior is normal.     4/2019 echo reviewed :     · Mild aortic valve stenosis is present.  · Mild dilation of the aortic root is present.  · Left atrial cavity size is borderline dilated.  · Left ventricular systolic function is normal.  · Left ventricular diastolic dysfunction.     MILD AORTIC VALVE STENOSIS  RHYTHM IS ATRIAL FIBRILLATION  NORMAL LV AND RV SYSTOLIC FUNCTION  LV DIASTOLIC DYSFUNCTION IS PROBABLY GRADE 1      Assessment:          Diagnosis Plan   1. CHF (congestive  heart failure), NYHA class III, chronic, diastolic (CMS/HCC)    Compensated/euvolemic   Continue current dose of torsemide    2. Persistent atrial fibrillation    Rate controlled, anticoagulated with Xarelto   No c/o palpitations ; has some chronic weakness and fatigue but I do not think these symptoms can be attributed solely to afib - will not pursue rhythm control      3. Essential hypertension    Hx of hypertension with more recent hypotension - now on midodrine and no longer on antihypertensives  BP low normal today      4. Chronic kidney disease (CKD), stage IV (severe) (CMS/HCC)    Followed by nephrology ; on torsemide    5. COPD, group B, by GOLD 2013 classification   Followed by pulmonology           Plan:       As noted above   Continue torsemide   Continue anticoagulation for now for stroke risk reduction; CHADSVASC 5. We also discussed his bleeding risk given his fall history. He chooses to continue anticoagulation at this time.   Follow up 6 months, sooner with symptoms or concerns     Reviewed signs and symptoms of CHF and what to report with the patient. Patient instructed to restrict sodium and weigh daily. Report weight gain of greater than 2 lbs overnight or 5 lbs in 1 week. Pt verbalized understanding of instructions and plan of care.

## 2019-10-02 ENCOUNTER — OFFICE VISIT (OUTPATIENT)
Dept: FAMILY MEDICINE CLINIC | Facility: CLINIC | Age: 84
End: 2019-10-02

## 2019-10-02 VITALS
OXYGEN SATURATION: 95 % | WEIGHT: 155 LBS | TEMPERATURE: 98 F | HEART RATE: 87 BPM | RESPIRATION RATE: 16 BRPM | BODY MASS INDEX: 22.19 KG/M2 | SYSTOLIC BLOOD PRESSURE: 108 MMHG | DIASTOLIC BLOOD PRESSURE: 82 MMHG | HEIGHT: 70 IN

## 2019-10-02 DIAGNOSIS — L98.9 SKIN LESION: Primary | ICD-10-CM

## 2019-10-02 PROCEDURE — 99213 OFFICE O/P EST LOW 20 MIN: CPT | Performed by: FAMILY MEDICINE

## 2019-10-03 NOTE — PROGRESS NOTES
Subjective   James Birch is a 89 y.o. male presenting with chief complaint of:   Chief Complaint   Patient presents with   • Mass     left arm       History of Present Illness :  With wife.  Here for primarily an acute issue today; home health  nurse concerned with lesion L arm.  Sits in area recent injury/bruise.  .       Has multiple chronic problems to consider that might have a bearing on today's issues; not an interval appointment.       Chronic/acute problems reviewed today:   1. Skin lesion: acute/variable above story.  Not sore.  Not draining.      Has an/another acute issue today: none.    The following portions of the patient's history were reviewed and updated as appropriate: allergies, current medications, past family history, past medical history, past social history, past surgical history and problem list.      Current Outpatient Medications:   •  acetaminophen (TYLENOL) 500 MG tablet, Take 1 tablet by mouth Every 4 (Four) Hours As Needed for Mild Pain ., Disp: , Rfl:   •  azelastine (OPTIVAR) 0.05 % ophthalmic solution, Administer 1 drop to both eyes 2 (Two) Times a Day., Disp: , Rfl:   •  B Complex-C (SUPER B COMPLEX/VITAMIN C PO), Take 1 capsule by mouth Daily., Disp: , Rfl:   •  budesonide-formoterol (SYMBICORT) 80-4.5 MCG/ACT inhaler, Inhale 2 puffs 2 (Two) Times a Day., Disp: 30.6 g, Rfl: 3  •  carbonyl iron (FEOSOL) 45 MG tablet tablet, Take 2 tablets by mouth 1 (One) Time Per Week., Disp: , Rfl:   •  Cholecalciferol (VITAMIN D3 PO), Take 2,000 Int'l Units by mouth Daily., Disp: , Rfl:   •  Coenzyme Q10 (CO Q-10 PO), Take 1 tablet by mouth Daily., Disp: , Rfl:   •  dutasteride (AVODART) 0.5 MG capsule, Take 0.5 mg by mouth Daily., Disp: , Rfl:   •  esomeprazole (nexIUM) 40 MG capsule, Take 1 capsule by mouth Every Morning Before Breakfast., Disp: 90 capsule, Rfl: 2  •  guaiFENesin (MUCINEX) 600 MG 12 hr tablet, Take 600 mg by mouth Every 12 (Twelve) Hours., Disp: , Rfl:   •   ipratropium-albuterol (DUO-NEB) 0.5-2.5 mg/3 ml nebulizer, USE 1 VIAL IN NEBULIZER FOUR TIMES DAILY GENERIC FOR, Disp: 180 vial, Rfl: 6  •  magnesium hydroxide (MILK OF MAGNESIA) 2400 MG/10ML suspension suspension, Take 10 mL by mouth Daily As Needed (constipation)., Disp: 10 mL, Rfl:   •  midodrine (PROAMATINE) 10 MG tablet, Take 1 tablet by mouth 2 (Two) Times a Day Before Meals., Disp: 60 tablet, Rfl: 5  •  Multiple Vitamins-Minerals (MULTIVITAMIN PO), Take 1 tablet by mouth daily., Disp: , Rfl:   •  O2 (OXYGEN), Inhale 1 L/min Every Night. As needed at bedtime. , Disp: , Rfl:   •  ofloxacin (OCUFLOX) 0.3 % ophthalmic solution, , Disp: , Rfl:   •  Omega-3 Fatty Acids (FISH OIL PO), Take 1,000 mg by mouth 2 (Two) Times a Day., Disp: , Rfl:   •  ondansetron (ZOFRAN) 4 MG tablet, Take 1 tablet by mouth Every 8 (Eight) Hours As Needed for Nausea or Vomiting., Disp: 20 tablet, Rfl: 0  •  polyethylene glycol (MIRALAX) packet, Take 17 g by mouth Daily., Disp: 30 each, Rfl: 1  •  PROAIR  (90 Base) MCG/ACT inhaler, Inhale 2 puffs Every 4 (Four) Hours As Needed for Shortness of Air., Disp: , Rfl: 5  •  rivaroxaban (XARELTO) 15 MG tablet, Take 15 mg by mouth Daily., Disp: , Rfl:   •  tamsulosin (FLOMAX) 0.4 MG capsule 24 hr capsule, Take 1 capsule by mouth Daily., Disp: 90 capsule, Rfl: 3  •  torsemide (DEMADEX) 20 MG tablet, Take 1 tablet by mouth Daily., Disp: 30 tablet, Rfl: 5    No problems with medications.  Refills if needed done    No Known Allergies    Review of Systems  GENERAL:  Inactive/slower with limits, speed, stamina for age and gait and SOB. Sleep is ok with oxygen.  No fever now.  ENDO:  No syncope, near or diaphoretic sweaty spells.  HEENT: No head injury or headache usually; sore facial brusing.   No vision change.  Same hearing loss.  Ears without pain/drainage.  No sore throat.  No significant nasal/sinus congestion/drainage. No epistaxis.  CHEST: No chest wall tenderness or mass. On/off cough,  with mild occ wheeze.  Variable SOB; no hemoptysis.  CV: No exertional chest pain, palpitations; worse end of day ankle edema but tolerated.    GI: No  dysphagia.  No abdominal pain, diarrhea, constipation.  No rectal bleeding, or melena.  No nausea and vomiting/heartburn.    :  Voids without dysuria, or incontinence to completion.  ORTHO: No painful/swollen joints but various on /off sore; especially hands/fingers with motion.  No change occ sore neck or back; hates the TLSO but less back pain.  Neurosurgery ordered bone density.  No acute neck or back pain despite recent injury.   NEURO: Mild dizziness, weakness of extremities.  No change UE/LE mild numbness/paresthesias.   PSYCH: Mild ? memory loss.  Mood good; occ anxious, depressed but/and not suicidal.  Tries to tolerate stress   Screening:  Mammogram: NA  Bone density: NA  Low dose CT chest: NA  GI:   Colonoscopy+nl/[facility]/9.5.12/5y-reminded  EGD+biop/[facility]/8.31.15  Prostate: urology past  Usual lab order  1m CBC, BMP, iron  6m CBC, CMP, iron  12m CBC, CMP, LIPID    Lab Results:  Results for orders placed or performed in visit on 08/12/19   Basic Metabolic Panel   Result Value Ref Range    Glucose 81 65 - 99 mg/dL    BUN 25 10 - 36 mg/dL    Creatinine 2.05 (H) 0.76 - 1.27 mg/dL    eGFR Non African Am 28 (L) >59 mL/min/1.73    eGFR  Am 32 (L) >59 mL/min/1.73    BUN/Creatinine Ratio 12 10 - 24    Sodium 142 134 - 144 mmol/L    Potassium 4.0 3.5 - 5.2 mmol/L    Chloride 102 96 - 106 mmol/L    Total CO2 24 20 - 29 mmol/L    Calcium 9.2 8.6 - 10.2 mg/dL   BNP   Result Value Ref Range    .1 (H) 0.0 - 100.0 pg/mL       A1C:No results for input(s): HGBA1C in the last 97111 hours.  PSA:No results for input(s): PSA in the last 93946 hours.  CBC:  Lab Results - Last 18 Months   Lab Units 05/27/19  0413 05/26/19  0449 05/25/19  0637 05/24/19  0619 05/23/19  0735 05/22/19  0523 04/08/19  1329 03/29/19  0800 02/04/19  0807  01/14/19  0855 12/10/18  1241  10/11/18  1417  05/15/18  0801   WBC 10*3/mm3 5.90 6.12 6.67 6.92 7.64 6.68 4.5 8.69 5.43 6.45 7.81   < > 5.03   < > 6.32   HEMOGLOBIN g/dL 12.6* 13.5* 12.8* 12.6* 12.6* 10.9* 11.9* 12.4* 12.1* 12.7* 11.9*   < > 11.8*   < > 15.4   HEMATOCRIT % 35.5* 38.5* 37.3* 36.8* 36.4* 31.5* 36.2* 39.6 37.3* 41.0 36.8*   < > 33.7*   < > 47.3   PLATELETS 10*3/mm3 201 190 173 170 178 160 259 154 177 215 218   < > 212   < > 193   IRON mcg/dL  --   --   --   --   --   --   --  34* 80 74 36*  --  45  --  91    < > = values in this interval not displayed.      BMP/CMP:  Lab Results - Last 18 Months   Lab Units 08/12/19  0806 06/11/19  1232 05/27/19  0413 05/26/19  0449 05/25/19  0637 05/24/19  0619 05/23/19  0735 05/22/19  0523 05/06/19  0735 04/26/19  0747 04/08/19  1329 03/29/19  0800 02/04/19  0807 01/14/19  0855   SODIUM mmol/L 142  --  133* 134* 134* 134* 135 135 139 136 135 138 137 138   POTASSIUM mmol/L 4.0  --  4.0 4.0 3.9 3.8 4.0 3.9 4.1 4.2 4.2 4.1 4.0 4.1   CHLORIDE mmol/L 102  --  95* 97* 96* 95* 97* 100 98 96* 97 97* 100 98   TOTAL CO2 mmol/L 24  --   --   --   --   --   --   --  27.9 26.6 23 23.7 26.0 29.0   CO2 mmol/L  --   --  30.0 28.0 29.0 28.0 29.0 27.0  --   --   --   --   --   --    GLUCOSE mg/dL 81  --   --   --   --   --   --   --  87 99 108* 94 66* 52*   BUN mg/dL 25  --  53* 48* 41* 38* 37* 37* 36* 32* 27 42* 32* 34*   CREATININE mg/dL 2.05*  --  2.32* 2.05* 2.20* 1.97* 2.07* 2.17* 2.35* 2.27* 1.94* 2.40* 2.15* 1.94*   EGFR IF NONAFRICN AM mL/min/1.73 28*  --  27* 31* 28* 32* 30* 29* 26* 27* 30* 26* 29* 33*   EGFR IF AFRICN AM mL/min/1.73 32*  --   --   --   --   --   --   --  32* 33* 34* 31* 35* 40*   CALCIUM mg/dL 9.2 9.0 9.0 9.2 9.0 9.1 9.1 8.7 9.5 9.8* 8.7 8.6 9.2 9.4     HEPATIC:  Lab Results - Last 18 Months   Lab Units 04/08/19  1329 03/29/19  0800 12/10/18  1241 10/11/18  1417 10/02/18  1002 05/15/18  0801   ALT (SGPT) IU/L 13 9 16 22 16 20   AST (SGOT) IU/L 17 10 15 27  "20 22   ALK PHOS IU/L 93 72 87 57 89 71     THYROID:  Lab Results - Last 18 Months   Lab Units 10/11/18  1417 05/15/18  0801   TSH mIU/mL 0.321* 1.330       Objective   /82   Pulse 87   Temp 98 °F (36.7 °C)   Resp 16   Ht 177.8 cm (70\")   Wt 70.3 kg (155 lb)   SpO2 95%   BMI 22.24 kg/m²   Body mass index is 22.24 kg/m².    Physical Exam  GENERAL:  Thin AFA developed in no acute distress.  SKIN: Turgor excellent, without rash lesion beyond bruising; PHOTO L arm  HEENT: Normal cephalic without trauma.  Pupils equal round reactive to light. Extraocular motions full without nystagmus.   External canals nonobstructive nontender without reddness. Tymphatic membranes calos with shelia structures intact.   Oral cavity without growths, exudates, and moist.  Posterior pharynx without mass, obstruction, redness.  No thyromegaly, mass, tenderness, lymphadenopathy and supple.  CV: Irregular irregular rhythm.  No murmur, gallop; 1-2/4 pitting ankle lower leg edema. Posterior pulses intact.  No carotid bruits.  CHEST: No chest wall tenderness or mass.   LUNGS: Symmetric motion with clear/mild reduced to auscultation.   ABD: Soft, nontender without mass.   ORTHO: Symmetric extremities without swelling/point tenderness.  Full gross range of motion.   NEURO: CN 2-12 grossly intact.  Symmetric facies. 1/4 x bicep equal reflexes.  UE/LE   3/5 strength throughout.  Nonfocal use extremities. Speech clear.   PSYCH: Oriented x 3.  Pleasant calm, well kept.  Purposeful/directed conservation with intact short/long gross memory    Assessment/Plan     1. Skin lesion      ? Part of hematoma or acanthoma; at any rate would require significant excision to remove.     Rx: reviewed/changes:  No orders of the defined types were placed in this encounter.    LAB/Testing/Referrals: reviewed/orders:   Today:   No orders of the defined types were placed in this encounter.    Chronic/recurrent labs above or change to:   same     Discussions: "   Wait briefly to see if gets smaller  Body mass index is 22.24 kg/m².  Patient's Body mass index is 22.24 kg/m². BMI is within normal parameters. No follow-up required..      Tobacco use reviewed:    Non-smoker    There are no Patient Instructions on file for this visit.    Follow up: Return for 3w dr william.  Future Appointments   Date Time Provider Department Center   10/23/2019 11:45 AM Hemal William MD [facility] None   10/28/2019 11:40 AM [facility] [place] [facility] None   12/12/2019 11:30 AM Kiko Campos PA [facility] None   2/24/2020  8:30 AM [facility] [place] [facility] None   2/25/2020  1:45 PM Hemal William MD [facility] None   3/17/2020  2:00 PM Bill Church MD [facility] None   3/31/2020 11:30 AM Skyler Trimble MD [facility]

## 2019-10-14 DIAGNOSIS — N18.4 CHRONIC KIDNEY DISEASE (CKD), STAGE IV (SEVERE) (HCC): Primary | ICD-10-CM

## 2019-10-15 LAB
ALBUMIN SERPL-MCNC: 3.9 G/DL (ref 3.5–5.2)
ALBUMIN/GLOB SERPL: 1.6 G/DL
ALP SERPL-CCNC: 82 U/L (ref 39–117)
ALT SERPL-CCNC: 10 U/L (ref 1–41)
APPEARANCE UR: CLEAR
AST SERPL-CCNC: 17 U/L (ref 1–40)
BASOPHILS # BLD AUTO: (no result) 10*3/UL
BILIRUB SERPL-MCNC: 0.5 MG/DL (ref 0.2–1.2)
BILIRUB UR QL STRIP: NEGATIVE
BUN SERPL-MCNC: 36 MG/DL (ref 8–23)
BUN/CREAT SERPL: 16.2 (ref 7–25)
CALCIUM SERPL-MCNC: 9.1 MG/DL (ref 8.2–9.6)
CHLORIDE SERPL-SCNC: 99 MMOL/L (ref 98–107)
CO2 SERPL-SCNC: 26.3 MMOL/L (ref 22–29)
COLOR UR: YELLOW
CREAT SERPL-MCNC: 2.22 MG/DL (ref 0.76–1.27)
CREAT UR-MCNC: NORMAL MG/DL
DIFFERENTIAL COMMENT: ABNORMAL
EOSINOPHIL # BLD AUTO: (no result) 10*3/UL
EOSINOPHIL # BLD MANUAL: 0.77 10*3/MM3 (ref 0–0.4)
EOSINOPHIL NFR BLD AUTO: (no result) %
EOSINOPHIL NFR BLD MANUAL: 16 % (ref 0.3–6.2)
ERYTHROCYTE [DISTWIDTH] IN BLOOD BY AUTOMATED COUNT: 13.4 % (ref 12.3–15.4)
GLOBULIN SER CALC-MCNC: 2.4 GM/DL
GLUCOSE SERPL-MCNC: 101 MG/DL (ref 65–99)
GLUCOSE UR QL: NEGATIVE
HCT VFR BLD AUTO: 32.2 % (ref 37.5–51)
HGB BLD-MCNC: 10.6 G/DL (ref 13–17.7)
HGB UR QL STRIP: NEGATIVE
IRON SERPL-MCNC: 60 MCG/DL (ref 59–158)
KETONES UR QL STRIP: NEGATIVE
LEUKOCYTE ESTERASE UR QL STRIP: ABNORMAL
LYMPHOCYTES # BLD AUTO: (no result) 10*3/UL
LYMPHOCYTES # BLD MANUAL: 0.92 10*3/MM3 (ref 0.7–3.1)
LYMPHOCYTES NFR BLD AUTO: (no result) %
LYMPHOCYTES NFR BLD MANUAL: 19 % (ref 19.6–45.3)
MAGNESIUM SERPL-MCNC: 2.1 MG/DL (ref 1.7–2.3)
MCH RBC QN AUTO: 29.1 PG (ref 26.6–33)
MCHC RBC AUTO-ENTMCNC: 32.9 G/DL (ref 31.5–35.7)
MCV RBC AUTO: 88.5 FL (ref 79–97)
MONOCYTES # BLD MANUAL: 0.05 10*3/MM3 (ref 0.1–0.9)
MONOCYTES NFR BLD AUTO: (no result) %
MONOCYTES NFR BLD MANUAL: 1 % (ref 5–12)
NEUTROPHILS # BLD MANUAL: 3.08 10*3/MM3 (ref 1.7–7)
NEUTROPHILS NFR BLD AUTO: (no result) %
NEUTROPHILS NFR BLD MANUAL: 64 % (ref 42.7–76)
NITRITE UR QL STRIP: NEGATIVE
PH UR STRIP: 5.5 [PH] (ref 5–8)
PHOSPHATE SERPL-MCNC: 3.5 MG/DL (ref 2.5–4.5)
PLATELET # BLD AUTO: 173 10*3/MM3 (ref 140–450)
PLATELET BLD QL SMEAR: ABNORMAL
POTASSIUM SERPL-SCNC: 4 MMOL/L (ref 3.5–5.2)
PROT SERPL-MCNC: 6.3 G/DL (ref 6–8.5)
PROT UR QL STRIP: NEGATIVE
PROT UR-MCNC: NORMAL G/DL
RBC # BLD AUTO: 3.64 10*6/MM3 (ref 4.14–5.8)
RBC MORPH BLD: ABNORMAL
SODIUM SERPL-SCNC: 138 MMOL/L (ref 136–145)
SP GR UR: 1.01 (ref 1–1.03)
SPECIMEN STATUS: NORMAL
URATE SERPL-MCNC: 8.4 MG/DL (ref 3.4–7)
UROBILINOGEN UR STRIP-MCNC: ABNORMAL MG/DL
WBC # BLD AUTO: 4.82 10*3/MM3 (ref 3.4–10.8)

## 2019-10-16 ENCOUNTER — OFFICE VISIT (OUTPATIENT)
Dept: FAMILY MEDICINE CLINIC | Facility: CLINIC | Age: 84
End: 2019-10-16

## 2019-10-16 VITALS
RESPIRATION RATE: 16 BRPM | TEMPERATURE: 98.1 F | WEIGHT: 155 LBS | SYSTOLIC BLOOD PRESSURE: 108 MMHG | DIASTOLIC BLOOD PRESSURE: 64 MMHG | OXYGEN SATURATION: 97 % | HEIGHT: 70 IN | HEART RATE: 85 BPM | BODY MASS INDEX: 22.19 KG/M2

## 2019-10-16 DIAGNOSIS — Z79.01 ANTICOAGULATED: ICD-10-CM

## 2019-10-16 DIAGNOSIS — R11.10 VOMITING, INTRACTABILITY OF VOMITING NOT SPECIFIED, PRESENCE OF NAUSEA NOT SPECIFIED, UNSPECIFIED VOMITING TYPE: ICD-10-CM

## 2019-10-16 DIAGNOSIS — Z86.73 HISTORY OF CVA (CEREBROVASCULAR ACCIDENT): ICD-10-CM

## 2019-10-16 DIAGNOSIS — N18.4 CHRONIC KIDNEY DISEASE (CKD), STAGE IV (SEVERE) (HCC): ICD-10-CM

## 2019-10-16 DIAGNOSIS — R05.9 COUGH: ICD-10-CM

## 2019-10-16 DIAGNOSIS — I50.32 CHF (CONGESTIVE HEART FAILURE), NYHA CLASS III, CHRONIC, DIASTOLIC (HCC): Chronic | ICD-10-CM

## 2019-10-16 DIAGNOSIS — I10 ESSENTIAL HYPERTENSION: Chronic | ICD-10-CM

## 2019-10-16 DIAGNOSIS — J44.9 COPD, GROUP B, BY GOLD 2013 CLASSIFICATION (HCC): Chronic | ICD-10-CM

## 2019-10-16 DIAGNOSIS — J44.9 COPD, GROUP B, BY GOLD 2013 CLASSIFICATION (HCC): Primary | ICD-10-CM

## 2019-10-16 DIAGNOSIS — J45.40 MODERATE PERSISTENT ASTHMA WITHOUT COMPLICATION: Chronic | ICD-10-CM

## 2019-10-16 DIAGNOSIS — I49.9 CARDIAC ARRHYTHMIA, UNSPECIFIED CARDIAC ARRHYTHMIA TYPE: ICD-10-CM

## 2019-10-16 PROCEDURE — 99214 OFFICE O/P EST MOD 30 MIN: CPT | Performed by: FAMILY MEDICINE

## 2019-10-16 RX ORDER — IPRATROPIUM BROMIDE AND ALBUTEROL SULFATE 2.5; .5 MG/3ML; MG/3ML
SOLUTION RESPIRATORY (INHALATION)
Qty: 180 VIAL | Refills: 5 | Status: SHIPPED | OUTPATIENT
Start: 2019-10-16 | End: 2020-02-18

## 2019-10-17 DIAGNOSIS — R11.10 VOMITING, INTRACTABILITY OF VOMITING NOT SPECIFIED, PRESENCE OF NAUSEA NOT SPECIFIED, UNSPECIFIED VOMITING TYPE: ICD-10-CM

## 2019-10-17 DIAGNOSIS — Z86.73 HISTORY OF CVA (CEREBROVASCULAR ACCIDENT): ICD-10-CM

## 2019-10-17 DIAGNOSIS — R05.9 COUGH: ICD-10-CM

## 2019-10-17 NOTE — PROGRESS NOTES
Subjective   James Birch is a 89 y.o. male presenting with chief complaint of:   Chief Complaint   Patient presents with   • Vomiting     still choking       History of Present Illness :  With wife.       Has multiple chronic problems to consider that might have a bearing on today's issues; not an interval appointment.       Chronic/acute problems reviewed today:   1. Gxdkiybvurenor-anm-zfon/xarelto: Chronic/stable reason and use of.  Denies bleeding issues; especially epistaxis, melena, hematochezia.  Upper arms/others bruise easily.  No recent falls.      2. Moderate persistent asthma without complication: chronic/stable infrequent wheeze and need for rescue medications. See acute/cough    Maintance Rx compliance and not needed.      3. COPD, group B, by GOLD 2013 classification : Chronic/stable as no sob, wheeze.  Rx used.   Not smoking.       4. CHF (congestive heart failure), NYHA class III, chronic, diastolic (CMS/HCC): Chronic/stable.  Denies significant sob, leg edema, weight gain.  Aware of influence diet/salt and watching weight at home.       5. Essential hypertension: Chronic/stable. Stable here/if home blood pressures.  No significant chest pain, SOB, LE edema, orthopnea, near syncope, dizziness/light headness.      6. Cardiac arrhythmia, unspecified cardiac arrhythmia type: Chronic/stable.   History of a fib.  Rhythm currently seems controlled.  Medications seem tolerated.  No syncope near syncope.      7. Chronic kidney disease (CKD), stage IV (severe) (CMS/HCC): Chronic/stable though last year reduced GFR.  Sees nephrology.  No dysuria.  Previously warned/reminded:   To avoid further kidney function decline  A. Treat any time you think you have infection  B. Stay hydrated (dont get dehydrated); drink at least 60 oz fluid every 24 hr (1800 cc or nearly a 2L)  C. Do not allow any xrays with dye WITHOUT the doctor ordering checking your renal function  D. Do not get new medications without the  doctor considering your renal condition  E. Do not use motrin/ibuprofen, alleve/naprosyn and these types of medications      8. 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     9. Vomiting, intractability of vomiting not specified, presence of nausea not specified, unspecified vomiting type: acute/with above   10. Cough: continued cough.  This only occurs with attempting to lay down; even recliner at night.  Denies choking though on/off nausea and even vomiting.       Has an/another acute issue today: none.    The following portions of the patient's history were reviewed and updated as appropriate: allergies, current medications, past family history, past medical history, past social history, past surgical history and problem list.      Current Outpatient Medications:   •  acetaminophen (TYLENOL) 500 MG tablet, Take 1 tablet by mouth Every 4 (Four) Hours As Needed for Mild Pain ., Disp: , Rfl:   •  azelastine (OPTIVAR) 0.05 % ophthalmic solution, Administer 1 drop to both eyes 2 (Two) Times a Day., Disp: , Rfl:   •  B Complex-C (SUPER B COMPLEX/VITAMIN C PO), Take 1 capsule by mouth Daily., Disp: , Rfl:   •  budesonide-formoterol (SYMBICORT) 80-4.5 MCG/ACT inhaler, Inhale 2 puffs 2 (Two) Times a Day., Disp: 30.6 g, Rfl: 3  •  carbonyl iron (FEOSOL) 45 MG tablet tablet, Take 1 tablet by mouth 2 (Two) Times a Week., Disp: , Rfl:   •  Cholecalciferol (VITAMIN D3 PO), Take 2,000 Int'l Units by mouth Daily., Disp: , Rfl:   •  Coenzyme Q10 (CO Q-10 PO), Take 1 tablet by mouth Daily., Disp: , Rfl:   •  dutasteride (AVODART) 0.5 MG capsule, Take 0.5 mg by mouth Daily., Disp: , Rfl:   •  esomeprazole (nexIUM) 40 MG capsule, Take 1 capsule by mouth Every Morning Before Breakfast., Disp: 90 capsule, Rfl: 2  •  guaiFENesin (MUCINEX) 600 MG 12 hr tablet, Take 600 mg by mouth Every 12 (Twelve) Hours.,  Disp: , Rfl:   •  ipratropium-albuterol (DUO-NEB) 0.5-2.5 mg/3 ml nebulizer, USE 1 VIAL IN NEBULIZER FOUR TIMES DAILY GENERIC FOR, Disp: 180 vial, Rfl: 6  •  magnesium hydroxide (MILK OF MAGNESIA) 2400 MG/10ML suspension suspension, Take 10 mL by mouth Daily As Needed (constipation)., Disp: 10 mL, Rfl:   •  midodrine (PROAMATINE) 10 MG tablet, Take 1 tablet by mouth 2 (Two) Times a Day Before Meals., Disp: 60 tablet, Rfl: 5  •  Multiple Vitamins-Minerals (MULTIVITAMIN PO), Take 1 tablet by mouth daily., Disp: , Rfl:   •  O2 (OXYGEN), Inhale 1 L/min Every Night. As needed at bedtime. , Disp: , Rfl:   •  ofloxacin (OCUFLOX) 0.3 % ophthalmic solution, , Disp: , Rfl:   •  Omega-3 Fatty Acids (FISH OIL PO), Take 1,000 mg by mouth 2 (Two) Times a Day., Disp: , Rfl:   •  ondansetron (ZOFRAN) 4 MG tablet, Take 1 tablet by mouth Every 8 (Eight) Hours As Needed for Nausea or Vomiting., Disp: 20 tablet, Rfl: 0  •  polyethylene glycol (MIRALAX) packet, Take 17 g by mouth Daily., Disp: 30 each, Rfl: 1  •  PROAIR  (90 Base) MCG/ACT inhaler, Inhale 2 puffs Every 4 (Four) Hours As Needed for Shortness of Air., Disp: , Rfl: 5  •  rivaroxaban (XARELTO) 15 MG tablet, Take 15 mg by mouth Daily., Disp: , Rfl:   •  tamsulosin (FLOMAX) 0.4 MG capsule 24 hr capsule, Take 1 capsule by mouth Daily., Disp: 90 capsule, Rfl: 3  •  torsemide (DEMADEX) 20 MG tablet, Take 1 tablet by mouth Daily., Disp: 30 tablet, Rfl: 5  •  ipratropium-albuterol (DUO-NEB) 0.5-2.5 mg/3 ml nebulizer, USE 1 VIAL IN NEBULIZER FOUR TIMES DAILY GENERIC FOR, Disp: 180 vial, Rfl: 5    No problems with medications.  Refills if needed done    No Known Allergies    Review of Systems  GENERAL:  Inactive/slower with limits, speed, stamina for age and gait and occ SOB. Sleep is ok with oxygen.  No fever now.  ENDO:  No syncope, near or diaphoretic sweaty spells.  HEENT: No head injury or headache usually.  No vision change.  Same hearing loss.  Ears without  pain/drainage.  No sore throat.  No significant nasal/sinus congestion/drainage. No epistaxis.  CHEST: No chest wall tenderness or mass. On/off cough, with mild occ wheeze.  Variable SOB; no hemoptysis.  CV: No exertional chest pain, palpitations; minimal end of day ankle edema but tolerated.    GI: No  dysphagia.  No abdominal pain, diarrhea, constipation.  No rectal bleeding, or melena.  No nausea and vomiting/heartburn.    :  Voids without dysuria, or incontinence to completion.  ORTHO: No painful/swollen joints but various on /off sore; especially hands/fingers with motion.  No change occ sore neck or back.  No acute neck or back pain despite recent injury.   NEURO: Mild dizziness, weakness of extremities.  No change UE/LE mild numbness/paresthesias.   PSYCH: Mild ? memory loss.  Mood good; occ anxious, depressed but/and not suicidal.  Tries to tolerate stress   Screening:  Mammogram: NA  Bone density: NA  Low dose CT chest: NA  GI:   Colonoscopy+nl/[facility]/9.5.12/5y-reminded  EGD+biop/[facility]/8.31.15  Prostate: urology past  Usual lab order  1m CBC, BMP, iron  6m CBC, CMP, iron  12m CBC, CMP, LIPID    Lab Results:  Results for orders placed or performed in visit on 10/14/19   Urinalysis without microscopic (no culture) - Urine, Clean Catch   Result Value Ref Range    Specific Gravity, UA 1.011 1.005 - 1.030    pH, UA 5.5 5.0 - 8.0    Color, UA Yellow     Appearance, UA Clear Clear    Leukocytes, UA See below: (A) Negative    Protein Negative Negative    Glucose, UA Negative Negative    Ketones Negative Negative    Blood, UA Negative Negative    Bilirubin, UA Negative Negative    Urobilinogen, UA Comment     Nitrite, UA Negative Negative   Protein / Creatinine Ratio, Urine - Urine, Clean Catch   Result Value Ref Range    Creatinine, Urine CANCELED mg/dL    Total Protein, Urine CANCELED    Phosphorus   Result Value Ref Range    Phosphorus 3.5 2.5 - 4.5 mg/dL   Uric Acid   Result Value Ref  Range    Uric Acid 8.4 (H) 3.4 - 7.0 mg/dL   Magnesium   Result Value Ref Range    Magnesium 2.1 1.7 - 2.3 mg/dL   Iron   Result Value Ref Range    Iron 60 59 - 158 mcg/dL   Comprehensive Metabolic Panel   Result Value Ref Range    Glucose 101 (H) 65 - 99 mg/dL    BUN 36 (H) 8 - 23 mg/dL    Creatinine 2.22 (H) 0.76 - 1.27 mg/dL    eGFR Non African Am 28 (L) >60 mL/min/1.73    eGFR African Am 34 (L) >60 mL/min/1.73    BUN/Creatinine Ratio 16.2 7.0 - 25.0    Sodium 138 136 - 145 mmol/L    Potassium 4.0 3.5 - 5.2 mmol/L    Chloride 99 98 - 107 mmol/L    Total CO2 26.3 22.0 - 29.0 mmol/L    Calcium 9.1 8.2 - 9.6 mg/dL    Total Protein 6.3 6.0 - 8.5 g/dL    Albumin 3.90 3.50 - 5.20 g/dL    Globulin 2.4 gm/dL    A/G Ratio 1.6 g/dL    Total Bilirubin 0.5 0.2 - 1.2 mg/dL    Alkaline Phosphatase 82 39 - 117 U/L    AST (SGOT) 17 1 - 40 U/L    ALT (SGPT) 10 1 - 41 U/L   Manual Differential   Result Value Ref Range    Neutrophil Rel % 64.0 42.7 - 76.0 %    Lymphocyte Rel % 19.0 (L) 19.6 - 45.3 %    Monocyte Rel % 1.0 (L) 5.0 - 12.0 %    Eosinophil Rel % 16.0 (H) 0.3 - 6.2 %    Neutrophils Absolute 3.08 1.70 - 7.00 10*3/mm3    Lymphocytes Absolute 0.92 0.70 - 3.10 10*3/mm3    Monocytes Absolute 0.05 (L) 0.10 - 0.90 10*3/mm3    Eosinophil Abs 0.77 (H) 0.00 - 0.40 10*3/mm3    Differential Comment Comment     Comment Comment     Plt Comment Comment    Specimen Status Report   Result Value Ref Range    Specimen Status CANCELED    CBC & Differential   Result Value Ref Range    WBC 4.82 3.40 - 10.80 10*3/mm3    RBC 3.64 (L) 4.14 - 5.80 10*6/mm3    Hemoglobin 10.6 (L) 13.0 - 17.7 g/dL    Hematocrit 32.2 (L) 37.5 - 51.0 %    MCV 88.5 79.0 - 97.0 fL    MCH 29.1 26.6 - 33.0 pg    MCHC 32.9 31.5 - 35.7 g/dL    RDW 13.4 12.3 - 15.4 %    Platelets 173 140 - 450 10*3/mm3    Neutrophil Rel % CANCELED     Lymphocyte Rel % CANCELED     Monocyte Rel % CANCELED     Eosinophil Rel % CANCELED     Lymphocytes Absolute CANCELED     Eosinophils  Absolute CANCELED     Basophils Absolute CANCELED        A1C:No results for input(s): HGBA1C in the last 82193 hours.  PSA:No results for input(s): PSA in the last 72530 hours.  CBC:  Lab Results - Last 18 Months   Lab Units 10/14/19  1241 05/27/19  0413 05/26/19  0449 05/25/19  0637 05/24/19  0619 05/23/19  0735 05/22/19  0523  03/29/19  0800 02/04/19  0807 01/14/19  0855 12/10/18  1241  10/11/18  1417  05/15/18  0801   WBC 10*3/mm3 4.82 5.90 6.12 6.67 6.92 7.64 6.68   < > 8.69 5.43 6.45 7.81   < > 5.03   < > 6.32   HEMOGLOBIN g/dL 10.6* 12.6* 13.5* 12.8* 12.6* 12.6* 10.9*   < > 12.4* 12.1* 12.7* 11.9*   < > 11.8*   < > 15.4   HEMATOCRIT % 32.2* 35.5* 38.5* 37.3* 36.8* 36.4* 31.5*   < > 39.6 37.3* 41.0 36.8*   < > 33.7*   < > 47.3   PLATELETS 10*3/mm3 173 201 190 173 170 178 160   < > 154 177 215 218   < > 212   < > 193   IRON mcg/dL 60  --   --   --   --   --   --   --  34* 80 74 36*  --  45  --  91    < > = values in this interval not displayed.      BMP/CMP:  Lab Results - Last 18 Months   Lab Units 10/14/19  1241 08/12/19  0806 06/11/19  1232 05/27/19  0413 05/26/19  0449 05/25/19  0637 05/24/19  0619 05/23/19  0735  05/06/19  0735 04/26/19  0747 04/08/19  1329 03/29/19  0800 02/04/19  0807   SODIUM mmol/L 138 142  --  133* 134* 134* 134* 135   < > 139 136 135 138 137   POTASSIUM mmol/L 4.0 4.0  --  4.0 4.0 3.9 3.8 4.0   < > 4.1 4.2 4.2 4.1 4.0   CHLORIDE mmol/L 99 102  --  95* 97* 96* 95* 97*   < > 98 96* 97 97* 100   TOTAL CO2 mmol/L 26.3 24  --   --   --   --   --   --   --  27.9 26.6 23 23.7 26.0   CO2 mmol/L  --   --   --  30.0 28.0 29.0 28.0 29.0   < >  --   --   --   --   --    GLUCOSE mg/dL 101* 81  --   --   --   --   --   --   --  87 99 108* 94 66*   BUN mg/dL 36* 25  --  53* 48* 41* 38* 37*   < > 36* 32* 27 42* 32*   CREATININE mg/dL 2.22* 2.05*  --  2.32* 2.05* 2.20* 1.97* 2.07*   < > 2.35* 2.27* 1.94* 2.40* 2.15*   EGFR IF NONAFRICN AM mL/min/1.73 28* 28*  --  27* 31* 28* 32* 30*   < > 26* 27* 30*  "26* 29*   EGFR IF AFRICN AM mL/min/1.73 34* 32*  --   --   --   --   --   --   --  32* 33* 34* 31* 35*   CALCIUM mg/dL 9.1 9.2 9.0 9.0 9.2 9.0 9.1 9.1   < > 9.5 9.8* 8.7 8.6 9.2    < > = values in this interval not displayed.     HEPATIC:  Lab Results - Last 18 Months   Lab Units 10/14/19  1241 04/08/19  1329 03/29/19  0800 12/10/18  1241 10/11/18  1417 10/02/18  1002 05/15/18  0801   ALT (SGPT) U/L 10 13 9 16 22 16 20   AST (SGOT) U/L 17 17 10 15 27 20 22   ALK PHOS U/L 82 93 72 87 57 89 71     THYROID:  Lab Results - Last 18 Months   Lab Units 10/11/18  1417 05/15/18  0801   TSH mIU/mL 0.321* 1.330       Objective   /64   Pulse 85   Temp 98.1 °F (36.7 °C)   Resp 16   Ht 177.8 cm (70\")   Wt 70.3 kg (155 lb)   SpO2 97%   BMI 22.24 kg/m²   Body mass index is 22.24 kg/m².    Physical Exam  GENERAL:  Thin AFA developed in no acute distress.  SKIN: Turgor excellent, without rash lesion.   HEENT: Normal cephalic without trauma.  Pupils equal round reactive to light. Extraocular motions full without nystagmus.   External canals nonobstructive nontender without reddness. Tymphatic membranes calos with shelia structures intact.   Oral cavity without growths, exudates, and moist.  Posterior pharynx without mass, obstruction, redness.  No thyromegaly, mass, tenderness, lymphadenopathy and supple.  CV: Irregular irregular rhythm.  No murmur, gallop; 1-2/4 pitting ankle lower leg edema. Posterior pulses intact.  No carotid bruits.  CHEST: No chest wall tenderness or mass.   LUNGS: Symmetric motion with clear/mild reduced to auscultation.   ABD: Soft, nontender without mass.   ORTHO: Symmetric extremities without swelling/point tenderness.  Full gross range of motion.   NEURO: CN 2-12 grossly intact.  Symmetric facies. 1/4 x bicep equal reflexes.  UE/LE   3/5 strength throughout.  Nonfocal use extremities. Speech clear.   PSYCH: Oriented x 3.  Pleasant calm, well kept.  Purposeful/directed conservation with intact " short/long gross memory    Assessment/Plan     1. Haanmijxdvicwv-jlx-chpv/xarelto    2. Moderate persistent asthma without complication    3. COPD, group B, by GOLD 2013 classification     4. CHF (congestive heart failure), NYHA class III, chronic, diastolic (CMS/[facility])    5. Essential hypertension    6. Cardiac arrhythmia, unspecified cardiac arrhythmia type    7. Chronic kidney disease (CKD), stage IV (severe) (CMS/[facility])    8. 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 Vomiting, intractability of vomiting not specified, presence of nausea not specified, unspecified vomiting type    10. Cough      Rx: reviewed/changes:  No orders of the defined types were placed in this encounter.    LAB/Testing/Referrals: reviewed/orders:   Today:   Orders Placed This Encounter   Procedures   • CT Chest Without Contrast   • FL Esophagram Complete     Chronic/recurrent labs above or change to:   same     Discussions:   This cough since always caused by lying down; appears to have gi causes (no significant sinus complaints).  Combining this with vomiting and nausea; more suspicion for gi.   Asthma, copd, CHF all appear compensated.     Body mass index is 22.24 kg/m².  Patient's Body mass index is 22.24 kg/m². BMI is within normal parameters. No follow-up required..    Tobacco use reviewed:    Non-smoker    There are no Patient Instructions on file for this visit.    Follow up: Return for lab;, Dr William-, as planned;.  Future Appointments   Date Time Provider Department Center   10/23/2019 11:45 AM Hemal William MD [facility] None   10/28/2019 11:40 AM [facility] [place] [facility] None   12/12/2019 11:30 AM Kiko Campos PA [facility] None   2/24/2020  8:30 AM [facility] [place] [facility] None   2/25/2020  1:45 PM Hemal William MD [facility] None   3/17/2020  2:00 PM Bill Church MD [facility] None   3/31/2020 11:30 AM Skyler Trimble MD [facility]

## 2019-10-18 DIAGNOSIS — R11.10 VOMITING, INTRACTABILITY OF VOMITING NOT SPECIFIED, PRESENCE OF NAUSEA NOT SPECIFIED, UNSPECIFIED VOMITING TYPE: ICD-10-CM

## 2019-10-18 DIAGNOSIS — Z86.73 HISTORY OF CVA (CEREBROVASCULAR ACCIDENT): ICD-10-CM

## 2019-10-18 DIAGNOSIS — R05.9 COUGH: ICD-10-CM

## 2019-10-18 RX ORDER — DOXYCYCLINE HYCLATE 100 MG/1
100 TABLET, DELAYED RELEASE ORAL 2 TIMES DAILY
Qty: 14 TABLET | Refills: 0 | Status: SHIPPED | OUTPATIENT
Start: 2019-10-18 | End: 2019-10-25

## 2019-10-23 ENCOUNTER — OFFICE VISIT (OUTPATIENT)
Dept: FAMILY MEDICINE CLINIC | Facility: CLINIC | Age: 84
End: 2019-10-23

## 2019-10-23 VITALS
DIASTOLIC BLOOD PRESSURE: 82 MMHG | TEMPERATURE: 98.9 F | SYSTOLIC BLOOD PRESSURE: 114 MMHG | OXYGEN SATURATION: 98 % | HEIGHT: 70 IN | RESPIRATION RATE: 16 BRPM | WEIGHT: 155 LBS | HEART RATE: 88 BPM | BODY MASS INDEX: 22.19 KG/M2

## 2019-10-23 DIAGNOSIS — R11.10 VOMITING, INTRACTABILITY OF VOMITING NOT SPECIFIED, PRESENCE OF NAUSEA NOT SPECIFIED, UNSPECIFIED VOMITING TYPE: Primary | ICD-10-CM

## 2019-10-23 DIAGNOSIS — Z86.73 HISTORY OF CVA (CEREBROVASCULAR ACCIDENT): ICD-10-CM

## 2019-10-23 DIAGNOSIS — R05.9 COUGH: ICD-10-CM

## 2019-10-23 DIAGNOSIS — K21.9 GASTROESOPHAGEAL REFLUX DISEASE, ESOPHAGITIS PRESENCE NOT SPECIFIED: ICD-10-CM

## 2019-10-23 DIAGNOSIS — K22.4 ESOPHAGEAL DYSFUNCTION: ICD-10-CM

## 2019-10-23 PROCEDURE — 99213 OFFICE O/P EST LOW 20 MIN: CPT | Performed by: FAMILY MEDICINE

## 2019-10-24 NOTE — PROGRESS NOTES
Subjective   James Birch is a 89 y.o. male presenting with chief complaint of:   Chief Complaint   Patient presents with   • Follow-up       History of Present Illness :  With wife.  Here for primarily an acute issue today; f/u last visit.    10.16.19  1. Hzhpdbctfubggk-kim-wszb/xarelto    2. Moderate persistent asthma without complication    3. COPD, group B, by GOLD 2013 classification     4. CHF (congestive heart failure), NYHA class III, chronic, diastolic (CMS/[facility])    5. Essential hypertension    6. Cardiac arrhythmia, unspecified cardiac arrhythmia type    7. Chronic kidney disease (CKD), stage IV (severe) (CMS/[facility])    8. 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 Vomiting, intractability of vomiting not specified, presence of nausea not specified, unspecified vomiting type    10. Cough       CT Chest Without Contrast   • FL Esophagram Complete   Bronchial wall thickening/mucus plugging, lymph nodes, CAD, 4cm Taneurysm, %12 compression renal masses ?   Reflux and esophageal dysfunction    Told:   Found some bronchitis (swallow test still pending); cough could therefore be swallow/reflux AND bronchitis   Add mucomyst neb treatment at home if we can because of mention of bronchial plugging  Start doxycycline 100 mg bid #14  If he cannot clear this with using his neb treatments/abx and keeps coughing he needs to see his lung doctor: ie: no better this week wife/he need to call pulmonary and ask for apt    Since this less coughing. Still sleeps in recliner; preference.  Has PT; maybe quitting this week. No understanding of speech yet.     Has multiple chronic problems to consider that might have a bearing on today's issues; not an interval appointment.       Chronic/acute problems reviewed today:   1. Vomiting, intractability of vomiting not specified, presence of nausea not specified, unspecified vomiting type: less of an issue since above.    2. Cough: acute/chronic and improved.    3.  Gastroesophageal reflux disease, esophagitis presence not specified: denies dysphagia, heartburn and thinks swallowing better.  Upper/lower dentures/partials.      4. Esophageal dysfunction: chronic/variable see xray.  Since CVA.    5. 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      Has an/another acute issue today: none.    The following portions of the patient's history were reviewed and updated as appropriate: allergies, current medications, past family history, past medical history, past social history, past surgical history and problem list.      Current Outpatient Medications:   •  azelastine (OPTIVAR) 0.05 % ophthalmic solution, Administer 1 drop to both eyes 2 (Two) Times a Day., Disp: , Rfl:   •  B Complex-C (SUPER B COMPLEX/VITAMIN C PO), Take 1 capsule by mouth Daily., Disp: , Rfl:   •  budesonide-formoterol (SYMBICORT) 80-4.5 MCG/ACT inhaler, Inhale 2 puffs 2 (Two) Times a Day., Disp: 30.6 g, Rfl: 3  •  carbonyl iron (FEOSOL) 45 MG tablet tablet, Take 1 tablet by mouth 2 (Two) Times a Week., Disp: , Rfl:   •  Cholecalciferol (VITAMIN D3 PO), Take 2,000 Int'l Units by mouth Daily., Disp: , Rfl:   •  Coenzyme Q10 (CO Q-10 PO), Take 1 tablet by mouth Daily., Disp: , Rfl:   •  doxycycline (DORYX) 100 MG enteric coated tablet, Take 1 tablet by mouth 2 (Two) Times a Day for 7 days., Disp: 14 tablet, Rfl: 0  •  dutasteride (AVODART) 0.5 MG capsule, Take 0.5 mg by mouth Daily., Disp: , Rfl:   •  esomeprazole (nexIUM) 40 MG capsule, Take 1 capsule by mouth Every Morning Before Breakfast., Disp: 90 capsule, Rfl: 2  •  guaiFENesin (MUCINEX) 600 MG 12 hr tablet, Take 600 mg by mouth Every 12 (Twelve) Hours., Disp: , Rfl:   •  ipratropium-albuterol (DUO-NEB) 0.5-2.5 mg/3 ml nebulizer, USE 1 VIAL IN NEBULIZER FOUR TIMES DAILY GENERIC FOR, Disp: 180 vial, Rfl: 6  •  midodrine (PROAMATINE) 10 MG tablet, Take 1 tablet by mouth 2 (Two) Times a Day Before Meals., Disp: 60 tablet, Rfl:  5  •  Multiple Vitamins-Minerals (MULTIVITAMIN PO), Take 1 tablet by mouth daily., Disp: , Rfl:   •  O2 (OXYGEN), Inhale 1 L/min Every Night. As needed at bedtime. , Disp: , Rfl:   •  ofloxacin (OCUFLOX) 0.3 % ophthalmic solution, , Disp: , Rfl:   •  Omega-3 Fatty Acids (FISH OIL PO), Take 1,000 mg by mouth 2 (Two) Times a Day., Disp: , Rfl:   •  rivaroxaban (XARELTO) 15 MG tablet, Take 15 mg by mouth Daily., Disp: , Rfl:   •  tamsulosin (FLOMAX) 0.4 MG capsule 24 hr capsule, Take 1 capsule by mouth Daily., Disp: 90 capsule, Rfl: 3  •  torsemide (DEMADEX) 20 MG tablet, Take 1 tablet by mouth Daily., Disp: 30 tablet, Rfl: 5  •  acetaminophen (TYLENOL) 500 MG tablet, Take 1 tablet by mouth Every 4 (Four) Hours As Needed for Mild Pain ., Disp: , Rfl:   •  ipratropium-albuterol (DUO-NEB) 0.5-2.5 mg/3 ml nebulizer, USE 1 VIAL IN NEBULIZER FOUR TIMES DAILY GENERIC FOR, Disp: 180 vial, Rfl: 5  •  magnesium hydroxide (MILK OF MAGNESIA) 2400 MG/10ML suspension suspension, Take 10 mL by mouth Daily As Needed (constipation)., Disp: 10 mL, Rfl:   •  ondansetron (ZOFRAN) 4 MG tablet, Take 1 tablet by mouth Every 8 (Eight) Hours As Needed for Nausea or Vomiting., Disp: 20 tablet, Rfl: 0  •  polyethylene glycol (MIRALAX) packet, Take 17 g by mouth Daily., Disp: 30 each, Rfl: 1  •  PROAIR  (90 Base) MCG/ACT inhaler, Inhale 2 puffs Every 4 (Four) Hours As Needed for Shortness of Air., Disp: , Rfl: 5    No problems with medications.  Refills if needed done    No Known Allergies    Review of Systems  GENERAL:  Inactive/slower with limits, speed, stamina for age and gait and occ SOB. Sleep is ok with oxygen.  No fever now.  ENDO:  No syncope, near or diaphoretic sweaty spells.  HEENT: No head injury or headache usually.  No vision change.  Same hearing loss.  Ears without pain/drainage.  No sore throat.  No significant nasal/sinus congestion/drainage. No epistaxis.  CHEST: No chest wall tenderness or mass. Occ cough, with mild  occ wheeze.  Variable SOB; no hemoptysis.  CV: No exertional chest pain, palpitations; minimal end of day ankle edema but tolerated.    GI: No  dysphagia.  No abdominal pain, diarrhea, constipation.  No rectal bleeding, or melena.  No nausea and vomiting/heartburn.    :  Voids without dysuria, or incontinence to completion.  ORTHO: No painful/swollen joints but various on /off sore; especially hands/fingers with motion.  No change occ sore neck or back.  No acute neck or back pain despite recent injury.   NEURO: Mild dizziness, weakness of extremities.  No change UE/LE mild numbness/paresthesias.   PSYCH: Mild ? memory loss.  Mood good; occ anxious, depressed but/and not suicidal.  Tries to tolerate stress   Screening:  Mammogram: NA  Bone density: NA  Low dose CT chest:Tobacco-smoker/age 18/<1 ppd/dc 1965 (19) NA  GI:   Colonoscopy+nl/[facility]/9.5.12/5y-reminded  EGD+biop/[facility]/8.31.15  Prostate: urology past  Usual lab order  1m CBC, BMP, iron  6m CBC, CMP, iron  12m CBC, CMP, LIPID    Lab Results:  Results for orders placed or performed in visit on 10/14/19   Urinalysis without microscopic (no culture) - Urine, Clean Catch   Result Value Ref Range    Specific Gravity, UA 1.011 1.005 - 1.030    pH, UA 5.5 5.0 - 8.0    Color, UA Yellow     Appearance, UA Clear Clear    Leukocytes, UA See below: (A) Negative    Protein Negative Negative    Glucose, UA Negative Negative    Ketones Negative Negative    Blood, UA Negative Negative    Bilirubin, UA Negative Negative    Urobilinogen, UA Comment     Nitrite, UA Negative Negative   Protein / Creatinine Ratio, Urine - Urine, Clean Catch   Result Value Ref Range    Creatinine, Urine CANCELED mg/dL    Total Protein, Urine CANCELED    Phosphorus   Result Value Ref Range    Phosphorus 3.5 2.5 - 4.5 mg/dL   Uric Acid   Result Value Ref Range    Uric Acid 8.4 (H) 3.4 - 7.0 mg/dL   Magnesium   Result Value Ref Range    Magnesium 2.1 1.7 - 2.3 mg/dL   Iron    Result Value Ref Range    Iron 60 59 - 158 mcg/dL   Comprehensive Metabolic Panel   Result Value Ref Range    Glucose 101 (H) 65 - 99 mg/dL    BUN 36 (H) 8 - 23 mg/dL    Creatinine 2.22 (H) 0.76 - 1.27 mg/dL    eGFR Non African Am 28 (L) >60 mL/min/1.73    eGFR African Am 34 (L) >60 mL/min/1.73    BUN/Creatinine Ratio 16.2 7.0 - 25.0    Sodium 138 136 - 145 mmol/L    Potassium 4.0 3.5 - 5.2 mmol/L    Chloride 99 98 - 107 mmol/L    Total CO2 26.3 22.0 - 29.0 mmol/L    Calcium 9.1 8.2 - 9.6 mg/dL    Total Protein 6.3 6.0 - 8.5 g/dL    Albumin 3.90 3.50 - 5.20 g/dL    Globulin 2.4 gm/dL    A/G Ratio 1.6 g/dL    Total Bilirubin 0.5 0.2 - 1.2 mg/dL    Alkaline Phosphatase 82 39 - 117 U/L    AST (SGOT) 17 1 - 40 U/L    ALT (SGPT) 10 1 - 41 U/L   Manual Differential   Result Value Ref Range    Neutrophil Rel % 64.0 42.7 - 76.0 %    Lymphocyte Rel % 19.0 (L) 19.6 - 45.3 %    Monocyte Rel % 1.0 (L) 5.0 - 12.0 %    Eosinophil Rel % 16.0 (H) 0.3 - 6.2 %    Neutrophils Absolute 3.08 1.70 - 7.00 10*3/mm3    Lymphocytes Absolute 0.92 0.70 - 3.10 10*3/mm3    Monocytes Absolute 0.05 (L) 0.10 - 0.90 10*3/mm3    Eosinophil Abs 0.77 (H) 0.00 - 0.40 10*3/mm3    Differential Comment Comment     Comment Comment     Plt Comment Comment    Specimen Status Report   Result Value Ref Range    Specimen Status CANCELED    CBC & Differential   Result Value Ref Range    WBC 4.82 3.40 - 10.80 10*3/mm3    RBC 3.64 (L) 4.14 - 5.80 10*6/mm3    Hemoglobin 10.6 (L) 13.0 - 17.7 g/dL    Hematocrit 32.2 (L) 37.5 - 51.0 %    MCV 88.5 79.0 - 97.0 fL    MCH 29.1 26.6 - 33.0 pg    MCHC 32.9 31.5 - 35.7 g/dL    RDW 13.4 12.3 - 15.4 %    Platelets 173 140 - 450 10*3/mm3    Neutrophil Rel % CANCELED     Lymphocyte Rel % CANCELED     Monocyte Rel % CANCELED     Eosinophil Rel % CANCELED     Lymphocytes Absolute CANCELED     Eosinophils Absolute CANCELED     Basophils Absolute CANCELED        A1C:No results for input(s): HGBA1C in the last 18581 hours.  PSA:No  results for input(s): PSA in the last 20259 hours.  CBC:  Lab Results - Last 18 Months   Lab Units 10/14/19  1241 05/27/19  0413 05/26/19  0449 05/25/19  0637 05/24/19  0619 05/23/19  0735 05/22/19  0523  03/29/19  0800 02/04/19  0807 01/14/19  0855 12/10/18  1241  10/11/18  1417  05/15/18  0801   WBC 10*3/mm3 4.82 5.90 6.12 6.67 6.92 7.64 6.68   < > 8.69 5.43 6.45 7.81   < > 5.03   < > 6.32   HEMOGLOBIN g/dL 10.6* 12.6* 13.5* 12.8* 12.6* 12.6* 10.9*   < > 12.4* 12.1* 12.7* 11.9*   < > 11.8*   < > 15.4   HEMATOCRIT % 32.2* 35.5* 38.5* 37.3* 36.8* 36.4* 31.5*   < > 39.6 37.3* 41.0 36.8*   < > 33.7*   < > 47.3   PLATELETS 10*3/mm3 173 201 190 173 170 178 160   < > 154 177 215 218   < > 212   < > 193   IRON mcg/dL 60  --   --   --   --   --   --   --  34* 80 74 36*  --  45  --  91    < > = values in this interval not displayed.      BMP/CMP:  Lab Results - Last 18 Months   Lab Units 10/14/19  1241 08/12/19  0806 06/11/19  1232 05/27/19  0413 05/26/19  0449 05/25/19  0637 05/24/19  0619 05/23/19  0735  05/06/19  0735 04/26/19  0747 04/08/19  1329 03/29/19  0800 02/04/19  0807   SODIUM mmol/L 138 142  --  133* 134* 134* 134* 135   < > 139 136 135 138 137   POTASSIUM mmol/L 4.0 4.0  --  4.0 4.0 3.9 3.8 4.0   < > 4.1 4.2 4.2 4.1 4.0   CHLORIDE mmol/L 99 102  --  95* 97* 96* 95* 97*   < > 98 96* 97 97* 100   TOTAL CO2 mmol/L 26.3 24  --   --   --   --   --   --   --  27.9 26.6 23 23.7 26.0   CO2 mmol/L  --   --   --  30.0 28.0 29.0 28.0 29.0   < >  --   --   --   --   --    GLUCOSE mg/dL 101* 81  --   --   --   --   --   --   --  87 99 108* 94 66*   BUN mg/dL 36* 25  --  53* 48* 41* 38* 37*   < > 36* 32* 27 42* 32*   CREATININE mg/dL 2.22* 2.05*  --  2.32* 2.05* 2.20* 1.97* 2.07*   < > 2.35* 2.27* 1.94* 2.40* 2.15*   EGFR IF NONAFRICN AM mL/min/1.73 28* 28*  --  27* 31* 28* 32* 30*   < > 26* 27* 30* 26* 29*   EGFR IF AFRICN AM mL/min/1.73 34* 32*  --   --   --   --   --   --   --  32* 33* 34* 31* 35*   CALCIUM mg/dL 9.1 9.2  "9.0 9.0 9.2 9.0 9.1 9.1   < > 9.5 9.8* 8.7 8.6 9.2    < > = values in this interval not displayed.     HEPATIC:  Lab Results - Last 18 Months   Lab Units 10/14/19  1241 04/08/19  1329 03/29/19  0800 12/10/18  1241 10/11/18  1417 10/02/18  1002 05/15/18  0801   ALT (SGPT) U/L 10 13 9 16 22 16 20   AST (SGOT) U/L 17 17 10 15 27 20 22   ALK PHOS U/L 82 93 72 87 57 89 71     THYROID:  Lab Results - Last 18 Months   Lab Units 10/11/18  1417 05/15/18  0801   TSH mIU/mL 0.321* 1.330       Objective   /82 (BP Location: Right arm, Patient Position: Sitting, Cuff Size: Adult)   Pulse 88   Temp 98.9 °F (37.2 °C) (Oral)   Resp 16   Ht 177.8 cm (70\")   Wt 70.3 kg (155 lb)   SpO2 98%   BMI 22.24 kg/m²   Body mass index is 22.24 kg/m².    Physical Exam  GENERAL:  Thin AFA developed in no acute distress.  SKIN: Turgor excellent, without rash lesion.   HEENT: Normal cephalic without trauma.  Pupils equal round reactive to light. Extraocular motions full without nystagmus.   External canals nonobstructive nontender without reddness. Tymphatic membranes calos with shelia structures intact.   Oral cavity without growths, exudates, and moist.  Posterior pharynx without mass, obstruction, redness.  No thyromegaly, mass, tenderness, lymphadenopathy and supple.  CV: Irregular irregular rhythm.  No murmur, gallop; 1-2/4 pitting ankle lower leg edema. Posterior pulses intact.  No carotid bruits.  CHEST: No chest wall tenderness or mass.   LUNGS: Symmetric motion with clear/mild reduced to auscultation.   ABD: Soft, nontender without mass.   ORTHO: Symmetric extremities without swelling/point tenderness.  Full gross range of motion.   NEURO: CN 2-12 grossly intact.  Symmetric facies. 1/4 x bicep equal reflexes.  UE/LE   3/5 strength throughout.  Nonfocal use extremities. Speech clear.   PSYCH: Oriented x 3.  Pleasant calm, well kept.  Purposeful/directed conservation with intact short/long gross memory    Assessment/Plan     1. " Vomiting, intractability of vomiting not specified, presence of nausea not specified, unspecified vomiting type    2. Cough    3. Gastroesophageal reflux disease, esophagitis presence not specified    4. Esophageal dysfunction    5. 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    LAB/Testing/Referrals: reviewed/orders:   Today:   Orders Placed This Encounter   Procedures   • Ambulatory Referral to Speech Therapy     Chronic/recurrent labs above or change to:   same     Discussions:   See if improved cough holds up and work with speech  Body mass index is 22.24 kg/m².  Patient's Body mass index is 22.24 kg/m². BMI is within normal parameters. No follow-up required..    Tobacco use reviewed:    Non-smoker    There are no Patient Instructions on file for this visit.    Follow up: Return for lab/Dr William as planned.  Future Appointments   Date Time Provider Department Center   10/28/2019 11:40 AM [facility] [place] [facility] None   12/12/2019 11:30 AM Kiko Campos PA [facility] None   2/24/2020  8:30 AM [facility] [place] [facility] None   2/25/2020  1:45 PM Hemal William MD [facility] None   3/17/2020  2:00 PM Bill Church MD [facility] None   3/31/2020 11:30 AM Skyler Trimble MD [facility]

## 2019-11-20 RX ORDER — MIDODRINE HYDROCHLORIDE 10 MG/1
TABLET ORAL
Qty: 60 TABLET | Refills: 5 | Status: SHIPPED | OUTPATIENT
Start: 2019-11-20 | End: 2020-05-20

## 2019-12-02 DIAGNOSIS — K21.9 GASTROESOPHAGEAL REFLUX DISEASE, ESOPHAGITIS PRESENCE NOT SPECIFIED: Chronic | ICD-10-CM

## 2019-12-02 DIAGNOSIS — R11.0 NAUSEA: ICD-10-CM

## 2019-12-02 DIAGNOSIS — R11.2 NAUSEA AND VOMITING, INTRACTABILITY OF VOMITING NOT SPECIFIED, UNSPECIFIED VOMITING TYPE: ICD-10-CM

## 2019-12-02 RX ORDER — DUTASTERIDE 0.5 MG/1
CAPSULE, LIQUID FILLED ORAL
Qty: 90 CAPSULE | Refills: 2 | Status: SHIPPED | OUTPATIENT
Start: 2019-12-02 | End: 2020-08-12

## 2019-12-02 RX ORDER — ESOMEPRAZOLE MAGNESIUM 40 MG/1
CAPSULE, DELAYED RELEASE ORAL
Qty: 90 CAPSULE | Refills: 2 | Status: SHIPPED | OUTPATIENT
Start: 2019-12-02 | End: 2020-08-12

## 2019-12-09 ENCOUNTER — OFFICE VISIT (OUTPATIENT)
Dept: NEUROLOGY | Facility: CLINIC | Age: 84
End: 2019-12-09

## 2019-12-09 VITALS
DIASTOLIC BLOOD PRESSURE: 64 MMHG | SYSTOLIC BLOOD PRESSURE: 110 MMHG | HEART RATE: 74 BPM | OXYGEN SATURATION: 98 % | HEIGHT: 70 IN | WEIGHT: 154 LBS | BODY MASS INDEX: 22.05 KG/M2

## 2019-12-09 DIAGNOSIS — I67.9 CEREBROVASCULAR SMALL VESSEL DISEASE: ICD-10-CM

## 2019-12-09 DIAGNOSIS — G90.9 AUTONOMIC DYSFUNCTION: ICD-10-CM

## 2019-12-09 DIAGNOSIS — I63.9 CEREBELLAR STROKE (HCC): Primary | ICD-10-CM

## 2019-12-09 DIAGNOSIS — R26.9 GAIT ABNORMALITY: ICD-10-CM

## 2019-12-09 PROCEDURE — 99214 OFFICE O/P EST MOD 30 MIN: CPT | Performed by: PHYSICIAN ASSISTANT

## 2019-12-09 NOTE — PROGRESS NOTES
Subjective   James Birch is a 89 y.o. male is here today for follow-up.    No reported stroke symptoms, continues with orthostatic symptoms and autonomic symptoms.  He has not had any significant falls.  He was released from neurosurgery back in the summer after healing from his compression fracture caused by falling secondary to orthostasis.  He has a history of cerebrovascular small vessel disease, bilateral PICA strokes, advanced cerebral atrophy, autonomic dysfunction with neurogenic orthostatic hypotension, senile tetraparesis, chronic heart and kidney disease, age-related neurocognitive issues, a history of osteoporosis as evidenced by a DEXA scan revealing osteopenia combined with a history of two previous vertebral body compression fractures related to falling.  Most falls have been relatable to [facility].  Symptomatic orthostasis was previously treated with Northera.  The patient responded very well to this.  Northera became unavailable to the patient due to changes in access and cost.  The patient continues to experience falling, he has the above history and is anticoagulated on Xarelto.    Stroke   This is a chronic problem. The current episode started more than 1 year ago. The problem occurs daily. The problem has been gradually worsening. Associated symptoms include arthralgias, fatigue, numbness and weakness. Pertinent negatives include no coughing. The symptoms are aggravated by exertion and stress. Treatments tried: Supportive care antiplatelet therapy. The treatment provided mild relief.       The following portions of the patient's history were reviewed and updated as appropriate: allergies, current medications, past family history, past medical history, past social history, past surgical history and problem list.    Review of Systems   Constitutional: Positive for fatigue.   HENT: Positive for hearing loss. Negative for trouble swallowing.    Eyes: Positive for visual disturbance.   Respiratory:  Positive for shortness of breath. Negative for cough.    Cardiovascular: Negative.    Gastrointestinal: Negative.    Endocrine: Negative.    Genitourinary: Negative.    Musculoskeletal: Positive for arthralgias and gait problem.   Skin: Negative.    Allergic/Immunologic: Negative.    Neurological: Positive for dizziness, syncope, weakness, light-headedness and numbness.   Hematological: Negative.    Psychiatric/Behavioral: Positive for confusion, decreased concentration and sleep disturbance.       Objective   Physical Exam   Constitutional: He is oriented to person, place, and time.   HENT:   Head: Normocephalic.   Right Ear: External ear normal. Decreased hearing is noted.   Left Ear: External ear normal. Decreased hearing is noted.   Nose: Nose normal.   Mouth/Throat: Uvula is midline, oropharynx is clear and moist and mucous membranes are normal.   Eyes: Pupils are equal, round, and reactive to light. Conjunctivae, EOM and lids are normal. No scleral icterus.   Neck: Normal range of motion. No JVD present. Carotid bruit is not present.   Cardiovascular: Normal rate and regular rhythm.   Pulmonary/Chest: Effort normal and breath sounds normal.   Musculoskeletal: Normal range of motion. He exhibits no edema.   Lymphadenopathy:     He has no cervical adenopathy.   Neurological: He is alert and oriented to person, place, and time. He displays tremor. A cranial nerve deficit and sensory deficit is present. He exhibits normal muscle tone. Coordination abnormal. GCS eye subscore is 4. GCS verbal subscore is 5. GCS motor subscore is 6.   Reflex Scores:       Bicep reflexes are 1+ on the right side and 1+ on the left side.       Brachioradialis reflexes are 1+ on the right side and 1+ on the left side.       Patellar reflexes are 1+ on the right side and 1+ on the left side.  Presbycusis, impaired but functional vision bilaterally, tetraparesis, diminished light touch sensation in a stocking distribution bilateral lower  extremities, gait is not tested today.    Stable neurocognitive issues.   Skin: Skin is warm and dry.   Psychiatric: He has a normal mood and affect. His speech is delayed. He is slowed. Cognition and memory are impaired. He expresses impulsivity.   Nursing note and vitals reviewed.        Assessment/Plan   James was seen today for stroke.    Diagnoses and all orders for this visit:    Cerebellar stroke (CMS/HCC)    Cerebrovascular small vessel disease    Autonomic dysfunction    Gait abnormality    The patient appears to be stable at this point.  No changes are needed in his present treatment regimen from a neurologic standpoint.  It is felt that his NOH symptoms and therefore his risk of falling was better addressed with Northera.  Presently, this will remain unavailable to the patient.  Safety precautions were discussed in detail with the patient and family.  Safety awareness, the use of support, and walker and family to help assist and to avoid falling is discussed in detail with the patient.  The significant risk of falling including additional fractures, internal bleeding or even intracranial bleeding is discussed with patient as well.  20 minutes of a 25-minute outpatient visit was spent in counseling and coordination of care with the patient and his family.    Dictated utilizing Dragon dictation.

## 2019-12-19 PROBLEM — I63.9 CEREBELLAR STROKE (HCC): Status: ACTIVE | Noted: 2019-12-19

## 2019-12-23 ENCOUNTER — OFFICE VISIT (OUTPATIENT)
Dept: FAMILY MEDICINE CLINIC | Facility: CLINIC | Age: 84
End: 2019-12-23

## 2019-12-23 VITALS
BODY MASS INDEX: 21.9 KG/M2 | TEMPERATURE: 97.5 F | DIASTOLIC BLOOD PRESSURE: 76 MMHG | OXYGEN SATURATION: 95 % | HEIGHT: 70 IN | SYSTOLIC BLOOD PRESSURE: 108 MMHG | RESPIRATION RATE: 18 BRPM | WEIGHT: 153 LBS | HEART RATE: 90 BPM

## 2019-12-23 DIAGNOSIS — R26.9 GAIT ABNORMALITY: ICD-10-CM
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
## 2019-12-23 DIAGNOSIS — S51.012A SKIN TEAR OF LEFT ELBOW WITHOUT COMPLICATION, INITIAL ENCOUNTER: ICD-10-CM

## 2019-12-23 DIAGNOSIS — Z86.73 HISTORY OF CVA (CEREBROVASCULAR ACCIDENT): ICD-10-CM

## 2019-12-23 PROCEDURE — 99213 OFFICE O/P EST LOW 20 MIN: CPT | Performed by: FAMILY MEDICINE

## 2019-12-26 DIAGNOSIS — N00.9 ACUTE GLOMERULONEPHRITIS WITH PATHOLOGICAL LESION IN KIDNEY: ICD-10-CM

## 2019-12-26 RX ORDER — BUDESONIDE AND FORMOTEROL FUMARATE DIHYDRATE 80; 4.5 UG/1; UG/1
AEROSOL RESPIRATORY (INHALATION)
Qty: 30.6 G | Refills: 3 | Status: SHIPPED | OUTPATIENT
Start: 2019-12-26 | End: 2021-03-16

## 2019-12-26 NOTE — PROGRESS NOTES
Subjective   James Birch is a 89 y.o. male presenting with chief complaint of:   Chief Complaint   Patient presents with   • Arm Injury     left, fell into some shelves, won't stop bleeding       History of Present Illness :  With wife.  Here for primarily an acute issue today; fell 2 days ago with skin tear L elbow; continues on/off bleeding (on xarelto).       Has multiple chronic problems to consider that might have a bearing on today's issues; not an interval appointment.       Chronic/acute problems reviewed today:   1. Skin tear of left elbow without complication, initial encounter: acute/above.     2. Fall, initial encounter: acute on chronic (past falls).  Many referrals to PT.  Uses rolling walker.  LE weakness, balance chronically off since CVA.  No other injury.  No LOC.    3. Gait abnormality: chronic/variable see fall.     4. 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        Has an/another acute issue today: none.    The following portions of the patient's history were reviewed and updated as appropriate: allergies, current medications, past family history, past medical history, past social history, past surgical history and problem list.      Current Outpatient Medications:   •  acetaminophen (TYLENOL) 500 MG tablet, Take 1 tablet by mouth Every 4 (Four) Hours As Needed for Mild Pain ., Disp: , Rfl:   •  azelastine (OPTIVAR) 0.05 % ophthalmic solution, Administer 1 drop to both eyes 2 (Two) Times a Day., Disp: , Rfl:   •  B Complex-C (SUPER B COMPLEX/VITAMIN C PO), Take 1 capsule by mouth Daily., Disp: , Rfl:   •  budesonide-formoterol (SYMBICORT) 80-4.5 MCG/ACT inhaler, Inhale 2 puffs 2 (Two) Times a Day., Disp: 30.6 g, Rfl: 3  •  carbonyl iron (FEOSOL) 45 MG tablet tablet, Take 1 tablet by mouth 2 (Two) Times a Week., Disp: , Rfl:   •  Cholecalciferol (VITAMIN D3 PO), Take  2,000 Int'l Units by mouth Daily., Disp: , Rfl:   •  Coenzyme Q10 (CO Q-10 PO), Take 1 tablet by mouth Daily., Disp: , Rfl:   •  dutasteride (AVODART) 0.5 MG capsule, TAKE 1 CAPSULE DAILY GENERIC FOR AVODART, Disp: 90 capsule, Rfl: 2  •  esomeprazole (nexIUM) 40 MG capsule, TAKE 1 CAPSULE DAILY BEFORE BREAKFAST GENERIC FOR NEXIUM, Disp: 90 capsule, Rfl: 2  •  guaiFENesin (MUCINEX) 600 MG 12 hr tablet, Take 600 mg by mouth Every 12 (Twelve) Hours., Disp: , Rfl:   •  ipratropium-albuterol (DUO-NEB) 0.5-2.5 mg/3 ml nebulizer, USE 1 VIAL IN NEBULIZER FOUR TIMES DAILY GENERIC FOR, Disp: 180 vial, Rfl: 5  •  magnesium hydroxide (MILK OF MAGNESIA) 2400 MG/10ML suspension suspension, Take 10 mL by mouth Daily As Needed (constipation)., Disp: 10 mL, Rfl:   •  midodrine (PROAMATINE) 10 MG tablet, TAKE ONE TABLET TWICE DAILY BEFORE A MEAL, Disp: 60 tablet, Rfl: 5  •  Multiple Vitamins-Minerals (MULTIVITAMIN PO), Take 1 tablet by mouth daily., Disp: , Rfl:   •  O2 (OXYGEN), Inhale 1 L/min Every Night. As needed at bedtime. , Disp: , Rfl:   •  ofloxacin (OCUFLOX) 0.3 % ophthalmic solution, Administer 1 drop to both eyes Every 4 (Four) Hours., Disp: , Rfl:   •  Omega-3 Fatty Acids (FISH OIL PO), Take 1,000 mg by mouth 2 (Two) Times a Day., Disp: , Rfl:   •  ondansetron (ZOFRAN) 4 MG tablet, Take 1 tablet by mouth Every 8 (Eight) Hours As Needed for Nausea or Vomiting., Disp: 20 tablet, Rfl: 0  •  polyethylene glycol (MIRALAX) packet, Take 17 g by mouth Daily., Disp: 30 each, Rfl: 1  •  PROAIR  (90 Base) MCG/ACT inhaler, Inhale 2 puffs Every 4 (Four) Hours As Needed for Shortness of Air., Disp: , Rfl: 5  •  rivaroxaban (XARELTO) 15 MG tablet, Take 15 mg by mouth Daily., Disp: , Rfl:   •  tamsulosin (FLOMAX) 0.4 MG capsule 24 hr capsule, Take 1 capsule by mouth Daily., Disp: 90 capsule, Rfl: 3  •  torsemide (DEMADEX) 20 MG tablet, Take 1 tablet by mouth Daily., Disp: 30 tablet, Rfl: 5    No problems with  medications.  Refills if needed done    No Known Allergies    Review of Systems  GENERAL:  Inactive/slower with limits, speed, stamina for age and gait and occ SOB. Sleep is ok with oxygen.  No fever now.  ENDO:  No syncope, near or diaphoretic sweaty spells.  HEENT: No head injury or headache usually.  No vision change.  Same hearing loss.  Ears without pain/drainage.  No sore throat.  No significant nasal/sinus congestion/drainage. No epistaxis.  CHEST: No chest wall tenderness or mass. Occ cough, with mild occ wheeze.  Variable SOB; no hemoptysis.  CV: No exertional chest pain, palpitations; minimal end of day ankle edema but tolerated.    GI: No  dysphagia.  No abdominal pain, diarrhea, constipation.  No rectal bleeding, or melena.  No nausea and vomiting/heartburn.    :  Voids without dysuria, or incontinence to completion.  ORTHO: No painful/swollen joints but various on /off sore; especially hands/fingers with motion.  No change occ sore neck or back.  No acute neck or back pain despite recent injury.   NEURO: Chronic/mild dizziness, and primarily LE weakness of extremities.  No change UE/LE mild numbness/paresthesias.   PSYCH: Mild ? memory loss.  Mood good; occ anxious, depressed but/and not suicidal.  Tries to tolerate stress   Screening:  Mammogram: NA  Bone density: NA  Low dose CT chest:Tobacco-smoker/age 18/<1 ppd/dc 1965 (19) NA  GI:   Colonoscopy+nl/[facility]/9.5.12/5y-reminded  EGD+biop/[facility]/8.31.15  Prostate: urology past  Usual lab order  1m CBC, BMP, iron  6m CBC, CMP, iron  12m CBC, CMP, LIPID    Lab Results:  Results for orders placed or performed in visit on 10/14/19   Urinalysis without microscopic (no culture) - Urine, Clean Catch   Result Value Ref Range    Specific Gravity, UA 1.011 1.005 - 1.030    pH, UA 5.5 5.0 - 8.0    Color, UA Yellow     Appearance, UA Clear Clear    Leukocytes, UA See below: (A) Negative    Protein Negative Negative    Glucose, UA Negative  Negative    Ketones Negative Negative    Blood, UA Negative Negative    Bilirubin, UA Negative Negative    Urobilinogen, UA Comment     Nitrite, UA Negative Negative   Protein / Creatinine Ratio, Urine - Urine, Clean Catch   Result Value Ref Range    Creatinine, Urine CANCELED mg/dL    Total Protein, Urine CANCELED    Phosphorus   Result Value Ref Range    Phosphorus 3.5 2.5 - 4.5 mg/dL   Uric Acid   Result Value Ref Range    Uric Acid 8.4 (H) 3.4 - 7.0 mg/dL   Magnesium   Result Value Ref Range    Magnesium 2.1 1.7 - 2.3 mg/dL   Iron   Result Value Ref Range    Iron 60 59 - 158 mcg/dL   Comprehensive Metabolic Panel   Result Value Ref Range    Glucose 101 (H) 65 - 99 mg/dL    BUN 36 (H) 8 - 23 mg/dL    Creatinine 2.22 (H) 0.76 - 1.27 mg/dL    eGFR Non African Am 28 (L) >60 mL/min/1.73    eGFR African Am 34 (L) >60 mL/min/1.73    BUN/Creatinine Ratio 16.2 7.0 - 25.0    Sodium 138 136 - 145 mmol/L    Potassium 4.0 3.5 - 5.2 mmol/L    Chloride 99 98 - 107 mmol/L    Total CO2 26.3 22.0 - 29.0 mmol/L    Calcium 9.1 8.2 - 9.6 mg/dL    Total Protein 6.3 6.0 - 8.5 g/dL    Albumin 3.90 3.50 - 5.20 g/dL    Globulin 2.4 gm/dL    A/G Ratio 1.6 g/dL    Total Bilirubin 0.5 0.2 - 1.2 mg/dL    Alkaline Phosphatase 82 39 - 117 U/L    AST (SGOT) 17 1 - 40 U/L    ALT (SGPT) 10 1 - 41 U/L   Manual Differential   Result Value Ref Range    Neutrophil Rel % 64.0 42.7 - 76.0 %    Lymphocyte Rel % 19.0 (L) 19.6 - 45.3 %    Monocyte Rel % 1.0 (L) 5.0 - 12.0 %    Eosinophil Rel % 16.0 (H) 0.3 - 6.2 %    Neutrophils Absolute 3.08 1.70 - 7.00 10*3/mm3    Lymphocytes Absolute 0.92 0.70 - 3.10 10*3/mm3    Monocytes Absolute 0.05 (L) 0.10 - 0.90 10*3/mm3    Eosinophil Abs 0.77 (H) 0.00 - 0.40 10*3/mm3    Differential Comment Comment     Comment Comment     Plt Comment Comment    Specimen Status Report   Result Value Ref Range    Specimen Status CANCELED    CBC & Differential   Result Value Ref Range    WBC 4.82 3.40 - 10.80 10*3/mm3    RBC 3.64  (L) 4.14 - 5.80 10*6/mm3    Hemoglobin 10.6 (L) 13.0 - 17.7 g/dL    Hematocrit 32.2 (L) 37.5 - 51.0 %    MCV 88.5 79.0 - 97.0 fL    MCH 29.1 26.6 - 33.0 pg    MCHC 32.9 31.5 - 35.7 g/dL    RDW 13.4 12.3 - 15.4 %    Platelets 173 140 - 450 10*3/mm3    Neutrophil Rel % CANCELED     Lymphocyte Rel % CANCELED     Monocyte Rel % CANCELED     Eosinophil Rel % CANCELED     Lymphocytes Absolute CANCELED     Eosinophils Absolute CANCELED     Basophils Absolute CANCELED        A1C:No results for input(s): HGBA1C in the last 64711 hours.  PSA:No results for input(s): PSA in the last 83400 hours.  CBC:  Lab Results - Last 18 Months   Lab Units 10/14/19  1241 05/27/19  0413 05/26/19  0449 05/25/19  0637 05/24/19  0619 05/23/19  0735 05/22/19  0523  03/29/19  0800 02/04/19  0807 01/14/19  0855 12/10/18  1241  10/11/18  1417   WBC 10*3/mm3 4.82 5.90 6.12 6.67 6.92 7.64 6.68   < > 8.69 5.43 6.45 7.81   < > 5.03   HEMOGLOBIN g/dL 10.6* 12.6* 13.5* 12.8* 12.6* 12.6* 10.9*   < > 12.4* 12.1* 12.7* 11.9*   < > 11.8*   HEMATOCRIT % 32.2* 35.5* 38.5* 37.3* 36.8* 36.4* 31.5*   < > 39.6 37.3* 41.0 36.8*   < > 33.7*   PLATELETS 10*3/mm3 173 201 190 173 170 178 160   < > 154 177 215 218   < > 212   IRON mcg/dL 60  --   --   --   --   --   --   --  34* 80 74 36*  --  45    < > = values in this interval not displayed.      BMP/CMP:  Lab Results - Last 18 Months   Lab Units 10/14/19  1241 08/12/19  0806 06/11/19  1232 05/27/19  0413 05/26/19  0449 05/25/19  0637 05/24/19  0619 05/23/19  0735  05/06/19  0735 04/26/19  0747 04/08/19  1329 03/29/19  0800 02/04/19  0807   SODIUM mmol/L 138 142  --  133* 134* 134* 134* 135   < > 139 136 135 138 137   POTASSIUM mmol/L 4.0 4.0  --  4.0 4.0 3.9 3.8 4.0   < > 4.1 4.2 4.2 4.1 4.0   CHLORIDE mmol/L 99 102  --  95* 97* 96* 95* 97*   < > 98 96* 97 97* 100   TOTAL CO2 mmol/L 26.3 24  --   --   --   --   --   --   --  27.9 26.6 23 23.7 26.0   CO2 mmol/L  --   --   --  30.0 28.0 29.0 28.0 29.0   < >  --   --    "--   --   --    GLUCOSE mg/dL 101* 81  --   --   --   --   --   --   --  87 99 108* 94 66*   BUN mg/dL 36* 25  --  53* 48* 41* 38* 37*   < > 36* 32* 27 42* 32*   CREATININE mg/dL 2.22* 2.05*  --  2.32* 2.05* 2.20* 1.97* 2.07*   < > 2.35* 2.27* 1.94* 2.40* 2.15*   EGFR IF NONAFRICN AM mL/min/1.73 28* 28*  --  27* 31* 28* 32* 30*   < > 26* 27* 30* 26* 29*   EGFR IF AFRICN AM mL/min/1.73 34* 32*  --   --   --   --   --   --   --  32* 33* 34* 31* 35*   CALCIUM mg/dL 9.1 9.2 9.0 9.0 9.2 9.0 9.1 9.1   < > 9.5 9.8* 8.7 8.6 9.2    < > = values in this interval not displayed.     HEPATIC:  Lab Results - Last 18 Months   Lab Units 10/14/19  1241 04/08/19  1329 03/29/19  0800 12/10/18  1241 10/11/18  1417 10/02/18  1002   ALT (SGPT) U/L 10 13 9 16 22 16   AST (SGOT) U/L 17 17 10 15 27 20   ALK PHOS U/L 82 93 72 87 57 89     THYROID:  Lab Results - Last 18 Months   Lab Units 10/11/18  1417   TSH mIU/mL 0.321*       Objective   /76   Pulse 90   Temp 97.5 °F (36.4 °C)   Resp 18   Ht 177.8 cm (70\")   Wt 69.4 kg (153 lb)   SpO2 95%   BMI 21.95 kg/m²   Body mass index is 21.95 kg/m².    Physical Exam  GENERAL:  Thin AFA developed in no acute distress.  SKIN: Turgor excellent, without rash lesion other than PHOTO-L elbow.  Adherant cling/gauze (pulled off leaving ulcer/skin tear without active bleeding).   HEENT: Normal cephalic without trauma.  Pupils equal round reactive to light. Extraocular motions full without nystagmus.     No thyromegaly, mass, tenderness, lymphadenopathy and supple.  CV: Irregular irregular rhythm.  No murmur, gallop; 1-2/4 pitting ankle lower leg edema. Posterior pulses intact.  No carotid bruits.  CHEST: No chest wall tenderness or mass.   LUNGS: Symmetric motion with clear/mild reduced to auscultation.   ABD: Soft, nontender without mass.   ORTHO: Symmetric extremities without swelling/point tenderness.  Full gross range of motion.   NEURO: CN 2-12 grossly intact.  Symmetric facies. 1/4 x " bicep equal reflexes.  UE/LE   2-3/5 strength throughout.  Nonfocal use extremities. Speech clear.   PSYCH: Oriented x 3.  Pleasant calm, well kept.  Purposeful/directed conservation with intact short/long gross memory       Assessment/Plan     1. Skin tear of left elbow without complication, initial encounter    2. Fall, initial encounter    3. Gait abnormality    4. 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    LAB/Testing/Referrals: reviewed/orders:   Today:   Orders Placed This Encounter   Procedures   • Ambulatory Referral to Physical Therapy     Chronic/recurrent labs above or change to:   same     Discussions:   Bid/prn wound care:   A. Clean/soap-clean water  B. Dry well  C. Cover with bandage; suggested telfa, wrap gauze.   Body mass index is 21.95 kg/m².  Patient's Body mass index is 21.95 kg/m². BMI is within normal parameters. No follow-up required..    Tobacco use reviewed:    Non-smoker    There are no Patient Instructions on file for this visit.    Follow up: Return for lab;, Dr William-, as needed;.  Future Appointments   Date Time Provider Department Center   2/24/2020  8:30 AM [facility] [place] [facility] None   2/25/2020  1:45 PM Hemal William MD [facility] None   3/17/2020  2:00 PM Bill Church MD [facility] None   3/31/2020 11:30 AM Sykler Trimble MD [facility]   7/10/2020 11:00 AM Kiko Campos PA [facility] None

## 2020-01-27 DIAGNOSIS — D50.8 OTHER IRON DEFICIENCY ANEMIA: ICD-10-CM

## 2020-01-27 DIAGNOSIS — J44.9 COPD, GROUP B, BY GOLD 2013 CLASSIFICATION (HCC): Chronic | ICD-10-CM

## 2020-01-27 DIAGNOSIS — Z79.01 ANTICOAGULATED: ICD-10-CM

## 2020-01-27 DIAGNOSIS — I10 ESSENTIAL HYPERTENSION: Primary | Chronic | ICD-10-CM

## 2020-01-27 DIAGNOSIS — I67.9 CEREBROVASCULAR SMALL VESSEL DISEASE: ICD-10-CM

## 2020-01-28 LAB
BASOPHILS # BLD AUTO: 0.04 10*3/MM3 (ref 0–0.2)
BASOPHILS NFR BLD AUTO: 0.6 % (ref 0–1.5)
BUN SERPL-MCNC: 53 MG/DL (ref 8–23)
BUN/CREAT SERPL: 21.3 (ref 7–25)
CALCIUM SERPL-MCNC: 8.8 MG/DL (ref 8.2–9.6)
CHLORIDE SERPL-SCNC: 98 MMOL/L (ref 98–107)
CO2 SERPL-SCNC: 25.8 MMOL/L (ref 22–29)
CREAT SERPL-MCNC: 2.49 MG/DL (ref 0.76–1.27)
EOSINOPHIL # BLD AUTO: 0.16 10*3/MM3 (ref 0–0.4)
EOSINOPHIL NFR BLD AUTO: 2.4 % (ref 0.3–6.2)
ERYTHROCYTE [DISTWIDTH] IN BLOOD BY AUTOMATED COUNT: 12.8 % (ref 12.3–15.4)
GLUCOSE SERPL-MCNC: 78 MG/DL (ref 65–99)
HCT VFR BLD AUTO: 31.6 % (ref 37.5–51)
HGB BLD-MCNC: 10.6 G/DL (ref 13–17.7)
IMM GRANULOCYTES # BLD AUTO: 0.02 10*3/MM3 (ref 0–0.05)
IMM GRANULOCYTES NFR BLD AUTO: 0.3 % (ref 0–0.5)
IRON SERPL-MCNC: 42 MCG/DL (ref 59–158)
LYMPHOCYTES # BLD AUTO: 0.88 10*3/MM3 (ref 0.7–3.1)
LYMPHOCYTES NFR BLD AUTO: 13 % (ref 19.6–45.3)
MCH RBC QN AUTO: 29.7 PG (ref 26.6–33)
MCHC RBC AUTO-ENTMCNC: 33.5 G/DL (ref 31.5–35.7)
MCV RBC AUTO: 88.5 FL (ref 79–97)
MONOCYTES # BLD AUTO: 0.77 10*3/MM3 (ref 0.1–0.9)
MONOCYTES NFR BLD AUTO: 11.4 % (ref 5–12)
NEUTROPHILS # BLD AUTO: 4.89 10*3/MM3 (ref 1.7–7)
NEUTROPHILS NFR BLD AUTO: 72.3 % (ref 42.7–76)
NRBC BLD AUTO-RTO: 0 /100 WBC (ref 0–0.2)
PLATELET # BLD AUTO: 181 10*3/MM3 (ref 140–450)
POTASSIUM SERPL-SCNC: 4.5 MMOL/L (ref 3.5–5.2)
RBC # BLD AUTO: 3.57 10*6/MM3 (ref 4.14–5.8)
SODIUM SERPL-SCNC: 137 MMOL/L (ref 136–145)
WBC # BLD AUTO: 6.76 10*3/MM3 (ref 3.4–10.8)

## 2020-01-29 DIAGNOSIS — N00.9 ACUTE GLOMERULONEPHRITIS WITH PATHOLOGICAL LESION IN KIDNEY: ICD-10-CM

## 2020-01-29 RX ORDER — RIVAROXABAN 15 MG/1
TABLET, FILM COATED ORAL
Qty: 90 TABLET | Refills: 4 | Status: SHIPPED | OUTPATIENT
Start: 2020-01-29 | End: 2021-02-13

## 2020-02-05 ENCOUNTER — TELEPHONE (OUTPATIENT)
Dept: FAMILY MEDICINE CLINIC | Facility: CLINIC | Age: 85
End: 2020-02-05

## 2020-02-05 NOTE — TELEPHONE ENCOUNTER
"Vm from wife \"his feet are really swollen and needs to see the doctor and my phone number is [phone]\"    Called son Kade to see if his dad needs to be seen? Advised \"I took him to his kidney doctor the other day and they were not then, but I dont know. Make him an appt and let dad know\"    Attempted to call back and vm left to return call in am and will get pt in to see Jose if needed?    Called son back and advised of vm left and son will call his parents also to remind them  "

## 2020-02-06 ENCOUNTER — OFFICE VISIT (OUTPATIENT)
Dept: FAMILY MEDICINE CLINIC | Facility: CLINIC | Age: 85
End: 2020-02-06

## 2020-02-06 ENCOUNTER — HOSPITAL ENCOUNTER (OUTPATIENT)
Facility: HOSPITAL | Age: 85
Setting detail: OBSERVATION
Discharge: HOME | End: 2020-02-09
Attending: FAMILY MEDICINE | Admitting: FAMILY MEDICINE

## 2020-02-06 VITALS
OXYGEN SATURATION: 96 % | WEIGHT: 155 LBS | BODY MASS INDEX: 22.19 KG/M2 | HEIGHT: 70 IN | RESPIRATION RATE: 16 BRPM | DIASTOLIC BLOOD PRESSURE: 68 MMHG | HEART RATE: 92 BPM | SYSTOLIC BLOOD PRESSURE: 110 MMHG | TEMPERATURE: 98.2 F

## 2020-02-06 DIAGNOSIS — N18.4 CHRONIC KIDNEY DISEASE (CKD), STAGE IV (SEVERE) (HCC): ICD-10-CM

## 2020-02-06 DIAGNOSIS — I10 ESSENTIAL HYPERTENSION: Chronic | ICD-10-CM

## 2020-02-06 DIAGNOSIS — R60.0 LEG EDEMA: ICD-10-CM

## 2020-02-06 DIAGNOSIS — Z79.01 ANTICOAGULATED: ICD-10-CM

## 2020-02-06 DIAGNOSIS — Z74.09 IMPAIRED MOBILITY: ICD-10-CM

## 2020-02-06 DIAGNOSIS — I50.32 CHF (CONGESTIVE HEART FAILURE), NYHA CLASS III, CHRONIC, DIASTOLIC (HCC): Chronic | ICD-10-CM

## 2020-02-06 DIAGNOSIS — I83.93 VARICOSE VEINS OF BOTH LOWER EXTREMITIES, UNSPECIFIED WHETHER COMPLICATED: Primary | ICD-10-CM

## 2020-02-06 LAB
ALBUMIN SERPL-MCNC: 4 G/DL (ref 3.5–5.2)
ALBUMIN/GLOB SERPL: 1.3 G/DL
ALP SERPL-CCNC: 105 U/L (ref 39–117)
ALT SERPL W P-5'-P-CCNC: 10 U/L (ref 1–41)
ANION GAP SERPL CALCULATED.3IONS-SCNC: 13 MMOL/L (ref 5–15)
AST SERPL-CCNC: 15 U/L (ref 1–40)
BACTERIA UR QL AUTO: ABNORMAL /HPF
BASOPHILS # BLD AUTO: 0.04 10*3/MM3 (ref 0–0.2)
BASOPHILS NFR BLD AUTO: 0.7 % (ref 0–1.5)
BILIRUB SERPL-MCNC: 0.4 MG/DL (ref 0.2–1.2)
BILIRUB UR QL STRIP: NEGATIVE
BUN BLD-MCNC: 50 MG/DL (ref 8–23)
BUN/CREAT SERPL: 23.7 (ref 7–25)
CALCIUM SPEC-SCNC: 9.2 MG/DL (ref 8.2–9.6)
CHLORIDE SERPL-SCNC: 97 MMOL/L (ref 98–107)
CLARITY UR: CLEAR
CO2 SERPL-SCNC: 28 MMOL/L (ref 22–29)
COLOR UR: YELLOW
CREAT BLD-MCNC: 2.11 MG/DL (ref 0.76–1.27)
DEPRECATED RDW RBC AUTO: 42.8 FL (ref 37–54)
EOSINOPHIL # BLD AUTO: 0.14 10*3/MM3 (ref 0–0.4)
EOSINOPHIL NFR BLD AUTO: 2.5 % (ref 0.3–6.2)
ERYTHROCYTE [DISTWIDTH] IN BLOOD BY AUTOMATED COUNT: 13.2 % (ref 12.3–15.4)
GFR SERPL CREATININE-BSD FRML MDRD: 30 ML/MIN/1.73
GLOBULIN UR ELPH-MCNC: 3.1 GM/DL
GLUCOSE BLD-MCNC: 108 MG/DL (ref 65–99)
GLUCOSE UR STRIP-MCNC: NEGATIVE MG/DL
HCT VFR BLD AUTO: 35.5 % (ref 37.5–51)
HGB BLD-MCNC: 11.8 G/DL (ref 13–17.7)
HGB UR QL STRIP.AUTO: NEGATIVE
HYALINE CASTS UR QL AUTO: ABNORMAL /LPF
IMM GRANULOCYTES # BLD AUTO: 0.02 10*3/MM3 (ref 0–0.05)
IMM GRANULOCYTES NFR BLD AUTO: 0.4 % (ref 0–0.5)
IRON 24H UR-MRATE: 26 MCG/DL (ref 59–158)
IRON SATN MFR SERPL: 9 % (ref 20–50)
KETONES UR QL STRIP: NEGATIVE
LEUKOCYTE ESTERASE UR QL STRIP.AUTO: ABNORMAL
LYMPHOCYTES # BLD AUTO: 0.65 10*3/MM3 (ref 0.7–3.1)
LYMPHOCYTES NFR BLD AUTO: 11.4 % (ref 19.6–45.3)
MCH RBC QN AUTO: 29.2 PG (ref 26.6–33)
MCHC RBC AUTO-ENTMCNC: 33.2 G/DL (ref 31.5–35.7)
MCV RBC AUTO: 87.9 FL (ref 79–97)
MONOCYTES # BLD AUTO: 0.6 10*3/MM3 (ref 0.1–0.9)
MONOCYTES NFR BLD AUTO: 10.5 % (ref 5–12)
NEUTROPHILS # BLD AUTO: 4.26 10*3/MM3 (ref 1.7–7)
NEUTROPHILS NFR BLD AUTO: 74.5 % (ref 42.7–76)
NITRITE UR QL STRIP: NEGATIVE
NRBC BLD AUTO-RTO: 0 /100 WBC (ref 0–0.2)
PH UR STRIP.AUTO: 7.5 [PH] (ref 5–8)
PLATELET # BLD AUTO: 199 10*3/MM3 (ref 140–450)
PMV BLD AUTO: 9.1 FL (ref 6–12)
POTASSIUM BLD-SCNC: 3.9 MMOL/L (ref 3.5–5.2)
PROT SERPL-MCNC: 7.1 G/DL (ref 6–8.5)
PROT UR QL STRIP: NEGATIVE
RBC # BLD AUTO: 4.04 10*6/MM3 (ref 4.14–5.8)
RBC # UR: ABNORMAL /HPF
REF LAB TEST METHOD: ABNORMAL
SODIUM BLD-SCNC: 138 MMOL/L (ref 136–145)
SP GR UR STRIP: 1.01 (ref 1–1.03)
SQUAMOUS #/AREA URNS HPF: ABNORMAL /HPF
TIBC SERPL-MCNC: 298 MCG/DL (ref 298–536)
TRANSFERRIN SERPL-MCNC: 200 MG/DL (ref 200–360)
UROBILINOGEN UR QL STRIP: ABNORMAL
WBC NRBC COR # BLD: 5.71 10*3/MM3 (ref 3.4–10.8)
WBC UR QL AUTO: ABNORMAL /HPF

## 2020-02-06 PROCEDURE — 99213 OFFICE O/P EST LOW 20 MIN: CPT | Performed by: FAMILY MEDICINE

## 2020-02-06 PROCEDURE — G0379 DIRECT REFER HOSPITAL OBSERV: HCPCS

## 2020-02-06 PROCEDURE — G0378 HOSPITAL OBSERVATION PER HR: HCPCS

## 2020-02-06 PROCEDURE — 84466 ASSAY OF TRANSFERRIN: CPT | Performed by: FAMILY MEDICINE

## 2020-02-06 PROCEDURE — 83540 ASSAY OF IRON: CPT | Performed by: FAMILY MEDICINE

## 2020-02-06 PROCEDURE — 80053 COMPREHEN METABOLIC PANEL: CPT | Performed by: FAMILY MEDICINE

## 2020-02-06 PROCEDURE — 85025 COMPLETE CBC W/AUTO DIFF WBC: CPT | Performed by: FAMILY MEDICINE

## 2020-02-06 PROCEDURE — 81001 URINALYSIS AUTO W/SCOPE: CPT | Performed by: FAMILY MEDICINE

## 2020-02-06 RX ORDER — SODIUM CHLORIDE 0.9 % (FLUSH) 0.9 %
10 SYRINGE (ML) INJECTION EVERY 12 HOURS SCHEDULED
Status: DISCONTINUED | OUTPATIENT
Start: 2020-02-06 | End: 2020-02-09 | Stop reason: HOSPADM

## 2020-02-06 RX ORDER — OFLOXACIN 3 MG/ML
1 SOLUTION/ DROPS OPHTHALMIC EVERY 4 HOURS
Status: DISCONTINUED | OUTPATIENT
Start: 2020-02-06 | End: 2020-02-09 | Stop reason: HOSPADM

## 2020-02-06 RX ORDER — TORSEMIDE 20 MG/1
20 TABLET ORAL DAILY
Status: DISCONTINUED | OUTPATIENT
Start: 2020-02-07 | End: 2020-02-09 | Stop reason: HOSPADM

## 2020-02-06 RX ORDER — POLYETHYLENE GLYCOL 3350 17 G/17G
17 POWDER, FOR SOLUTION ORAL DAILY
Status: DISCONTINUED | OUTPATIENT
Start: 2020-02-06 | End: 2020-02-09 | Stop reason: HOSPADM

## 2020-02-06 RX ORDER — FINASTERIDE 5 MG/1
5 TABLET, FILM COATED ORAL DAILY
Status: DISCONTINUED | OUTPATIENT
Start: 2020-02-07 | End: 2020-02-09 | Stop reason: HOSPADM

## 2020-02-06 RX ORDER — IPRATROPIUM BROMIDE AND ALBUTEROL SULFATE 2.5; .5 MG/3ML; MG/3ML
1.5 SOLUTION RESPIRATORY (INHALATION)
Status: DISCONTINUED | OUTPATIENT
Start: 2020-02-06 | End: 2020-02-06 | Stop reason: SDUPTHER

## 2020-02-06 RX ORDER — BUDESONIDE AND FORMOTEROL FUMARATE DIHYDRATE 80; 4.5 UG/1; UG/1
2 AEROSOL RESPIRATORY (INHALATION)
Status: DISCONTINUED | OUTPATIENT
Start: 2020-02-06 | End: 2020-02-09 | Stop reason: HOSPADM

## 2020-02-06 RX ORDER — TAMSULOSIN HYDROCHLORIDE 0.4 MG/1
0.4 CAPSULE ORAL DAILY
Status: DISCONTINUED | OUTPATIENT
Start: 2020-02-07 | End: 2020-02-09 | Stop reason: HOSPADM

## 2020-02-06 RX ORDER — MIDODRINE HYDROCHLORIDE 10 MG/1
10 TABLET ORAL
Status: DISCONTINUED | OUTPATIENT
Start: 2020-02-06 | End: 2020-02-09 | Stop reason: HOSPADM

## 2020-02-06 RX ORDER — PANTOPRAZOLE SODIUM 40 MG/1
40 TABLET, DELAYED RELEASE ORAL
Status: DISCONTINUED | OUTPATIENT
Start: 2020-02-07 | End: 2020-02-09 | Stop reason: HOSPADM

## 2020-02-06 RX ORDER — SODIUM CHLORIDE 0.9 % (FLUSH) 0.9 %
10 SYRINGE (ML) INJECTION AS NEEDED
Status: DISCONTINUED | OUTPATIENT
Start: 2020-02-06 | End: 2020-02-09 | Stop reason: HOSPADM

## 2020-02-06 RX ORDER — KETOTIFEN FUMARATE 0.35 MG/ML
1 SOLUTION/ DROPS OPHTHALMIC 2 TIMES DAILY
Status: DISCONTINUED | OUTPATIENT
Start: 2020-02-06 | End: 2020-02-09 | Stop reason: HOSPADM

## 2020-02-06 RX ORDER — ACETAMINOPHEN 500 MG
500 TABLET ORAL EVERY 4 HOURS PRN
Status: DISCONTINUED | OUTPATIENT
Start: 2020-02-06 | End: 2020-02-09 | Stop reason: HOSPADM

## 2020-02-06 RX ADMIN — KETOTIFEN FUMARATE 1 DROP: 0.35 SOLUTION/ DROPS OPHTHALMIC at 21:26

## 2020-02-06 RX ADMIN — RIVAROXABAN 15 MG: 15 TABLET, FILM COATED ORAL at 19:00

## 2020-02-06 RX ADMIN — OFLOXACIN 1 DROP: 3 SOLUTION/ DROPS OPHTHALMIC at 21:26

## 2020-02-06 RX ADMIN — MIDODRINE HYDROCHLORIDE 10 MG: 10 TABLET ORAL at 19:00

## 2020-02-06 RX ADMIN — OFLOXACIN 1 DROP: 3 SOLUTION/ DROPS OPHTHALMIC at 18:59

## 2020-02-06 RX ADMIN — SODIUM CHLORIDE, PRESERVATIVE FREE 10 ML: 5 INJECTION INTRAVENOUS at 21:26

## 2020-02-07 LAB
ANION GAP SERPL CALCULATED.3IONS-SCNC: 11 MMOL/L (ref 5–15)
BASOPHILS # BLD AUTO: 0.03 10*3/MM3 (ref 0–0.2)
BASOPHILS NFR BLD AUTO: 0.6 % (ref 0–1.5)
BUN BLD-MCNC: 48 MG/DL (ref 8–23)
BUN/CREAT SERPL: 20.8 (ref 7–25)
CALCIUM SPEC-SCNC: 8.8 MG/DL (ref 8.2–9.6)
CHLORIDE SERPL-SCNC: 101 MMOL/L (ref 98–107)
CO2 SERPL-SCNC: 26 MMOL/L (ref 22–29)
CREAT BLD-MCNC: 2.31 MG/DL (ref 0.76–1.27)
DEPRECATED RDW RBC AUTO: 41.3 FL (ref 37–54)
EOSINOPHIL # BLD AUTO: 0.27 10*3/MM3 (ref 0–0.4)
EOSINOPHIL NFR BLD AUTO: 5 % (ref 0.3–6.2)
ERYTHROCYTE [DISTWIDTH] IN BLOOD BY AUTOMATED COUNT: 13.2 % (ref 12.3–15.4)
GFR SERPL CREATININE-BSD FRML MDRD: 27 ML/MIN/1.73
GLUCOSE BLD-MCNC: 95 MG/DL (ref 65–99)
HCT VFR BLD AUTO: 32 % (ref 37.5–51)
HGB BLD-MCNC: 11 G/DL (ref 13–17.7)
IMM GRANULOCYTES # BLD AUTO: 0.01 10*3/MM3 (ref 0–0.05)
IMM GRANULOCYTES NFR BLD AUTO: 0.2 % (ref 0–0.5)
LYMPHOCYTES # BLD AUTO: 0.89 10*3/MM3 (ref 0.7–3.1)
LYMPHOCYTES NFR BLD AUTO: 16.5 % (ref 19.6–45.3)
MCH RBC QN AUTO: 29.8 PG (ref 26.6–33)
MCHC RBC AUTO-ENTMCNC: 34.4 G/DL (ref 31.5–35.7)
MCV RBC AUTO: 86.7 FL (ref 79–97)
MONOCYTES # BLD AUTO: 0.7 10*3/MM3 (ref 0.1–0.9)
MONOCYTES NFR BLD AUTO: 13 % (ref 5–12)
NEUTROPHILS # BLD AUTO: 3.49 10*3/MM3 (ref 1.7–7)
NEUTROPHILS NFR BLD AUTO: 64.7 % (ref 42.7–76)
NRBC BLD AUTO-RTO: 0 /100 WBC (ref 0–0.2)
PLATELET # BLD AUTO: 187 10*3/MM3 (ref 140–450)
PMV BLD AUTO: 9.6 FL (ref 6–12)
POTASSIUM BLD-SCNC: 4.1 MMOL/L (ref 3.5–5.2)
RBC # BLD AUTO: 3.69 10*6/MM3 (ref 4.14–5.8)
SODIUM BLD-SCNC: 138 MMOL/L (ref 136–145)
WBC NRBC COR # BLD: 5.39 10*3/MM3 (ref 3.4–10.8)

## 2020-02-07 PROCEDURE — 94640 AIRWAY INHALATION TREATMENT: CPT

## 2020-02-07 PROCEDURE — 85025 COMPLETE CBC W/AUTO DIFF WBC: CPT | Performed by: FAMILY MEDICINE

## 2020-02-07 PROCEDURE — 83970 ASSAY OF PARATHORMONE: CPT | Performed by: INTERNAL MEDICINE

## 2020-02-07 PROCEDURE — 94799 UNLISTED PULMONARY SVC/PX: CPT

## 2020-02-07 PROCEDURE — 80048 BASIC METABOLIC PNL TOTAL CA: CPT | Performed by: FAMILY MEDICINE

## 2020-02-07 PROCEDURE — 99219 PR INITIAL OBSERVATION CARE/DAY 50 MINUTES: CPT | Performed by: FAMILY MEDICINE

## 2020-02-07 PROCEDURE — G0378 HOSPITAL OBSERVATION PER HR: HCPCS

## 2020-02-07 RX ADMIN — ALBUTEROL SULFATE 1.25 MG: 2.5 SOLUTION RESPIRATORY (INHALATION) at 04:03

## 2020-02-07 RX ADMIN — SODIUM CHLORIDE, PRESERVATIVE FREE 10 ML: 5 INJECTION INTRAVENOUS at 20:33

## 2020-02-07 RX ADMIN — MIDODRINE HYDROCHLORIDE 10 MG: 10 TABLET ORAL at 18:21

## 2020-02-07 RX ADMIN — OFLOXACIN 1 DROP: 3 SOLUTION/ DROPS OPHTHALMIC at 14:35

## 2020-02-07 RX ADMIN — BUDESONIDE AND FORMOTEROL FUMARATE DIHYDRATE 2 PUFF: 80; 4.5 AEROSOL RESPIRATORY (INHALATION) at 07:37

## 2020-02-07 RX ADMIN — ALBUTEROL SULFATE 1.25 MG: 2.5 SOLUTION RESPIRATORY (INHALATION) at 14:24

## 2020-02-07 RX ADMIN — IPRATROPIUM BROMIDE 0.5 MG: 0.5 SOLUTION RESPIRATORY (INHALATION) at 14:24

## 2020-02-07 RX ADMIN — NYSTATIN AND TRIAMCINOLONE ACETONIDE: 100000; 1 CREAM TOPICAL at 13:50

## 2020-02-07 RX ADMIN — KETOTIFEN FUMARATE 1 DROP: 0.35 SOLUTION/ DROPS OPHTHALMIC at 09:42

## 2020-02-07 RX ADMIN — OFLOXACIN 1 DROP: 3 SOLUTION/ DROPS OPHTHALMIC at 23:23

## 2020-02-07 RX ADMIN — IPRATROPIUM BROMIDE 0.5 MG: 0.5 SOLUTION RESPIRATORY (INHALATION) at 11:08

## 2020-02-07 RX ADMIN — IPRATROPIUM BROMIDE 0.5 MG: 0.5 SOLUTION RESPIRATORY (INHALATION) at 07:32

## 2020-02-07 RX ADMIN — IPRATROPIUM BROMIDE 0.5 MG: 0.5 SOLUTION RESPIRATORY (INHALATION) at 04:03

## 2020-02-07 RX ADMIN — TORSEMIDE 20 MG: 20 TABLET ORAL at 09:42

## 2020-02-07 RX ADMIN — OFLOXACIN 1 DROP: 3 SOLUTION/ DROPS OPHTHALMIC at 18:21

## 2020-02-07 RX ADMIN — BUDESONIDE AND FORMOTEROL FUMARATE DIHYDRATE 2 PUFF: 80; 4.5 AEROSOL RESPIRATORY (INHALATION) at 21:29

## 2020-02-07 RX ADMIN — ALBUTEROL SULFATE 2.5 MG: 2.5 SOLUTION RESPIRATORY (INHALATION) at 21:29

## 2020-02-07 RX ADMIN — NYSTATIN AND TRIAMCINOLONE ACETONIDE: 100000; 1 CREAM TOPICAL at 20:33

## 2020-02-07 RX ADMIN — IPRATROPIUM BROMIDE 0.5 MG: 0.5 SOLUTION RESPIRATORY (INHALATION) at 21:29

## 2020-02-07 RX ADMIN — ALBUTEROL SULFATE 1.25 MG: 2.5 SOLUTION RESPIRATORY (INHALATION) at 11:08

## 2020-02-07 RX ADMIN — RIVAROXABAN 15 MG: 15 TABLET, FILM COATED ORAL at 18:21

## 2020-02-07 RX ADMIN — FINASTERIDE 5 MG: 5 TABLET, FILM COATED ORAL at 09:42

## 2020-02-07 RX ADMIN — PANTOPRAZOLE SODIUM 40 MG: 40 TABLET, DELAYED RELEASE ORAL at 05:34

## 2020-02-07 RX ADMIN — TAMSULOSIN HYDROCHLORIDE 0.4 MG: 0.4 CAPSULE ORAL at 09:42

## 2020-02-07 RX ADMIN — ALBUTEROL SULFATE 1.25 MG: 2.5 SOLUTION RESPIRATORY (INHALATION) at 07:32

## 2020-02-07 RX ADMIN — SODIUM CHLORIDE, PRESERVATIVE FREE 10 ML: 5 INJECTION INTRAVENOUS at 09:42

## 2020-02-07 RX ADMIN — OFLOXACIN 1 DROP: 3 SOLUTION/ DROPS OPHTHALMIC at 09:47

## 2020-02-07 RX ADMIN — OFLOXACIN 1 DROP: 3 SOLUTION/ DROPS OPHTHALMIC at 05:34

## 2020-02-07 RX ADMIN — KETOTIFEN FUMARATE 1 DROP: 0.35 SOLUTION/ DROPS OPHTHALMIC at 20:33

## 2020-02-07 RX ADMIN — MIDODRINE HYDROCHLORIDE 10 MG: 10 TABLET ORAL at 09:42

## 2020-02-07 NOTE — H&P
"Patient ID: James Birch  MRN: [id number]     Acct:  [id number]  Admit Date: 2/6/2020   Date of service: 2.7.2020    SOURCE: The source of this information is prior knowledge of the patient, review of his office records, his current chart, as well as discussion with he/wife/son; he and wife are barely considered reliable.      PATIENT PROFILE: The patient is a 89 y/o white  male resident from [facility]/assisted living in [place]; he was cooperative.      CHIEF COMPLAINT: \"my legs are swollen\"     HISTORY OF PRESENT ILLNESS: Has chronic on/off leg swelling. Has CHF, CKD4, COPD and between diuretics up/down has up/down status of SOB, leg edema.  Has had problem understanding to elevate legs or wear compressive stockings.  Has history of CVA; has memory deficits.  Wife has been primary care giver; she now is developing significant memory loss.  Son plans to move mom/dad from assisted living to NH but not ready/not prepared to do right now.   Patient presented to office today with red, swollen, scaley LE 1/2 leg/down and 2-3 small scabs on L lateral leg; that leg was alittle sore.  We were convienced wife/he could not provide adequate care so we suggest admit/observation.  Interval/while here; 12 hr of leg elevated has resolved 1/2 of edema.  WBC is not high; no fever has been noticed.     No Known Allergies    HOME MEDICATIONS:  Prior to Admission medications    Medication Sig Start Date End Date Taking? Authorizing Provider   acetaminophen (TYLENOL) 500 MG tablet Take 1 tablet by mouth Every 4 (Four) Hours As Needed for Mild Pain . 5/27/19   Hemal William MD   azelastine (OPTIVAR) 0.05 % ophthalmic solution Administer 1 drop to both eyes 2 (Two) Times a Day.    ProviderMaryuri MD   B Complex-C (SUPER B COMPLEX/VITAMIN C PO) Take 1 capsule by mouth Daily.    ProviderMaryuri MD   budesonide-formoterol (SYMBICORT) 80-4.5 MCG/ACT inhaler USE 2 PUFFS TWICE DAILY 12/26/19   Hemal William " MD Thomas   carbonyl iron (FEOSOL) 45 MG tablet tablet Take 1 tablet by mouth Daily.    Maryuri Coburn MD   Cholecalciferol (VITAMIN D3 PO) Take 2,000 Int'l Units by mouth Daily.    Maryuri Coburn MD   Coenzyme Q10 (CO Q-10 PO) Take 1 tablet by mouth Daily.    Maryuri Coburn MD   dutasteride (AVODART) 0.5 MG capsule TAKE 1 CAPSULE DAILY GENERIC FOR AVODART 12/2/19   Hemal William MD   esomeprazole (nexIUM) 40 MG capsule TAKE 1 CAPSULE DAILY BEFORE BREAKFAST GENERIC FOR NEXIUM 12/2/19   Hemal William MD   guaiFENesin (MUCINEX) 600 MG 12 hr tablet Take 600 mg by mouth Every 12 (Twelve) Hours.    Maryuri Coburn MD   ipratropium-albuterol (DUO-NEB) 0.5-2.5 mg/3 ml nebulizer USE 1 VIAL IN NEBULIZER FOUR TIMES DAILY GENERIC FOR 10/16/19   Hemal William MD   midodrine (PROAMATINE) 10 MG tablet TAKE ONE TABLET TWICE DAILY BEFORE A MEAL 11/20/19   Hemal William MD   Multiple Vitamins-Minerals (MULTIVITAMIN PO) Take 1 tablet by mouth daily.    Maryuri Coburn MD   O2 (OXYGEN) Inhale 1 L/min Every Night. As needed at bedtime.     Maryuri Coburn MD   ofloxacin (OCUFLOX) 0.3 % ophthalmic solution Administer 1 drop to both eyes Every 4 (Four) Hours. 7/1/19   Maryuri Coburn MD   Omega-3 Fatty Acids (FISH OIL PO) Take 1,000 mg by mouth 2 (Two) Times a Day.    Maryuri Coburn MD   polyethylene glycol (MIRALAX) packet Take 17 g by mouth Daily. 10/13/18   Hemal William MD   PROAIR  (90 Base) MCG/ACT inhaler Inhale 2 puffs Every 4 (Four) Hours As Needed for Shortness of Air. 5/30/18   Maryuri Coburn MD   tamsulosin (FLOMAX) 0.4 MG capsule 24 hr capsule Take 1 capsule by mouth Daily. 2/27/19   Hemal William MD   torsemide (DEMADEX) 20 MG tablet Take 1 tablet by mouth Daily. 7/2/19   Hemal William MD   XARELTO 15 MG tablet TAKE ONE TABLET DAILY 1/29/20   Knees, WILLIAM Kessler       PAST HISTORY:  CHILDHOOD:  unremarkable.      PROCEDURES:     SCANNED  Prostate exam/urology/confirmed/8.22.16     EGD+biop/[facility]/10-2-02  Colonscopy/Mary/02-20-02  EGD+PEG-candida esophagitis/[facility]/3-25-10  Colonoscopy+nl/[facility]/9.5.12/5y  EGD+biop/[facility]ick/8.31.15     SURGERIES:  R Hip/1994  Renan Inguinal Hernia/2001  L ear/Misbah  L ear/Misbah  L ear/Misbah/9.30.11  Tkach/L lower eyelid excision/LH/7.8.16        FAMILY HISTORY:  Heart/none  DM/none  CA-prostate/none  CA-colon/none  CA-eye/b  CA-testicle/b     HABITS:  Tobacco-smoker/age 18/<1 ppd/dc 1965  Alcohol/none  Drugs/none     SOCIAL HISTORY:  /1949  Retired/[employer]/1993  Children/3     HOSPITAL ADMITS:   BH:   HOSPITAL NOTES:  1.29.10-2.12.10 vertigo?CVA  2.12.10-4.10.10-TCU  cerebellar CVA-probably of atrial fib  vertigo  nausea with vomiting-positional  nausea -central  nausea with vomiting-central  gastroenteritis (N&V, diarrhea)  hematemesis  anemia  diarrhea  nutritional risk/decline  A fib-flutter  ataxia  gait issues  IgA nephropathy  coumadin therapy- afib indication  hypercoagulation  hypermagnesiuma  syncope- vasovagal +/- orthostasis  hypotension  late affects CVA, (balance, gait, vertigo, nausea)  pseudomonas bacteremia- lung/PIC line  LLL pneumonia 3.21.10  COPD  esophagitis-candida     HOSPITAL NOTES:  3.29.11-4.1.11 R hand burn  R hand burn-2nd degree  seizure-possible  CVA-cerebellar/embolic-1.29.10  late affects CVA  atrial fib-chronic  coumadin therapy  excessive INR 4.47  chronic kidney disease-3     HOSPITAL NOTES: [facility]  3.28.13-4.2.13   fever-assume from respiratory source  nausea with vomiting  abnormal xray-STELLA  STELLA pneumonia-community acquired  copd excerbation  copd  long term anticoagulation-coumadin (a fib, CVA)  elevated/low INR 3.21-1.65  CKD-3 (IgA nephropathy)  abnormal urine (mod blood, protein)  bacturia (3.25.13 10-25K E coli)  stomatitis     HOSPITAL NOTES: [facility]  9.16.13   weakness  leg  weakness..     5.25.18-5.27.18  Rapid a fib  Paroxysmal SVT  tachycardia  Bradycardia  Palpitations  Dizziness  Autonomic dysfunction  Recent R pontine lesion  Small vessel ischemic cerebral disease  Acute renal insufficiency (on CKD)  Hyperlipidemia-resumption of statin     10.11.18-10.13.18  Acute renal injury (with CKD) GFR 28 usually 30s  Dehydration (b/cr 18.3)  Nausea-may constipation, pain Rx; ? others  Nausea/vomiting-same  Gait decline-acute  Recent fall  Risk falls  Recent UE skin tear  Recent distal radius fracture  Constipation-narcotic influenced    5.21.19-5.27.19 [facility]  5.27.19-6.6.19 [facility] -swing bed  T12 compression fracture  Back pain-acute  Gait difficulty-acute/chronic  Hypotension-symptomatic dizzy, falls-contribution of autonomatic dysfunction?  Nausea-on/off  Fall-acute  Fall-chronic risk  Leg edema; no longer able to resolve  Constipation-acute on chronic  Atrial flutter     [facility]:   12.4.91-12.14.91-pelvic fx  1.28.10-1.29.10-vertigo    5.27.19-6.6.19 [facility] -swing bed  T12 compression fracture  Back pain-acute  Gait difficulty-acute/chronic  Hypotension-symptomatic dizzy, falls-contribution of autonomatic dysfunction?  Nausea-on/off  Fall-acute  Fall-chronic risk  Leg edema; no longer able to resolve  Constipation-acute on chronic  Atrial flutter     [place]:   None    Review of Systems  GENERAL:  Inactive/slower with limits, speed, stamina for age and fatigue; using rolling walker at admit. Sleep is ok. No fever.  ENDO:  No syncope, near or diaphoretic sweaty spells.  BS Ok here.  HEENT: No head injury occ/same headache.  Minor decreased/ vision change. Same aide treated hearing loss.  Ears without pain/drainage.  No sore throat.  No significant nasal/sinus congestion/drainage. No epistaxis.  CHEST: No chest wall tenderness or mass. No change occ cough, wheeze, SOB; no hemoptysis.  CV: No chest pain, palpitations; lately always LE/ ankle edema.  Hx atrial fib.  GI: No heartburn,  "dysphagia.  No abdominal pain, diarrhea,  rectal bleeding, or melena; tends towards constipation.  No recent nausea and vomiting even before Norco.   :  Voids without dysuria, or incontinence to completion.  ORTHO: No painful/swollen joints but various on /off sore.  Usual mild sore neck or back; not much increased back.  No acute neck but acute back pain with above recent injury.   NEURO: Often dizziness without weakness of extremities.  No change LE numbness/paresthesias.   PSYCH: Mild/stable memory loss.  Mood good; occ mildly anxious, depressed but/and not suicidal.  Tolerated stress.     PHYSICAL EXAMINATION:  /87 (BP Location: Left arm, Patient Position: Lying)   Pulse 86   Temp 98.4 °F (36.9 °C) (Oral)   Resp 18   Ht 177.8 cm (70\")   Wt 71.4 kg (157 lb 8 oz)   SpO2 98%   BMI 22.60 kg/m²     Physical Exam  GENERAL:  Well nourished/developed in no acute distress.  SKIN: Turgor excellent, without wound, rash, lesion beyond: The distal mid leg/calf to the toes has edema mildly pitting mild erythema significant scaling and thickening/brittleness of all the nails.  The left lower extremity has 2 pinpoint areas of scabbing; the area around this is gently indurated and mildly sore.  There is no fluctuance no drainage.  HEENT: Normal cephalic without trauma.  Pupils equal round reactive to light. Extraocular motions full without nystagmus.     External canals nonobstructive nontender without reddness.  Oral cavity without growths, exudates, and moist.  Posterior pharynx without mass, obstruction, redness.  No thyromegaly, mass, tenderness, lymphadenopathy and supple.  CV: Regular rhythm.  No gallop but 1/6 systolic, 1-2/4 pitting edema. Posterior pulses intact.  No carotid bruits.   CHEST: No chest wall tenderness or mass.   LUNGS: Symmetric motion with clear to auscultation.   ABD: Soft, nontender without mass.   ORTHO: Symmetric extremities without swelling/point tenderness.  Full gross range of " motion.    NEURO: CN 2-12 grossly intact.  Symmetric facies. 1/4 x bicep equal reflexes.  UE/LE   3/5 strength throughout.  Nonfocal use extremities. Speech clear.    PSYCH: Oriented x 3.  Pleasant calm, well kept.  Purposeful/directed conservation with intact short/long gross memory.     ASSESSMENT/PROBLEM LIST:   89 y/o white male; advanced age   Allergy/intolerance: see above  Procedural history: see above  Family history: see above  History tobacco-stopped  COPD  Asthma  Shortness of breath-chronic (copd, CHF, anemia)  Hyperlipidemia-reserved to statin  Hypertension  IgA nephropathy  CKD3  Obstructive sleep apnea-intolerant tx  Nocturnal hypoxemia-oxygen treated  hypersomnia  Cardiac arrhythmia-pa fib-flutter  Congestive heart failure-diastolic-chronic  Valvular heart  Edema (copd, CKD, CHF, pAfib)  Anticoagulated Hx CVA, a fib/(past coumadin), xarelto  Anemia-chronic (CKD, xarelto  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  Cerebral small vessel disease  Late effects CVA  Memory loss  Autonomic dysfunction-neuro opinion  Recurrent dizziness  History seizure disorder  Peripheral neuropathy  Gait difficulties  Fall risk-history falls  Prostatism  BPH  History urolithiasis  Varicose veins  GE reflux (heartburn)  History cholinergic urticaria  Hearing loss-conductive  Degenerative joint disease    REASON FOR ADMISSION:    Increased LE edema  LE pain  LE rash-likely now more stasis dermatitis than cellulitis  Inability to complete needed care at home  Intolerance Rx-difficulty tolerating diuretics    PLANS:   Rx-reviewed; ordered as needed and will review daily/as needed.   Includes anticoagulation for DVT prevention as on xarelto.    Would like to try topical care and watch for any antibiotic needs here at first  LAB-reviewed; ordered as needed and will review daily.  Particular:  Daily renal/CBC  Imaging-reviewed; ordered as needed and will review daily.  Particular:  none  Consults nephrology; if renal  declines  Diet: cardiac, lower salt  Fluids: oral/encouraged; could end up needing IV  Special issues:   I talked to case management bedside.  We reviewed his legs the care he needed and we are going to explore home health private sitters and what the son may be thinking about.  I called the son and he was eager to consider private sitters and is looking at placing both his mom and father in a nursing home soon.  DC planning: likely home with home health/private care and what assisted living will do  Code status: full till clarify; if should worsen likely would be DNR

## 2020-02-07 NOTE — PROGRESS NOTES
Discharge Planning Assessment  [facility]     Patient Name: James Birch  MRN: [id number]  Today's Date: 2/7/2020    Admit Date: 2/6/2020    Discharge Needs Assessment     Row Name 02/07/20 1502       Living Environment    Lives With  facility resident;spouse    Unique Family Situation  [place]    Current Living Arrangements  home/apartment/condo    Primary Care Provided by  self    Provides Primary Care For  no one    Family Caregiver if Needed  spouse    Quality of Family Relationships  helpful;involved;supportive    Able to Return to Prior Arrangements  no       Resource/Environmental Concerns    Resource/Environmental Concerns  none    Transportation Concerns  car, none       Transition Planning    Patient/Family Anticipates Transition to  inpatient rehabilitation facility    Patient/Family Anticipated Services at Transition  none    Transportation Anticipated  family or friend will provide       Discharge Needs Assessment    Readmission Within the Last 30 Days  no previous admission in last 30 days    Concerns to be Addressed  no discharge needs identified;denies needs/concerns at this time    Equipment Currently Used at Home  walker, standard    Anticipated Changes Related to Illness  none    Equipment Needed After Discharge  none    Provided post acute provider list?  Yes    Post Acute Provider List  Nursing Home    Post Acute Provider Quality & Resource List  Yes    Delivered To  Support Person    Method of Delivery  In person    Patient's Choice of Community Agency(s)  [facility]    Discharge Coordination/Progress  Pt has RX coverage and a PCP. Pt lives at [facility] [facility] with his wife. TUTU spoke with pt's son who is requesting placement at [facility]. TUTU faxed info and spoke with jania Smart, who is aware of referral. TUTU will follow and await possible bed offer.         Discharge Plan    No documentation.       Destination      Coordination has not been  started for this encounter.      Durable Medical Equipment      Coordination has not been started for this encounter.      Dialysis/Infusion      Coordination has not been started for this encounter.      Home Medical Care      Coordination has not been started for this encounter.      Therapy      Coordination has not been started for this encounter.      Community Resources      Coordination has not been started for this encounter.          Demographic Summary    No documentation.       Functional Status    No documentation.       Psychosocial    No documentation.       Abuse/Neglect    No documentation.       Legal    No documentation.       Substance Abuse    No documentation.       Patient Forms    No documentation.           Joanne Little

## 2020-02-07 NOTE — PLAN OF CARE
Problem: Fall Risk (Adult)  Goal: Identify Related Risk Factors and Signs and Symptoms  Outcome: Ongoing (interventions implemented as appropriate)  Goal: Absence of Fall  Outcome: Ongoing (interventions implemented as appropriate)     Problem: Patient Care Overview  Goal: Plan of Care Review  Outcome: Ongoing (interventions implemented as appropriate)  Flowsheets (Taken 2/7/2020 1516)  Progress: no change  Plan of Care Reviewed With: patient  Note:   Patient voiced no pain through out the shift. Patient kept legs elevated. Wound care completed. VSS. Safety Maintained. Will continue to monitor.   Goal: Individualization and Mutuality  Outcome: Ongoing (interventions implemented as appropriate)  Goal: Discharge Needs Assessment  Outcome: Ongoing (interventions implemented as appropriate)  Goal: Interprofessional Rounds/Family Conf  Outcome: Ongoing (interventions implemented as appropriate)     Problem: Fluid Volume Excess (Adult)  Goal: Identify Related Risk Factors and Signs and Symptoms  Outcome: Ongoing (interventions implemented as appropriate)  Goal: Optimal Fluid Balance  Outcome: Ongoing (interventions implemented as appropriate)

## 2020-02-07 NOTE — PLAN OF CARE
Pt direct admit from Stewart's office.  He has moderate dependent edema in BLE.  Family has been at bedside assist patient.  He has brought in his own walker.

## 2020-02-07 NOTE — PROGRESS NOTES
Continued Stay Note   [facility]     Patient Name: James Birch  MRN: [id number]  Today's Date: 2/7/2020    Admit Date: 2/6/2020    Discharge Plan     Row Name 02/07/20 0921       Plan    Plan Comments  SW attempted to call Kade, son, this AM to discuss discharge planning. SW left message and will await a phone call back.         Discharge Codes    No documentation.             Joanne Little

## 2020-02-07 NOTE — DISCHARGE PLACEMENT REQUEST
"Bebo Birch (89 y.o. Male)     Date of Birth Social Security Number Address Home Phone MRN    04/18/1928  [address] [address] [phone] [id number]    Buddhist Marital Status          Hoahaoism        Admission Date Admission Type Admitting Provider Attending Provider Department, Room/Bed    2/6/20 Urgent Hemal William MD Oliver, Randy Eugene, MD [facility] 4C, 472/1    Discharge Date Discharge Disposition Discharge Destination                       Attending Provider:  Hemal William MD    Allergies:  No Known Allergies    Isolation:  None   Infection:  None   Code Status:  CPR    Ht:  177.8 cm (70\")   Wt:  71.4 kg (157 lb 8 oz)    Admission Cmt:  None   Principal Problem:  None                Active Insurance as of 2/6/2020     Primary Coverage     Payor Plan Insurance Group Employer/Plan Group    [facility] MEDICARE REPLACEMENT [facility] MEDICARE REPLACEMENT [id number]     Payor Plan Address Payor Plan Phone Number Payor Plan Fax Number Effective Dates    [address] [address]   1/1/2016 - None Entered    [facility] [id number]       Subscriber Name Subscriber Birth Date Member ID       BEBO BIRCH 4/18/1928 [id number]                 Emergency Contacts      (Rel.) Home Phone Work Phone Mobile Phone    Kade Birch (Son) [phone] -- [phone]    AlejandroKacie siddiqui (Other) -- -- [phone]    Queta Radha (Spouse) [phone] -- --    QuetaDamian (Son) [phone] -- --            History & Physical    No notes of this type exist for this encounter.         Physician Progress Notes (most recent note)    No notes of this type exist for this encounter.            Consult Notes (most recent note)      Henry Medina, APRN at 02/07/20 0949      Consult Orders    1. Inpatient Nephrology Consult [[id number]] ordered by Hemal William MD at 02/06/20 1602                Nephrology ([facility] " [facility]) Consult Note      Patient:  James Birch  YOB: 1928  Date of Service: 2/7/2020  MRN: [id number]   Acct: [id number]   Primary Care Physician: Hemal William MD  Advance Directive:   Code Status and Medical Interventions:   Ordered at: 02/06/20 1602     Code Status:    CPR     Medical Interventions (Level of Support Prior to Arrest):    Full     Admit Date: 2/6/2020       Hospital Day: 0  Referring Provider: Hemal William MD      Patient personally seen and examined.  Complete chart including Consults, Notes, Operative Reports, Labs, Cardiology, and Radiology studies reviewed as able.        Subjective:  James Birch is a 89 y.o. male  whom we were consulted for chronic kidney disease.  Baseline CKD stage 4; baseline creatinine 2.2. Follows with Dr Valente in our office; was seen in office less then two weeks ago; creatinine 2.4 at that time.  History of chronic diastolic congestive heart failure, atrial fibrillation, hypertension, COPD.  Patient has had recurring problems with edema and low BP. Admitted this time for lower extremity edema.  Currently patient is awake and alert. Denies pain or dyspnea. No recent change in urine output, no dysuria or hematuria. Denies NSAIDs. No N/V/D.  Trace lower extremity edema; patient states his swelling is better today than it has been for a while. Urine output nonoliguric    Allergies:  Patient has no known allergies.    Home Meds:  Medications Prior to Admission   Medication Sig Dispense Refill Last Dose   • acetaminophen (TYLENOL) 500 MG tablet Take 1 tablet by mouth Every 4 (Four) Hours As Needed for Mild Pain .   Taking   • azelastine (OPTIVAR) 0.05 % ophthalmic solution Administer 1 drop to both eyes 2 (Two) Times a Day.   Taking   • B Complex-C (SUPER B COMPLEX/VITAMIN C PO) Take 1 capsule by mouth Daily.   Taking   • budesonide-formoterol (SYMBICORT) 80-4.5 MCG/ACT inhaler USE 2 PUFFS TWICE DAILY 30.6 g 3 Taking    • carbonyl iron (FEOSOL) 45 MG tablet tablet Take 1 tablet by mouth Daily.   Taking   • Cholecalciferol (VITAMIN D3 PO) Take 2,000 Int'l Units by mouth Daily.   Taking   • Coenzyme Q10 (CO Q-10 PO) Take 1 tablet by mouth Daily.   Taking   • dutasteride (AVODART) 0.5 MG capsule TAKE 1 CAPSULE DAILY GENERIC FOR AVODART 90 capsule 2 Taking   • esomeprazole (nexIUM) 40 MG capsule TAKE 1 CAPSULE DAILY BEFORE BREAKFAST GENERIC FOR NEXIUM 90 capsule 2 Taking   • guaiFENesin (MUCINEX) 600 MG 12 hr tablet Take 600 mg by mouth Every 12 (Twelve) Hours.   Taking   • ipratropium-albuterol (DUO-NEB) 0.5-2.5 mg/3 ml nebulizer USE 1 VIAL IN NEBULIZER FOUR TIMES DAILY GENERIC  vial 5 Taking   • midodrine (PROAMATINE) 10 MG tablet TAKE ONE TABLET TWICE DAILY BEFORE A MEAL 60 tablet 5 Taking   • Multiple Vitamins-Minerals (MULTIVITAMIN PO) Take 1 tablet by mouth daily.   Taking   • O2 (OXYGEN) Inhale 1 L/min Every Night. As needed at bedtime.    Taking   • ofloxacin (OCUFLOX) 0.3 % ophthalmic solution Administer 1 drop to both eyes Every 4 (Four) Hours.   Taking   • Omega-3 Fatty Acids (FISH OIL PO) Take 1,000 mg by mouth 2 (Two) Times a Day.   Taking   • polyethylene glycol (MIRALAX) packet Take 17 g by mouth Daily. 30 each 1 Taking   • PROAIR  (90 Base) MCG/ACT inhaler Inhale 2 puffs Every 4 (Four) Hours As Needed for Shortness of Air.  5 Taking   • tamsulosin (FLOMAX) 0.4 MG capsule 24 hr capsule Take 1 capsule by mouth Daily. 90 capsule 3 Taking   • torsemide (DEMADEX) 20 MG tablet Take 1 tablet by mouth Daily. 30 tablet 5 Taking   • XARELTO 15 MG tablet TAKE ONE TABLET DAILY 90 tablet 4 Taking       Medicines:  Current Facility-Administered Medications   Medication Dose Route Frequency Provider Last Rate Last Dose   • acetaminophen (TYLENOL) tablet 500 mg  500 mg Oral Q4H PRN Hemal William MD       • albuterol (PROVENTIL) nebulizer solution 0.5% 2.5 mg/0.5mL  1.25 mg Nebulization 4x Daily - RT Stewart  Hemal Guzman MD   1.25 mg at 02/07/20 0732    And   • ipratropium (ATROVENT) nebulizer solution 0.5 mg  0.5 mg Nebulization 4x Daily - RT Hemal William MD   0.5 mg at 02/07/20 0732   • budesonide-formoterol (SYMBICORT) 80-4.5 MCG/ACT inhaler 2 puff  2 puff Inhalation BID - RT Hemal William MD   2 puff at 02/07/20 0737   • finasteride (PROSCAR) tablet 5 mg  5 mg Oral Daily Hemal William MD   5 mg at 02/07/20 0942   • ketotifen (ZADITOR) 0.025 % ophthalmic solution 1 drop  1 drop Both Eyes BID Hemal William MD   1 drop at 02/07/20 0942   • midodrine (PROAMATINE) tablet 10 mg  10 mg Oral BID AC Hemal William MD   10 mg at 02/07/20 0942   • O2 (OXYGEN)  1 L/min Inhalation Nightly Heaml William MD   1 L/min at 02/06/20 2129   • ofloxacin (OCUFLOX) 0.3 % ophthalmic solution 1 drop  1 drop Both Eyes Q4H Hemal William MD   1 drop at 02/07/20 0947   • pantoprazole (PROTONIX) EC tablet 40 mg  40 mg Oral Q AM Hemal William MD   40 mg at 02/07/20 0534   • polyethylene glycol (MIRALAX) powder 17 g  17 g Oral Daily Hemal William MD       • rivaroxaban (XARELTO) tablet 15 mg  15 mg Oral Daily With Dinner Hemal William MD   15 mg at 02/06/20 1900   • sodium chloride 0.9 % flush 10 mL  10 mL Intravenous Q12H Hemal William MD   10 mL at 02/07/20 0942   • sodium chloride 0.9 % flush 10 mL  10 mL Intravenous PRN Hemal William MD       • tamsulosin (FLOMAX) 24 hr capsule 0.4 mg  0.4 mg Oral Daily Hemal William MD   0.4 mg at 02/07/20 0942   • torsemide (DEMADEX) tablet 20 mg  20 mg Oral Daily Hemal William MD   20 mg at 02/07/20 0942       Past Medical History:  Past Medical History:   Diagnosis Date   • Arthritis    • Asthma    • Atrial fibrillation (CMS/HCC)    • Cancer (CMS/HCC)     SKIN   • CHF (congestive heart failure) (CMS/HCC)    • Conductive hearing loss    • COPD (chronic obstructive pulmonary disease) (CMS/[facility])     • Eustachian tube dysfunction    • GERD (gastroesophageal reflux disease)    • Hyperlipidemia    • Hypertension    • Obstructive sleep apnea    • Prostate disease    • Sensorineural hearing loss    • Stroke (CMS/HCC)    • Vertigo        Past Surgical History:  Past Surgical History:   Procedure Laterality Date   • CATARACT EXTRACTION      left   • CATARACT EXTRACTION      left   • COLONOSCOPY  2012    normal   • ENDOSCOPY  2015    normal   • EYE SURGERY     • FRACTURE SURGERY     • HERNIA REPAIR     • HIP SURGERY     • SKIN BIOPSY         Family History  Family History   Problem Relation Age of Onset   • No Known Problems Mother    • No Known Problems Father        Social History  Social History     Socioeconomic History   • Marital status:      Spouse name: Not on file   • Number of children: 3   • Years of education: Not on file   • Highest education level: Not on file   Tobacco Use   • Smoking status: Former Smoker     Packs/day: 1.00     Years: 18.00     Pack years: 18.00     Types: Cigarettes     Last attempt to quit: 1967     Years since quittin.1   • Smokeless tobacco: Never Used   • Tobacco comment: already quit   Substance and Sexual Activity   • Alcohol use: No   • Drug use: No   • Sexual activity: Defer     Partners: Female         Review of Systems:  History obtained from chart review and the patient  General ROS: No fever or chills  Respiratory ROS: No cough, shortness of breath, wheezing  Cardiovascular ROS: No chest pain or palpitations  Gastrointestinal ROS: No abdominal pain or melena  Genito-Urinary ROS: No dysuria or hematuria  Neurological ROS: no headache or dizziness  14 point ROS reviewed with the patient and negative except as noted above and in the HPI unless unable to obtain.    Objective:  Patient Vitals for the past 24 hrs:   BP Temp Temp src Pulse Resp SpO2 Height Weight   20 0834 135/87 98.4 °F (36.9 °C) Oral 86 18 98 % -- --   20 0741 -- -- -- 71  "16 99 % -- --   02/07/20 0732 -- -- -- 79 16 97 % -- --   02/07/20 0409 -- -- -- 74 -- 100 % -- --   02/07/20 0403 -- -- -- 67 16 98 % -- --   02/07/20 0300 110/75 98.1 °F (36.7 °C) Oral 69 18 96 % -- --   02/07/20 0017 119/59 98.1 °F (36.7 °C) Oral 79 18 97 % -- --   02/06/20 1955 107/65 97.5 °F (36.4 °C) Oral 82 18 96 % -- 71.4 kg (157 lb 8 oz)   02/06/20 1613 118/78 98.1 °F (36.7 °C) Oral 90 16 98 % 177.8 cm (70\") 71.4 kg (157 lb 6 oz)       Intake/Output Summary (Last 24 hours) at 2/7/2020 0949  Last data filed at 2/7/2020 0200  Gross per 24 hour   Intake 240 ml   Output 200 ml   Net 40 ml     General: awake/alert    Neck: supple, no JVD  Chest:  clear to auscultation bilaterally without respiratory distress  CVS: regular rate and rhythm  Abdominal: soft, nontender, positive bowel sounds  Extremities: trace edema bilateral ankles  Skin: warm and dry without rash   Neuro: no focal motor deficits      Labs:  Results from last 7 days   Lab Units 02/07/20  0356 02/06/20  1749   WBC 10*3/mm3 5.39 5.71   HEMOGLOBIN g/dL 11.0* 11.8*   HEMATOCRIT % 32.0* 35.5*   PLATELETS 10*3/mm3 187 199         Results from last 7 days   Lab Units 02/07/20  0356 02/06/20  1749   SODIUM mmol/L 138 138   POTASSIUM mmol/L 4.1 3.9   CHLORIDE mmol/L 101 97*   CO2 mmol/L 26.0 28.0   BUN mg/dL 48* 50*   CREATININE mg/dL 2.31* 2.11*   CALCIUM mg/dL 8.8 9.2   BILIRUBIN mg/dL  --  0.4   ALK PHOS U/L  --  105   ALT (SGPT) U/L  --  10   AST (SGOT) U/L  --  15   GLUCOSE mg/dL 95 108*       Radiology:   Imaging Results (Last 72 Hours)     ** No results found for the last 72 hours. **          Culture:  No results found for: BLOODCX, URINECX, WOUNDCX, MRSACX, RESPCX, STOOLCX      Assessment   1.  Chronic kidney disease stage 4  2.  Chronic diastolic congestive heart failure  3.  Benign hypertension  4.  Paroxysmal atrial fibrillation  5.  Anemia of chronic kidney disease    Plan:  1.  Continue daily Torsemide  2.  Monitor labs  3.  Further " assessment and plan pending Dr Keith's evaluation      Thank you for the consult, we appreciate the opportunity to provide care to your patients.  Feel free to contact me if I can be of any further assistance.      Henry Medina, WILLIAM  2/7/2020  9:49 AM    Electronically signed by Henry Medina, WILLIAM at 02/07/20 0959       Physical Therapy Notes (most recent note)    No notes exist for this encounter.         Occupational Therapy Notes (most recent note)    No notes exist for this encounter.         Discharge Summary    No notes of this type exist for this encounter.

## 2020-02-07 NOTE — CONSULTS
Nephrology ([facility]) Consult Note      Patient:  James Birch  YOB: 1928  Date of Service: 2/7/2020  MRN: [id number]   Acct: [id number]   Primary Care Physician: Hemal William MD  Advance Directive:   Code Status and Medical Interventions:   Ordered at: 02/06/20 1602     Code Status:    CPR     Medical Interventions (Level of Support Prior to Arrest):    Full     Admit Date: 2/6/2020       Hospital Day: 0  Referring Provider: Hemal Willaim MD      Patient personally seen and examined.  Complete chart including Consults, Notes, Operative Reports, Labs, Cardiology, and Radiology studies reviewed as able.        Subjective:  James Birch is a 89 y.o. male  whom we were consulted for chronic kidney disease.  Baseline CKD stage 4; baseline creatinine 2.2. Follows with Dr Valente in our office; was seen in office less then two weeks ago; creatinine 2.4 at that time.  History of chronic diastolic congestive heart failure, atrial fibrillation, hypertension, COPD.  Patient has had recurring problems with edema and low BP. Admitted this time for lower extremity edema.  Currently patient is awake and alert. Denies pain or dyspnea. No recent change in urine output, no dysuria or hematuria. Denies NSAIDs. No N/V/D.  Trace lower extremity edema; patient states his swelling is better today than it has been for a while. Urine output nonoliguric    Allergies:  Patient has no known allergies.    Home Meds:  Medications Prior to Admission   Medication Sig Dispense Refill Last Dose   • acetaminophen (TYLENOL) 500 MG tablet Take 1 tablet by mouth Every 4 (Four) Hours As Needed for Mild Pain .   Taking   • azelastine (OPTIVAR) 0.05 % ophthalmic solution Administer 1 drop to both eyes 2 (Two) Times a Day.   Taking   • B Complex-C (SUPER B COMPLEX/VITAMIN C PO) Take 1 capsule by mouth Daily.   Taking   • budesonide-formoterol (SYMBICORT) 80-4.5 MCG/ACT inhaler USE 2 PUFFS  TWICE DAILY 30.6 g 3 Taking   • carbonyl iron (FEOSOL) 45 MG tablet tablet Take 1 tablet by mouth Daily.   Taking   • Cholecalciferol (VITAMIN D3 PO) Take 2,000 Int'l Units by mouth Daily.   Taking   • Coenzyme Q10 (CO Q-10 PO) Take 1 tablet by mouth Daily.   Taking   • dutasteride (AVODART) 0.5 MG capsule TAKE 1 CAPSULE DAILY GENERIC FOR AVODART 90 capsule 2 Taking   • esomeprazole (nexIUM) 40 MG capsule TAKE 1 CAPSULE DAILY BEFORE BREAKFAST GENERIC FOR NEXIUM 90 capsule 2 Taking   • guaiFENesin (MUCINEX) 600 MG 12 hr tablet Take 600 mg by mouth Every 12 (Twelve) Hours.   Taking   • ipratropium-albuterol (DUO-NEB) 0.5-2.5 mg/3 ml nebulizer USE 1 VIAL IN NEBULIZER FOUR TIMES DAILY GENERIC  vial 5 Taking   • midodrine (PROAMATINE) 10 MG tablet TAKE ONE TABLET TWICE DAILY BEFORE A MEAL 60 tablet 5 Taking   • Multiple Vitamins-Minerals (MULTIVITAMIN PO) Take 1 tablet by mouth daily.   Taking   • O2 (OXYGEN) Inhale 1 L/min Every Night. As needed at bedtime.    Taking   • ofloxacin (OCUFLOX) 0.3 % ophthalmic solution Administer 1 drop to both eyes Every 4 (Four) Hours.   Taking   • Omega-3 Fatty Acids (FISH OIL PO) Take 1,000 mg by mouth 2 (Two) Times a Day.   Taking   • polyethylene glycol (MIRALAX) packet Take 17 g by mouth Daily. 30 each 1 Taking   • PROAIR  (90 Base) MCG/ACT inhaler Inhale 2 puffs Every 4 (Four) Hours As Needed for Shortness of Air.  5 Taking   • tamsulosin (FLOMAX) 0.4 MG capsule 24 hr capsule Take 1 capsule by mouth Daily. 90 capsule 3 Taking   • torsemide (DEMADEX) 20 MG tablet Take 1 tablet by mouth Daily. 30 tablet 5 Taking   • XARELTO 15 MG tablet TAKE ONE TABLET DAILY 90 tablet 4 Taking       Medicines:  Current Facility-Administered Medications   Medication Dose Route Frequency Provider Last Rate Last Dose   • acetaminophen (TYLENOL) tablet 500 mg  500 mg Oral Q4H PRN Hemal William MD       • albuterol (PROVENTIL) nebulizer solution 0.5% 2.5 mg/0.5mL  1.25 mg  Nebulization 4x Daily - RT Hemal William MD   1.25 mg at 02/07/20 0732    And   • ipratropium (ATROVENT) nebulizer solution 0.5 mg  0.5 mg Nebulization 4x Daily - RT Hemal William MD   0.5 mg at 02/07/20 0732   • budesonide-formoterol (SYMBICORT) 80-4.5 MCG/ACT inhaler 2 puff  2 puff Inhalation BID - RT Hemal William MD   2 puff at 02/07/20 0737   • finasteride (PROSCAR) tablet 5 mg  5 mg Oral Daily Hemal William MD   5 mg at 02/07/20 0942   • ketotifen (ZADITOR) 0.025 % ophthalmic solution 1 drop  1 drop Both Eyes BID Hemal William MD   1 drop at 02/07/20 0942   • midodrine (PROAMATINE) tablet 10 mg  10 mg Oral BID AC Hemal William MD   10 mg at 02/07/20 0942   • O2 (OXYGEN)  1 L/min Inhalation Nightly Hemal William MD   1 L/min at 02/06/20 2129   • ofloxacin (OCUFLOX) 0.3 % ophthalmic solution 1 drop  1 drop Both Eyes Q4H Hemal William MD   1 drop at 02/07/20 0947   • pantoprazole (PROTONIX) EC tablet 40 mg  40 mg Oral Q AM Hemal William MD   40 mg at 02/07/20 0534   • polyethylene glycol (MIRALAX) powder 17 g  17 g Oral Daily Hemal William MD       • rivaroxaban (XARELTO) tablet 15 mg  15 mg Oral Daily With Dinner Hemal William MD   15 mg at 02/06/20 1900   • sodium chloride 0.9 % flush 10 mL  10 mL Intravenous Q12H Hemal William MD   10 mL at 02/07/20 0942   • sodium chloride 0.9 % flush 10 mL  10 mL Intravenous PRN Hemal William MD       • tamsulosin (FLOMAX) 24 hr capsule 0.4 mg  0.4 mg Oral Daily Hemal William MD   0.4 mg at 02/07/20 0942   • torsemide (DEMADEX) tablet 20 mg  20 mg Oral Daily Hemal William MD   20 mg at 02/07/20 0942       Past Medical History:  Past Medical History:   Diagnosis Date   • Arthritis    • Asthma    • Atrial fibrillation (CMS/HCC)    • Cancer (CMS/HCC)     SKIN   • CHF (congestive heart failure) (CMS/HCC)    • Conductive hearing loss    • COPD (chronic  obstructive pulmonary disease) (CMS/HCC)    • Eustachian tube dysfunction    • GERD (gastroesophageal reflux disease)    • Hyperlipidemia    • Hypertension    • Obstructive sleep apnea    • Prostate disease    • Sensorineural hearing loss    • Stroke (CMS/HCC)    • Vertigo        Past Surgical History:  Past Surgical History:   Procedure Laterality Date   • CATARACT EXTRACTION      left   • CATARACT EXTRACTION      left   • COLONOSCOPY  2012    normal   • ENDOSCOPY  2015    normal   • EYE SURGERY     • FRACTURE SURGERY     • HERNIA REPAIR     • HIP SURGERY     • SKIN BIOPSY         Family History  Family History   Problem Relation Age of Onset   • No Known Problems Mother    • No Known Problems Father        Social History  Social History     Socioeconomic History   • Marital status:      Spouse name: Not on file   • Number of children: 3   • Years of education: Not on file   • Highest education level: Not on file   Tobacco Use   • Smoking status: Former Smoker     Packs/day: 1.00     Years: 18.00     Pack years: 18.00     Types: Cigarettes     Last attempt to quit: 1967     Years since quittin.1   • Smokeless tobacco: Never Used   • Tobacco comment: already quit   Substance and Sexual Activity   • Alcohol use: No   • Drug use: No   • Sexual activity: Defer     Partners: Female         Review of Systems:  History obtained from chart review and the patient  General ROS: No fever or chills  Respiratory ROS: No cough, shortness of breath, wheezing  Cardiovascular ROS: No chest pain or palpitations  Gastrointestinal ROS: No abdominal pain or melena  Genito-Urinary ROS: No dysuria or hematuria  Neurological ROS: no headache or dizziness  14 point ROS reviewed with the patient and negative except as noted above and in the HPI unless unable to obtain.    Objective:  Patient Vitals for the past 24 hrs:   BP Temp Temp src Pulse Resp SpO2 Height Weight   20 0834 135/87 98.4 °F (36.9 °C) Oral 86  "18 98 % -- --   02/07/20 0741 -- -- -- 71 16 99 % -- --   02/07/20 0732 -- -- -- 79 16 97 % -- --   02/07/20 0409 -- -- -- 74 -- 100 % -- --   02/07/20 0403 -- -- -- 67 16 98 % -- --   02/07/20 0300 110/75 98.1 °F (36.7 °C) Oral 69 18 96 % -- --   02/07/20 0017 119/59 98.1 °F (36.7 °C) Oral 79 18 97 % -- --   02/06/20 1955 107/65 97.5 °F (36.4 °C) Oral 82 18 96 % -- 71.4 kg (157 lb 8 oz)   02/06/20 1613 118/78 98.1 °F (36.7 °C) Oral 90 16 98 % 177.8 cm (70\") 71.4 kg (157 lb 6 oz)       Intake/Output Summary (Last 24 hours) at 2/7/2020 0949  Last data filed at 2/7/2020 0200  Gross per 24 hour   Intake 240 ml   Output 200 ml   Net 40 ml     General: awake/alert    Neck: supple, no JVD  Chest:  clear to auscultation bilaterally without respiratory distress  CVS: regular rate and rhythm  Abdominal: soft, nontender, positive bowel sounds  Extremities: trace edema bilateral ankles  Skin: warm and dry without rash   Neuro: no focal motor deficits      Labs:  Results from last 7 days   Lab Units 02/07/20  0356 02/06/20  1749   WBC 10*3/mm3 5.39 5.71   HEMOGLOBIN g/dL 11.0* 11.8*   HEMATOCRIT % 32.0* 35.5*   PLATELETS 10*3/mm3 187 199         Results from last 7 days   Lab Units 02/07/20  0356 02/06/20  1749   SODIUM mmol/L 138 138   POTASSIUM mmol/L 4.1 3.9   CHLORIDE mmol/L 101 97*   CO2 mmol/L 26.0 28.0   BUN mg/dL 48* 50*   CREATININE mg/dL 2.31* 2.11*   CALCIUM mg/dL 8.8 9.2   BILIRUBIN mg/dL  --  0.4   ALK PHOS U/L  --  105   ALT (SGPT) U/L  --  10   AST (SGOT) U/L  --  15   GLUCOSE mg/dL 95 108*       Radiology:   Imaging Results (Last 72 Hours)     ** No results found for the last 72 hours. **          Culture:  No results found for: BLOODCX, URINECX, WOUNDCX, MRSACX, RESPCX, STOOLCX      Assessment   1.  Chronic kidney disease stage 4  2.  Chronic diastolic congestive heart failure  3.  Benign hypertension  4.  Paroxysmal atrial fibrillation  5.  Anemia of chronic kidney disease    Plan:  1.  Continue daily " Torsemide  2.  Monitor labs  3.  Further assessment and plan pending Dr Keith's evaluation      Thank you for the consult, we appreciate the opportunity to provide care to your patients.  Feel free to contact me if I can be of any further assistance.      Henry Medina, APRN  2/7/2020  9:49 AM

## 2020-02-08 LAB
ANION GAP SERPL CALCULATED.3IONS-SCNC: 15 MMOL/L (ref 5–15)
BASOPHILS # BLD AUTO: 0.04 10*3/MM3 (ref 0–0.2)
BASOPHILS NFR BLD AUTO: 0.7 % (ref 0–1.5)
BUN BLD-MCNC: 44 MG/DL (ref 8–23)
BUN/CREAT SERPL: 20.3 (ref 7–25)
CALCIUM SPEC-SCNC: 9.2 MG/DL (ref 8.2–9.6)
CHLORIDE SERPL-SCNC: 99 MMOL/L (ref 98–107)
CO2 SERPL-SCNC: 23 MMOL/L (ref 22–29)
CREAT BLD-MCNC: 2.17 MG/DL (ref 0.76–1.27)
DEPRECATED RDW RBC AUTO: 42 FL (ref 37–54)
EOSINOPHIL # BLD AUTO: 0.27 10*3/MM3 (ref 0–0.4)
EOSINOPHIL NFR BLD AUTO: 4.5 % (ref 0.3–6.2)
ERYTHROCYTE [DISTWIDTH] IN BLOOD BY AUTOMATED COUNT: 13.2 % (ref 12.3–15.4)
GFR SERPL CREATININE-BSD FRML MDRD: 29 ML/MIN/1.73
GLUCOSE BLD-MCNC: 97 MG/DL (ref 65–99)
HCT VFR BLD AUTO: 39.1 % (ref 37.5–51)
HGB BLD-MCNC: 13.1 G/DL (ref 13–17.7)
IMM GRANULOCYTES # BLD AUTO: 0.01 10*3/MM3 (ref 0–0.05)
IMM GRANULOCYTES NFR BLD AUTO: 0.2 % (ref 0–0.5)
LYMPHOCYTES # BLD AUTO: 0.99 10*3/MM3 (ref 0.7–3.1)
LYMPHOCYTES NFR BLD AUTO: 16.5 % (ref 19.6–45.3)
MCH RBC QN AUTO: 29.2 PG (ref 26.6–33)
MCHC RBC AUTO-ENTMCNC: 33.5 G/DL (ref 31.5–35.7)
MCV RBC AUTO: 87.1 FL (ref 79–97)
MONOCYTES # BLD AUTO: 0.77 10*3/MM3 (ref 0.1–0.9)
MONOCYTES NFR BLD AUTO: 12.9 % (ref 5–12)
NEUTROPHILS # BLD AUTO: 3.91 10*3/MM3 (ref 1.7–7)
NEUTROPHILS NFR BLD AUTO: 65.2 % (ref 42.7–76)
NRBC BLD AUTO-RTO: 0 /100 WBC (ref 0–0.2)
PLATELET # BLD AUTO: 188 10*3/MM3 (ref 140–450)
PMV BLD AUTO: 9.3 FL (ref 6–12)
POTASSIUM BLD-SCNC: 4.3 MMOL/L (ref 3.5–5.2)
PTH-INTACT SERPL-MCNC: 62.6 PG/ML (ref 15–65)
RBC # BLD AUTO: 4.49 10*6/MM3 (ref 4.14–5.8)
SODIUM BLD-SCNC: 137 MMOL/L (ref 136–145)
URATE SERPL-MCNC: 7 MG/DL (ref 3.4–7)
WBC NRBC COR # BLD: 5.99 10*3/MM3 (ref 3.4–10.8)

## 2020-02-08 PROCEDURE — 80048 BASIC METABOLIC PNL TOTAL CA: CPT | Performed by: FAMILY MEDICINE

## 2020-02-08 PROCEDURE — G0378 HOSPITAL OBSERVATION PER HR: HCPCS

## 2020-02-08 PROCEDURE — 84550 ASSAY OF BLOOD/URIC ACID: CPT | Performed by: INTERNAL MEDICINE

## 2020-02-08 PROCEDURE — 99225 PR SBSQ OBSERVATION CARE/DAY 25 MINUTES: CPT | Performed by: FAMILY MEDICINE

## 2020-02-08 PROCEDURE — 85025 COMPLETE CBC W/AUTO DIFF WBC: CPT | Performed by: FAMILY MEDICINE

## 2020-02-08 PROCEDURE — 94799 UNLISTED PULMONARY SVC/PX: CPT

## 2020-02-08 RX ADMIN — POLYETHYLENE GLYCOL 3350 17 G: 17 POWDER, FOR SOLUTION ORAL at 08:55

## 2020-02-08 RX ADMIN — MIDODRINE HYDROCHLORIDE 10 MG: 10 TABLET ORAL at 08:55

## 2020-02-08 RX ADMIN — MIDODRINE HYDROCHLORIDE 10 MG: 10 TABLET ORAL at 17:14

## 2020-02-08 RX ADMIN — IPRATROPIUM BROMIDE 0.5 MG: 0.5 SOLUTION RESPIRATORY (INHALATION) at 14:15

## 2020-02-08 RX ADMIN — TAMSULOSIN HYDROCHLORIDE 0.4 MG: 0.4 CAPSULE ORAL at 09:01

## 2020-02-08 RX ADMIN — RIVAROXABAN 15 MG: 15 TABLET, FILM COATED ORAL at 17:14

## 2020-02-08 RX ADMIN — TORSEMIDE 20 MG: 20 TABLET ORAL at 08:55

## 2020-02-08 RX ADMIN — SODIUM CHLORIDE, PRESERVATIVE FREE 10 ML: 5 INJECTION INTRAVENOUS at 21:30

## 2020-02-08 RX ADMIN — KETOTIFEN FUMARATE 1 DROP: 0.35 SOLUTION/ DROPS OPHTHALMIC at 20:37

## 2020-02-08 RX ADMIN — ALBUTEROL SULFATE 1.25 MG: 2.5 SOLUTION RESPIRATORY (INHALATION) at 07:03

## 2020-02-08 RX ADMIN — IPRATROPIUM BROMIDE 0.5 MG: 0.5 SOLUTION RESPIRATORY (INHALATION) at 21:30

## 2020-02-08 RX ADMIN — ALBUTEROL SULFATE 1.25 MG: 2.5 SOLUTION RESPIRATORY (INHALATION) at 11:34

## 2020-02-08 RX ADMIN — OFLOXACIN 1 DROP: 3 SOLUTION/ DROPS OPHTHALMIC at 06:09

## 2020-02-08 RX ADMIN — NYSTATIN AND TRIAMCINOLONE ACETONIDE: 100000; 1 CREAM TOPICAL at 20:37

## 2020-02-08 RX ADMIN — IPRATROPIUM BROMIDE 0.5 MG: 0.5 SOLUTION RESPIRATORY (INHALATION) at 11:34

## 2020-02-08 RX ADMIN — ALBUTEROL SULFATE 1.25 MG: 2.5 SOLUTION RESPIRATORY (INHALATION) at 14:16

## 2020-02-08 RX ADMIN — FINASTERIDE 5 MG: 5 TABLET, FILM COATED ORAL at 08:55

## 2020-02-08 RX ADMIN — BUDESONIDE AND FORMOTEROL FUMARATE DIHYDRATE 2 PUFF: 80; 4.5 AEROSOL RESPIRATORY (INHALATION) at 07:03

## 2020-02-08 RX ADMIN — PANTOPRAZOLE SODIUM 40 MG: 40 TABLET, DELAYED RELEASE ORAL at 06:09

## 2020-02-08 RX ADMIN — ALBUTEROL SULFATE 1.25 MG: 2.5 SOLUTION RESPIRATORY (INHALATION) at 21:30

## 2020-02-08 RX ADMIN — KETOTIFEN FUMARATE 1 DROP: 0.35 SOLUTION/ DROPS OPHTHALMIC at 08:55

## 2020-02-08 RX ADMIN — IPRATROPIUM BROMIDE 0.5 MG: 0.5 SOLUTION RESPIRATORY (INHALATION) at 07:03

## 2020-02-08 RX ADMIN — NYSTATIN AND TRIAMCINOLONE ACETONIDE: 100000; 1 CREAM TOPICAL at 08:58

## 2020-02-08 RX ADMIN — BUDESONIDE AND FORMOTEROL FUMARATE DIHYDRATE 2 PUFF: 80; 4.5 AEROSOL RESPIRATORY (INHALATION) at 21:30

## 2020-02-08 RX ADMIN — OFLOXACIN 1 DROP: 3 SOLUTION/ DROPS OPHTHALMIC at 22:05

## 2020-02-08 NOTE — PLAN OF CARE
Problem: Patient Care Overview  Goal: Plan of Care Review  Outcome: Ongoing (interventions implemented as appropriate)  Flow sheets (Taken 2/8/2020 0805)  Progress: improving  Plan of Care Reviewed With: patient  Outcome Summary: No C/O of pain this shift. Bilateral lower legs cleaned and redressed this shift. Edema in legs improving. Safety maintained.

## 2020-02-08 NOTE — PROGRESS NOTES
"PROGRESS NOTE.      Patient:  James Birch  YOB: 1928  Date of Service: 2/8/2020  MRN: [id number]   Acct: [id number]   Primary Care Physician: Hemal William MD  Advance Directive:   Code Status and Medical Interventions:   Ordered at: 02/06/20 1602     Code Status:    CPR     Medical Interventions (Level of Support Prior to Arrest):    Full     Admit Date: 2/6/2020       Hospital Day: 0  Referring Provider: Hemal William MD      Patient Seen, Chart, Consults, Notes, Labs, Radiology studies reviewed.        Subjective:  James Birch is a 89 y.o. male  whom we were consulted for chronic kidney disease.  Baseline CKD stage 4; baseline creatinine 2.2. Follows with Dr Valente in our office; was seen in office less then two weeks ago; creatinine 2.4 at that time.  History of chronic diastolic congestive heart failure, atrial fibrillation, hypertension, COPD.  Patient has had recurring problems with edema and low BP. Admitted this time for lower extremity edema.  Currently patient is awake and alert. Denies pain or dyspnea. No recent change in urine output, no dysuria or hematuria. Denies NSAIDs. No N/V/D.  Trace lower extremity edema; patient states his swelling is better today than it has been for a while. Urine output nonoliguric.Today, he offered no new complaints.       Review of Systems:  History obtained from chart review and the patient  General ROS: No fever or chills  Respiratory ROS: No cough, shortness of breath, wheezing  Cardiovascular ROS: no chest pain or dyspnea on exertion  Gastrointestinal ROS: No abdominal pain or melena  Genito-Urinary ROS: No dysuria or hematuria  Musculoskeletal: negative  Skin: negative    Objective:  /58 (BP Location: Left arm, Patient Position: Lying)   Pulse 69   Temp 98 °F (36.7 °C) (Oral)   Resp 16   Ht 177.8 cm (70\")   Wt 71.4 kg (157 lb 8 oz)   SpO2 97%   BMI 22.60 kg/m²     Intake/Output Summary (Last 24 hours) at " 2/8/2020 1244  Last data filed at 2/8/2020 0347  Gross per 24 hour   Intake 600 ml   Output 1250 ml   Net -650 ml       Physical examination:  General: awake/alert   Chest:  clear to auscultation bilaterally without respiratory distress  CVS: regular rate and rhythm  Abdominal: soft, nontender, normal bowel sounds  Extremities: no cyanosis or edema  Skin: warm and dry without rash  Neuro: No focal motor deficits    Labs:  Lab Results (last 24 hours)     Procedure Component Value Units Date/Time    CBC & Differential [[id number]] Collected:  02/08/20 0601    Specimen:  Blood Updated:  02/08/20 0656    Narrative:       The following orders were created for panel order CBC & Differential.  Procedure                               Abnormality         Status                     ---------                               -----------         ------                     CBC Auto Differential[[id number]]        Abnormal            Final result                 Please view results for these tests on the individual orders.    CBC Auto Differential [[id number]]  (Abnormal) Collected:  02/08/20 0601    Specimen:  Blood Updated:  02/08/20 0656     WBC 5.99 10*3/mm3      RBC 4.49 10*6/mm3      Hemoglobin 13.1 g/dL      Hematocrit 39.1 %      MCV 87.1 fL      MCH 29.2 pg      MCHC 33.5 g/dL      RDW 13.2 %      RDW-SD 42.0 fl      MPV 9.3 fL      Platelets 188 10*3/mm3      Neutrophil % 65.2 %      Lymphocyte % 16.5 %      Monocyte % 12.9 %      Eosinophil % 4.5 %      Basophil % 0.7 %      Immature Grans % 0.2 %      Neutrophils, Absolute 3.91 10*3/mm3      Lymphocytes, Absolute 0.99 10*3/mm3      Monocytes, Absolute 0.77 10*3/mm3      Eosinophils, Absolute 0.27 10*3/mm3      Basophils, Absolute 0.04 10*3/mm3      Immature Grans, Absolute 0.01 10*3/mm3      nRBC 0.0 /100 WBC     Basic Metabolic Panel [[id number]]  (Abnormal) Collected:  02/08/20 0601    Specimen:  Blood Updated:  02/08/20 0641     Glucose 97 mg/dL      BUN 44 mg/dL       Creatinine 2.17 mg/dL      Sodium 137 mmol/L      Potassium 4.3 mmol/L      Chloride 99 mmol/L      CO2 23.0 mmol/L      Calcium 9.2 mg/dL      eGFR Non African Amer 29 mL/min/1.73      BUN/Creatinine Ratio 20.3     Anion Gap 15.0 mmol/L     Narrative:       GFR Normal >60  Chronic Kidney Disease <60  Kidney Failure <15      Uric Acid [[id number]]  (Normal) Collected:  02/08/20 0601    Specimen:  Blood Updated:  02/08/20 0641     Uric Acid 7.0 mg/dL     PTH, Intact [[id number]]  (Normal) Collected:  02/07/20 2317    Specimen:  Blood Updated:  02/08/20 0002     PTH, Intact 62.6 pg/mL     Narrative:       Results may be falsely decreased if patient taking Biotin.            Radiology:   Imaging Results (Last 24 Hours)     ** No results found for the last 24 hours. **              Assessment     1.  Chronic kidney disease stage 4  2.  Chronic diastolic congestive heart failure  3.  Arterial hypotension  4.  Paroxysmal atrial fibrillation  5.  Anemia of chronic kidney disease    Plan:  Continue oral diuretics for now and follow up urine output and renal function. Continue midodrine.       Jeevan Keith MD  2/8/2020  12:44 PM

## 2020-02-08 NOTE — PROGRESS NOTES
LOS: 0 days   Patient Care Team:  Hemal William MD as PCP - General  Hemal William MD as PCP - Family Medicine  Skyler Trimble MD as Cardiologist (Cardiology)  Bill Church MD as Consulting Physician (Pulmonary Disease)  Kiko Campos PA as Physician Assistant (Neurology)  Naun Valente MD as Consulting Physician (Nephrology)  [facility] (DME Services)    Chief Complaint:  Leg swelling    Subjective      HISTORY OF PRESENT ILLNESS: Has chronic on/off leg swelling. Has CHF, CKD4, COPD and between diuretics up/down has up/down status of SOB, leg edema.  Has had problem understanding to elevate legs or wear compressive stockings.  Has history of CVA; has memory deficits.  Wife has been primary care giver; she now is developing significant memory loss.  Son plans to move mom/dad from assisted living to NH but not ready/not prepared to do right now.   Patient presented to office today with red, swollen, scaley LE 1/2 leg/down and 2-3 small scabs on L lateral leg; that leg was alittle sore.  We were convienced wife/he could not provide adequate care so we suggest admit/observation.  Interval/while here; 12 hr of leg elevated has resolved 1/2 of edema.  WBC is not high; no fever has been noticed.     Interval History:  Leg edema now gone; no significant ulcers/lesions or reddness.  Slept ok.  Eating/without diarrhea.  Drinking and voiding.  There are no notes in the chart for discharge planning.  I called the son and he was waiting on  to talk to him about options.  I reminded him he has the option of pain force sitters and helpers versus moving both mom and dad into a nursing home.  He was leaning towards the nursing home admission; I explained to my knowledge his father is still in observation and would not have skilled care coverage at discharge.  Also pointed out that I see no reasonable way to suspect of his mom and dad move into a nursing home  that they would improve to the point to be able to move out.    .  Current Facility-Administered Medications:   •  acetaminophen (TYLENOL) tablet 500 mg, 500 mg, Oral, Q4H PRN, Hemal William MD  •  albuterol (PROVENTIL) nebulizer solution 0.5% 2.5 mg/0.5mL, 1.25 mg, Nebulization, 4x Daily - RT, 1.25 mg at 02/08/20 1416 **AND** ipratropium (ATROVENT) nebulizer solution 0.5 mg, 0.5 mg, Nebulization, 4x Daily - RT, Hemal William MD, 0.5 mg at 02/08/20 1415  •  budesonide-formoterol (SYMBICORT) 80-4.5 MCG/ACT inhaler 2 puff, 2 puff, Inhalation, BID - RT, Hemal William MD, 2 puff at 02/08/20 0703  •  finasteride (PROSCAR) tablet 5 mg, 5 mg, Oral, Daily, Hemal William MD, 5 mg at 02/08/20 0855  •  ketotifen (ZADITOR) 0.025 % ophthalmic solution 1 drop, 1 drop, Both Eyes, BID, Hemal William MD, 1 drop at 02/08/20 0855  •  midodrine (PROAMATINE) tablet 10 mg, 10 mg, Oral, BID AC, Hemal William MD, 10 mg at 02/08/20 0855  •  nystatin-triamcinolone (MYCOLOG II) 283277-6.1 UNIT/GM-% cream, , Topical, Q12H, Hemal William MD  •  O2 (OXYGEN), 1 L/min, Inhalation, Nightly, Hemal William MD, 1 L/min at 02/07/20 2033  •  ofloxacin (OCUFLOX) 0.3 % ophthalmic solution 1 drop, 1 drop, Both Eyes, Q4H, Hemal William MD, 1 drop at 02/08/20 0609  •  pantoprazole (PROTONIX) EC tablet 40 mg, 40 mg, Oral, Q AM, Hemal William MD, 40 mg at 02/08/20 0609  •  polyethylene glycol (MIRALAX) powder 17 g, 17 g, Oral, Daily, Hemal William MD, 17 g at 02/08/20 0855  •  rivaroxaban (XARELTO) tablet 15 mg, 15 mg, Oral, Daily With Dinner, Hemal William MD, 15 mg at 02/07/20 1821  •  sodium chloride 0.9 % flush 10 mL, 10 mL, Intravenous, Q12H, Hemal William MD, 10 mL at 02/07/20 2033  •  sodium chloride 0.9 % flush 10 mL, 10 mL, Intravenous, PRN, Hemal William MD  •  tamsulosin (FLOMAX) 24 hr capsule 0.4 mg, 0.4 mg, Oral, Daily, Hemal William  "MD Thomas, 0.4 mg at 02/08/20 0901  •  torsemide (DEMADEX) tablet 20 mg, 20 mg, Oral, Daily, Hemal William MD, 20 mg at 02/08/20 0855    Review of Systems:   GENERAL:  Inactive/slower with limits, speed, stamina for age and fatigue; using rolling walker at admit. Sleep is ok. No fever.  ENDO:  No syncope, near or diaphoretic sweaty spells.  BS Ok here.  HEENT: No head injury occ/same headache.  Minor decreased/ vision change. Same aide treated hearing loss.  Ears without pain/drainage.  No sore throat.  No significant nasal/sinus congestion/drainage. No epistaxis.  CHEST: No chest wall tenderness or mass. No change occ cough, wheeze, SOB; no hemoptysis.  CV: No chest pain, palpitations; lately always LE/ ankle edema.  Hx atrial fib.  GI: No heartburn, dysphagia.  No abdominal pain, diarrhea,  rectal bleeding, or melena; tends towards constipation.  No recent nausea and vomiting even before Norco.   :  Voids without dysuria, or incontinence to completion.  ORTHO: No painful/swollen joints but various on /off sore.  Usual mild sore neck or back; not much increased back.  No acute neck but acute back pain with above recent injury.   NEURO: Often dizziness without weakness of extremities.  No change LE numbness/paresthesias.   PSYCH: Mild/stable memory loss.  Mood good; occ mildly anxious, depressed but/and not suicidal.  Tolerated stress.     Objective     Vital Signs  /58 (BP Location: Left arm, Patient Position: Lying)   Pulse 74   Temp 98 °F (36.7 °C) (Oral)   Resp 16   Ht 177.8 cm (70\")   Wt 71.4 kg (157 lb 8 oz)   SpO2 97%   BMI 22.60 kg/m²   Temp:  [96 °F (35.6 °C)-98 °F (36.7 °C)] 98 °F (36.7 °C)  Heart Rate:  [66-86] 74  Resp:  [16-18] 16  BP: (101-106)/(57-75) 101/58      Intake/Output Summary (Last 24 hours) at 2/8/2020 1536  Last data filed at 2/8/2020 0347  Gross per 24 hour   Intake 360 ml   Output 850 ml   Net -490 ml       Lab Results:  Lab Results (last 24 hours)     Procedure " Component Value Units Date/Time    CBC & Differential [[id number]] Collected:  02/08/20 0601    Specimen:  Blood Updated:  02/08/20 0656    Narrative:       The following orders were created for panel order CBC & Differential.  Procedure                               Abnormality         Status                     ---------                               -----------         ------                     CBC Auto Differential[[id number]]        Abnormal            Final result                 Please view results for these tests on the individual orders.    CBC Auto Differential [[id number]]  (Abnormal) Collected:  02/08/20 0601    Specimen:  Blood Updated:  02/08/20 0656     WBC 5.99 10*3/mm3      RBC 4.49 10*6/mm3      Hemoglobin 13.1 g/dL      Hematocrit 39.1 %      MCV 87.1 fL      MCH 29.2 pg      MCHC 33.5 g/dL      RDW 13.2 %      RDW-SD 42.0 fl      MPV 9.3 fL      Platelets 188 10*3/mm3      Neutrophil % 65.2 %      Lymphocyte % 16.5 %      Monocyte % 12.9 %      Eosinophil % 4.5 %      Basophil % 0.7 %      Immature Grans % 0.2 %      Neutrophils, Absolute 3.91 10*3/mm3      Lymphocytes, Absolute 0.99 10*3/mm3      Monocytes, Absolute 0.77 10*3/mm3      Eosinophils, Absolute 0.27 10*3/mm3      Basophils, Absolute 0.04 10*3/mm3      Immature Grans, Absolute 0.01 10*3/mm3      nRBC 0.0 /100 WBC     Basic Metabolic Panel [[id number]]  (Abnormal) Collected:  02/08/20 0601    Specimen:  Blood Updated:  02/08/20 0641     Glucose 97 mg/dL      BUN 44 mg/dL      Creatinine 2.17 mg/dL      Sodium 137 mmol/L      Potassium 4.3 mmol/L      Chloride 99 mmol/L      CO2 23.0 mmol/L      Calcium 9.2 mg/dL      eGFR Non African Amer 29 mL/min/1.73      BUN/Creatinine Ratio 20.3     Anion Gap 15.0 mmol/L     Narrative:       GFR Normal >60  Chronic Kidney Disease <60  Kidney Failure <15      Uric Acid [[id number]]  (Normal) Collected:  02/08/20 0601    Specimen:  Blood Updated:  02/08/20 0641     Uric Acid 7.0 mg/dL     PTH, Intact  [[id number]]  (Normal) Collected:  02/07/20 2317    Specimen:  Blood Updated:  02/08/20 0002     PTH, Intact 62.6 pg/mL     Narrative:       Results may be falsely decreased if patient taking Biotin.            CBC:   Results from last 7 days   Lab Units 02/08/20  0601 02/07/20  0356 02/06/20  1749   WBC 10*3/mm3 5.99 5.39 5.71   HEMOGLOBIN g/dL 13.1 11.0* 11.8*   HEMATOCRIT % 39.1 32.0* 35.5*   PLATELETS 10*3/mm3 188 187 199     BMP:   Results from last 7 days   Lab Units 02/08/20  0601 02/07/20  0356 02/06/20  1749   SODIUM mmol/L 137 138 138   POTASSIUM mmol/L 4.3 4.1 3.9   CHLORIDE mmol/L 99 101 97*   CO2 mmol/L 23.0 26.0 28.0   BUN mg/dL 44* 48* 50*   CREATININE mg/dL 2.17* 2.31* 2.11*   EGFR IF NONAFRICN AM mL/min/1.73 29* 27* 30*   GLUCOSE mg/dL 97 95 108*   CALCIUM mg/dL 9.2 8.8 9.2   ALT (SGPT) U/L  --   --  10     INR:       Culture Results: No results found for: BLOODCX, URINECX, WOUNDCX, MRSACX, RESPCX, STOOLCX    Radiology: None    Additional Studies Reviewed: None    Physical Exam:  GENERAL:  Well nourished/developed in no acute distress.  SKIN: Turgor excellent, without wound, rash, lesion.  Thickening/brittleness of all the nails of foot.  Previous edema of the leg is gone.  Is much less scaliness.  There is no induration no soreness.  There is no open sores.  HEENT: Normal cephalic without trauma.  Pupils equal round reactive to light. Extraocular motions full without nystagmus.     External canals nonobstructive nontender without reddness.  Oral cavity without growths, exudates, and moist.  Posterior pharynx without mass, obstruction, redness.  No thyromegaly, mass, tenderness, lymphadenopathy and supple.  CV: Regular rhythm.  No gallop but 1/6 systolic, trace/4 pitting edema. Posterior pulses intact.  No carotid bruits.   CHEST: No chest wall tenderness or mass.   LUNGS: Symmetric motion with clear to auscultation.   ABD: Soft, nontender without mass.   ORTHO: Symmetric extremities without  swelling/point tenderness.  Full gross range of motion.    NEURO: CN 2-12 grossly intact.  Symmetric facies. 1/4 x bicep equal reflexes.  UE/LE   3/5 strength throughout.  Nonfocal use extremities. Speech clear.    PSYCH: Oriented x 3.  Pleasant calm, well kept.  Purposeful/directed conservation with intact short/long gross memory.    Results Review:    above    ASSESSMENT/PLAN:  Reason for admit/problems addressing acutely:  Increased LE edema-acute/chronic; improved with same diuretic and elevation/compression  LE pain-resolved; related to edema  LE rash-likely now more stasis dermatitis than cellulitis-proving stasis and resolving  Inability to complete needed care at home  Intolerance Rx-difficulty tolerating diuretics    Chronic problems to review/consider during care:  89 y/o white male; advanced age   Allergy/intolerance: see above  Procedural history: see above  Family history: see above  History tobacco-stopped  COPD  Asthma  Shortness of breath-chronic (copd, CHF, anemia)  Hyperlipidemia-reserved to statin  Hypertension  IgA nephropathy  CKD3  Obstructive sleep apnea-intolerant tx  Nocturnal hypoxemia-oxygen treated  hypersomnia  Cardiac arrhythmia-pa fib-flutter  Congestive heart failure-diastolic-chronic  Valvular heart  Edema (copd, CKD, CHF, pAfib)  Anticoagulated Hx CVA, a fib/(past coumadin), xarelto  Anemia-chronic (CKD, xarelto  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  Cerebral small vessel disease  Late effects CVA  Memory loss  Autonomic dysfunction-neuro opinion  Recurrent dizziness  History seizure disorder  Peripheral neuropathy  Gait difficulties  Fall risk-history falls  Prostatism  BPH  History urolithiasis  Varicose veins  GE reflux (heartburn)  History cholinergic urticaria  Hearing loss-conductive  Degenerative joint disease     Rx-reviewed, considered with changes as needed: no changes  Labs reviewed, considered and changes made as needed: no changes  Imaging reviewed, and need for  considered: no changes  Consults needed: active nephrology  Diet reviewed, considered and changes made as needed: no changes  Fluids reviewed, considered and changes made as needed: no changes/oral encouraged  Increase activity: up in chair but legs elevated  Code status: full; could change  Discussed possible/future discharge plans: I called the son and told him his dad is dischargeable certainly by tomorrow.  I told him he needs his legs cleaned and ointment applied and wrapped; he thinks they can accomplish that for a few days on their own.  Beyond the issue of his mother and this patient been able to care for themselves he is looking at nursing home placement; he has to work with his mom and dad and get them to agree to this.  He has been given the option of hiring individuals to help in the assisted living facility.  Case discussed with nursing and son/patient  Available to talk if needed with POA/caregiver and members care team.    Hemal William MD  02/08/20  3:34 PM

## 2020-02-09 VITALS
DIASTOLIC BLOOD PRESSURE: 69 MMHG | OXYGEN SATURATION: 99 % | WEIGHT: 157.5 LBS | BODY MASS INDEX: 22.55 KG/M2 | HEIGHT: 70 IN | SYSTOLIC BLOOD PRESSURE: 110 MMHG | RESPIRATION RATE: 18 BRPM | HEART RATE: 61 BPM | TEMPERATURE: 98.3 F

## 2020-02-09 LAB
ANION GAP SERPL CALCULATED.3IONS-SCNC: 11 MMOL/L (ref 5–15)
BASOPHILS # BLD AUTO: 0.03 10*3/MM3 (ref 0–0.2)
BASOPHILS NFR BLD AUTO: 0.6 % (ref 0–1.5)
BUN BLD-MCNC: 45 MG/DL (ref 8–23)
BUN/CREAT SERPL: 19.9 (ref 7–25)
CALCIUM SPEC-SCNC: 8.4 MG/DL (ref 8.2–9.6)
CHLORIDE SERPL-SCNC: 100 MMOL/L (ref 98–107)
CO2 SERPL-SCNC: 26 MMOL/L (ref 22–29)
CREAT BLD-MCNC: 2.26 MG/DL (ref 0.76–1.27)
DEPRECATED RDW RBC AUTO: 41 FL (ref 37–54)
EOSINOPHIL # BLD AUTO: 0.28 10*3/MM3 (ref 0–0.4)
EOSINOPHIL NFR BLD AUTO: 5.1 % (ref 0.3–6.2)
ERYTHROCYTE [DISTWIDTH] IN BLOOD BY AUTOMATED COUNT: 13 % (ref 12.3–15.4)
GFR SERPL CREATININE-BSD FRML MDRD: 27 ML/MIN/1.73
GLUCOSE BLD-MCNC: 101 MG/DL (ref 65–99)
HCT VFR BLD AUTO: 31.3 % (ref 37.5–51)
HGB BLD-MCNC: 10.6 G/DL (ref 13–17.7)
IMM GRANULOCYTES # BLD AUTO: 0.01 10*3/MM3 (ref 0–0.05)
IMM GRANULOCYTES NFR BLD AUTO: 0.2 % (ref 0–0.5)
LYMPHOCYTES # BLD AUTO: 0.79 10*3/MM3 (ref 0.7–3.1)
LYMPHOCYTES NFR BLD AUTO: 14.5 % (ref 19.6–45.3)
MCH RBC QN AUTO: 29.3 PG (ref 26.6–33)
MCHC RBC AUTO-ENTMCNC: 33.9 G/DL (ref 31.5–35.7)
MCV RBC AUTO: 86.5 FL (ref 79–97)
MONOCYTES # BLD AUTO: 0.72 10*3/MM3 (ref 0.1–0.9)
MONOCYTES NFR BLD AUTO: 13.2 % (ref 5–12)
NEUTROPHILS # BLD AUTO: 3.62 10*3/MM3 (ref 1.7–7)
NEUTROPHILS NFR BLD AUTO: 66.4 % (ref 42.7–76)
NRBC BLD AUTO-RTO: 0 /100 WBC (ref 0–0.2)
PLATELET # BLD AUTO: 178 10*3/MM3 (ref 140–450)
PMV BLD AUTO: 9.3 FL (ref 6–12)
POTASSIUM BLD-SCNC: 4 MMOL/L (ref 3.5–5.2)
RBC # BLD AUTO: 3.62 10*6/MM3 (ref 4.14–5.8)
SODIUM BLD-SCNC: 137 MMOL/L (ref 136–145)
WBC NRBC COR # BLD: 5.45 10*3/MM3 (ref 3.4–10.8)

## 2020-02-09 PROCEDURE — G0378 HOSPITAL OBSERVATION PER HR: HCPCS

## 2020-02-09 PROCEDURE — 97161 PT EVAL LOW COMPLEX 20 MIN: CPT

## 2020-02-09 PROCEDURE — 85025 COMPLETE CBC W/AUTO DIFF WBC: CPT | Performed by: FAMILY MEDICINE

## 2020-02-09 PROCEDURE — 99217 PR OBSERVATION CARE DISCHARGE MANAGEMENT: CPT | Performed by: FAMILY MEDICINE

## 2020-02-09 PROCEDURE — 94799 UNLISTED PULMONARY SVC/PX: CPT

## 2020-02-09 PROCEDURE — 80048 BASIC METABOLIC PNL TOTAL CA: CPT | Performed by: FAMILY MEDICINE

## 2020-02-09 RX ADMIN — PANTOPRAZOLE SODIUM 40 MG: 40 TABLET, DELAYED RELEASE ORAL at 06:35

## 2020-02-09 RX ADMIN — TAMSULOSIN HYDROCHLORIDE 0.4 MG: 0.4 CAPSULE ORAL at 08:55

## 2020-02-09 RX ADMIN — FINASTERIDE 5 MG: 5 TABLET, FILM COATED ORAL at 08:56

## 2020-02-09 RX ADMIN — IPRATROPIUM BROMIDE 0.5 MG: 0.5 SOLUTION RESPIRATORY (INHALATION) at 11:06

## 2020-02-09 RX ADMIN — ALBUTEROL SULFATE 2.5 MG: 2.5 SOLUTION RESPIRATORY (INHALATION) at 11:06

## 2020-02-09 RX ADMIN — OFLOXACIN 1 DROP: 3 SOLUTION/ DROPS OPHTHALMIC at 02:33

## 2020-02-09 RX ADMIN — BUDESONIDE AND FORMOTEROL FUMARATE DIHYDRATE 2 PUFF: 80; 4.5 AEROSOL RESPIRATORY (INHALATION) at 07:23

## 2020-02-09 RX ADMIN — KETOTIFEN FUMARATE 1 DROP: 0.35 SOLUTION/ DROPS OPHTHALMIC at 08:56

## 2020-02-09 RX ADMIN — TORSEMIDE 20 MG: 20 TABLET ORAL at 08:55

## 2020-02-09 RX ADMIN — ALBUTEROL SULFATE: 2.5 SOLUTION RESPIRATORY (INHALATION) at 07:23

## 2020-02-09 RX ADMIN — IPRATROPIUM BROMIDE 0.5 MG: 0.5 SOLUTION RESPIRATORY (INHALATION) at 07:23

## 2020-02-09 RX ADMIN — MIDODRINE HYDROCHLORIDE 10 MG: 10 TABLET ORAL at 08:55

## 2020-02-09 RX ADMIN — NYSTATIN AND TRIAMCINOLONE ACETONIDE: 100000; 1 CREAM TOPICAL at 08:57

## 2020-02-09 RX ADMIN — OFLOXACIN 1 DROP: 3 SOLUTION/ DROPS OPHTHALMIC at 06:35

## 2020-02-09 NOTE — DISCHARGE INSTRUCTIONS
ACTIVITY:  Gradually increase activity as able  No driving  Safety device when up: cane or walker  Elevate legs as much as can/avoid legs handing down; avoid dependent legs  Suggest no smoking/exposure to smoking  Wound care/procedure  1. Wash legs clean soap/water bid  2. Dry well  3. Apply prescription cream  4. Latricia gauze wrap lightly  5. ACE wrap toe to mid calf firmly applied (may substitute OTC compressive stockings knee high/firmly fitting)  5. May remove compressive device nightly (use only gauze during night)    DIET:  AHA  Additional type: 3 gm sodium  Consistency:    Solids: general     Fluids thin  Suggest no alcohol   Avoid caffeine   Calories [id number]/24 hr  Fluids at least 60 oz/24 hr-stay hydrated    MEDICATIONS:   Per AVS  Oxygen as admit/hs    EXPECT:   LAB on office f/u  Home health referral this week    CALL:   If problems/questions

## 2020-02-09 NOTE — THERAPY EVALUATION
Patient Name: James Birch  : 1928    MRN: [id number]                              Today's Date: 2020       Admit Date: 2020    Visit Dx:     ICD-10-CM ICD-9-CM   1. Impaired mobility Z74.09 799.89     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   • DAMIAN-intolerant   • Autonomic dysfunction   • Persistent atrial fibrillation   • CHF (congestive heart failure), NYHA class III, chronic, diastolic (CMS/HCC)   • Wellness examination-done   • Gait difficulty-cane/walker   • Asthma   • Xkwtjauvjpuwqy-utq-fgbg/xarelto   • Chronic kidney disease (CKD), stage IV (severe) (CMS/[facility])   • Prostatism   • History of kidney stones   • Varicose veins of legs   • Hyperlipidemia-statin   • Hypertension   • Hypersomnia   • Gastroesophageal reflux disease   • COPD, group B, by GOLD 2013 classification    • Nocturnal hypoxia-oxygen advised   • Laboratory test*   • History of tobacco use   • Acute renal failure (CMS/HCC)   • Gait abnormality   • Cerebrovascular small vessel disease   • Compression fracture of body of thoracic vertebra (CMS/HCC)   • Dyspnea due to congestive heart failure (CMS/HCC)   • Atrial flutter with controlled response (CMS/[facility])   • Current non-smoker   • Osteoporosis   • Fall   • Skin lesion   • Cardiac arrhythmia: a fib-flutter   • Esophageal dysfunction   • Cerebellar stroke (CMS/HCC)   • Leg edema     Past Medical History:   Diagnosis Date   • Arthritis    • Asthma    • Atrial fibrillation (CMS/HCC)    • Cancer (CMS/[facility])     SKIN   • CHF (congestive heart failure) (CMS/[facility])    • Conductive hearing loss    • COPD (chronic obstructive pulmonary disease) (CMS/HCC)    • Eustachian tube dysfunction    • GERD (gastroesophageal reflux disease)    • Hyperlipidemia    • Hypertension    • Obstructive sleep apnea    • Prostate disease    • Sensorineural hearing loss    • Stroke (CMS/HCC)    • Vertigo      Past Surgical History:   Procedure Laterality Date   •  CATARACT EXTRACTION      left   • CATARACT EXTRACTION      left   • COLONOSCOPY  09/05/2012    normal   • ENDOSCOPY  08/31/2015    normal   • EYE SURGERY     • FRACTURE SURGERY     • HERNIA REPAIR     • HIP SURGERY     • SKIN BIOPSY       General Information     [facility] Name 02/09/20 1003          PT Evaluation Time/Intention    Document Type  evaluation BLE leg swelling, Dx:  BLE leg edema  -     Mode of Treatment  physical therapy  -     Row Name 02/09/20 1003          General Information    Patient Profile Reviewed?  yes  -     Prior Level of Function  independent:;all household mobility;ADL's  -     Existing Precautions/Restrictions  fall  -     Barriers to Rehab  previous functional deficit;hearing deficit  -[facility] Name 02/09/20 1003          Relationship/Environment    Lives With  spouse  -[facility] Name 02/09/20 1003          Resource/Environmental Concerns    Current Living Arrangements  independent/assisted living facility  -[facility] Name 02/09/20 1003          Home Main Entrance    Number of Stairs, Main Entrance  none  -[facility] Name 02/09/20 1003          Stairs Within Home, Primary    Number of Stairs, Within Home, Primary  none  -[facility] Name 02/09/20 1003          Cognitive Assessment/Intervention- PT/OT    Orientation Status (Cognition)  oriented x 4  -[facility] Name 02/09/20 1003          Safety Issues, Functional Mobility    Safety Issues Affecting Function (Mobility)  ability to follow commands  -     Impairments Affecting Function (Mobility)  balance;strength  -       User Key  (r) = Recorded By, (t) = Taken By, (c) = Cosigned By    Initials Name Provider Type     Barry Matute, PT Physical Therapist        Mobility     Row Name 02/09/20 1003          Bed Mobility Assessment/Treatment    Bed Mobility Assessment/Treatment  supine-sit;sit-supine  -     Supine-Sit [place] (Bed Mobility)  independent  -     Sit-Supine [place] (Bed Mobility)  independent  -      [facility] Name 02/09/20 1003          Sit-Stand Transfer    Sit-Stand [place] (Transfers)  contact guard  -[facility] Name 02/09/20 1003          Gait/Stairs Assessment/Training    [place] Level (Gait)  contact guard  -     Assistive Device (Gait)  walker, front-wheeled  -     Distance in Feet (Gait)  120ft  -       User Key  (r) = Recorded By, (t) = Taken By, (c) = Cosigned By    Initials Name Provider Type     Barry Matute, PT Physical Therapist        Obj/Interventions     [facility] Name 02/09/20 1003          General ROM    GENERAL ROM COMMENTS  WFL  -[facility] Name 02/09/20 1003          MMT (Manual Muscle Testing)    General MMT Comments  functionally 4/5  -[facility] Name 02/09/20 1003          Static Sitting Balance    Level of Bates (Unsupported Sitting, Static Balance)  independent  -     Sitting Position (Unsupported Sitting, Static Balance)  sitting on edge of bed  -LH     Row Name 02/09/20 1003          Static Standing Balance    Level of Bates (Supported Standing, Static Balance)  standby assist  -     Assistive Device Utilized (Supported Standing, Static Balance)  walker, rolling  -[facility] Name 02/09/20 1003          Sensory Assessment/Intervention    Sensory General Assessment  no sensation deficits identified  -       User Key  (r) = Recorded By, (t) = Taken By, (c) = Cosigned By    Initials Name Provider Type     Barry Matute, PT Physical Therapist        Goals/Plan     Row Name 02/09/20 1003          Transfer Goal 1 (PT)    Activity/Assistive Device (Transfer Goal 1, PT)  sit-to-stand/stand-to-sit  -     [place]ates Level/Cues Needed (Transfer Goal 1, PT)  independent  -     Time Frame (Transfer Goal 1, PT)  by discharge  -     Progress/Outcome (Transfer Goal 1, PT)  goal ongoing  -[facility] Name 02/09/20 1003          Gait Training Goal 1 (PT)    Activity/Assistive Device (Gait Training Goal 1, PT)  gait (walking locomotion);assistive device use;walker,  rolling  -     [place] Level (Gait Training Goal 1, PT)  independent  -     Distance (Gait Goal 1, PT)  250ft  -     Time Frame (Gait Training Goal 1, PT)  by discharge  -     Progress/Outcome (Gait Training Goal 1, PT)  goal ongoing  -       User Key  (r) = Recorded By, (t) = Taken By, (c) = Cosigned By    Initials Name Provider Type     Barry Matute, PT Physical Therapist        Clinical Impression     Row Name 02/09/20 1003          Pain Assessment    Additional Documentation  Pain Scale: Numbers Pre/Post-Treatment (Group)  -     Row Name 02/09/20 1003          Pain Scale: Numbers Pre/Post-Treatment    Pain Scale: Numbers, Pretreatment  0/10 - no pain  -     Pain Scale: Numbers, Post-Treatment  0/10 - no pain  -     Pain Intervention(s)  Medication (See MAR)  -     Row Name 02/09/20 1003          Plan of Care Review    Plan of Care Reviewed With  patient  -     Outcome Summary  PT IE complete.  Ambulated 120ft cg assist x1 w/ RW.  PT to continue to provide services for gait safety and strengthening.  At this point, recommend home health vs sitter service for pt and wife at [place].  Thank you for referral.  -     Row Name 02/09/20 1003          Physical Therapy Clinical Impression    Patient/Family Goals Statement (PT Clinical Impression)  return home  -     Criteria for Skilled Interventions Met (PT Clinical Impression)  yes;treatment indicated  -     Rehab Potential (PT Clinical Summary)  good, to achieve stated therapy goals  -     Predicted Duration of Therapy (PT)  until ND  -     Row Name 02/09/20 1003          Positioning and Restraints    Pre-Treatment Position  in bed  -     Post Treatment Position  bed  -     In Bed  fowlers;call light within reach;encouraged to call for assist;side rails up x2;legs elevated  -       User Key  (r) = Recorded By, (t) = Taken By, (c) = Cosigned By    Initials Name Provider Type     Barry Matute, PT Physical Therapist         Outcome Measures     Row Name 02/09/20 1031          How much help from another person do you currently need...    Turning from your back to your side while in flat bed without using bedrails?  4  -LH     Moving from lying on back to sitting on the side of a flat bed without bedrails?  4  -LH     Moving to and from a bed to a chair (including a wheelchair)?  4  -LH     Standing up from a chair using your arms (e.g., wheelchair, bedside chair)?  4  -LH     Climbing 3-5 steps with a railing?  3  -LH     To walk in hospital room?  3  -     AM-PAC 6 Clicks Score (PT)  22  -     Row Name 02/09/20 1031          Functional Assessment    Outcome Measure Options  AM-PAC 6 Clicks Basic Mobility (PT)  -       User Key  (r) = Recorded By, (t) = Taken By, (c) = Cosigned By    Initials Name Provider Type    Barry Han, PT Physical Therapist          PT Recommendation and Plan  Planned Therapy Interventions (PT Eval): balance training, gait training, home exercise program, patient/family education, strengthening, transfer training  Outcome Summary/Treatment Plan (PT)  Anticipated Discharge Disposition (PT): home with home health, other (see comments)(possibly sitter service at [facility] living [facility])  Plan of Care Reviewed With: patient  Outcome Summary: PT IE complete.  Ambulated 120ft cg assist x1 w/ RW.  PT to continue to provide services for gait safety and strengthening.  At this point, recommend home health vs sitter service for pt and wife at [place].  Thank you for referral.     Time Calculation:   PT Charges     Row Name 02/09/20 1033             Time Calculation    Start Time  1003  -      Stop Time  1033  -      Time Calculation (min)  30 min  -      PT Received On  02/09/20  -      PT Goal Re-Cert Due Date  02/19/20  -        User Key  (r) = Recorded By, (t) = Taken By, (c) = Cosigned By    Initials Name Provider Type    Barry Han, PT Physical Therapist        Therapy Charges for Today     Code  Description Service Date Service Provider Modifiers Qty    [id number] HC PT EVAL LOW COMPLEXITY 2 2/9/2020 Barry Matute, PT GP 1          PT G-Codes  Outcome Measure Options: AM-PAC 6 Clicks Basic Mobility (PT)  AM-PAC 6 Clicks Score (PT): 22    Barry Matute, PHILIP  2/9/2020

## 2020-02-09 NOTE — DISCHARGE SUMMARY
"Patient ID: James Birch  MRN: [id number]     Acct:  [id number]    Admit Date: 2/6/2020   Discharge Date: 2/9/2020  Date of service: 2/9/2020    Consults:    IP CONSULT TO NEPHROLOGY  IP CONSULT TO CASE MANAGEMENT     PATIENT PROFILE: The patient is a 89 y/o white  male resident from [facility]/assisted living in [place]; he was cooperative.      CHIEF COMPLAINT: \"my legs are swollen\"     HISTORY OF PRESENT ILLNESS: Has chronic on/off leg swelling. Has CHF, CKD4, COPD and between diuretics up/down has up/down status of SOB, leg edema.  Has had problem understanding to elevate legs or wear compressive stockings.  Has history of CVA; has memory deficits.  Wife has been primary care giver; she now is developing significant memory loss.  Son plans to move mom/dad from assisted living to NH but not ready/not prepared to do right now.   Patient presented to office today with red, swollen, scaley LE 1/2 leg/down and 2-3 small scabs on L lateral leg; that leg was alittle sore.  We were convienced wife/he could not provide adequate care so we suggest admit/observation.  Interval/while here; 12 hr of leg elevated has resolved 1/2 of edema.  WBC is not high; no fever has been noticed.      No Known Allergies     HOME MEDICATIONS:          Prior to Admission medications    Medication Sig Start Date End Date Taking? Authorizing Provider   acetaminophen (TYLENOL) 500 MG tablet Take 1 tablet by mouth Every 4 (Four) Hours As Needed for Mild Pain . 5/27/19     Hemal William MD   azelastine (OPTIVAR) 0.05 % ophthalmic solution Administer 1 drop to both eyes 2 (Two) Times a Day.       ProviderMaryuri MD   B Complex-C (SUPER B COMPLEX/VITAMIN C PO) Take 1 capsule by mouth Daily.       ProviderMaryuri MD   budesonide-formoterol (SYMBICORT) 80-4.5 MCG/ACT inhaler USE 2 PUFFS TWICE DAILY 12/26/19     Hemal William MD   carbonyl iron (FEOSOL) 45 MG tablet tablet Take 1 tablet by " mouth Daily.       Maryuri Coburn MD   Cholecalciferol (VITAMIN D3 PO) Take 2,000 Int'l Units by mouth Daily.       Maryuri Coburn MD   Coenzyme Q10 (CO Q-10 PO) Take 1 tablet by mouth Daily.       Maryuri Coburn MD   dutasteride (AVODART) 0.5 MG capsule TAKE 1 CAPSULE DAILY GENERIC FOR AVODART 12/2/19     Hemal William MD   esomeprazole (nexIUM) 40 MG capsule TAKE 1 CAPSULE DAILY BEFORE BREAKFAST GENERIC FOR NEXIUM 12/2/19     Hemal William MD   guaiFENesin (MUCINEX) 600 MG 12 hr tablet Take 600 mg by mouth Every 12 (Twelve) Hours.       Maryuri Coburn MD   ipratropium-albuterol (DUO-NEB) 0.5-2.5 mg/3 ml nebulizer USE 1 VIAL IN NEBULIZER FOUR TIMES DAILY GENERIC FOR 10/16/19     Hemal William MD   midodrine (PROAMATINE) 10 MG tablet TAKE ONE TABLET TWICE DAILY BEFORE A MEAL 11/20/19     Hemal William MD   Multiple Vitamins-Minerals (MULTIVITAMIN PO) Take 1 tablet by mouth daily.       Maryuri Coburn MD   O2 (OXYGEN) Inhale 1 L/min Every Night. As needed at bedtime.        Maryuri Coburn MD   ofloxacin (OCUFLOX) 0.3 % ophthalmic solution Administer 1 drop to both eyes Every 4 (Four) Hours. 7/1/19     Maryuri Coburn MD   Omega-3 Fatty Acids (FISH OIL PO) Take 1,000 mg by mouth 2 (Two) Times a Day.       Maryuri Coburn MD   polyethylene glycol (MIRALAX) packet Take 17 g by mouth Daily. 10/13/18     Hemal William MD   PROAIR  (90 Base) MCG/ACT inhaler Inhale 2 puffs Every 4 (Four) Hours As Needed for Shortness of Air. 5/30/18     Maryuri Coburn MD   tamsulosin (FLOMAX) 0.4 MG capsule 24 hr capsule Take 1 capsule by mouth Daily. 2/27/19     Hemal William MD   torsemide (DEMADEX) 20 MG tablet Take 1 tablet by mouth Daily. 7/2/19     Hemal William MD   XARELTO 15 MG tablet TAKE ONE TABLET DAILY 1/29/20     Jasmyn, WILLIAM Kessler         PAST HISTORY:  CHILDHOOD: unremarkable.      PROCEDURES:      SCANNED  Prostate exam/urology/confirmed/8.22.16     EGD+biop/[facility]/10-2-02  Colonscopy/Mary/02-20-02  EGD+PEG-candida esophagitis/[facility]/3-25-10  Colonoscopy+nl/[facility]/9.5.12/5y  EGD+biop/[facility]/8.31.15     SURGERIES:  R Hip/1994  Renan Inguinal Hernia/2001  L ear/Mis[facility]ah  L ear/Misbah  L ear/Misbah/9.30.11  Tkach/L lower eyelid excision/LH/7.8.16        FAMILY HISTORY:  Heart/none  DM/none  CA-prostate/none  CA-colon/none  CA-eye/b  CA-testicle/b     HABITS:  Tobacco-smoker/age 18/<1 ppd/dc 1965  Alcohol/none  Drugs/none     SOCIAL HISTORY:  /1949  Retired/[employer]/1993  Children/3     HOSPITAL ADMITS:   [facility]:   HOSPITAL NOTES:  1.29.10-2.12.10 vertigo?CVA  2.12.10-4.10.10-TCU  cerebellar CVA-probably of atrial fib  vertigo  nausea with vomiting-positional  nausea -central  nausea with vomiting-central  gastroenteritis (N&V, diarrhea)  hematemesis  anemia  diarrhea  nutritional risk/decline  A fib-flutter  ataxia  gait issues  IgA nephropathy  coumadin therapy- afib indication  hypercoagulation  hypermagnesiuma  syncope- vasovagal +/- orthostasis  hypotension  late affects CVA, (balance, gait, vertigo, nausea)  pseudomonas bacteremia- lung/PIC line  LLL pneumonia 3.21.10  COPD  esophagitis-candida     HOSPITAL NOTES:  3.29.11-4.1.11 R hand burn  R hand burn-2nd degree  seizure-possible  CVA-cerebellar/embolic-1.29.10  late affects CVA  atrial fib-chronic  coumadin therapy  excessive INR 4.47  chronic kidney disease-3     HOSPITAL NOTES: [facility]  3.28.13-4.2.13   fever-assume from respiratory source  nausea with vomiting  abnormal xray-STELLA  STELLA pneumonia-community acquired  copd excerbation  copd  long term anticoagulation-coumadin (a fib, CVA)  elevated/low INR 3.21-1.65  CKD-3 (IgA nephropathy)  abnormal urine (mod blood, protein)  bacturia (3.25.13 10-25K E coli)  stomatitis     HOSPITAL NOTES: [facility]  9.16.13   weakness  leg weakness..     5.25.18-5.27.18  Rapid a  fib  Paroxysmal SVT  tachycardia  Bradycardia  Palpitations  Dizziness  Autonomic dysfunction  Recent R pontine lesion  Small vessel ischemic cerebral disease  Acute renal insufficiency (on CKD)  Hyperlipidemia-resumption of statin     10.11.18-10.13.18  Acute renal injury (with CKD) GFR 28 usually 30s  Dehydration (b/cr 18.3)  Nausea-may constipation, pain Rx; ? others  Nausea/vomiting-same  Gait decline-acute  Recent fall  Risk falls  Recent UE skin tear  Recent distal radius fracture  Constipation-narcotic influenced     5.21.19-5.27.19 [facility]  5.27.19-6.6.19 [facility] -swing bed  T12 compression fracture  Back pain-acute  Gait difficulty-acute/chronic  Hypotension-symptomatic dizzy, falls-contribution of autonomatic dysfunction?  Nausea-on/off  Fall-acute  Fall-chronic risk  Leg edema; no longer able to resolve  Constipation-acute on chronic  Atrial flutter     [facility]   12.[facility]-pelvic fx  1.28.10-1.29.10-vertigo     5.27.19-6.6.19 [facility] -swing bed  T12 compression fracture  Back pain-acute  Gait difficulty-acute/chronic  Hypotension-symptomatic dizzy, falls-contribution of autonomatic dysfunction?  Nausea-on/off  Fall-acute  Fall-chronic risk  Leg edema; no longer able to resolve  Constipation-acute on chronic  Atrial flutter     [facility]:   None     Review of Systems  GENERAL:  Inactive/slower with limits, speed, stamina for age and fatigue; using rolling walker at admit. Sleep is ok. No fever.  ENDO:  No syncope, near or diaphoretic sweaty spells.  BS Ok here.  HEENT: No head injury occ/same headache.  Minor decreased/ vision change. Same aide treated hearing loss.  Ears without pain/drainage.  No sore throat.  No significant nasal/sinus congestion/drainage. No epistaxis.  CHEST: No chest wall tenderness or mass. No change occ cough, wheeze, SOB; no hemoptysis.  CV: No chest pain, palpitations; lately always LE/ ankle edema.  Hx atrial fib.  GI: No heartburn, dysphagia.  No abdominal pain, diarrhea,  " rectal bleeding, or melena; tends towards constipation.  No recent nausea and vomiting even before Norco.   :  Voids without dysuria, or incontinence to completion.  ORTHO: No painful/swollen joints but various on /off sore.  Usual mild sore neck or back; not much increased back.  No acute neck but acute back pain with above recent injury.   NEURO: Often dizziness without weakness of extremities.  No change LE numbness/paresthesias.   PSYCH: Mild/stable memory loss.  Mood good; occ mildly anxious, depressed but/and not suicidal.  Tolerated stress.      PHYSICAL EXAMINATION:  /87 (BP Location: Left arm, Patient Position: Lying)   Pulse 86   Temp 98.4 °F (36.9 °C) (Oral)   Resp 18   Ht 177.8 cm (70\")   Wt 71.4 kg (157 lb 8 oz)   SpO2 98%   BMI 22.60 kg/m²      Physical Exam  GENERAL:  Well nourished/developed in no acute distress.  SKIN: Turgor excellent, without wound, rash, lesion beyond: The distal mid leg/calf to the toes has edema mildly pitting mild erythema significant scaling and thickening/brittleness of all the nails.  The left lower extremity has 2 pinpoint areas of scabbing; the area around this is gently indurated and mildly sore.  There is no fluctuance no drainage.  HEENT: Normal cephalic without trauma.  Pupils equal round reactive to light. Extraocular motions full without nystagmus.     External canals nonobstructive nontender without reddness.  Oral cavity without growths, exudates, and moist.  Posterior pharynx without mass, obstruction, redness.  No thyromegaly, mass, tenderness, lymphadenopathy and supple.  CV: Regular rhythm.  No gallop but 1/6 systolic, 1-2/4 pitting edema. Posterior pulses intact.  No carotid bruits.   CHEST: No chest wall tenderness or mass.   LUNGS: Symmetric motion with clear to auscultation.   ABD: Soft, nontender without mass.   ORTHO: Symmetric extremities without swelling/point tenderness.  Full gross range of motion.    NEURO: CN 2-12 grossly intact. "  Symmetric facies. 1/4 x bicep equal reflexes.  UE/LE   3/5 strength throughout.  Nonfocal use extremities. Speech clear.    PSYCH: Oriented x 3.  Pleasant calm, well kept.  Purposeful/directed conservation with intact short/long gross memory.     ASSESSMENT/PROBLEM LIST:   89 y/o white male; advanced age   Allergy/intolerance: see above  Procedural history: see above  Family history: see above  History tobacco-stopped  COPD  Asthma  Shortness of breath-chronic (copd, CHF, anemia)  Hyperlipidemia-reserved to statin  Hypertension  IgA nephropathy  CKD3  Obstructive sleep apnea-intolerant tx  Nocturnal hypoxemia-oxygen treated  hypersomnia  Cardiac arrhythmia-pa fib-flutter  Congestive heart failure-diastolic-chronic  Valvular heart  Edema (copd, CKD, CHF, pAfib)  Anticoagulated Hx CVA, a fib/(past coumadin), xarelto  Anemia-chronic (CKD, xarelto  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  Cerebral small vessel disease  Late effects CVA  Memory loss  Autonomic dysfunction-neuro opinion  Recurrent dizziness  History seizure disorder  Peripheral neuropathy  Gait difficulties  Fall risk-history falls  Prostatism  BPH  History urolithiasis  Varicose veins  GE reflux (heartburn)  History cholinergic urticaria  Hearing loss-conductive  Degenerative joint disease     REASON FOR ADMISSION:    Increased LE edema  LE pain  LE rash-likely now more stasis dermatitis than cellulitis  Inability to complete needed care at home  Intolerance Rx-difficulty tolerating diuretics    HOSPITAL COURSE: We elevated his legs and the swelling took 2 days but it came completely out.  We treated his legs with low-dose cortisone and Mycolog in the scaliness slowly improved; the initial mild soreness and redness of the left leg completely resolved.  This was without antibiotics.  He never ran a fever.  He did not require IV diuretics; his renal function remained stable.  He was followed by nephrology which we agreed with all above.  Ace wraps  were applied; his leg swelling did not come back.  It took a day to work out details for his home care because his wife is slowly becoming demented and cannot provide supervision for self and is less her ; the family is looking into nursing home placement or hiring assistance in the home.    Labs included:     Labs 24 hr before discharge:  Lab Results (last 24 hours)     Procedure Component Value Units Date/Time    Basic Metabolic Panel [[id number]]  (Abnormal) Collected:  02/09/20 0553    Specimen:  Blood Updated:  02/09/20 0633     Glucose 101 mg/dL      BUN 45 mg/dL      Creatinine 2.26 mg/dL      Sodium 137 mmol/L      Potassium 4.0 mmol/L      Chloride 100 mmol/L      CO2 26.0 mmol/L      Calcium 8.4 mg/dL      eGFR Non African Amer 27 mL/min/1.73      BUN/Creatinine Ratio 19.9     Anion Gap 11.0 mmol/L     Narrative:       GFR Normal >60  Chronic Kidney Disease <60  Kidney Failure <15      CBC & Differential [[id number]] Collected:  02/09/20 0553    Specimen:  Blood Updated:  02/09/20 0619    Narrative:       The following orders were created for panel order CBC & Differential.  Procedure                               Abnormality         Status                     ---------                               -----------         ------                     CBC Auto Differential[[id number]]        Abnormal            Final result                 Please view results for these tests on the individual orders.    CBC Auto Differential [[id number]]  (Abnormal) Collected:  02/09/20 0553    Specimen:  Blood Updated:  02/09/20 0619     WBC 5.45 10*3/mm3      RBC 3.62 10*6/mm3      Hemoglobin 10.6 g/dL      Hematocrit 31.3 %      MCV 86.5 fL      MCH 29.3 pg      MCHC 33.9 g/dL      RDW 13.0 %      RDW-SD 41.0 fl      MPV 9.3 fL      Platelets 178 10*3/mm3      Neutrophil % 66.4 %      Lymphocyte % 14.5 %      Monocyte % 13.2 %      Eosinophil % 5.1 %      Basophil % 0.6 %      Immature Grans % 0.2 %      Neutrophils,  Absolute 3.62 10*3/mm3      Lymphocytes, Absolute 0.79 10*3/mm3      Monocytes, Absolute 0.72 10*3/mm3      Eosinophils, Absolute 0.28 10*3/mm3      Basophils, Absolute 0.03 10*3/mm3      Immature Grans, Absolute 0.01 10*3/mm3      nRBC 0.0 /100 WBC           Lab Results otherwise  CBC:   Results from last 7 days   Lab Units 02/09/20  0553 02/08/20  0601 02/07/20  0356 02/06/20  1749   WBC 10*3/mm3 5.45 5.99 5.39 5.71   HEMOGLOBIN g/dL 10.6* 13.1 11.0* 11.8*   HEMATOCRIT % 31.3* 39.1 32.0* 35.5*   PLATELETS 10*3/mm3 178 188 187 199     BMP:  Results from last 7 days   Lab Units 02/09/20  0553 02/08/20  0601 02/07/20  0356 02/06/20  1749   SODIUM mmol/L 137 137 138 138   POTASSIUM mmol/L 4.0 4.3 4.1 3.9   CHLORIDE mmol/L 100 99 101 97*   CO2 mmol/L 26.0 23.0 26.0 28.0   BUN mg/dL 45* 44* 48* 50*   CREATININE mg/dL 2.26* 2.17* 2.31* 2.11*   GLUCOSE mg/dL 101* 97 95 108*   CALCIUM mg/dL 8.4 9.2 8.8 9.2   ALT (SGPT) U/L  --   --   --  10     IMAGING:   No results found.    Culture Results: none    DISCHARGE ASSESSMENT:  Reasons for admit/problems address while here:   Increased LE edema-acute/chronic; improved with same diuretic and elevation/compression  LE pain-resolved; related to edema  LE rash-likely now more stasis dermatitis than cellulitis-proving stasis and resolving  Inability to complete needed care at home  Intolerance Rx-difficulty tolerating diuretics    Chronic problems affecting stay:  See above      PLAN:   See AVS    Discharge Disposition:  Home or Self Care    Discharge Medications:     Discharge Medications      New Medications      Instructions Start Date   nystatin-triamcinolone 228861-5.1 UNIT/GM-% cream  Commonly known as:  MYCOLOG II   Topical, Every 12 Hours Scheduled         Continue These Medications      Instructions Start Date   acetaminophen 500 MG tablet  Commonly known as:  TYLENOL   500 mg, Oral, Every 4 Hours PRN      azelastine 0.05 % ophthalmic solution  Commonly known as:  OPTIVAR    1 drop, Both Eyes, 2 Times Daily      budesonide-formoterol 80-4.5 MCG/ACT inhaler  Commonly known as:  SYMBICORT   USE 2 PUFFS TWICE DAILY      carbonyl iron 45 MG tablet tablet  Commonly known as:  FEOSOL   1 tablet, Oral, Daily      CO Q-10 PO   1 tablet, Oral, Daily      dutasteride 0.5 MG capsule  Commonly known as:  AVODART   TAKE 1 CAPSULE DAILY GENERIC FOR AVODART      esomeprazole 40 MG capsule  Commonly known as:  nexIUM   TAKE 1 CAPSULE DAILY BEFORE BREAKFAST GENERIC FOR NEXIUM      FISH OIL PO   1,000 mg, Oral, 2 Times Daily      guaiFENesin 600 MG 12 hr tablet  Commonly known as:  MUCINEX   600 mg, Oral, Every 12 Hours Scheduled      ipratropium-albuterol 0.5-2.5 mg/3 ml nebulizer  Commonly known as:  DUO-NEB   USE 1 VIAL IN NEBULIZER FOUR TIMES DAILY GENERIC FOR      midodrine 10 MG tablet  Commonly known as:  PROAMATINE   TAKE ONE TABLET TWICE DAILY BEFORE A MEAL      MULTIVITAMIN PO   1 tablet, Oral, Daily      O2  Commonly known as:  OXYGEN   1 L/min, Inhalation, Nightly, As needed at bedtime.      ofloxacin 0.3 % ophthalmic solution  Commonly known as:  OCUFLOX   1 drop, Both Eyes, Every 4 Hours      polyethylene glycol packet  Commonly known as:  MIRALAX   17 g, Oral, Daily      PROAIR  (90 Base) MCG/ACT inhaler  Generic drug:  albuterol sulfate HFA   2 puffs, Inhalation, Every 4 Hours PRN      SUPER B COMPLEX/VITAMIN C PO   1 capsule, Oral, Daily      tamsulosin 0.4 MG capsule 24 hr capsule  Commonly known as:  FLOMAX   0.4 mg, Oral, Daily      torsemide 20 MG tablet  Commonly known as:  DEMADEX   20 mg, Oral, Daily      VITAMIN D3 PO   2,000 Int'l Units, Oral, Daily      XARELTO 15 MG tablet  Generic drug:  rivaroxaban   TAKE ONE TABLET DAILY             Discharge Care Plan/Instructions:   ACTIVITY:  Gradually increase activity as able  No driving  Safety device when up: cane or walker  Elevate legs as much as can/avoid legs handing down; avoid dependent legs  Suggest no smoking/exposure  to smoking  Wound care/procedure  1. Wash legs clean soap/water bid  2. Dry well  3. Apply prescription cream  4. Latricia gauze wrap lightly  5. ACE wrap toe to mid calf firmly applied (may substitute OTC compressive stockings knee high/firmly fitting)  5. May remove compressive device nightly (use only gauze during night)    DIET:  AHA  Additional type: 3 gm sodium  Consistency:    Solids: general     Fluids thin  Suggest no alcohol   Avoid caffeine   Calories 4212-0060/24 hr  Fluids at least 60 oz/24 hr-stay hydrated    MEDICATIONS:   Per AVS  Oxygen as admit/hs    EXPECT:   LAB on office f/u  Home health referral this week    CALL:   If problems/questions    Follow-up Appointments:   Dr William [facility] f/u extra 2.19.20 (call for time)    Other appointments:   Future Appointments   Date Time Provider Department Center   2/24/2020  8:30 AM [facility] [place] [facility] None   2/25/2020  1:45 PM Hemal William MD [facility] None   3/17/2020  2:00 PM Bill Church MD [facility] None   4/1/2020 11:30 AM Skyler Trimble MD [facility]   7/10/2020 11:00 AM Kiko Campos PA [facility] None     CONDITION: stable/improved    PROGNOSIS: guarded    TIME: 15 min

## 2020-02-09 NOTE — PLAN OF CARE
Problem: Patient Care Overview  Goal: Plan of Care Review  Outcome: Ongoing (interventions implemented as appropriate)  Flowsheets (Taken 2/9/2020 1003)  Plan of Care Reviewed With: patient  Outcome Summary: PT IE complete.  Ambulated 120ft cg assist x1 w/ RW.  PT to continue to provide services for gait safety and strengthening.  At this point, recommend home health vs sitter service for pt and wife at [facility].  Thank you for referral.

## 2020-02-09 NOTE — PROGRESS NOTES
Subjective   James Birch is a 89 y.o. male presenting with chief complaint of:   Chief Complaint   Patient presents with   • Leg Swelling     Bilateral and goes into feet        History of Present Illness :  With wife.  Here for primarily an acute issue today; increased leg swelling.  Has had this on and off and is directly related with his congestive heart failure and chronic kidney disease.  He cannot consistently modify his sodium in his diet and certainly does not reliably elevate his legs.  His wife now has increasing dementia documented by the son and says she cannot be trusted the even have her take her own medications much less his.    Has multiple chronic problems to consider that might have a bearing on today's issues; not an interval appointment.       Chronic/acute problems reviewed today:   1. Varicose veins of both lower extremities, unspecified whether complicated    2. Essential hypertension    3. CHF (congestive heart failure), NYHA class III, chronic, diastolic (CMS/HCC)    4. Chronic kidney disease (CKD), stage IV (severe) (CMS/[facility])    5. Leg edema    6. Pyhvawwsgswdpm-qfi-sqfg/xarelto      Has an/another acute issue today: none.    The following portions of the patient's history were reviewed and updated as appropriate: allergies, current medications, past family history, past medical history, past social history, past surgical history and problem list.    No current facility-administered medications for this visit.   No current outpatient medications on file.    Facility-Administered Medications Ordered in Other Visits:   •  acetaminophen (TYLENOL) tablet 500 mg, 500 mg, Oral, Q4H PRN, Hemal William MD  •  albuterol (PROVENTIL) nebulizer solution 0.5% 2.5 mg/0.5mL, 1.25 mg, Nebulization, 4x Daily - RT, 1.25 mg at 02/08/20 1416 **AND** ipratropium (ATROVENT) nebulizer solution 0.5 mg, 0.5 mg, Nebulization, 4x Daily - RT, Hemal William MD, 0.5 mg at 02/08/20 1415  •   budesonide-formoterol (SYMBICORT) 80-4.5 MCG/ACT inhaler 2 puff, 2 puff, Inhalation, BID - RT, Hemal William MD, 2 puff at 02/08/20 0703  •  finasteride (PROSCAR) tablet 5 mg, 5 mg, Oral, Daily, Hemal William MD, 5 mg at 02/08/20 0855  •  ketotifen (ZADITOR) 0.025 % ophthalmic solution 1 drop, 1 drop, Both Eyes, BID, Hemal William MD, 1 drop at 02/08/20 2037  •  midodrine (PROAMATINE) tablet 10 mg, 10 mg, Oral, BID AC, Hemal William MD, 10 mg at 02/08/20 1714  •  nystatin-triamcinolone (MYCOLOG II) 511439-7.1 UNIT/GM-% cream, , Topical, Q12H, Hemal William MD  •  O2 (OXYGEN), 1 L/min, Inhalation, Nightly, Hemal William MD, 1 L/min at 02/07/20 2033  •  ofloxacin (OCUFLOX) 0.3 % ophthalmic solution 1 drop, 1 drop, Both Eyes, Q4H, Hemal William MD, 1 drop at 02/08/20 0609  •  pantoprazole (PROTONIX) EC tablet 40 mg, 40 mg, Oral, Q AM, Hemal William MD, 40 mg at 02/08/20 0609  •  polyethylene glycol (MIRALAX) powder 17 g, 17 g, Oral, Daily, Hemal William MD, 17 g at 02/08/20 0855  •  rivaroxaban (XARELTO) tablet 15 mg, 15 mg, Oral, Daily With Dinner, Hemal William MD, 15 mg at 02/08/20 1714  •  sodium chloride 0.9 % flush 10 mL, 10 mL, Intravenous, Q12H, Hemal William MD, 10 mL at 02/07/20 2033  •  sodium chloride 0.9 % flush 10 mL, 10 mL, Intravenous, PRN, Hemal William MD  •  tamsulosin (FLOMAX) 24 hr capsule 0.4 mg, 0.4 mg, Oral, Daily, Hemal William MD, 0.4 mg at 02/08/20 0901  •  torsemide (DEMADEX) tablet 20 mg, 20 mg, Oral, Daily, Hemal William MD, 20 mg at 02/08/20 0855    No problems with medications.  Refills if needed done    No Known Allergies    Review of Systems  GENERAL:  Inactive/slower with limits, speed, stamina for age and gait and occ SOB. Sleep is ok with oxygen.  No fever now.  ENDO:  No syncope, near or diaphoretic sweaty spells.  HEENT: No head injury or headache usually.  No vision  change.  Same hearing loss.  Ears without pain/drainage.  No sore throat.  No significant nasal/sinus congestion/drainage. No epistaxis.  CHEST: No chest wall tenderness or mass. Occ cough, with mild occ wheeze.  Variable SOB; no hemoptysis.  CV: No exertional chest pain, palpitations; increased end of day ankle edema.   GI: No  dysphagia.  No abdominal pain, diarrhea, constipation.  No rectal bleeding, or melena.  No nausea and vomiting/heartburn.    :  Voids without dysuria, or incontinence to completion.  ORTHO: No painful/swollen joints but various on /off sore; especially hands/fingers with motion.  No change occ sore neck or back.  No acute neck or back pain despite recent injury.   NEURO: Chronic/mild dizziness, and primarily LE weakness of extremities.  No change UE/LE mild numbness/paresthesias.   PSYCH: Mild ? memory loss.  Mood good; occ anxious, depressed but/and not suicidal.  Tries to tolerate stress   Screening:  Mammogram: NA  Bone density: NA  Low dose CT chest:Tobacco-smoker/age 18/<1 ppd/dc 1965 (19) NA  GI:   Colonoscopy+nl/[facility]/9.5.12/5y-reminded  EGD+biop/[facility]/8.31.15  Prostate: urology past  Usual lab order  1m CBC, BMP, iron  6m CBC, CMP, iron  12m CBC, CMP, LIPID       Lab Results:  Results for orders placed or performed in visit on 01/27/20   Basic metabolic panel   Result Value Ref Range    Glucose 78 65 - 99 mg/dL    BUN 53 (H) 8 - 23 mg/dL    Creatinine 2.49 (H) 0.76 - 1.27 mg/dL    eGFR Non African Am 24 (L) >60 mL/min/1.73    eGFR African Am 30 (L) >60 mL/min/1.73    BUN/Creatinine Ratio 21.3 7.0 - 25.0    Sodium 137 136 - 145 mmol/L    Potassium 4.5 3.5 - 5.2 mmol/L    Chloride 98 98 - 107 mmol/L    Total CO2 25.8 22.0 - 29.0 mmol/L    Calcium 8.8 8.2 - 9.6 mg/dL   Iron   Result Value Ref Range    Iron 42 (L) 59 - 158 mcg/dL   CBC & Differential   Result Value Ref Range    WBC 6.76 3.40 - 10.80 10*3/mm3    RBC 3.57 (L) 4.14 - 5.80 10*6/mm3    Hemoglobin 10.6  (L) 13.0 - 17.7 g/dL    Hematocrit 31.6 (L) 37.5 - 51.0 %    MCV 88.5 79.0 - 97.0 fL    MCH 29.7 26.6 - 33.0 pg    MCHC 33.5 31.5 - 35.7 g/dL    RDW 12.8 12.3 - 15.4 %    Platelets 181 140 - 450 10*3/mm3    Neutrophil Rel % 72.3 42.7 - 76.0 %    Lymphocyte Rel % 13.0 (L) 19.6 - 45.3 %    Monocyte Rel % 11.4 5.0 - 12.0 %    Eosinophil Rel % 2.4 0.3 - 6.2 %    Basophil Rel % 0.6 0.0 - 1.5 %    Neutrophils Absolute 4.89 1.70 - 7.00 10*3/mm3    Lymphocytes Absolute 0.88 0.70 - 3.10 10*3/mm3    Monocytes Absolute 0.77 0.10 - 0.90 10*3/mm3    Eosinophils Absolute 0.16 0.00 - 0.40 10*3/mm3    Basophils Absolute 0.04 0.00 - 0.20 10*3/mm3    Immature Granulocyte Rel % 0.3 0.0 - 0.5 %    Immature Grans Absolute 0.02 0.00 - 0.05 10*3/mm3    nRBC 0.0 0.0 - 0.2 /100 WBC       A1C:No results for input(s): HGBA1C in the last 99490 hours.  PSA:No results for input(s): PSA in the last 56097 hours.  CBC:  Lab Results - Last 18 Months   Lab Units 01/27/20  0823 10/14/19  1241 05/27/19  0413 05/26/19  0449  03/29/19  0800 02/04/19  0807 01/14/19  0855 12/10/18  1241  10/11/18  1417   WBC 10*3/mm3 6.76 4.82 5.90 6.12   < > 8.69 5.43 6.45 7.81   < > 5.03   HEMOGLOBIN g/dL 10.6* 10.6* 12.6* 13.5*   < > 12.4* 12.1* 12.7* 11.9*   < > 11.8*   HEMATOCRIT % 31.6* 32.2* 35.5* 38.5*   < > 39.6 37.3* 41.0 36.8*   < > 33.7*   PLATELETS 10*3/mm3 181 173 201 190   < > 154 177 215 218   < > 212   IRON mcg/dL 42* 60  --   --   --  34* 80 74 36*  --  45    < > = values in this interval not displayed.      BMP/CMP:  Lab Results - Last 18 Months   Lab Units 01/27/20  0823 10/14/19  1241 08/12/19  0806 06/11/19  1232 05/27/19  0413  05/06/19  0735 04/26/19  0747 04/08/19  1329 03/29/19  0800   SODIUM mmol/L 137 138 142  --  133*   < > 139 136 135 138   POTASSIUM mmol/L 4.5 4.0 4.0  --  4.0   < > 4.1 4.2 4.2 4.1   CHLORIDE mmol/L 98 99 102  --  95*   < > 98 96* 97 97*   TOTAL CO2 mmol/L 25.8 26.3 24  --   --   --  27.9 26.6 23 23.7   CO2 mmol/L  --   --    "--   --  30.0   < >  --   --   --   --    GLUCOSE mg/dL 78 101* 81  --   --   --  87 99 108* 94   BUN mg/dL 53* 36* 25  --  53*   < > 36* 32* 27 42*   CREATININE mg/dL 2.49* 2.22* 2.05*  --  2.32*   < > 2.35* 2.27* 1.94* 2.40*   EGFR IF NONAFRICN AM mL/min/1.73 24* 28* 28*  --  27*   < > 26* 27* 30* 26*   EGFR IF AFRICN AM mL/min/1.73 30* 34* 32*  --   --   --  32* 33* 34* 31*   CALCIUM mg/dL 8.8 9.1 9.2 9.0 9.0   < > 9.5 9.8* 8.7 8.6    < > = values in this interval not displayed.     HEPATIC:  Lab Results - Last 18 Months   Lab Units 10/14/19  1241 04/08/19  1329 03/29/19  0800 12/10/18  1241 10/11/18  1417 10/02/18  1002   ALT (SGPT) U/L 10 13 9 16 22 16   AST (SGOT) U/L 17 17 10 15 27 20   ALK PHOS U/L 82 93 72 87 57 89     THYROID:  Lab Results - Last 18 Months   Lab Units 10/11/18  1417   TSH mIU/mL 0.321*       Objective   /68   Pulse 92   Temp 98.2 °F (36.8 °C)   Resp 16   Ht 177.8 cm (70\")   Wt 70.3 kg (155 lb)   SpO2 96%   BMI 22.24 kg/m²   Body mass index is 22.24 kg/m².    Physical Exam  GENERAL:  Thin AFA developed in no acute distress.  SKIN: Turgor excellent, without rash lesion other than bilateral LE edema, scaling and L 2 pin-point areas scabbing; mild reddness around those.  HEENT: Normal cephalic without trauma.  Pupils equal round reactive to light. Extraocular motions full without nystagmus.     No thyromegaly, mass, tenderness, lymphadenopathy and supple.  CV: Irregular irregular rhythm.  No murmur, gallop; 1-2/4 pitting ankle lower leg edema. Posterior pulses intact.  No carotid bruits.  CHEST: No chest wall tenderness or mass.   LUNGS: Symmetric motion with clear/mild reduced to auscultation.   ABD: Soft, nontender without mass.   ORTHO: Symmetric extremities without swelling/point tenderness.  Full gross range of motion.   NEURO: CN 2-12 grossly intact.  Symmetric facies. 1/4 x bicep equal reflexes.  UE/LE   2-3/5 strength throughout.  Nonfocal use extremities. Speech clear. "   PSYCH: Oriented x 3.  Pleasant calm, well kept.  Purposeful/directed conservation with intact short/long gross memory    Assessment/Plan     1. Varicose veins of both lower extremities, unspecified whether complicated    2. Essential hypertension    3. CHF (congestive heart failure), NYHA class III, chronic, diastolic (CMS/[facility])    4. Chronic kidney disease (CKD), stage IV (severe) (CMS/[facility])    5. Leg edema    6. Cmsjythyffhcqv-mbc-qjgr/xarelto      Rx: reviewed/changes:  No orders of the defined types were placed in this encounter.      LAB/Testing/Referrals: reviewed/orders:   Today: on admit; chemistry, CBC  No orders of the defined types were placed in this encounter.    Chronic/recurrent labs above or change to:   same     Discussions:   Observation/direct admit BH this is primarily to help confirm that we can reliably keep his legs elevated and watch over the next day or 2 and see how much this helps the swelling and how much it helps rash.  We can also see if there is any established pattern to suggest this might be a cellulitis need antibiotics.  This also could open the door for discussions about what he and his wife are going to do long-term; as I do not think they can be trusted to take care of themselves right now.  Body mass index is 22.24 kg/m².  Patient's Body mass index is 22.24 kg/m². BMI is within normal parameters. No follow-up required..    Tobacco use reviewed:    Non-smoker  James Birch  reports that he quit smoking about 53 years ago. His smoking use included cigarettes. He has a 18.00 pack-year smoking history. He has never used smokeless tobacco..  There are no Patient Instructions on file for this visit.    Follow up: No follow-ups on file.  Future Appointments   Date Time Provider Department Center   2/24/2020  8:30 AM LAB [facility] None   2/25/2020  1:45 PM Hemal William MD [facility] None   3/17/2020  2:00 PM Bill Church MD [facility] None    4/1/2020 11:30 AM Skyler Trimble MD [facility]   7/10/2020 11:00 AM Kiko Campos PA [facility] None

## 2020-02-09 NOTE — PLAN OF CARE
Problem: Patient Care Overview  Goal: Plan of Care Review  Outcome: Ongoing (interventions implemented as appropriate)  Flowsheets (Taken 2/9/2020 0469)  Progress: no change  Plan of Care Reviewed With: patient  Note:   LE edema resolved - continued with wraps BID - no c/o - saw where SW/CM attempted day or two ago to contact son without returned call - no c/o no acute issues noted - will cont to monitor     Problem: Patient Care Overview  Goal: Individualization and Mutuality  Outcome: Ongoing (interventions implemented as appropriate)  Flowsheets (Taken 2/9/2020 7741)  Patient Specific Preferences: KIMMY

## 2020-02-09 NOTE — PROGRESS NOTES
"PROGRESS NOTE.      Patient:  James Birch  YOB: 1928  Date of Service: 2/9/2020  MRN: [id number]   Acct: [id number]   Primary Care Physician: Hemal William MD  Advance Directive:   Code Status and Medical Interventions:   Ordered at: 02/06/20 1602     Code Status:    CPR     Medical Interventions (Level of Support Prior to Arrest):    Full     Admit Date: 2/6/2020       Hospital Day: 0  Referring Provider: Hemal William MD      Patient Seen, Chart, Consults, Notes, Labs, Radiology studies reviewed.        Subjective:  James Birch is a 89 y.o. male  whom we were consulted for chronic kidney disease.  Baseline CKD stage 4; baseline creatinine 2.2. Follows with Dr Valente in our office; was seen in office less then two weeks ago; creatinine 2.4 at that time.  History of chronic diastolic congestive heart failure, atrial fibrillation, hypertension, COPD.  Patient has had recurring problems with edema and low BP. Admitted this time for lower extremity edema.  Currently patient is awake and alert. Denies pain or dyspnea. No recent change in urine output, no dysuria or hematuria. Denies NSAIDs. No N/V/D.  Trace lower extremity edema; patient states his swelling is better today than it has been for a while. Urine output nonoliguric.Today, he offered no new complaints and he was anxious for discharge.      Review of Systems:  History obtained from chart review and the patient  General ROS: No fever or chills  Respiratory ROS: No cough, shortness of breath, wheezing  Cardiovascular ROS: no chest pain or dyspnea on exertion  Gastrointestinal ROS: No abdominal pain or melena  Genito-Urinary ROS: No dysuria or hematuria  Musculoskeletal: negative  Skin: negative    Objective:  /69 (BP Location: Left arm, Patient Position: Lying)   Pulse 61   Temp 98.3 °F (36.8 °C) (Oral)   Resp 18   Ht 177.8 cm (70\")   Wt 71.4 kg (157 lb 8 oz)   SpO2 99%   BMI 22.60 kg/m²     Intake/Output " Summary (Last 24 hours) at 2/9/2020 1506  Last data filed at 2/9/2020 0510  Gross per 24 hour   Intake --   Output 750 ml   Net -750 ml       Physical examination:  General: awake/alert   Chest:  clear to auscultation bilaterally without respiratory distress  CVS: regular rate and rhythm  Abdominal: soft, nontender, normal bowel sounds  Extremities: no cyanosis or edema  Skin: warm and dry without rash  Neuro: No focal motor deficits    Labs:  Lab Results (last 24 hours)     Procedure Component Value Units Date/Time    Basic Metabolic Panel [[id number]]  (Abnormal) Collected:  02/09/20 0553    Specimen:  Blood Updated:  02/09/20 0633     Glucose 101 mg/dL      BUN 45 mg/dL      Creatinine 2.26 mg/dL      Sodium 137 mmol/L      Potassium 4.0 mmol/L      Chloride 100 mmol/L      CO2 26.0 mmol/L      Calcium 8.4 mg/dL      eGFR Non African Amer 27 mL/min/1.73      BUN/Creatinine Ratio 19.9     Anion Gap 11.0 mmol/L     Narrative:       GFR Normal >60  Chronic Kidney Disease <60  Kidney Failure <15      CBC & Differential [[id number]] Collected:  02/09/20 0553    Specimen:  Blood Updated:  02/09/20 0619    Narrative:       The following orders were created for panel order CBC & Differential.  Procedure                               Abnormality         Status                     ---------                               -----------         ------                     CBC Auto Differential[[id number]]        Abnormal            Final result                 Please view results for these tests on the individual orders.    CBC Auto Differential [[id number]]  (Abnormal) Collected:  02/09/20 0553    Specimen:  Blood Updated:  02/09/20 0619     WBC 5.45 10*3/mm3      RBC 3.62 10*6/mm3      Hemoglobin 10.6 g/dL      Hematocrit 31.3 %      MCV 86.5 fL      MCH 29.3 pg      MCHC 33.9 g/dL      RDW 13.0 %      RDW-SD 41.0 fl      MPV 9.3 fL      Platelets 178 10*3/mm3      Neutrophil % 66.4 %      Lymphocyte % 14.5 %      Monocyte % 13.2  %      Eosinophil % 5.1 %      Basophil % 0.6 %      Immature Grans % 0.2 %      Neutrophils, Absolute 3.62 10*3/mm3      Lymphocytes, Absolute 0.79 10*3/mm3      Monocytes, Absolute 0.72 10*3/mm3      Eosinophils, Absolute 0.28 10*3/mm3      Basophils, Absolute 0.03 10*3/mm3      Immature Grans, Absolute 0.01 10*3/mm3      nRBC 0.0 /100 WBC           Radiology:   Imaging Results (Last 24 Hours)     ** No results found for the last 24 hours. **              Assessment     1.  Chronic kidney disease stage 4  2.  Chronic diastolic congestive heart failure  3.  Arterial hypotension  4.  Paroxysmal atrial fibrillation  5.  Anemia of chronic kidney disease    Plan:  Continue oral diuretics for now and follow up urine output and renal function. Continue midodrine.       Jeevan Keith MD  2/9/2020  3:06 PM

## 2020-02-10 ENCOUNTER — TRANSITIONAL CARE MANAGEMENT TELEPHONE ENCOUNTER (OUTPATIENT)
Dept: FAMILY MEDICINE CLINIC | Facility: CLINIC | Age: 85
End: 2020-02-10

## 2020-02-10 ENCOUNTER — READMISSION MANAGEMENT (OUTPATIENT)
Dept: CALL CENTER | Facility: HOSPITAL | Age: 85
End: 2020-02-10

## 2020-02-10 DIAGNOSIS — R60.0 LEG EDEMA: ICD-10-CM

## 2020-02-10 DIAGNOSIS — J44.9 COPD, GROUP B, BY GOLD 2013 CLASSIFICATION (HCC): Chronic | ICD-10-CM

## 2020-02-10 DIAGNOSIS — R26.9 GAIT ABNORMALITY: ICD-10-CM

## 2020-02-10 DIAGNOSIS — R26.9 GAIT DIFFICULTY: Chronic | ICD-10-CM

## 2020-02-10 DIAGNOSIS — I63.9 CEREBELLAR STROKE (HCC): ICD-10-CM

## 2020-02-10 DIAGNOSIS — I50.32 CHF (CONGESTIVE HEART FAILURE), NYHA CLASS III, CHRONIC, DIASTOLIC (HCC): Primary | Chronic | ICD-10-CM

## 2020-02-10 DIAGNOSIS — N18.4 CHRONIC KIDNEY DISEASE (CKD), STAGE IV (SEVERE) (HCC): ICD-10-CM

## 2020-02-10 DIAGNOSIS — I67.9 CEREBROVASCULAR SMALL VESSEL DISEASE: ICD-10-CM
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
## 2020-02-10 NOTE — THERAPY DISCHARGE NOTE
Acute Care - Physical Therapy Discharge Summary  [facility]       Patient Name: James Birch  : 1928  MRN: [id number]    Today's Date: 2/10/2020                 Admit Date: 2020      PT Recommendation and Plan    Visit Dx:    ICD-10-CM ICD-9-CM   1. Impaired mobility Z74.09 799.89               Rehab Goal Summary     Row Name 02/10/20 1330             Transfer Goal 1 (PT)    Activity/Assistive Device (Transfer Goal 1, PT)  sit-to-stand/stand-to-sit  -AB      [place] Level/Cues Needed (Transfer Goal 1, PT)  independent  -AB      Time Frame (Transfer Goal 1, PT)  by discharge  -AB      Progress/Outcome (Transfer Goal 1, PT)  goal not met  -AB         Gait Training Goal 1 (PT)    Activity/Assistive Device (Gait Training Goal 1, PT)  gait (walking locomotion);assistive device use;walker, rolling  -AB      [place] Level (Gait Training Goal 1, PT)  independent  -AB      Distance (Gait Goal 1, PT)  250ft  -AB      Time Frame (Gait Training Goal 1, PT)  by discharge  -AB      Progress/Outcome (Gait Training Goal 1, PT)  goal not met  -AB        User Key  (r) = Recorded By, (t) = Taken By, (c) = Cosigned By    Initials Name Provider Type Discipline    Maria C Mendieta PTA Physical Therapy Assistant PT              PT Discharge Summary  Anticipated Discharge Disposition (PT): home with home health, other (see comments)  Reason for Discharge: Discharge from facility  Outcomes Achieved: Discharge from facility occurred on same date as evluation, Refer to plan of care for updates on goals achieved  Discharge Destination: Assisted Living Facility      Maria C Boyer PTA   2/10/2020

## 2020-02-11 ENCOUNTER — READMISSION MANAGEMENT (OUTPATIENT)
Dept: CALL CENTER | Facility: HOSPITAL | Age: 85
End: 2020-02-11

## 2020-02-11 NOTE — OUTREACH NOTE
"Attempted to call pt son, but no anwer. Called pt wife and pt was sitting next to her, and could hear him answering questions in the background. Only complaint was weakness since he got out of the hospital and order place for [facility]. Wife also advised when pt is not \"down at the dinning room he is to be sitting with his feet up\" pt denied any pain and no c/o's with hospital stay. Patient does have follow up with Dr Stewart Cm at 11:15a.  "

## 2020-02-11 NOTE — OUTREACH NOTE
Called and spoke to [facility] and advised that needs order faxed to [phone] att referrals. And faxed all information requested. With order.

## 2020-02-11 NOTE — OUTREACH NOTE
Medical Week 1 Survey      Responses   Facility patient discharged from?  [facility]   Does the patient have one of the following disease processes/diagnoses(primary or secondary)?  Other   Is there a successful TCM telephone encounter documented?  No   Week 1 attempt successful?  No   Unsuccessful attempts  Attempt 1          Nancy Grove RN

## 2020-02-13 ENCOUNTER — READMISSION MANAGEMENT (OUTPATIENT)
Dept: CALL CENTER | Facility: HOSPITAL | Age: 85
End: 2020-02-13

## 2020-02-13 ENCOUNTER — TELEPHONE (OUTPATIENT)
Dept: FAMILY MEDICINE CLINIC | Facility: CLINIC | Age: 85
End: 2020-02-13

## 2020-02-13 NOTE — TELEPHONE ENCOUNTER
"Per Azeb LAU \"we admitted him to home health today, but he refused purvi health he thought it was to complicated\"  "

## 2020-02-13 NOTE — OUTREACH NOTE
Medical Week 1 Survey      Responses   Facility patient discharged from?  [place] [facility]   Does the patient have one of the following disease processes/diagnoses(primary or secondary)?  Other   Is there a successful TCM telephone encounter documented?  Yes          Sammi Sauceda RN

## 2020-02-14 ENCOUNTER — TELEPHONE (OUTPATIENT)
Dept: FAMILY MEDICINE CLINIC | Facility: CLINIC | Age: 85
End: 2020-02-14

## 2020-02-14 NOTE — TELEPHONE ENCOUNTER
Pierce called to inform us that the pt could not start home health today due to other appts (per pt) and asked if he could come back on Monday. It was extended out to start then.

## 2020-02-16 ENCOUNTER — READMISSION MANAGEMENT (OUTPATIENT)
Dept: CALL CENTER | Facility: HOSPITAL | Age: 85
End: 2020-02-16

## 2020-02-16 NOTE — OUTREACH NOTE
Medical Week 2 Survey      Responses   Facility patient discharged from?  [place]   Does the patient have one of the following disease processes/diagnoses(primary or secondary)?  Other   Week 2 attempt successful?  No   Unsuccessful attempts  Attempt 2          Senait Anton RN

## 2020-02-18 ENCOUNTER — OFFICE VISIT (OUTPATIENT)
Dept: FAMILY MEDICINE CLINIC | Facility: CLINIC | Age: 85
End: 2020-02-18

## 2020-02-18 VITALS
TEMPERATURE: 97.8 F | SYSTOLIC BLOOD PRESSURE: 106 MMHG | BODY MASS INDEX: 22.19 KG/M2 | DIASTOLIC BLOOD PRESSURE: 68 MMHG | WEIGHT: 155 LBS | HEART RATE: 79 BPM | OXYGEN SATURATION: 98 % | RESPIRATION RATE: 16 BRPM | HEIGHT: 70 IN

## 2020-02-18 DIAGNOSIS — L03.119 CELLULITIS OF LOWER EXTREMITY, UNSPECIFIED LATERALITY: ICD-10-CM

## 2020-02-18 DIAGNOSIS — N18.4 CHRONIC KIDNEY DISEASE (CKD), STAGE IV (SEVERE) (HCC): ICD-10-CM

## 2020-02-18 DIAGNOSIS — Z74.2 NO ONE AVAILABLE AT HOME TO CARE FOR PATIENT: ICD-10-CM

## 2020-02-18 DIAGNOSIS — J44.9 COPD, GROUP B, BY GOLD 2013 CLASSIFICATION (HCC): ICD-10-CM

## 2020-02-18 DIAGNOSIS — I87.2 VENOUS STASIS DERMATITIS OF BOTH LOWER EXTREMITIES: ICD-10-CM

## 2020-02-18 DIAGNOSIS — R60.0 LEG EDEMA: Primary | ICD-10-CM

## 2020-02-18 DIAGNOSIS — I83.93 VARICOSE VEINS OF BOTH LOWER EXTREMITIES, UNSPECIFIED WHETHER COMPLICATED: ICD-10-CM

## 2020-02-18 PROCEDURE — 99495 TRANSJ CARE MGMT MOD F2F 14D: CPT | Performed by: FAMILY MEDICINE

## 2020-02-18 RX ORDER — IPRATROPIUM BROMIDE AND ALBUTEROL SULFATE 2.5; .5 MG/3ML; MG/3ML
SOLUTION RESPIRATORY (INHALATION)
Qty: 180 VIAL | Refills: 5 | Status: SHIPPED | OUTPATIENT
Start: 2020-02-18 | End: 2020-05-18

## 2020-02-18 NOTE — PROGRESS NOTES
Transitional Care Follow Up Visit  Subjective     James Birch is a 89 y.o. male who presents for a transitional care management visit.  Alone.    Within 48 business hours after discharge our office contacted him via telephone to coordinate his care and needs.      I reviewed and discussed the details of that call along with the discharge summary, hospital problems, inpatient lab results, inpatient diagnostic studies, and consultation reports with James.     Current outpatient and discharge medications have been reconciled for the patient.    Date of TCM Phone Call 2/11/2020   [facility]   Date of Admission 2/6/2020   Date of Discharge 2/9/2020   Discharge Disposition Home or Self Care   Increased LE edema-acute/chronic; improved with same diuretic and elevation/compression  LE pain-resolved; related to edema  LE rash-likely now more stasis dermatitis than cellulitis-proving stasis and resolving  Inability to complete needed care at home  Intolerance Rx-difficulty tolerating diuretics    Risk for Readmission (LACE) Score: 9 (2/9/2020  5:01 AM)    History of Present Illness   Has chronic on/off leg swelling. Has CHF, CKD4, COPD and between diuretics up/down has up/down status of SOB, leg edema.  Has had problem understanding to elevate legs or wear compressive stockings.  Has history of CVA; has memory deficits.  Wife has been primary care giver; she now is developing significant memory loss.  Son plans to move mom/dad from assisted living to NH but not ready/not prepared to do right now.   Patient presented to office today with red, swollen, scaley LE 1/2 leg/down and 2-3 small scabs on L lateral leg; that leg was alittle sore.  We were convienced wife/he could not provide adequate care so we suggest admit/observation.  Interval/while here; 12 hr of leg elevated has resolved 1/2 of edema.  WBC is not high; no fever has been noticed.      Course During Hospital Stay:    We elevated his legs  and the swelling took 2 days but it came completely out.  We treated his legs with low-dose cortisone and Mycolog in the scaliness slowly improved; the initial mild soreness and redness of the left leg completely resolved.  This was without antibiotics.  He never ran a fever.  He did not require IV diuretics; his renal function remained stable.  He was followed by nephrology which we agreed with all above.  Ace wraps were applied; his leg swelling did not come back.  It took a day to work out details for his home care because his wife is slowly becoming demented and cannot provide supervision for self and is less her ; the family is looking into nursing home placement or hiring assistance in the home.       The following portions of the patient's history were reviewed and updated as appropriate: allergies, current medications, past family history, past medical history, past social history, past surgical history and problem list.    Review of Systems  GENERAL:  Inactive/slower with limits, speed, stamina for age and gait and occ SOB. Sleep is ok with oxygen.  No fever now.  ENDO:  No syncope, near or diaphoretic sweaty spells.  HEENT: No head injury or headache usually.  No vision change.  Same hearing loss.  Ears without pain/drainage.  No sore throat.  No significant nasal/sinus congestion/drainage. No epistaxis.  CHEST: No chest wall tenderness or mass. Occ cough, with mild occ wheeze.  Variable SOB; no hemoptysis.  CV: No exertional chest pain, palpitations; variable end of day ankle edema.   GI: No  dysphagia.  No abdominal pain, diarrhea, constipation.  No rectal bleeding, or melena.  No nausea and vomiting/heartburn.    :  Voids without dysuria, or incontinence to completion.  ORTHO: No painful/swollen joints but various on /off sore; especially hands/fingers with motion.  No change occ sore neck or back.  No acute neck or back pain despite recent injury.   NEURO: Chronic/mild dizziness, and primarily  LE weakness of extremities.  No change UE/LE mild numbness/paresthesias.   PSYCH: Mild ? memory loss.  Mood good; occ anxious, depressed but/and not suicidal.  Tries to tolerate stress   Screening:  Mammogram: NA  Bone density: NA  Low dose CT chest:Tobacco-smoker/age 18/<1 ppd/dc 1965 (19) NA  GI:   Colonoscopy+nl/[facility]/9.5.12/5y-reminded  EGD+biop/[facility]/8.31.15  Prostate: urology past  Usual lab order  1m CBC, BMP, iron  6m CBC, CMP, iron  12m CBC, CMP, LIPID      Results for orders placed or performed during the hospital encounter of 02/06/20   Urinalysis With Culture If Indicated - Urine, Clean Catch   Result Value Ref Range    Color, UA Yellow Yellow, Straw    Appearance, UA Clear Clear    pH, UA 7.5 5.0 - 8.0    Specific Gravity, UA 1.009 1.005 - 1.030    Glucose, UA Negative Negative    Ketones, UA Negative Negative    Bilirubin, UA Negative Negative    Blood, UA Negative Negative    Protein, UA Negative Negative    Leuk Esterase, UA Trace (A) Negative    Nitrite, UA Negative Negative    Urobilinogen, UA 0.2 E.U./dL 0.2 - 1.0 E.U./dL   Comprehensive Metabolic Panel   Result Value Ref Range    Glucose 108 (H) 65 - 99 mg/dL    BUN 50 (H) 8 - 23 mg/dL    Creatinine 2.11 (H) 0.76 - 1.27 mg/dL    Sodium 138 136 - 145 mmol/L    Potassium 3.9 3.5 - 5.2 mmol/L    Chloride 97 (L) 98 - 107 mmol/L    CO2 28.0 22.0 - 29.0 mmol/L    Calcium 9.2 8.2 - 9.6 mg/dL    Total Protein 7.1 6.0 - 8.5 g/dL    Albumin 4.00 3.50 - 5.20 g/dL    ALT (SGPT) 10 1 - 41 U/L    AST (SGOT) 15 1 - 40 U/L    Alkaline Phosphatase 105 39 - 117 U/L    Total Bilirubin 0.4 0.2 - 1.2 mg/dL    eGFR Non African Amer 30 (L) >60 mL/min/1.73    Globulin 3.1 gm/dL    A/G Ratio 1.3 g/dL    BUN/Creatinine Ratio 23.7 7.0 - 25.0    Anion Gap 13.0 5.0 - 15.0 mmol/L   Iron Profile   Result Value Ref Range    Iron 26 (L) 59 - 158 mcg/dL    Iron Saturation 9 (L) 20 - 50 %    Transferrin 200 200 - 360 mg/dL    TIBC 298 298 - 536 mcg/dL   CBC  Auto Differential   Result Value Ref Range    WBC 5.71 3.40 - 10.80 10*3/mm3    RBC 4.04 (L) 4.14 - 5.80 10*6/mm3    Hemoglobin 11.8 (L) 13.0 - 17.7 g/dL    Hematocrit 35.5 (L) 37.5 - 51.0 %    MCV 87.9 79.0 - 97.0 fL    MCH 29.2 26.6 - 33.0 pg    MCHC 33.2 31.5 - 35.7 g/dL    RDW 13.2 12.3 - 15.4 %    RDW-SD 42.8 37.0 - 54.0 fl    MPV 9.1 6.0 - 12.0 fL    Platelets 199 140 - 450 10*3/mm3    Neutrophil % 74.5 42.7 - 76.0 %    Lymphocyte % 11.4 (L) 19.6 - 45.3 %    Monocyte % 10.5 5.0 - 12.0 %    Eosinophil % 2.5 0.3 - 6.2 %    Basophil % 0.7 0.0 - 1.5 %    Immature Grans % 0.4 0.0 - 0.5 %    Neutrophils, Absolute 4.26 1.70 - 7.00 10*3/mm3    Lymphocytes, Absolute 0.65 (L) 0.70 - 3.10 10*3/mm3    Monocytes, Absolute 0.60 0.10 - 0.90 10*3/mm3    Eosinophils, Absolute 0.14 0.00 - 0.40 10*3/mm3    Basophils, Absolute 0.04 0.00 - 0.20 10*3/mm3    Immature Grans, Absolute 0.02 0.00 - 0.05 10*3/mm3    nRBC 0.0 0.0 - 0.2 /100 WBC   Urinalysis, Microscopic Only - Urine, Clean Catch   Result Value Ref Range    RBC, UA 3-5 (A) None Seen /HPF    WBC, UA 0-2 (A) None Seen /HPF    Bacteria, UA None Seen None Seen /HPF    Squamous Epithelial Cells, UA None Seen None Seen, 0-2 /HPF    Hyaline Casts, UA 0-2 None Seen /LPF    Methodology Automated Microscopy    Basic Metabolic Panel   Result Value Ref Range    Glucose 95 65 - 99 mg/dL    BUN 48 (H) 8 - 23 mg/dL    Creatinine 2.31 (H) 0.76 - 1.27 mg/dL    Sodium 138 136 - 145 mmol/L    Potassium 4.1 3.5 - 5.2 mmol/L    Chloride 101 98 - 107 mmol/L    CO2 26.0 22.0 - 29.0 mmol/L    Calcium 8.8 8.2 - 9.6 mg/dL    eGFR Non African Amer 27 (L) >60 mL/min/1.73    BUN/Creatinine Ratio 20.8 7.0 - 25.0    Anion Gap 11.0 5.0 - 15.0 mmol/L   CBC Auto Differential   Result Value Ref Range    WBC 5.39 3.40 - 10.80 10*3/mm3    RBC 3.69 (L) 4.14 - 5.80 10*6/mm3    Hemoglobin 11.0 (L) 13.0 - 17.7 g/dL    Hematocrit 32.0 (L) 37.5 - 51.0 %    MCV 86.7 79.0 - 97.0 fL    MCH 29.8 26.6 - 33.0 pg    MCHC  34.4 31.5 - 35.7 g/dL    RDW 13.2 12.3 - 15.4 %    RDW-SD 41.3 37.0 - 54.0 fl    MPV 9.6 6.0 - 12.0 fL    Platelets 187 140 - 450 10*3/mm3    Neutrophil % 64.7 42.7 - 76.0 %    Lymphocyte % 16.5 (L) 19.6 - 45.3 %    Monocyte % 13.0 (H) 5.0 - 12.0 %    Eosinophil % 5.0 0.3 - 6.2 %    Basophil % 0.6 0.0 - 1.5 %    Immature Grans % 0.2 0.0 - 0.5 %    Neutrophils, Absolute 3.49 1.70 - 7.00 10*3/mm3    Lymphocytes, Absolute 0.89 0.70 - 3.10 10*3/mm3    Monocytes, Absolute 0.70 0.10 - 0.90 10*3/mm3    Eosinophils, Absolute 0.27 0.00 - 0.40 10*3/mm3    Basophils, Absolute 0.03 0.00 - 0.20 10*3/mm3    Immature Grans, Absolute 0.01 0.00 - 0.05 10*3/mm3    nRBC 0.0 0.0 - 0.2 /100 WBC   PTH, Intact   Result Value Ref Range    PTH, Intact 62.6 15.0 - 65.0 pg/mL   Basic Metabolic Panel   Result Value Ref Range    Glucose 97 65 - 99 mg/dL    BUN 44 (H) 8 - 23 mg/dL    Creatinine 2.17 (H) 0.76 - 1.27 mg/dL    Sodium 137 136 - 145 mmol/L    Potassium 4.3 3.5 - 5.2 mmol/L    Chloride 99 98 - 107 mmol/L    CO2 23.0 22.0 - 29.0 mmol/L    Calcium 9.2 8.2 - 9.6 mg/dL    eGFR Non African Amer 29 (L) >60 mL/min/1.73    BUN/Creatinine Ratio 20.3 7.0 - 25.0    Anion Gap 15.0 5.0 - 15.0 mmol/L   Uric Acid   Result Value Ref Range    Uric Acid 7.0 3.4 - 7.0 mg/dL   CBC Auto Differential   Result Value Ref Range    WBC 5.99 3.40 - 10.80 10*3/mm3    RBC 4.49 4.14 - 5.80 10*6/mm3    Hemoglobin 13.1 13.0 - 17.7 g/dL    Hematocrit 39.1 37.5 - 51.0 %    MCV 87.1 79.0 - 97.0 fL    MCH 29.2 26.6 - 33.0 pg    MCHC 33.5 31.5 - 35.7 g/dL    RDW 13.2 12.3 - 15.4 %    RDW-SD 42.0 37.0 - 54.0 fl    MPV 9.3 6.0 - 12.0 fL    Platelets 188 140 - 450 10*3/mm3    Neutrophil % 65.2 42.7 - 76.0 %    Lymphocyte % 16.5 (L) 19.6 - 45.3 %    Monocyte % 12.9 (H) 5.0 - 12.0 %    Eosinophil % 4.5 0.3 - 6.2 %    Basophil % 0.7 0.0 - 1.5 %    Immature Grans % 0.2 0.0 - 0.5 %    Neutrophils, Absolute 3.91 1.70 - 7.00 10*3/mm3    Lymphocytes, Absolute 0.99 0.70 - 3.10  10*3/mm3    Monocytes, Absolute 0.77 0.10 - 0.90 10*3/mm3    Eosinophils, Absolute 0.27 0.00 - 0.40 10*3/mm3    Basophils, Absolute 0.04 0.00 - 0.20 10*3/mm3    Immature Grans, Absolute 0.01 0.00 - 0.05 10*3/mm3    nRBC 0.0 0.0 - 0.2 /100 WBC   Basic Metabolic Panel   Result Value Ref Range    Glucose 101 (H) 65 - 99 mg/dL    BUN 45 (H) 8 - 23 mg/dL    Creatinine 2.26 (H) 0.76 - 1.27 mg/dL    Sodium 137 136 - 145 mmol/L    Potassium 4.0 3.5 - 5.2 mmol/L    Chloride 100 98 - 107 mmol/L    CO2 26.0 22.0 - 29.0 mmol/L    Calcium 8.4 8.2 - 9.6 mg/dL    eGFR Non African Amer 27 (L) >60 mL/min/1.73    BUN/Creatinine Ratio 19.9 7.0 - 25.0    Anion Gap 11.0 5.0 - 15.0 mmol/L   CBC Auto Differential   Result Value Ref Range    WBC 5.45 3.40 - 10.80 10*3/mm3    RBC 3.62 (L) 4.14 - 5.80 10*6/mm3    Hemoglobin 10.6 (L) 13.0 - 17.7 g/dL    Hematocrit 31.3 (L) 37.5 - 51.0 %    MCV 86.5 79.0 - 97.0 fL    MCH 29.3 26.6 - 33.0 pg    MCHC 33.9 31.5 - 35.7 g/dL    RDW 13.0 12.3 - 15.4 %    RDW-SD 41.0 37.0 - 54.0 fl    MPV 9.3 6.0 - 12.0 fL    Platelets 178 140 - 450 10*3/mm3    Neutrophil % 66.4 42.7 - 76.0 %    Lymphocyte % 14.5 (L) 19.6 - 45.3 %    Monocyte % 13.2 (H) 5.0 - 12.0 %    Eosinophil % 5.1 0.3 - 6.2 %    Basophil % 0.6 0.0 - 1.5 %    Immature Grans % 0.2 0.0 - 0.5 %    Neutrophils, Absolute 3.62 1.70 - 7.00 10*3/mm3    Lymphocytes, Absolute 0.79 0.70 - 3.10 10*3/mm3    Monocytes, Absolute 0.72 0.10 - 0.90 10*3/mm3    Eosinophils, Absolute 0.28 0.00 - 0.40 10*3/mm3    Basophils, Absolute 0.03 0.00 - 0.20 10*3/mm3    Immature Grans, Absolute 0.01 0.00 - 0.05 10*3/mm3    nRBC 0.0 0.0 - 0.2 /100 WBC       Lab Results   Component Value Date    PSA 1.070 11/08/2016        Lab Results:  CBC:  Lab Results - Last 18 Months   Lab Units 02/09/20  0553 02/08/20  0601 02/07/20  0356 02/06/20  1749 01/27/20  0823 10/14/19  1241 05/27/19  0413  03/29/19  0800 02/04/19  0807 01/14/19  0855 12/10/18  1241  10/11/18  1417   WBC 10*3/mm3  5.45 5.99 5.39 5.71 6.76 4.82 5.90   < > 8.69 5.43 6.45 7.81   < > 5.03   HEMOGLOBIN g/dL 10.6* 13.1 11.0* 11.8* 10.6* 10.6* 12.6*   < > 12.4* 12.1* 12.7* 11.9*   < > 11.8*   HEMATOCRIT % 31.3* 39.1 32.0* 35.5* 31.6* 32.2* 35.5*   < > 39.6 37.3* 41.0 36.8*   < > 33.7*   PLATELETS 10*3/mm3 178 188 187 199 181 173 201   < > 154 177 215 218   < > 212   IRON mcg/dL  --   --   --  26* 42* 60  --   --  34* 80 74 36*  --  45    < > = values in this interval not displayed.      BMP/CMP:  Lab Results - Last 18 Months   Lab Units 02/09/20  0553 02/08/20  0601 02/07/20  0356 02/06/20  1749 01/27/20  0823 10/14/19  1241 08/12/19  0806  05/06/19  0735 04/26/19  0747 04/08/19  1329 03/29/19  0800   SODIUM mmol/L 137 137 138 138 137 138 142   < > 139 136 135 138   POTASSIUM mmol/L 4.0 4.3 4.1 3.9 4.5 4.0 4.0   < > 4.1 4.2 4.2 4.1   CHLORIDE mmol/L 100 99 101 97* 98 99 102   < > 98 96* 97 97*   TOTAL CO2 mmol/L  --   --   --   --  25.8 26.3 24  --  27.9 26.6 23 23.7   CO2 mmol/L 26.0 23.0 26.0 28.0  --   --   --    < >  --   --   --   --    GLUCOSE mg/dL  --   --   --   --  78 101* 81  --  87 99 108* 94   BUN mg/dL 45* 44* 48* 50* 53* 36* 25   < > 36* 32* 27 42*   CREATININE mg/dL 2.26* 2.17* 2.31* 2.11* 2.49* 2.22* 2.05*   < > 2.35* 2.27* 1.94* 2.40*   EGFR IF NONAFRICN AM mL/min/1.73 27* 29* 27* 30* 24* 28* 28*   < > 26* 27* 30* 26*   EGFR IF AFRICN AM mL/min/1.73  --   --   --   --  30* 34* 32*  --  32* 33* 34* 31*   CALCIUM mg/dL 8.4 9.2 8.8 9.2 8.8 9.1 9.2   < > 9.5 9.8* 8.7 8.6    < > = values in this interval not displayed.     HEPATIC:  Lab Results - Last 18 Months   Lab Units 02/06/20  1749 10/14/19  1241 04/08/19  1329 03/29/19  0800 12/10/18  1241 10/11/18  1417 10/02/18  1002   ALT (SGPT) U/L 10 10 13 9 16 22 16   AST (SGOT) U/L 15 17 17 10 15 27 20   ALK PHOS U/L 105 82 93 72 87 57 89     THYROID:  Lab Results - Last 18 Months   Lab Units 10/11/18  1417   TSH mIU/mL 0.321*     A1C:No results for input(s): HGBA1C in the  "last 58881 hours.  PSA:No results for input(s): PSA in the last 10972 hours.        Objective   /68   Pulse 79   Temp 97.8 °F (36.6 °C)   Resp 16   Ht 177.8 cm (70\")   Wt 70.3 kg (155 lb)   SpO2 98%   BMI 22.24 kg/m²   Body mass index is 22.24 kg/m².    Physical Exam  GENERAL:  Thin AFA developed in no acute distress.  SKIN: Turgor excellent, without rash lesion.  LE wrapped aces.  HEENT: Normal cephalic without trauma.  Pupils equal round reactive to light. Extraocular motions full without nystagmus.     No thyromegaly, mass, tenderness, lymphadenopathy and supple.  CV: Irregular irregular rhythm.  No murmur, gallop; 1-2/4 pitting ankle lower leg edema. Posterior pulses intact.  No carotid bruits.  CHEST: No chest wall tenderness or mass.   LUNGS: Symmetric motion with clear/mild reduced to auscultation.   ABD: Soft, nontender without mass.   ORTHO: Symmetric extremities without swelling/point tenderness.  Full gross range of motion.   NEURO: CN 2-12 grossly intact.  Symmetric facies. 1/4 x bicep equal reflexes.  UE/LE   2-3/5 strength throughout.  Nonfocal use extremities. Speech clear.   PSYCH: Oriented x 3.  Pleasant calm, well kept.  Purposeful/directed conservation with intact short/long gross memory      Assessment/Plan     1. Leg edema    2. Varicose veins of both lower extremities, unspecified whether complicated    3. Venous stasis dermatitis of both lower extremities    4. Cellulitis of lower extremity, unspecified laterality    5. No one available at home to care for patient    6. Chronic kidney disease (CKD), stage IV (severe) (CMS/[facility])        Rx: reviewed/changes:  New Medications Ordered This Visit   Medications   • Elastic Bandages & Supports (JOBST FOR MEN 8-15MMHG LG) misc     Si Device Daily. Apply when up each morning; may remove at night     Dispense:  2 each     Refill:  0     Size that allows 8-15 mg Hg pressure       LAB/Testing/Referrals: reviewed/orders:   Today:   No " orders of the defined types were placed in this encounter.    Usual:   same    Discussions:   Keep aides;   Try to move to compression stockings; Rx written  Body mass index is 22.24 kg/m².   Patient's Body mass index is 22.24 kg/m². BMI is within normal parameters. No follow-up required..  Non-smoker  James Birch  reports that he quit smoking about 53 years ago. His smoking use included cigarettes. He has a 18.00 pack-year smoking history. He has never used smokeless tobacco..     There are no Patient Instructions on file for this visit.    Follow up: Return for IF WIFE has apt 2.25; keep but if not move 2.25.20 next wife apt or 2-3m.  Future Appointments   Date Time Provider Department Center   2/24/2020  8:30 AM [facility] [place] [facility] None   2/25/2020  1:45 PM Hemal William MD [facility] None   3/17/2020  2:00 PM Bill Church MD [facility] None   4/1/2020 11:30 AM Skyler Trimble MD [facility]   7/10/2020 11:00 AM Kiko Campos PA [facility]              Current outpatient and discharge medications have been reconciled for the patient.

## 2020-02-19 DIAGNOSIS — N00.9 ACUTE GLOMERULONEPHRITIS WITH PATHOLOGICAL LESION IN KIDNEY: ICD-10-CM

## 2020-02-19 RX ORDER — TAMSULOSIN HYDROCHLORIDE 0.4 MG/1
CAPSULE ORAL
Qty: 90 CAPSULE | Refills: 3 | Status: SHIPPED | OUTPATIENT
Start: 2020-02-19 | End: 2021-01-14

## 2020-02-24 ENCOUNTER — READMISSION MANAGEMENT (OUTPATIENT)
Dept: CALL CENTER | Facility: HOSPITAL | Age: 85
End: 2020-02-24

## 2020-02-24 DIAGNOSIS — N18.4 CHRONIC KIDNEY DISEASE (CKD), STAGE IV (SEVERE) (HCC): Primary | ICD-10-CM

## 2020-02-25 ENCOUNTER — READMISSION MANAGEMENT (OUTPATIENT)
Dept: CALL CENTER | Facility: HOSPITAL | Age: 85
End: 2020-02-25

## 2020-02-25 ENCOUNTER — OFFICE VISIT (OUTPATIENT)
Dept: FAMILY MEDICINE CLINIC | Facility: CLINIC | Age: 85
End: 2020-02-25

## 2020-02-25 VITALS
HEART RATE: 78 BPM | WEIGHT: 155 LBS | RESPIRATION RATE: 16 BRPM | TEMPERATURE: 97.5 F | DIASTOLIC BLOOD PRESSURE: 84 MMHG | OXYGEN SATURATION: 92 % | BODY MASS INDEX: 22.19 KG/M2 | HEIGHT: 70 IN | SYSTOLIC BLOOD PRESSURE: 130 MMHG

## 2020-02-25 DIAGNOSIS — R26.9 GAIT ABNORMALITY: ICD-10-CM

## 2020-02-25 DIAGNOSIS — M81.0 OSTEOPOROSIS, UNSPECIFIED OSTEOPOROSIS TYPE, UNSPECIFIED PATHOLOGICAL FRACTURE PRESENCE: ICD-10-CM

## 2020-02-25 DIAGNOSIS — R26.9 GAIT DIFFICULTY: Chronic | ICD-10-CM

## 2020-02-25 DIAGNOSIS — E78.2 MIXED HYPERLIPIDEMIA: Chronic | ICD-10-CM

## 2020-02-25 DIAGNOSIS — R60.0 LEG EDEMA: ICD-10-CM

## 2020-02-25 DIAGNOSIS — I50.32 CHF (CONGESTIVE HEART FAILURE), NYHA CLASS III, CHRONIC, DIASTOLIC (HCC): Chronic | ICD-10-CM

## 2020-02-25 DIAGNOSIS — Z79.01 ANTICOAGULATED: ICD-10-CM

## 2020-02-25 DIAGNOSIS — J44.9 COPD, GROUP B, BY GOLD 2013 CLASSIFICATION (HCC): Chronic | ICD-10-CM

## 2020-02-25 DIAGNOSIS — I87.2 VENOUS STASIS DERMATITIS OF BOTH LOWER EXTREMITIES: ICD-10-CM

## 2020-02-25 DIAGNOSIS — I10 ESSENTIAL HYPERTENSION: Chronic | ICD-10-CM

## 2020-02-25 DIAGNOSIS — E61.1 IRON DEFICIENCY: Primary | ICD-10-CM

## 2020-02-25 DIAGNOSIS — I48.19 PERSISTENT ATRIAL FIBRILLATION (HCC): ICD-10-CM

## 2020-02-25 DIAGNOSIS — N18.4 CHRONIC KIDNEY DISEASE (CKD), STAGE IV (SEVERE) (HCC): ICD-10-CM

## 2020-02-25 PROBLEM — I63.9 CEREBELLAR STROKE (HCC): Status: RESOLVED | Noted: 2019-12-19 | Resolved: 2020-02-25

## 2020-02-25 LAB
BUN SERPL-MCNC: 48 MG/DL (ref 8–23)
BUN/CREAT SERPL: 20.3 (ref 7–25)
CALCIUM SERPL-MCNC: 9.2 MG/DL (ref 8.2–9.6)
CHLORIDE SERPL-SCNC: 97 MMOL/L (ref 98–107)
CO2 SERPL-SCNC: 27.6 MMOL/L (ref 22–29)
CREAT SERPL-MCNC: 2.36 MG/DL (ref 0.76–1.27)
GLUCOSE SERPL-MCNC: 96 MG/DL (ref 65–99)
POTASSIUM SERPL-SCNC: 4.5 MMOL/L (ref 3.5–5.2)
SODIUM SERPL-SCNC: 136 MMOL/L (ref 136–145)

## 2020-02-25 PROCEDURE — 99213 OFFICE O/P EST LOW 20 MIN: CPT | Performed by: FAMILY MEDICINE

## 2020-02-25 NOTE — OUTREACH NOTE
Medical Week 3 Survey      Responses   Facility patient discharged from?  [place]   Does the patient have one of the following disease processes/diagnoses(primary or secondary)?  Other   Week 3 attempt successful?  No   Unsuccessful attempts  Attempt 1          Leslie Huertas RN

## 2020-02-25 NOTE — OUTREACH NOTE
Medical Week 3 Survey      Responses   Facility patient discharged from?  [facility]   Does the patient have one of the following disease processes/diagnoses(primary or secondary)?  Other   Week 3 attempt successful?  No   Unsuccessful attempts  Attempt 2          Martina Ambriz RN

## 2020-02-25 NOTE — PROGRESS NOTES
Subjective   James Birch is a 89 y.o. male presenting with chief complaint of:   Chief Complaint   Patient presents with   • Follow-up       History of Present Illness :  With wife.        Has multiple chronic problems to consider that might have a bearing on today's issues;  an interval appointment.       Chronic/acute problems reviewed today:   1. Iron deficiency: chronic/variable iron replaced and lab followed.  With age/other situations no aggressive eval.    2. Essential hypertension Chronic/stable. Stable here/if home blood pressures.  No significant chest pain, SOB, LE edema, orthopnea, near syncope, dizziness/light headness.      3. Mixed hyperlipidemia Chronic/stable.  Tolerated use of Rx with labs showing improved lipid values and tolerant liver labs. No muscle aches unexpected.      4. CHF (congestive heart failure), NYHA class III, chronic, diastolic (CMS/HCC) Chronic/stable.  Denies significant sob, orthopnea, leg edema, weight gain.  Aware of influence diet/salt and watching weight at home.       5. Persistent atrial fibrillation  Chronic/stable.   History of a fib/flutter.  Rhythm currently seems controlled.  Medications seem tolerated.  No syncope near syncope.     6. COPD, group B, by GOLD 2013 classification  Chronic/stable mild occ cough, sob, wheeze.  Rx helps.   No smoking.       7. Venous stasis dermatitis of both lower extremities: chronic/stable improved after last admit; transition from ACE wrap to stockings.  Hired help AM; now doing themselves.    8. Chronic kidney disease (CKD), stage IV (severe) (CMS/HCC) Chronic/stable.  Sees nephrology.  No dysuria.  Previously warned/reminded:   To avoid further kidney function decline  A. Treat any time you think you have infection  B. Stay hydrated (dont get dehydrated); drink at least 60 oz fluid every 24 hr (1800 cc or nearly a 2L)  C. Do not allow any xrays with dye WITHOUT the doctor ordering checking your renal function  D. Do not get new  medications without the doctor considering your renal condition  E. Do not use motrin/ibuprofen, alleve/naprosyn and these types of medications     9. Osteoporosis, unspecified osteoporosis type, unspecified pathological fracture presence Chronic/stable.  Last bone density/if below.   Has with renal history no Rx.  Declines further bone density screening when due.      10. Leg edema    11. Pazksfyaqhghcn-qvj-yham/xarelto Chronic/stable reason and use of.  Denies bleeding issues; especially epistaxis, melena, hematochezia.  Upper arms/others do bruise easily.  No significant bleeding or falls.      12. Gait abnormality: see below   13. Gait difficulty-cane/walker Chronic/stable:.  Ongoing issues with difficulties it results in difficulty with walking or gait.  Recent falls.  No recent injuries.  Uses to help gait: cane/or walker.       Has an/another acute issue today: none.    The following portions of the patient's history were reviewed and updated as appropriate: allergies, current medications, past family history, past medical history, past social history, past surgical history and problem list.      Current Outpatient Medications:   •  acetaminophen (TYLENOL) 500 MG tablet, Take 1 tablet by mouth Every 4 (Four) Hours As Needed for Mild Pain ., Disp: , Rfl:   •  azelastine (OPTIVAR) 0.05 % ophthalmic solution, Administer 1 drop to both eyes 2 (Two) Times a Day., Disp: , Rfl:   •  B Complex-C (SUPER B COMPLEX/VITAMIN C PO), Take 1 capsule by mouth Daily., Disp: , Rfl:   •  budesonide-formoterol (SYMBICORT) 80-4.5 MCG/ACT inhaler, USE 2 PUFFS TWICE DAILY, Disp: 30.6 g, Rfl: 3  •  carbonyl iron (FEOSOL) 45 MG tablet tablet, Take 1 tablet by mouth Daily., Disp: , Rfl:   •  Cholecalciferol (VITAMIN D3 PO), Take 2,000 Int'l Units by mouth Daily., Disp: , Rfl:   •  Coenzyme Q10 (CO Q-10 PO), Take 1 tablet by mouth Daily., Disp: , Rfl:   •  dutasteride (AVODART) 0.5 MG capsule, TAKE 1 CAPSULE DAILY GENERIC FOR AVODART,  Disp: 90 capsule, Rfl: 2  •  Elastic Bandages & Supports (JOBST FOR MEN 8-15MMHG LG) misc, 1 Device Daily. Apply when up each morning; may remove at night, Disp: 2 each, Rfl: 0  •  esomeprazole (nexIUM) 40 MG capsule, TAKE 1 CAPSULE DAILY BEFORE BREAKFAST GENERIC FOR NEXIUM, Disp: 90 capsule, Rfl: 2  •  guaiFENesin (MUCINEX) 600 MG 12 hr tablet, Take 600 mg by mouth Every 12 (Twelve) Hours., Disp: , Rfl:   •  ipratropium-albuterol (DUO-NEB) 0.5-2.5 mg/3 ml nebulizer, USE 1 VIAL IN NEBULIZER FOUR TIMES DAILY GENERIC FOR, Disp: 180 vial, Rfl: 5  •  midodrine (PROAMATINE) 10 MG tablet, TAKE ONE TABLET TWICE DAILY BEFORE A MEAL, Disp: 60 tablet, Rfl: 5  •  Multiple Vitamins-Minerals (MULTIVITAMIN PO), Take 1 tablet by mouth daily., Disp: , Rfl:   •  nystatin-triamcinolone (MYCOLOG II) 437477-8.1 UNIT/GM-% cream, Apply  topically to the appropriate area as directed Every 12 (Twelve) Hours., Disp: , Rfl:   •  O2 (OXYGEN), Inhale 1 L/min Every Night. As needed at bedtime. , Disp: , Rfl:   •  ofloxacin (OCUFLOX) 0.3 % ophthalmic solution, Administer 1 drop to both eyes Every 4 (Four) Hours., Disp: , Rfl:   •  Omega-3 Fatty Acids (FISH OIL PO), Take 1,000 mg by mouth 2 (Two) Times a Day., Disp: , Rfl:   •  polyethylene glycol (MIRALAX) packet, Take 17 g by mouth Daily., Disp: 30 each, Rfl: 1  •  PROAIR  (90 Base) MCG/ACT inhaler, Inhale 2 puffs Every 4 (Four) Hours As Needed for Shortness of Air., Disp: , Rfl: 5  •  tamsulosin (FLOMAX) 0.4 MG capsule 24 hr capsule, TAKE 1 CAPSULE DAILY SHORTLY AFTER THE SAME MEAL GENERIC FOR FLOMAX, Disp: 90 capsule, Rfl: 3  •  torsemide (DEMADEX) 20 MG tablet, Take 1 tablet by mouth Daily., Disp: 30 tablet, Rfl: 5  •  XARELTO 15 MG tablet, TAKE ONE TABLET DAILY, Disp: 90 tablet, Rfl: 4    No problems with medications.  Refills if needed done    No Known Allergies    Review of Systems  GENERAL:  Inactive/slower with limits, speed, stamina for age and gait and occ SOB. Sleep is ok with  oxygen.  No fever now.  ENDO:  No syncope, near or diaphoretic sweaty spells.  HEENT: No head injury or headache usually.  No vision change.  Same hearing loss.  Ears without pain/drainage.  No sore throat.  No significant nasal/sinus congestion/drainage. No epistaxis.  CHEST: No chest wall tenderness or mass. Occ cough, with mild occ wheeze.  Variable SOB; no hemoptysis.  CV: No exertional chest pain, palpitations; no further ankle edema.   GI: No  dysphagia.  No abdominal pain, diarrhea, constipation.  No rectal bleeding, or melena.  No nausea and vomiting/heartburn.    :  Voids without dysuria, or incontinence to completion.  ORTHO: No painful/swollen joints but various on /off sore; especially hands/fingers with motion.  No change occ sore neck or back.  No acute neck or back pain despite recent injury.   NEURO: Chronic/mild dizziness, and primarily LE weakness of extremities.  No change UE/LE mild numbness/paresthesias.   PSYCH: Mild ? memory loss.  Mood good; occ anxious, depressed but/and not suicidal.  Tries to tolerate stress   Screening:  Mammogram: NA  Bone density: NA  Low dose CT chest:Tobacco-smoker/age 18/<1 ppd/dc 1965 (19) NA  GI:   Colonoscopy+nl/[facility]/9.5.12/5y-reminded  EGD+biop/[facility]/8.31.15  Prostate: urology past  Usual lab order  1m CBC, BMP, iron  6m CBC, CMP, iron  12m CBC, CMP, LIPID    Lab Results:  Results for orders placed or performed in visit on 02/24/20   Basic Metabolic Panel   Result Value Ref Range    Glucose 96 65 - 99 mg/dL    BUN 48 (H) 8 - 23 mg/dL    Creatinine 2.36 (H) 0.76 - 1.27 mg/dL    eGFR Non African Am 26 (L) >60 mL/min/1.73    eGFR  Am 32 (L) >60 mL/min/1.73    BUN/Creatinine Ratio 20.3 7.0 - 25.0    Sodium 136 136 - 145 mmol/L    Potassium 4.5 3.5 - 5.2 mmol/L    Chloride 97 (L) 98 - 107 mmol/L    Total CO2 27.6 22.0 - 29.0 mmol/L    Calcium 9.2 8.2 - 9.6 mg/dL       A1C:No results for input(s): HGBA1C in the last 74857 hours.  PSA:No  results for input(s): PSA in the last 88487 hours.  CBC:  Lab Results - Last 18 Months   Lab Units 02/09/20  0553 02/08/20  0601 02/07/20  0356 02/06/20  1749 01/27/20  0823 10/14/19  1241 05/27/19  0413  03/29/19  0800 02/04/19  0807 01/14/19  0855 12/10/18  1241  10/11/18  1417   WBC 10*3/mm3 5.45 5.99 5.39 5.71 6.76 4.82 5.90   < > 8.69 5.43 6.45 7.81   < > 5.03   HEMOGLOBIN g/dL 10.6* 13.1 11.0* 11.8* 10.6* 10.6* 12.6*   < > 12.4* 12.1* 12.7* 11.9*   < > 11.8*   HEMATOCRIT % 31.3* 39.1 32.0* 35.5* 31.6* 32.2* 35.5*   < > 39.6 37.3* 41.0 36.8*   < > 33.7*   PLATELETS 10*3/mm3 178 188 187 199 181 173 201   < > 154 177 215 218   < > 212   IRON mcg/dL  --   --   --  26* 42* 60  --   --  34* 80 74 36*  --  45    < > = values in this interval not displayed.      BMP/CMP:  Lab Results - Last 18 Months   Lab Units 02/24/20  0727 02/09/20  0553 02/08/20  0601 02/07/20  0356 02/06/20  1749 01/27/20  0823 10/14/19  1241 08/12/19  0806  05/06/19  0735 04/26/19  0747 04/08/19  1329   SODIUM mmol/L 136 137 137 138 138 137 138 142   < > 139 136 135   POTASSIUM mmol/L 4.5 4.0 4.3 4.1 3.9 4.5 4.0 4.0   < > 4.1 4.2 4.2   CHLORIDE mmol/L 97* 100 99 101 97* 98 99 102   < > 98 96* 97   TOTAL CO2 mmol/L 27.6  --   --   --   --  25.8 26.3 24  --  27.9 26.6 23   CO2 mmol/L  --  26.0 23.0 26.0 28.0  --   --   --    < >  --   --   --    GLUCOSE mg/dL 96  --   --   --   --  78 101* 81  --  87 99 108*   BUN mg/dL 48* 45* 44* 48* 50* 53* 36* 25   < > 36* 32* 27   CREATININE mg/dL 2.36* 2.26* 2.17* 2.31* 2.11* 2.49* 2.22* 2.05*   < > 2.35* 2.27* 1.94*   EGFR IF NONAFRICN AM mL/min/1.73 26* 27* 29* 27* 30* 24* 28* 28*   < > 26* 27* 30*   EGFR IF AFRICN AM mL/min/1.73 32*  --   --   --   --  30* 34* 32*  --  32* 33* 34*   CALCIUM mg/dL 9.2 8.4 9.2 8.8 9.2 8.8 9.1 9.2   < > 9.5 9.8* 8.7    < > = values in this interval not displayed.     HEPATIC:  Lab Results - Last 18 Months   Lab Units 02/06/20  1749 10/14/19  1241 04/08/19  1322  "03/29/19  0800 12/10/18  1241 10/11/18  1417 10/02/18  1002   ALT (SGPT) U/L 10 10 13 9 16 22 16   AST (SGOT) U/L 15 17 17 10 15 27 20   ALK PHOS U/L 105 82 93 72 87 57 89     THYROID:  Lab Results - Last 18 Months   Lab Units 10/11/18  1417   TSH mIU/mL 0.321*       Objective   /84 (BP Location: Left arm)   Pulse 78   Temp 97.5 °F (36.4 °C) (Temporal)   Resp 16   Ht 177.8 cm (70\")   Wt 70.3 kg (155 lb)   SpO2 92%   BMI 22.24 kg/m²   Body mass index is 22.24 kg/m².    Physical Exam  GENERAL:  Thin AFA developed in no acute distress.  SKIN: Turgor excellent, without rash lesion.  LE compressive stockings.   HEENT: Normal cephalic without trauma.  Pupils equal round reactive to light. Extraocular motions full without nystagmus.     No thyromegaly, mass, tenderness, lymphadenopathy and supple.  CV: Irregular irregular rhythm.  No murmur, gallop; no lower leg edema. Posterior pulses intact.  No carotid bruits.  CHEST: No chest wall tenderness or mass.   LUNGS: Symmetric motion with clear/mild reduced to auscultation.   ABD: Soft, nontender without mass.   ORTHO: Symmetric extremities without swelling/point tenderness.  Full gross range of motion.   NEURO: CN 2-12 grossly intact.  Symmetric facies. 1/4 x bicep equal reflexes.  UE/LE   2-3/5 strength throughout.  Nonfocal use extremities. Speech clear.   PSYCH: Oriented x 3.  Pleasant calm, well kept.  Purposeful/directed conservation with intact short/long gross memory    Assessment/Plan     1. Iron deficiency    2. Essential hypertension    3. Mixed hyperlipidemia    4. CHF (congestive heart failure), NYHA class III, chronic, diastolic (CMS/HCC)    5. Persistent atrial fibrillation    6. COPD, group B, by GOLD 2013 classification     7. Venous stasis dermatitis of both lower extremities    8. Chronic kidney disease (CKD), stage IV (severe) (CMS/HCC)    9. Osteoporosis, unspecified osteoporosis type, unspecified pathological fracture presence    10. Leg " edema    11. Jmwyadiaqlvxsw-nnb-wwbi/xarelto    12. Gait abnormality    13. Gait difficulty-cane/walker        Rx: reviewed/changes:  New Medications Ordered This Visit   Medications   • carbonyl iron (FEOSOL) 45 MG tablet tablet     Sig: Take 1 tablet by mouth Daily.     Dispense:  30 tablet     Refill:  2     LAB/Testing/Referrals: reviewed/orders:   Today:   No orders of the defined types were placed in this encounter.    Chronic/recurrent labs above or change to:   same     Discussions:   Refill iron  Body mass index is 22.24 kg/m².  Patient's Body mass index is 22.24 kg/m². BMI is within normal parameters. No follow-up required..    Tobacco use reviewed:    Non-smoker  James Birch  reports that he quit smoking about 53 years ago. His smoking use included cigarettes. He has a 18.00 pack-year smoking history. He has never used smokeless tobacco..  There are no Patient Instructions on file for this visit.    Follow up: Return for lab one month AND lab/Dr William .  Future Appointments   Date Time Provider Department Center   3/17/2020  2:00 PM Bill Church MD [facility] None   3/25/2020  8:35 AM [facility] [place] [facility] None   4/1/2020 11:30 AM Skyler Trimble MD [facility]   5/22/2020  9:00 AM [facility] [place] [facility] None   5/26/2020  2:00 PM Hemal William MD [facility] None   7/10/2020 11:00 AM Kiko Campos PA [facility]

## 2020-02-26 ENCOUNTER — TELEPHONE (OUTPATIENT)
Dept: FAMILY MEDICINE CLINIC | Facility: CLINIC | Age: 85
End: 2020-02-26

## 2020-02-26 NOTE — TELEPHONE ENCOUNTER
Spoke with son and daughter-in-law and reflected on yesterday's visits and certainly obvious problems with patient in wife managing their meds    It appears an effort is been put forth to have [place] encouraged the patient's to take their meds and it would be simpler if we can get this down to twice daily    So  AM Rx  Tylenol 500 mg  optivar 0.05 ophthalmic one drop both eyes  Multivitamin one  symbicort two puffs  Iron one  Vitamin D 1000 one  avodart 0.5 mg one  flomax 0.4 one  proamatine 10 mg one  xarelto 15 one  nexium 40 mg one  duoneb one treatment  miralax one dose  demadex 20 mg one      Supper Rx  Tylenol 500 mg one  optivar 0.05 ophthalmic one drop both eyes  symbicort two puffs  Vitamin D 1000 international units one  duoneb one treatment  proamatine 10 mg one    I dont know about ocoflox; eye antibiotics; one would have to check with eye MD as that might have been an acute/short term Rx  I think the other inhalers, treatments, Rx are nonessentiall and problably not being given very often anyway    Give this list to son and see if he can work out supervision of the Rx with someone    Current Outpatient Medications:   •  acetaminophen (TYLENOL) 500 MG tablet, Take 1 tablet by mouth Every 4 (Four) Hours As Needed for Mild Pain ., Disp: , Rfl:   •  azelastine (OPTIVAR) 0.05 % ophthalmic solution, Administer 1 drop to both eyes 2 (Two) Times a Day., Disp: , Rfl:   •  B Complex-C (SUPER B COMPLEX/VITAMIN C PO), Take 1 capsule by mouth Daily., Disp: , Rfl:   •  budesonide-formoterol (SYMBICORT) 80-4.5 MCG/ACT inhaler, USE 2 PUFFS TWICE DAILY, Disp: 30.6 g, Rfl: 3  •  carbonyl iron (FEOSOL) 45 MG tablet tablet, Take 1 tablet by mouth Daily., Disp: 30 tablet, Rfl: 2  •  Cholecalciferol (VITAMIN D3 PO), Take 2,000 Int'l Units by mouth Daily., Disp: , Rfl:   •  Coenzyme Q10 (CO Q-10 PO), Take 1 tablet by mouth Daily., Disp: , Rfl:   •  dutasteride (AVODART) 0.5 MG capsule, TAKE 1 CAPSULE DAILY GENERIC FOR  AVODART, Disp: 90 capsule, Rfl: 2  •  Elastic Bandages & Supports (JOBST FOR MEN 8-15MMHG LG) misc, 1 Device Daily. Apply when up each morning; may remove at night, Disp: 2 each, Rfl: 0  •  esomeprazole (nexIUM) 40 MG capsule, TAKE 1 CAPSULE DAILY BEFORE BREAKFAST GENERIC FOR NEXIUM, Disp: 90 capsule, Rfl: 2  •  guaiFENesin (MUCINEX) 600 MG 12 hr tablet, Take 600 mg by mouth Every 12 (Twelve) Hours., Disp: , Rfl:   •  ipratropium-albuterol (DUO-NEB) 0.5-2.5 mg/3 ml nebulizer, USE 1 VIAL IN NEBULIZER FOUR TIMES DAILY GENERIC FOR, Disp: 180 vial, Rfl: 5  •  midodrine (PROAMATINE) 10 MG tablet, TAKE ONE TABLET TWICE DAILY BEFORE A MEAL, Disp: 60 tablet, Rfl: 5  •  Multiple Vitamins-Minerals (MULTIVITAMIN PO), Take 1 tablet by mouth daily., Disp: , Rfl:   •  nystatin-triamcinolone (MYCOLOG II) 657512-8.1 UNIT/GM-% cream, Apply  topically to the appropriate area as directed Every 12 (Twelve) Hours., Disp: , Rfl:   •  O2 (OXYGEN), Inhale 1 L/min Every Night. As needed at bedtime. , Disp: , Rfl:   •  ofloxacin (OCUFLOX) 0.3 % ophthalmic solution, Administer 1 drop to both eyes Every 4 (Four) Hours., Disp: , Rfl:   •  Omega-3 Fatty Acids (FISH OIL PO), Take 1,000 mg by mouth 2 (Two) Times a Day., Disp: , Rfl:   •  polyethylene glycol (MIRALAX) packet, Take 17 g by mouth Daily., Disp: 30 each, Rfl: 1  •  PROAIR  (90 Base) MCG/ACT inhaler, Inhale 2 puffs Every 4 (Four) Hours As Needed for Shortness of Air., Disp: , Rfl: 5  •  tamsulosin (FLOMAX) 0.4 MG capsule 24 hr capsule, TAKE 1 CAPSULE DAILY SHORTLY AFTER THE SAME MEAL GENERIC FOR FLOMAX, Disp: 90 capsule, Rfl: 3  •  torsemide (DEMADEX) 20 MG tablet, Take 1 tablet by mouth Daily., Disp: 30 tablet, Rfl: 5  •  XARELTO 15 MG tablet, TAKE ONE TABLET DAILY, Disp: 90 tablet, Rfl: 4

## 2020-02-27 ENCOUNTER — OUTSIDE FACILITY SERVICE (OUTPATIENT)
Dept: FAMILY MEDICINE CLINIC | Facility: CLINIC | Age: 85
End: 2020-02-27

## 2020-02-27 PROCEDURE — G0180 MD CERTIFICATION HHA PATIENT: HCPCS | Performed by: FAMILY MEDICINE

## 2020-03-05 ENCOUNTER — TELEPHONE (OUTPATIENT)
Dept: FAMILY MEDICINE CLINIC | Facility: CLINIC | Age: 85
End: 2020-03-05

## 2020-03-05 NOTE — TELEPHONE ENCOUNTER
HOME [facility] PHYS[facility]ERAPY CALLED AND  IS REQUESTING A CONTINUATION OF THERAPY THROUGH 3/27. HOME HEALTH STATED THAT IF THEY DO NOT HEAR ANYTHING BACK THEN THEY WILL GO AHEAD WITH THERAPY.

## 2020-03-09 ENCOUNTER — TELEPHONE (OUTPATIENT)
Dept: FAMILY MEDICINE CLINIC | Facility: CLINIC | Age: 85
End: 2020-03-09

## 2020-03-09 NOTE — TELEPHONE ENCOUNTER
Carri called to make sure you knew and are ok with the pt taking the DG Cold/Mucus relief.  He has finished a bottle over the last 1 week and he does state it has helped him.  He has not felt good for passed 1 week with cold like symptoms. His O2 sat is at 95% without oxygen and he does not seem to have anything in lungs.

## 2020-03-24 DIAGNOSIS — Z79.01 ANTICOAGULATED: ICD-10-CM

## 2020-03-24 DIAGNOSIS — N18.4 CHRONIC KIDNEY DISEASE (CKD), STAGE IV (SEVERE) (HCC): ICD-10-CM

## 2020-03-24 DIAGNOSIS — I50.32 CHF (CONGESTIVE HEART FAILURE), NYHA CLASS III, CHRONIC, DIASTOLIC (HCC): ICD-10-CM

## 2020-03-24 DIAGNOSIS — E61.1 IRON DEFICIENCY: Primary | ICD-10-CM

## 2020-03-24 DIAGNOSIS — I10 ESSENTIAL HYPERTENSION: ICD-10-CM

## 2020-03-24 DIAGNOSIS — M81.0 OSTEOPOROSIS, UNSPECIFIED OSTEOPOROSIS TYPE, UNSPECIFIED PATHOLOGICAL FRACTURE PRESENCE: ICD-10-CM

## 2020-03-25 ENCOUNTER — RESULTS ENCOUNTER (OUTPATIENT)
Dept: FAMILY MEDICINE CLINIC | Facility: CLINIC | Age: 85
End: 2020-03-25

## 2020-03-25 DIAGNOSIS — I50.32 CHF (CONGESTIVE HEART FAILURE), NYHA CLASS III, CHRONIC, DIASTOLIC (HCC): ICD-10-CM

## 2020-03-25 DIAGNOSIS — Z79.01 ANTICOAGULATED: ICD-10-CM

## 2020-03-25 DIAGNOSIS — E61.1 IRON DEFICIENCY: ICD-10-CM

## 2020-03-25 DIAGNOSIS — I10 ESSENTIAL HYPERTENSION: ICD-10-CM

## 2020-03-25 DIAGNOSIS — N18.4 CHRONIC KIDNEY DISEASE (CKD), STAGE IV (SEVERE) (HCC): ICD-10-CM

## 2020-03-25 DIAGNOSIS — M81.0 OSTEOPOROSIS, UNSPECIFIED OSTEOPOROSIS TYPE, UNSPECIFIED PATHOLOGICAL FRACTURE PRESENCE: ICD-10-CM

## 2020-04-02 ENCOUNTER — TELEPHONE (OUTPATIENT)
Dept: FAMILY MEDICINE CLINIC | Facility: CLINIC | Age: 85
End: 2020-04-02

## 2020-04-02 NOTE — TELEPHONE ENCOUNTER
"Called [facility] and spoke to Monique \"I am in there three times a day for med checks and he comes out three times a day for meals and we havnt noticed him coughing or being ill? Ill go down and check his temp and call you back\"    \"I checked his temp 97.9, no cough, no runny nose, pt told me he was feeling fine. The  said he would not kept out for 14 days, they would just be quarantined to his room. His wife is the one that said he coughs at night. Mr Birch said he didn't want to go anywhere because he wasn't sick\"    Monique did advise they will keep them in their room and monitor them for illness.   Told Jose phelps would send to Dr William and let them know  "

## 2020-04-02 NOTE — TELEPHONE ENCOUNTER
PT'S WIFE CALLED AND STATED HE HAS A BAD COLD AND WOULD LIKE TO KNOW IF SOMETHING COULD BE PRESCRIBED FOR HIM.    MADISYN STATED [facility] IS ON LOCK DOWN AND SOMETHING WOULD HAVE TO BE DELIVERED TO THEM.    PLEASE ADVISE PT.     PT'S CALLBACK NUMBER: [phone]

## 2020-04-02 NOTE — TELEPHONE ENCOUNTER
Call [facility] and see if they will     A allow him to go to the respiratory clinic at [facility] be checked including COVID-19 and then come back to the facility.  We have to know that still having him do this is not going to get him to where he has to go somewhere for 14 days outside of the building.

## 2020-04-02 NOTE — TELEPHONE ENCOUNTER
No fever, sometimes he does have SOB mostly at night when he reclines back in chair, and yes he does have increased cough more than his usual.

## 2020-04-27 DIAGNOSIS — E61.1 IRON DEFICIENCY: ICD-10-CM

## 2020-05-18 ENCOUNTER — TELEPHONE (OUTPATIENT)
Dept: FAMILY MEDICINE CLINIC | Facility: CLINIC | Age: 85
End: 2020-05-18

## 2020-05-18 DIAGNOSIS — J44.9 COPD, GROUP B, BY GOLD 2013 CLASSIFICATION (HCC): ICD-10-CM

## 2020-05-18 RX ORDER — IPRATROPIUM BROMIDE AND ALBUTEROL SULFATE 2.5; .5 MG/3ML; MG/3ML
SOLUTION RESPIRATORY (INHALATION)
Qty: 180 VIAL | Refills: 5 | Status: SHIPPED | OUTPATIENT
Start: 2020-05-18 | End: 2021-04-28

## 2020-05-18 NOTE — TELEPHONE ENCOUNTER
PATIENT'S DAUGHTER-IN-LAW, NYDIA, CALLED REQUESTING THE MEDICATION REFILL BE DELIVERED TO PATIENT'S APARTMENT.    NYDIA'S CALLBACK NUMBER: [phone]

## 2020-05-20 DIAGNOSIS — N00.9 ACUTE GLOMERULONEPHRITIS WITH PATHOLOGICAL LESION IN KIDNEY: ICD-10-CM

## 2020-05-20 RX ORDER — MIDODRINE HYDROCHLORIDE 10 MG/1
TABLET ORAL
Qty: 60 TABLET | Refills: 5 | Status: SHIPPED | OUTPATIENT
Start: 2020-05-20 | End: 2020-11-11

## 2020-05-21 DIAGNOSIS — M81.0 OSTEOPOROSIS, UNSPECIFIED OSTEOPOROSIS TYPE, UNSPECIFIED PATHOLOGICAL FRACTURE PRESENCE: ICD-10-CM

## 2020-05-21 DIAGNOSIS — R60.0 LEG EDEMA: ICD-10-CM

## 2020-05-21 DIAGNOSIS — Z00.00 WELLNESS EXAMINATION: ICD-10-CM

## 2020-05-21 DIAGNOSIS — N18.4 CHRONIC KIDNEY DISEASE (CKD), STAGE IV (SEVERE) (HCC): ICD-10-CM

## 2020-05-21 DIAGNOSIS — E78.2 MIXED HYPERLIPIDEMIA: Chronic | ICD-10-CM

## 2020-05-21 DIAGNOSIS — Z87.442 HISTORY OF KIDNEY STONES: ICD-10-CM

## 2020-05-21 DIAGNOSIS — I50.32 CHF (CONGESTIVE HEART FAILURE), NYHA CLASS III, CHRONIC, DIASTOLIC (HCC): Primary | Chronic | ICD-10-CM

## 2020-05-21 DIAGNOSIS — N40.0 PROSTATISM: Chronic | ICD-10-CM

## 2020-05-22 ENCOUNTER — RESULTS ENCOUNTER (OUTPATIENT)
Dept: FAMILY MEDICINE CLINIC | Facility: CLINIC | Age: 85
End: 2020-05-22

## 2020-05-22 DIAGNOSIS — N18.4 CHRONIC KIDNEY DISEASE (CKD), STAGE IV (SEVERE) (HCC): ICD-10-CM

## 2020-05-22 DIAGNOSIS — R60.0 LEG EDEMA: ICD-10-CM

## 2020-05-22 DIAGNOSIS — E78.2 MIXED HYPERLIPIDEMIA: Chronic | ICD-10-CM

## 2020-05-22 DIAGNOSIS — Z87.442 HISTORY OF KIDNEY STONES: ICD-10-CM

## 2020-05-22 DIAGNOSIS — N40.0 PROSTATISM: Chronic | ICD-10-CM

## 2020-05-22 DIAGNOSIS — M81.0 OSTEOPOROSIS, UNSPECIFIED OSTEOPOROSIS TYPE, UNSPECIFIED PATHOLOGICAL FRACTURE PRESENCE: ICD-10-CM

## 2020-05-22 DIAGNOSIS — Z00.00 WELLNESS EXAMINATION: ICD-10-CM

## 2020-05-22 DIAGNOSIS — I50.32 CHF (CONGESTIVE HEART FAILURE), NYHA CLASS III, CHRONIC, DIASTOLIC (HCC): Chronic | ICD-10-CM

## 2020-05-23 LAB
25(OH)D3+25(OH)D2 SERPL-MCNC: 80.3 NG/ML (ref 30–100)
ALBUMIN SERPL-MCNC: 4.2 G/DL (ref 3.5–5.2)
ALBUMIN/GLOB SERPL: 2.2 G/DL
ALP SERPL-CCNC: 79 U/L (ref 39–117)
ALT SERPL-CCNC: 7 U/L (ref 1–41)
APPEARANCE UR: CLEAR
AST SERPL-CCNC: 12 U/L (ref 1–40)
BASOPHILS # BLD AUTO: 0.03 10*3/MM3 (ref 0–0.2)
BASOPHILS NFR BLD AUTO: 0.5 % (ref 0–1.5)
BILIRUB SERPL-MCNC: 0.5 MG/DL (ref 0.2–1.2)
BILIRUB UR QL STRIP: NEGATIVE
BUN SERPL-MCNC: 44 MG/DL (ref 8–23)
BUN/CREAT SERPL: 19.2 (ref 7–25)
CALCIUM SERPL-MCNC: 9.2 MG/DL (ref 8.2–9.6)
CHLORIDE SERPL-SCNC: 98 MMOL/L (ref 98–107)
CHOLEST SERPL-MCNC: 158 MG/DL (ref 0–200)
CO2 SERPL-SCNC: 28.2 MMOL/L (ref 22–29)
COLOR UR: YELLOW
CREAT SERPL-MCNC: 2.29 MG/DL (ref 0.76–1.27)
CREAT UR-MCNC: 84.7 MG/DL
EOSINOPHIL # BLD AUTO: 0.29 10*3/MM3 (ref 0–0.4)
EOSINOPHIL NFR BLD AUTO: 4.7 % (ref 0.3–6.2)
ERYTHROCYTE [DISTWIDTH] IN BLOOD BY AUTOMATED COUNT: 14 % (ref 12.3–15.4)
FERRITIN SERPL-MCNC: 133 NG/ML (ref 30–400)
FOLATE SERPL-MCNC: >20 NG/ML (ref 4.78–24.2)
GLOBULIN SER CALC-MCNC: 1.9 GM/DL
GLUCOSE SERPL-MCNC: 97 MG/DL (ref 65–99)
GLUCOSE UR QL: NEGATIVE
HCT VFR BLD AUTO: 34.8 % (ref 37.5–51)
HDLC SERPL-MCNC: 48 MG/DL (ref 40–60)
HGB BLD-MCNC: 12 G/DL (ref 13–17.7)
HGB UR QL STRIP: NEGATIVE
IMM GRANULOCYTES # BLD AUTO: 0.03 10*3/MM3 (ref 0–0.05)
IMM GRANULOCYTES NFR BLD AUTO: 0.5 % (ref 0–0.5)
IRON SERPL-MCNC: 87 MCG/DL (ref 59–158)
KETONES UR QL STRIP: NEGATIVE
LDLC SERPL CALC-MCNC: 91 MG/DL (ref 0–100)
LEUKOCYTE ESTERASE UR QL STRIP: ABNORMAL
LYMPHOCYTES # BLD AUTO: 1.22 10*3/MM3 (ref 0.7–3.1)
LYMPHOCYTES NFR BLD AUTO: 20 % (ref 19.6–45.3)
MAGNESIUM SERPL-MCNC: 2.4 MG/DL (ref 1.7–2.3)
MCH RBC QN AUTO: 30.2 PG (ref 26.6–33)
MCHC RBC AUTO-ENTMCNC: 34.5 G/DL (ref 31.5–35.7)
MCV RBC AUTO: 87.7 FL (ref 79–97)
MONOCYTES # BLD AUTO: 0.75 10*3/MM3 (ref 0.1–0.9)
MONOCYTES NFR BLD AUTO: 12.3 % (ref 5–12)
NEUTROPHILS # BLD AUTO: 3.79 10*3/MM3 (ref 1.7–7)
NEUTROPHILS NFR BLD AUTO: 62 % (ref 42.7–76)
NITRITE UR QL STRIP: NEGATIVE
NRBC BLD AUTO-RTO: 0 /100 WBC (ref 0–0.2)
PH UR STRIP: 6.5 [PH] (ref 5–8)
PLATELET # BLD AUTO: 231 10*3/MM3 (ref 140–450)
POTASSIUM SERPL-SCNC: 4.6 MMOL/L (ref 3.5–5.2)
PROT SERPL-MCNC: 6.1 G/DL (ref 6–8.5)
PROT UR QL STRIP: NEGATIVE
PROT UR-MCNC: 15 MG/DL
PROT/CREAT UR: 177.1 MG/G CREA (ref 0–200)
PTH-INTACT SERPL-MCNC: 64 PG/ML (ref 15–65)
RBC # BLD AUTO: 3.97 10*6/MM3 (ref 4.14–5.8)
SODIUM SERPL-SCNC: 138 MMOL/L (ref 136–145)
SP GR UR: 1.02 (ref 1–1.03)
TRIGL SERPL-MCNC: 97 MG/DL (ref 0–150)
TSH SERPL DL<=0.005 MIU/L-ACNC: 1.5 UIU/ML (ref 0.27–4.2)
URATE SERPL-MCNC: 7.5 MG/DL (ref 3.4–7)
UROBILINOGEN UR STRIP-MCNC: ABNORMAL MG/DL
VIT B12 SERPL-MCNC: 883 PG/ML (ref 211–946)
VLDLC SERPL CALC-MCNC: 19.4 MG/DL (ref 5–40)
WBC # BLD AUTO: 6.11 10*3/MM3 (ref 3.4–10.8)

## 2020-06-17 ENCOUNTER — OFFICE VISIT (OUTPATIENT)
Dept: FAMILY MEDICINE CLINIC | Facility: CLINIC | Age: 85
End: 2020-06-17

## 2020-06-17 VITALS
WEIGHT: 152 LBS | RESPIRATION RATE: 16 BRPM | DIASTOLIC BLOOD PRESSURE: 72 MMHG | SYSTOLIC BLOOD PRESSURE: 116 MMHG | HEART RATE: 127 BPM | TEMPERATURE: 98 F | OXYGEN SATURATION: 100 % | BODY MASS INDEX: 21.76 KG/M2 | HEIGHT: 70 IN

## 2020-06-17 DIAGNOSIS — I10 ESSENTIAL HYPERTENSION: Chronic | ICD-10-CM

## 2020-06-17 DIAGNOSIS — I49.9 CARDIAC ARRHYTHMIA, UNSPECIFIED CARDIAC ARRHYTHMIA TYPE: ICD-10-CM

## 2020-06-17 DIAGNOSIS — N18.4 CHRONIC KIDNEY DISEASE (CKD), STAGE IV (SEVERE) (HCC): ICD-10-CM

## 2020-06-17 DIAGNOSIS — R26.9 GAIT DIFFICULTY: Chronic | ICD-10-CM

## 2020-06-17 DIAGNOSIS — Z79.01 ANTICOAGULATED: ICD-10-CM

## 2020-06-17 DIAGNOSIS — E78.2 MIXED HYPERLIPIDEMIA: Chronic | ICD-10-CM

## 2020-06-17 DIAGNOSIS — M25.50 ARTHRALGIA, UNSPECIFIED JOINT: Primary | ICD-10-CM

## 2020-06-17 DIAGNOSIS — J44.9 COPD, GROUP B, BY GOLD 2013 CLASSIFICATION (HCC): Chronic | ICD-10-CM

## 2020-06-17 DIAGNOSIS — I50.32 CHF (CONGESTIVE HEART FAILURE), NYHA CLASS III, CHRONIC, DIASTOLIC (HCC): Chronic | ICD-10-CM

## 2020-06-17 PROCEDURE — 99214 OFFICE O/P EST MOD 30 MIN: CPT | Performed by: FAMILY MEDICINE

## 2020-06-17 NOTE — PROGRESS NOTES
Subjective   James Birch is a 89 y.o. male presenting with chief complaint of:   Chief Complaint   Patient presents with   • Follow-up       History of Present Illness :  With son/wife.       Has multiple chronic problems to consider that might have a bearing on today's issues;  an interval appointment.       Chronic/acute problems reviewed today:   1. Arthralgia, unspecified joint: Chronic/verbal but tolerated levels of joint discomforts.  Not a candidate for surgical intervention.  Currently not requiring injections.   2. Essential hypertension: Chronic/stable. Stable here past/no recent home blood pressures.  No significant chest pain, SOB, LE edema, orthopnea, near syncope; same/occ dizziness/light headness (somewhat also related to prior cerebellar stroke).   Recent Vitals       2/18/2020 2/25/2020 6/17/2020       BP:  106/68  130/84  116/72     Pulse:  79  78  (!) 127     Temp:  97.8 °F (36.6 °C)  97.5 °F (36.4 °C)  98 °F (36.7 °C)     Weight:  70.3 kg (155 lb)  70.3 kg (155 lb)  68.9 kg (152 lb)     BMI (Calculated):  22.2  22.2  21.8               3. Mixed hyperlipidemia: Chronic/stable.  Tolerated use of Rx with labs showing improved lipid values and tolerant liver labs. No muscle aches unexpected.      4. CHF (congestive heart failure), NYHA class III, chronic, diastolic (CMS/HCC): Chronic/stable.  Denies significant sob, orthopnea, leg edema, weight gain.  Aware of influence diet/salt and watching weight at home.       5. Cardiac arrhythmia, unspecified cardiac arrhythmia type: Chronic/variable.   History of a fib.  Rhythm currently seems controlled.  Medications seem tolerated.  No syncope near syncope.     6. COPD, group B, by GOLD 2013 classification: Chronic/stable mild occ cough, sob, wheeze.  Rx helps.  Past/no current smoking.         7. Chronic kidney disease (CKD), stage IV (severe) (CMS/HCC): Chronic/stable.  Sees nephrology.  No dysuria.  Previously warned/reminded:   To avoid further  kidney function decline  A. Treat any time you think you have infection  B. Stay hydrated (dont get dehydrated); drink at least 60 oz fluid every 24 hr (1800 cc or nearly a 2L)  C. Do not allow any xrays with dye WITHOUT the doctor ordering checking your renal function  D. Do not get new medications without the doctor considering your renal condition  E. Do not use motrin/ibuprofen, alleve/naprosyn and these types of medications     8. Gait difficulty-cane/walker: Chronic/stable:.  Ongoing issues with difficulties it results in difficulty with walking or gait.  No recent falls.  No recent injuries.  Uses to help gait: walker/cane.      9. Obxiafjpvkoqxs-vym-chvf/xarelto: Chronic/stable reason and use of.  Denies bleeding issues; especially epistaxis, melena, hematochezia.  Upper arms/others do bruise easily.  No significant bleeding or falls.        Has an/another acute issue today: none.    The following portions of the patient's history were reviewed and updated as appropriate: allergies, current medications, past family history, past medical history, past social history, past surgical history and problem list.      Current Outpatient Medications:   •  acetaminophen (TYLENOL) 500 MG tablet, Take 1 tablet by mouth Every 4 (Four) Hours As Needed for Mild Pain ., Disp: , Rfl:   •  azelastine (OPTIVAR) 0.05 % ophthalmic solution, Administer 1 drop to both eyes 2 (Two) Times a Day., Disp: , Rfl:   •  B Complex-C (SUPER B COMPLEX/VITAMIN C PO), Take 1 capsule by mouth Daily., Disp: , Rfl:   •  budesonide-formoterol (SYMBICORT) 80-4.5 MCG/ACT inhaler, USE 2 PUFFS TWICE DAILY, Disp: 30.6 g, Rfl: 3  •  carbonyl iron (FEOSOL) 45 MG tablet tablet, TAKE ONE TABLET DAILY, Disp: 30 each, Rfl: 6  •  Cholecalciferol (VITAMIN D3 PO), Take 2,000 Int'l Units by mouth Daily., Disp: , Rfl:   •  Coenzyme Q10 (CO Q-10 PO), Take 1 tablet by mouth Daily., Disp: , Rfl:   •  dutasteride (AVODART) 0.5 MG capsule, TAKE 1 CAPSULE DAILY GENERIC  FOR AVODART, Disp: 90 capsule, Rfl: 2  •  Elastic Bandages & Supports (JOBST FOR MEN 8-15MMHG LG) misc, 1 Device Daily. Apply when up each morning; may remove at night, Disp: 2 each, Rfl: 0  •  esomeprazole (nexIUM) 40 MG capsule, TAKE 1 CAPSULE DAILY BEFORE BREAKFAST GENERIC FOR NEXIUM, Disp: 90 capsule, Rfl: 2  •  guaiFENesin (MUCINEX) 600 MG 12 hr tablet, Take 600 mg by mouth Every 12 (Twelve) Hours., Disp: , Rfl:   •  ipratropium-albuterol (DUO-NEB) 0.5-2.5 mg/3 ml nebulizer, USE 1 VIAL IN NEBULIZER FOUR TIMES DAILY GENERIC FOR, Disp: 180 vial, Rfl: 5  •  midodrine (PROAMATINE) 10 MG tablet, TAKE ONE TABLET TWICE DAILY BEFORE A MEAL, Disp: 60 tablet, Rfl: 5  •  Multiple Vitamins-Minerals (MULTIVITAMIN PO), Take 1 tablet by mouth daily., Disp: , Rfl:   •  nystatin-triamcinolone (MYCOLOG II) 697084-6.1 UNIT/GM-% cream, Apply  topically to the appropriate area as directed Every 12 (Twelve) Hours., Disp: , Rfl:   •  O2 (OXYGEN), Inhale 1 L/min Every Night. As needed at bedtime. , Disp: , Rfl:   •  ofloxacin (OCUFLOX) 0.3 % ophthalmic solution, Administer 1 drop to both eyes Every 4 (Four) Hours., Disp: , Rfl:   •  Omega-3 Fatty Acids (FISH OIL PO), Take 1,000 mg by mouth 2 (Two) Times a Day., Disp: , Rfl:   •  polyethylene glycol (MIRALAX) packet, Take 17 g by mouth Daily., Disp: 30 each, Rfl: 1  •  PROAIR  (90 Base) MCG/ACT inhaler, Inhale 2 puffs Every 4 (Four) Hours As Needed for Shortness of Air., Disp: , Rfl: 5  •  tamsulosin (FLOMAX) 0.4 MG capsule 24 hr capsule, TAKE 1 CAPSULE DAILY SHORTLY AFTER THE SAME MEAL GENERIC FOR FLOMAX, Disp: 90 capsule, Rfl: 3  •  torsemide (DEMADEX) 20 MG tablet, Take 1 tablet by mouth Daily., Disp: 30 tablet, Rfl: 5  •  XARELTO 15 MG tablet, TAKE ONE TABLET DAILY, Disp: 90 tablet, Rfl: 4  •  diclofenac (VOLTAREN) 1 % gel gel, Apply 4 g topically to the appropriate area as directed At Night As Needed (elbow pain)., Disp: 1 tube, Rfl: 1    No problems with  medications.  Refills if needed done    No Known Allergies    Review of Systems  GENERAL:  Inactive/slower with limits, speed, stamina for age and gait and occ SOB. Sleep is ok with oxygen.  No fever now.  ENDO:  No syncope, near or diaphoretic sweaty spells.  HEENT: No head injury or headache usually.  No vision change.  Same hearing loss.  Ears without pain/drainage.  No sore throat.  No significant nasal/sinus congestion/drainage. No epistaxis.  CHEST: No chest wall tenderness or mass. Occ cough, with mild occ wheeze.  Variable SOB; no hemoptysis.  CV: No exertional chest pain, palpitations; no further ankle edema.   GI: No  dysphagia.  No abdominal pain, diarrhea, constipation.  No rectal bleeding, or melena.  No nausea and vomiting/heartburn.    :  Voids without dysuria, or incontinence to completion.  ORTHO: No painful/swollen joints but various on /off sore; especially hands/fingers with motion.  No change occ sore neck or back.  No acute neck or back pain despite recent injury.   NEURO: Chronic/mild dizziness, and primarily LE weakness of extremities.  No change UE/LE mild numbness/paresthesias.   PSYCH: Mild ? memory loss.  Mood good; occ anxious, depressed but/and not suicidal.  Tries to tolerate stress   Screening:  Mammogram: NA  Bone density: NA  Low dose CT chest:Tobacco-smoker/age 18/<1 ppd/dc 1965 (19) NA  GI:   Colonoscopy+nl/[facility]/9.5.12/5y-reminded past; declined.  EGD+biop/[facility]/8.31.15  Prostate: urology past  Usual lab order  1m CBC, BMP, iron  6m CBC, CMP, iron  12m CBC, CMP, LIPID    Lab Results:  Results for orders placed or performed in visit on 05/22/20   Comprehensive Metabolic Panel   Result Value Ref Range    Glucose 97 65 - 99 mg/dL    BUN 44 (H) 8 - 23 mg/dL    Creatinine 2.29 (H) 0.76 - 1.27 mg/dL    eGFR Non African Am 27 (L) >60 mL/min/1.73    eGFR African Am 33 (L) >60 mL/min/1.73    BUN/Creatinine Ratio 19.2 7.0 - 25.0    Sodium 138 136 - 145 mmol/L     Potassium 4.6 3.5 - 5.2 mmol/L    Chloride 98 98 - 107 mmol/L    Total CO2 28.2 22.0 - 29.0 mmol/L    Calcium 9.2 8.2 - 9.6 mg/dL    Total Protein 6.1 6.0 - 8.5 g/dL    Albumin 4.20 3.50 - 5.20 g/dL    Globulin 1.9 gm/dL    A/G Ratio 2.2 g/dL    Total Bilirubin 0.5 0.2 - 1.2 mg/dL    Alkaline Phosphatase 79 39 - 117 U/L    AST (SGOT) 12 1 - 40 U/L    ALT (SGPT) 7 1 - 41 U/L   Lipid Panel   Result Value Ref Range    Total Cholesterol 158 0 - 200 mg/dL    Triglycerides 97 0 - 150 mg/dL    HDL Cholesterol 48 40 - 60 mg/dL    VLDL Cholesterol 19.4 5 - 40 mg/dL    LDL Cholesterol  91 0 - 100 mg/dL   TSH   Result Value Ref Range    TSH 1.500 0.270 - 4.200 uIU/mL   Vitamin D 25 Hydroxy   Result Value Ref Range    25 Hydroxy, Vitamin D 80.3 30.0 - 100.0 ng/ml   Iron   Result Value Ref Range    Iron 87 59 - 158 mcg/dL   Ferritin   Result Value Ref Range    Ferritin 133.00 30.00 - 400.00 ng/mL   Vitamin B12   Result Value Ref Range    Vitamin B-12 883 211 - 946 pg/mL   Folate   Result Value Ref Range    Folate >20.00 4.78 - 24.20 ng/mL   Magnesium   Result Value Ref Range    Magnesium 2.4 (H) 1.7 - 2.3 mg/dL   Protein / Creatinine Ratio, Urine - Urine, Clean Catch   Result Value Ref Range    Creatinine, Urine 84.7 mg/dL    Total Protein, Urine 15.0 mg/dL    Protein/Creatinine Ratio 177.1 0.0 - 200.0 mg/G Crea   Urinalysis without microscopic (no culture) - Urine, Clean Catch   Result Value Ref Range    Specific Gravity, UA 1.016 1.005 - 1.030    pH, UA 6.5 5.0 - 8.0    Color, UA Yellow     Appearance, UA Clear Clear    Leukocytes, UA See below: (A) Negative    Protein Negative Negative    Glucose, UA Negative Negative    Ketones Negative Negative    Blood, UA Negative Negative    Bilirubin, UA Negative Negative    Urobilinogen, UA Comment     Nitrite, UA Negative Negative   Uric Acid   Result Value Ref Range    Uric Acid 7.5 (H) 3.4 - 7.0 mg/dL   PTH, Intact   Result Value Ref Range    PTH, Intact 64 15 - 65 pg/mL   CBC &  Differential   Result Value Ref Range    WBC 6.11 3.40 - 10.80 10*3/mm3    RBC 3.97 (L) 4.14 - 5.80 10*6/mm3    Hemoglobin 12.0 (L) 13.0 - 17.7 g/dL    Hematocrit 34.8 (L) 37.5 - 51.0 %    MCV 87.7 79.0 - 97.0 fL    MCH 30.2 26.6 - 33.0 pg    MCHC 34.5 31.5 - 35.7 g/dL    RDW 14.0 12.3 - 15.4 %    Platelets 231 140 - 450 10*3/mm3    Neutrophil Rel % 62.0 42.7 - 76.0 %    Lymphocyte Rel % 20.0 19.6 - 45.3 %    Monocyte Rel % 12.3 (H) 5.0 - 12.0 %    Eosinophil Rel % 4.7 0.3 - 6.2 %    Basophil Rel % 0.5 0.0 - 1.5 %    Neutrophils Absolute 3.79 1.70 - 7.00 10*3/mm3    Lymphocytes Absolute 1.22 0.70 - 3.10 10*3/mm3    Monocytes Absolute 0.75 0.10 - 0.90 10*3/mm3    Eosinophils Absolute 0.29 0.00 - 0.40 10*3/mm3    Basophils Absolute 0.03 0.00 - 0.20 10*3/mm3    Immature Granulocyte Rel % 0.5 0.0 - 0.5 %    Immature Grans Absolute 0.03 0.00 - 0.05 10*3/mm3    nRBC 0.0 0.0 - 0.2 /100 WBC       A1C:No results for input(s): HGBA1C in the last 99182 hours.  PSA:No results for input(s): PSA in the last 91101 hours.  CBC:  Lab Results - Last 18 Months   Lab Units 05/22/20  0822 02/09/20  0553 02/08/20  0601 02/07/20  0356 02/06/20  1749 01/27/20  0823 10/14/19  1241  03/29/19  0800 02/04/19  0807 01/14/19  0855   WBC 10*3/mm3 6.11 5.45 5.99 5.39 5.71 6.76 4.82   < > 8.69 5.43 6.45   HEMOGLOBIN g/dL 12.0* 10.6* 13.1 11.0* 11.8* 10.6* 10.6*   < > 12.4* 12.1* 12.7*   HEMATOCRIT % 34.8* 31.3* 39.1 32.0* 35.5* 31.6* 32.2*   < > 39.6 37.3* 41.0   PLATELETS 10*3/mm3 231 178 188 187 199 181 173   < > 154 177 215   IRON mcg/dL 87  --   --   --  26* 42* 60  --  34* 80 74    < > = values in this interval not displayed.      BMP/CMP:  Lab Results - Last 18 Months   Lab Units 05/22/20  0822 02/24/20  0727 02/09/20  0553 02/08/20  0601 02/07/20  0356 02/06/20  1749 01/27/20  0823 10/14/19  1241 08/12/19  0806  05/06/19  0735 04/26/19  0747   SODIUM mmol/L 138 136 137 137 138 138 137 138 142   < > 139 136   POTASSIUM mmol/L 4.6 4.5 4.0 4.3  "4.1 3.9 4.5 4.0 4.0   < > 4.1 4.2   CHLORIDE mmol/L 98 97* 100 99 101 97* 98 99 102   < > 98 96*   TOTAL CO2 mmol/L 28.2 27.6  --   --   --   --  25.8 26.3 24  --  27.9 26.6   CO2 mmol/L  --   --  26.0 23.0 26.0 28.0  --   --   --    < >  --   --    GLUCOSE mg/dL 97 96  --   --   --   --  78 101* 81  --  87 99   BUN mg/dL 44* 48* 45* 44* 48* 50* 53* 36* 25   < > 36* 32*   CREATININE mg/dL 2.29* 2.36* 2.26* 2.17* 2.31* 2.11* 2.49* 2.22* 2.05*   < > 2.35* 2.27*   EGFR IF NONAFRICN AM mL/min/1.73 27* 26* 27* 29* 27* 30* 24* 28* 28*   < > 26* 27*   EGFR IF AFRICN AM mL/min/1.73 33* 32*  --   --   --   --  30* 34* 32*  --  32* 33*   CALCIUM mg/dL 9.2 9.2 8.4 9.2 8.8 9.2 8.8 9.1 9.2   < > 9.5 9.8*    < > = values in this interval not displayed.     HEPATIC:  Lab Results - Last 18 Months   Lab Units 05/22/20  0822 02/06/20  1749 10/14/19  1241 04/08/19  1329 03/29/19  0800   ALT (SGPT) U/L 7 10 10 13 9   AST (SGOT) U/L 12 15 17 17 10   ALK PHOS U/L 79 105 82 93 72     THYROID:  Lab Results - Last 18 Months   Lab Units 05/22/20  0822   TSH uIU/mL 1.500       Objective   /72   Pulse (!) 127   Temp 98 °F (36.7 °C)   Resp 16   Ht 177.8 cm (70\")   Wt 68.9 kg (152 lb)   SpO2 100%   BMI 21.81 kg/m²   Body mass index is 21.81 kg/m².    Physical Exam  GENERAL:  Thin AFA developed in no acute distress.  SKIN: Turgor excellent, without rash lesion.  LE compressive stockings.   HEENT: Normal cephalic without trauma.  Pupils equal round reactive to light. Extraocular motions full without nystagmus.     No thyromegaly, mass, tenderness, lymphadenopathy and supple.  CV: Irregular irregular rhythm.  No murmur, gallop; no lower leg edema. Posterior pulses intact.  No carotid bruits.  CHEST: No chest wall tenderness or mass.   LUNGS: Symmetric motion with clear/mild reduced to auscultation.   ABD: Soft, nontender without mass.   ORTHO: Symmetric extremities without swelling/point tenderness.  Full gross range of motion. "   NEURO: CN 2-12 grossly intact.  Symmetric facies. 1/4 x bicep equal reflexes.  UE/LE   2-3/5 strength throughout.  Nonfocal use extremities. Speech clear.   PSYCH: Oriented x 3.  Pleasant calm, well kept.  Purposeful/directed conservation with intact short/long gross memory    Assessment/Plan     1. Arthralgia, unspecified joint    2. Essential hypertension    3. Mixed hyperlipidemia    4. CHF (congestive heart failure), NYHA class III, chronic, diastolic (CMS/[facility])    5. Cardiac arrhythmia, unspecified cardiac arrhythmia type    6. COPD, group B, by GOLD 2013 classification     7. Chronic kidney disease (CKD), stage IV (severe) (CMS/[facility])    8. Gait difficulty-cane/walker    9. Oetxhhukoauiaf-tla-aatw/xarelto      Rx: reviewed/changes:  New Medications Ordered This Visit   Medications   • diclofenac (VOLTAREN) 1 % gel gel     Sig: Apply 4 g topically to the appropriate area as directed At Night As Needed (elbow pain).     Dispense:  1 tube     Refill:  1       LAB/Testing/Referrals: reviewed/orders:   Today:   No orders of the defined types were placed in this encounter.    Chronic/recurrent labs above or change to:   Same    Discussions:   Cautious use topical NSAID; avoid large amounts  Advise walker/cane anytime up   Body mass index is 21.81 kg/m².  Patient's Body mass index is 21.81 kg/m². BMI is within normal parameters. No follow-up required..  Tobacco use reviewed:    Non-smoker  James Birch  reports that he quit smoking about 53 years ago. His smoking use included cigarettes. He has a 18.00 pack-year smoking history. He has never used smokeless tobacco..    There are no Patient Instructions on file for this visit.    Follow up: Return for lab August; Dr William 4m.  Future Appointments   Date Time Provider Department Center   7/10/2020 11:00 AM Kiko Campos PA [facility] N [facility] PAD   8/12/2020  8:20 AM LAB [facility] None   9/25/2020 10:30 AM Skyler Trimble MD [facility]   10/21/2020  1:00 PM Hemal William MD [facility] None   10/28/2020 10:30 AM Bill Church MD [facility] None

## 2020-07-20 ENCOUNTER — OFFICE VISIT (OUTPATIENT)
Dept: FAMILY MEDICINE CLINIC | Facility: CLINIC | Age: 85
End: 2020-07-20

## 2020-07-20 VITALS
OXYGEN SATURATION: 99 % | DIASTOLIC BLOOD PRESSURE: 74 MMHG | SYSTOLIC BLOOD PRESSURE: 110 MMHG | HEIGHT: 70 IN | RESPIRATION RATE: 14 BRPM | WEIGHT: 154 LBS | HEART RATE: 91 BPM | BODY MASS INDEX: 22.05 KG/M2

## 2020-07-20 DIAGNOSIS — M25.512 ACUTE PAIN OF LEFT SHOULDER: Primary | ICD-10-CM

## 2020-07-20 DIAGNOSIS — M25.512 ACUTE PAIN OF LEFT SHOULDER: ICD-10-CM

## 2020-07-20 PROCEDURE — 99213 OFFICE O/P EST LOW 20 MIN: CPT | Performed by: NURSE PRACTITIONER

## 2020-07-20 RX ORDER — METHYLPREDNISOLONE 4 MG/1
TABLET ORAL
Qty: 1 EACH | Refills: 0 | Status: SHIPPED | OUTPATIENT
Start: 2020-07-20 | End: 2020-10-07

## 2020-07-20 NOTE — PROGRESS NOTES
Subjective   Chief Complaint:  Left shoulder pain    History of Present Illness:  This 89 y.o. male was seen in the office today for evaluation of left shoulder pain.  He is left-handed.  He reports he woke up with left shoulder pain 2 days ago.  He denies any falls or injuries.  He told the nurse 5 days ago.  He presents today with a swollen tender left shoulder.    No Known Allergies   Current Outpatient Medications on File Prior to Visit   Medication Sig   • dutasteride (AVODART) 0.5 MG capsule TAKE 1 CAPSULE DAILY GENERIC FOR AVODART   • Elastic Bandages & Supports (JOBST FOR MEN 8-15MMHG LG) misc 1 Device Daily. Apply when up each morning; may remove at night   • esomeprazole (nexIUM) 40 MG capsule TAKE 1 CAPSULE DAILY BEFORE BREAKFAST GENERIC FOR NEXIUM   • ipratropium-albuterol (DUO-NEB) 0.5-2.5 mg/3 ml nebulizer USE 1 VIAL IN NEBULIZER FOUR TIMES DAILY GENERIC FOR   • midodrine (PROAMATINE) 10 MG tablet TAKE ONE TABLET TWICE DAILY BEFORE A MEAL   • Multiple Vitamins-Minerals (MULTIVITAMIN PO) Take 1 tablet by mouth daily.   • O2 (OXYGEN) Inhale 1 L/min Every Night. As needed at bedtime.    • Omega-3 Fatty Acids (FISH OIL PO) Take 1,000 mg by mouth 2 (Two) Times a Day.   • polyethylene glycol (MIRALAX) packet Take 17 g by mouth Daily.   • PROAIR  (90 Base) MCG/ACT inhaler Inhale 2 puffs Every 4 (Four) Hours As Needed for Shortness of Air.   • tamsulosin (FLOMAX) 0.4 MG capsule 24 hr capsule TAKE 1 CAPSULE DAILY SHORTLY AFTER THE SAME MEAL GENERIC FOR FLOMAX   • torsemide (DEMADEX) 20 MG tablet Take 1 tablet by mouth Daily.   • XARELTO 15 MG tablet TAKE ONE TABLET DAILY   • acetaminophen (TYLENOL) 500 MG tablet Take 1 tablet by mouth Every 4 (Four) Hours As Needed for Mild Pain .   • azelastine (OPTIVAR) 0.05 % ophthalmic solution Administer 1 drop to both eyes 2 (Two) Times a Day.   • B Complex-C (SUPER B COMPLEX/VITAMIN C PO) Take 1 capsule by mouth Daily.   • budesonide-formoterol (SYMBICORT) 80-4.5  MCG/ACT inhaler USE 2 PUFFS TWICE DAILY   • carbonyl iron (FEOSOL) 45 MG tablet tablet TAKE ONE TABLET DAILY   • Cholecalciferol (VITAMIN D3 PO) Take 2,000 Int'l Units by mouth Daily.   • Coenzyme Q10 (CO Q-10 PO) Take 1 tablet by mouth Daily.   • diclofenac (VOLTAREN) 1 % gel gel Apply 4 g topically to the appropriate area as directed At Night As Needed (elbow pain).   • [DISCONTINUED] guaiFENesin (MUCINEX) 600 MG 12 hr tablet Take 600 mg by mouth Every 12 (Twelve) Hours.   • [DISCONTINUED] nystatin-triamcinolone (MYCOLOG II) 424657-9.1 UNIT/GM-% cream Apply  topically to the appropriate area as directed Every 12 (Twelve) Hours.   • [DISCONTINUED] ofloxacin (OCUFLOX) 0.3 % ophthalmic solution Administer 1 drop to both eyes Every 4 (Four) Hours.     No current facility-administered medications on file prior to visit.       Past Medical, Surgical, Social, and Family History:  Past Medical History:   Diagnosis Date   • Arthritis    • Asthma    • Atrial fibrillation (CMS/HCC)    • Cancer (CMS/HCC)     SKIN   • CHF (congestive heart failure) (CMS/HCC)    • Conductive hearing loss    • COPD (chronic obstructive pulmonary disease) (CMS/HCC)    • Eustachian tube dysfunction    • GERD (gastroesophageal reflux disease)    • Hyperlipidemia    • Hypertension    • Obstructive sleep apnea    • Prostate disease    • Sensorineural hearing loss    • Stroke (CMS/HCC)    • Vertigo      Past Surgical History:   Procedure Laterality Date   • CATARACT EXTRACTION      left   • CATARACT EXTRACTION      left   • COLONOSCOPY  09/05/2012    normal   • ENDOSCOPY  08/31/2015    normal   • EYE SURGERY     • FRACTURE SURGERY     • HERNIA REPAIR     • HIP SURGERY     • SKIN BIOPSY       Social History     Socioeconomic History   • Marital status:      Spouse name: Not on file   • Number of children: 3   • Years of education: Not on file   • Highest education level: Not on file   Tobacco Use   • Smoking status: Former Smoker     Packs/day:  1.00     Years: 18.00     Pack years: 18.00     Types: Cigarettes     Last attempt to quit: 1967     Years since quittin.5   • Smokeless tobacco: Never Used   • Tobacco comment: already quit   Substance and Sexual Activity   • Alcohol use: No   • Drug use: No   • Sexual activity: Defer     Partners: Female     Family History   Problem Relation Age of Onset   • No Known Problems Mother    • No Known Problems Father      Review of Systems   Constitutional: Negative for chills and fever.   Respiratory: Negative for cough and shortness of breath.    Cardiovascular: Negative for chest pain and palpitations.   Musculoskeletal: Positive for arthralgias (left shoulder), gait problem (chronic) and joint swelling. Negative for myalgias.     Objective   Physical Exam   Constitutional: No distress.   HENT:   Head: Normocephalic and atraumatic.   Nose: Nose normal.   Eyes: Pupils are equal, round, and reactive to light. Conjunctivae and EOM are normal.   Neck: Normal range of motion. Neck supple. No JVD present. No tracheal deviation present. No thyromegaly present.   Cardiovascular: Normal rate, regular rhythm, normal heart sounds and intact distal pulses. Exam reveals no gallop and no friction rub.   No murmur heard.  Pulmonary/Chest: Effort normal and breath sounds normal. No respiratory distress. He has no wheezes.   Abdominal: Soft. Bowel sounds are normal. There is no tenderness. There is no guarding.   Musculoskeletal: He exhibits edema and tenderness. He exhibits no deformity.   Limited range of motion bilateral shoulders, requires a walker for mobility.  He has chronic mobility deficits.  Palpable tenderness palpable edema to the left shoulder.   Lymphadenopathy:     He has no cervical adenopathy.   Neurological: He is alert.   Chronic cognitive deficits-forgetful.   Skin: Skin is warm and dry. Capillary refill takes less than 2 seconds. He is not diaphoretic.   Psychiatric: He has a normal mood and affect. His  "behavior is normal. Judgment and thought content normal.   Nursing note and vitals reviewed.  /74   Pulse 91   Resp 14   Ht 177.8 cm (70\")   Wt 69.9 kg (154 lb)   SpO2 99%   BMI 22.10 kg/m²     Assessment/Plan   Diagnoses and all orders for this visit:    1. Acute pain of left shoulder (Primary)  -     XR Shoulder 2+ View Left; Future  -     methylPREDNISolone (MEDROL, GUY,) 4 MG tablet; Take as directed on package instructions.  Dispense: 1 each; Refill: 0    Discussion:  Advised and educated plan of care.  With the swelling, will do an x-ray to rule out major injury to the area.  Being mindful of renal function, we will do a steroid pack versus NSAIDs for pain.  Also advised Tylenol regimen 1000 mg p.o. 3 times daily x5 days routine, advised ice and rest until we can figure out what more is going on.    Follow-up:  Return for Will call results and coordinate next steps..    Note e-Signed by WILLIAM Bravo on 07/20/2020 at 10:43 AM  "

## 2020-07-24 PROCEDURE — 87102 FUNGUS ISOLATION CULTURE: CPT | Performed by: OPHTHALMOLOGY

## 2020-07-24 PROCEDURE — 87205 SMEAR GRAM STAIN: CPT | Performed by: OPHTHALMOLOGY

## 2020-07-24 PROCEDURE — 87076 CULTURE ANAEROBE IDENT EACH: CPT | Performed by: OPHTHALMOLOGY

## 2020-07-24 PROCEDURE — 87070 CULTURE OTHR SPECIMN AEROBIC: CPT | Performed by: OPHTHALMOLOGY

## 2020-07-25 ENCOUNTER — LAB REQUISITION (OUTPATIENT)
Dept: LAB | Facility: HOSPITAL | Age: 85
End: 2020-07-25

## 2020-07-25 DIAGNOSIS — Z00.00 ENCOUNTER FOR GENERAL ADULT MEDICAL EXAMINATION WITHOUT ABNORMAL FINDINGS: ICD-10-CM

## 2020-07-27 LAB
BACTERIA SPEC AEROBE CULT: ABNORMAL
GRAM STN SPEC: ABNORMAL

## 2020-08-11 ENCOUNTER — LAB (OUTPATIENT)
Dept: FAMILY MEDICINE CLINIC | Facility: CLINIC | Age: 85
End: 2020-08-11

## 2020-08-11 DIAGNOSIS — D50.8 OTHER IRON DEFICIENCY ANEMIA: ICD-10-CM

## 2020-08-11 DIAGNOSIS — E61.1 IRON DEFICIENCY: ICD-10-CM

## 2020-08-11 DIAGNOSIS — I49.9 CARDIAC ARRHYTHMIA, UNSPECIFIED CARDIAC ARRHYTHMIA TYPE: ICD-10-CM

## 2020-08-11 DIAGNOSIS — M81.0 OSTEOPOROSIS, UNSPECIFIED OSTEOPOROSIS TYPE, UNSPECIFIED PATHOLOGICAL FRACTURE PRESENCE: ICD-10-CM

## 2020-08-11 DIAGNOSIS — I10 ESSENTIAL HYPERTENSION: Primary | ICD-10-CM

## 2020-08-11 DIAGNOSIS — Z79.01 ANTICOAGULATED: ICD-10-CM

## 2020-08-12 DIAGNOSIS — R11.2 NAUSEA AND VOMITING, INTRACTABILITY OF VOMITING NOT SPECIFIED, UNSPECIFIED VOMITING TYPE: ICD-10-CM

## 2020-08-12 DIAGNOSIS — R11.0 NAUSEA: ICD-10-CM

## 2020-08-12 DIAGNOSIS — N00.9 ACUTE GLOMERULONEPHRITIS WITH PATHOLOGICAL LESION IN KIDNEY: ICD-10-CM

## 2020-08-12 DIAGNOSIS — K21.9 GASTROESOPHAGEAL REFLUX DISEASE, ESOPHAGITIS PRESENCE NOT SPECIFIED: Chronic | ICD-10-CM

## 2020-08-12 LAB
BASOPHILS # BLD AUTO: 0.03 10*3/MM3 (ref 0–0.2)
BASOPHILS NFR BLD AUTO: 0.5 % (ref 0–1.5)
BUN SERPL-MCNC: 37 MG/DL (ref 8–23)
BUN/CREAT SERPL: 19.3 (ref 7–25)
CALCIUM SERPL-MCNC: 9.4 MG/DL (ref 8.2–9.6)
CHLORIDE SERPL-SCNC: 97 MMOL/L (ref 98–107)
CO2 SERPL-SCNC: 29.3 MMOL/L (ref 22–29)
CREAT SERPL-MCNC: 1.92 MG/DL (ref 0.76–1.27)
EOSINOPHIL # BLD AUTO: 0.25 10*3/MM3 (ref 0–0.4)
EOSINOPHIL NFR BLD AUTO: 4.2 % (ref 0.3–6.2)
ERYTHROCYTE [DISTWIDTH] IN BLOOD BY AUTOMATED COUNT: 13.8 % (ref 12.3–15.4)
GLUCOSE SERPL-MCNC: 95 MG/DL (ref 65–99)
HCT VFR BLD AUTO: 38.9 % (ref 37.5–51)
HGB BLD-MCNC: 12.7 G/DL (ref 13–17.7)
IMM GRANULOCYTES # BLD AUTO: 0.03 10*3/MM3 (ref 0–0.05)
IMM GRANULOCYTES NFR BLD AUTO: 0.5 % (ref 0–0.5)
IRON SERPL-MCNC: 70 MCG/DL (ref 59–158)
LYMPHOCYTES # BLD AUTO: 1.2 10*3/MM3 (ref 0.7–3.1)
LYMPHOCYTES NFR BLD AUTO: 20 % (ref 19.6–45.3)
MCH RBC QN AUTO: 29.6 PG (ref 26.6–33)
MCHC RBC AUTO-ENTMCNC: 32.6 G/DL (ref 31.5–35.7)
MCV RBC AUTO: 90.7 FL (ref 79–97)
MONOCYTES # BLD AUTO: 0.77 10*3/MM3 (ref 0.1–0.9)
MONOCYTES NFR BLD AUTO: 12.8 % (ref 5–12)
NEUTROPHILS # BLD AUTO: 3.73 10*3/MM3 (ref 1.7–7)
NEUTROPHILS NFR BLD AUTO: 62 % (ref 42.7–76)
NRBC BLD AUTO-RTO: 0 /100 WBC (ref 0–0.2)
PLATELET # BLD AUTO: 172 10*3/MM3 (ref 140–450)
POTASSIUM SERPL-SCNC: 4 MMOL/L (ref 3.5–5.2)
RBC # BLD AUTO: 4.29 10*6/MM3 (ref 4.14–5.8)
SODIUM SERPL-SCNC: 137 MMOL/L (ref 136–145)
WBC # BLD AUTO: 6.01 10*3/MM3 (ref 3.4–10.8)

## 2020-08-12 RX ORDER — DUTASTERIDE 0.5 MG/1
CAPSULE, LIQUID FILLED ORAL
Qty: 90 CAPSULE | Refills: 2 | Status: SHIPPED | OUTPATIENT
Start: 2020-08-12 | End: 2021-03-12

## 2020-08-12 RX ORDER — ESOMEPRAZOLE MAGNESIUM 40 MG/1
CAPSULE, DELAYED RELEASE ORAL
Qty: 90 CAPSULE | Refills: 2 | Status: SHIPPED | OUTPATIENT
Start: 2020-08-12 | End: 2020-09-17

## 2020-09-01 LAB — FUNGUS WND CULT: NORMAL

## 2020-09-17 DIAGNOSIS — R11.2 NAUSEA AND VOMITING, INTRACTABILITY OF VOMITING NOT SPECIFIED, UNSPECIFIED VOMITING TYPE: ICD-10-CM

## 2020-09-17 DIAGNOSIS — K21.9 GASTROESOPHAGEAL REFLUX DISEASE, ESOPHAGITIS PRESENCE NOT SPECIFIED: Chronic | ICD-10-CM

## 2020-09-17 DIAGNOSIS — R11.0 NAUSEA: ICD-10-CM

## 2020-09-17 RX ORDER — ESOMEPRAZOLE MAGNESIUM 40 MG/1
CAPSULE, DELAYED RELEASE ORAL
Qty: 90 CAPSULE | Refills: 2 | Status: SHIPPED | OUTPATIENT
Start: 2020-09-17 | End: 2021-03-12

## 2020-09-25 ENCOUNTER — OFFICE VISIT (OUTPATIENT)
Dept: CARDIOLOGY | Facility: CLINIC | Age: 85
End: 2020-09-25

## 2020-09-25 VITALS
DIASTOLIC BLOOD PRESSURE: 62 MMHG | BODY MASS INDEX: 22.33 KG/M2 | SYSTOLIC BLOOD PRESSURE: 110 MMHG | WEIGHT: 156 LBS | HEART RATE: 59 BPM | HEIGHT: 70 IN

## 2020-09-25 DIAGNOSIS — I10 ESSENTIAL HYPERTENSION: Chronic | ICD-10-CM

## 2020-09-25 DIAGNOSIS — I48.0 PAROXYSMAL ATRIAL FIBRILLATION (HCC): Primary | ICD-10-CM

## 2020-09-25 DIAGNOSIS — I50.32 CHF (CONGESTIVE HEART FAILURE), NYHA CLASS III, CHRONIC, DIASTOLIC (HCC): Chronic | ICD-10-CM

## 2020-09-25 PROCEDURE — 99213 OFFICE O/P EST LOW 20 MIN: CPT | Performed by: INTERNAL MEDICINE

## 2020-09-25 PROCEDURE — 93000 ELECTROCARDIOGRAM COMPLETE: CPT | Performed by: INTERNAL MEDICINE

## 2020-09-25 RX ORDER — FUROSEMIDE 40 MG/1
40 TABLET ORAL DAILY
Status: ON HOLD | COMMUNITY
End: 2022-01-01 | Stop reason: SDUPTHER

## 2020-09-25 NOTE — PROGRESS NOTES
Subjective    James Birch is a 89 y.o. male. Fu of rhythm and chf    History of Present Illness     PAR AFIB:  Is active for his age and no decline in stamina over the past year. He has trouble with balance and some orthostatic light-headedness that limits his walking. Has no cp or unusual sob. EKG today is nsr. Is on a DOAC ok without any known bleeding problems.    HFpEF:  Has no edema if he wears his support hose. No unusual sob. Good appetite, energy and stamina for his age.    COPD:  Still sees Pulm Med and has noc O2        The following portions of the patient's history were reviewed and updated as appropriate: allergies, current medications, past family history, past medical history, past social history, past surgical history and problem list.    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   • DAMIAN-intolerant   • Autonomic dysfunction   • Paroxysmal atrial fibrillation (CMS/HCC)   • CHF (congestive heart failure), NYHA class III, chronic, diastolic (CMS/HCC)   • Wellness examination-done   • Gait difficulty-cane/walker   • Asthma   • Zqhvfdlznloaul-xaw-pmhw/xarelto   • Chronic kidney disease (CKD), stage IV (severe) (CMS/HCC)   • Prostatism   • History of kidney stones   • Varicose veins of legs   • Hyperlipidemia-statin   • Hypertension   • Hypersomnia   • Gastroesophageal reflux disease   • COPD, group B, by GOLD 2013 classification    • Nocturnal hypoxia-oxygen advised   • Laboratory test*   • History of tobacco use   • Acute renal failure (CMS/HCC)   • Cerebrovascular small vessel disease   • Compression fracture of body of thoracic vertebra (CMS/HCC)   • Dyspnea due to congestive heart failure (CMS/HCC)   • Atrial flutter with controlled response (CMS/HCC)   • Current non-smoker   • Osteoporosis: 2019-no tx with renal   • Fall   • Skin lesion   • Cardiac arrhythmia: a fib-flutter   • Esophageal dysfunction   • Leg edema   • Stasis dermatitis       No  Known Allergies    Family History   Problem Relation Age of Onset   • No Known Problems Mother    • No Known Problems Father        Social History     Socioeconomic History   • Marital status:      Spouse name: Not on file   • Number of children: 3   • Years of education: Not on file   • Highest education level: Not on file   Tobacco Use   • Smoking status: Former Smoker     Packs/day: 1.00     Years: 18.00     Pack years: 18.00     Types: Cigarettes     Quit date:      Years since quittin.7   • Smokeless tobacco: Never Used   • Tobacco comment: already quit   Substance and Sexual Activity   • Alcohol use: No   • Drug use: No   • Sexual activity: Defer     Partners: Female         Current Outpatient Medications:   •  acetaminophen (TYLENOL) 500 MG tablet, Take 1 tablet by mouth Every 4 (Four) Hours As Needed for Mild Pain ., Disp: , Rfl:   •  azelastine (OPTIVAR) 0.05 % ophthalmic solution, Administer 1 drop to both eyes 2 (Two) Times a Day., Disp: , Rfl:   •  B Complex-C (SUPER B COMPLEX/VITAMIN C PO), Take 1 capsule by mouth Daily., Disp: , Rfl:   •  budesonide-formoterol (SYMBICORT) 80-4.5 MCG/ACT inhaler, USE 2 PUFFS TWICE DAILY, Disp: 30.6 g, Rfl: 3  •  carbonyl iron (FEOSOL) 45 MG tablet tablet, TAKE ONE TABLET DAILY, Disp: 30 each, Rfl: 6  •  Cholecalciferol (VITAMIN D3 PO), Take 2,000 Int'l Units by mouth Daily., Disp: , Rfl:   •  Coenzyme Q10 (CO Q-10 PO), Take 1 tablet by mouth Daily., Disp: , Rfl:   •  diclofenac (VOLTAREN) 1 % gel gel, Apply 4 g topically to the appropriate area as directed At Night As Needed (elbow pain)., Disp: 1 tube, Rfl: 1  •  dutasteride (AVODART) 0.5 MG capsule, TAKE 1 CAPSULE DAILY GENERIC FOR AVODART, Disp: 90 capsule, Rfl: 2  •  Elastic Bandages & Supports (JOBST FOR MEN 8-15MMHG LG) misc, 1 Device Daily. Apply when up each morning; may remove at night, Disp: 2 each, Rfl: 0  •  esomeprazole (nexIUM) 40 MG capsule, TAKE 1 CAPSULE DAILY BEFORE BREAKFAST GENERIC  FOR NEXIUM, Disp: 90 capsule, Rfl: 2  •  furosemide (LASIX) 40 MG tablet, Take 40 mg by mouth Daily., Disp: , Rfl:   •  ipratropium-albuterol (DUO-NEB) 0.5-2.5 mg/3 ml nebulizer, USE 1 VIAL IN NEBULIZER FOUR TIMES DAILY GENERIC FOR, Disp: 180 vial, Rfl: 5  •  midodrine (PROAMATINE) 10 MG tablet, TAKE ONE TABLET TWICE DAILY BEFORE A MEAL, Disp: 60 tablet, Rfl: 5  •  Multiple Vitamins-Minerals (MULTIVITAMIN PO), Take 1 tablet by mouth daily., Disp: , Rfl:   •  O2 (OXYGEN), Inhale 1 L/min Every Night. As needed at bedtime. , Disp: , Rfl:   •  Omega-3 Fatty Acids (FISH OIL PO), Take 1,000 mg by mouth 2 (Two) Times a Day., Disp: , Rfl:   •  polyethylene glycol (MIRALAX) packet, Take 17 g by mouth Daily., Disp: 30 each, Rfl: 1  •  PROAIR  (90 Base) MCG/ACT inhaler, Inhale 2 puffs Every 4 (Four) Hours As Needed for Shortness of Air., Disp: , Rfl: 5  •  tamsulosin (FLOMAX) 0.4 MG capsule 24 hr capsule, TAKE 1 CAPSULE DAILY SHORTLY AFTER THE SAME MEAL GENERIC FOR FLOMAX, Disp: 90 capsule, Rfl: 3  •  torsemide (DEMADEX) 20 MG tablet, Take 1 tablet by mouth Daily., Disp: 30 tablet, Rfl: 5  •  XARELTO 15 MG tablet, TAKE ONE TABLET DAILY, Disp: 90 tablet, Rfl: 4  •  methylPREDNISolone (MEDROL, GUY,) 4 MG tablet, Take as directed on package instructions., Disp: 1 each, Rfl: 0    Past Surgical History:   Procedure Laterality Date   • CATARACT EXTRACTION      left   • CATARACT EXTRACTION      left   • COLONOSCOPY  09/05/2012    normal   • ENDOSCOPY  08/31/2015    normal   • EYE SURGERY     • FRACTURE SURGERY     • HERNIA REPAIR     • HIP SURGERY     • SKIN BIOPSY         Review of Systems   Constitutional: Negative for activity change, appetite change and fatigue.   Respiratory: Negative for apnea, chest tightness and shortness of breath.    Cardiovascular: Negative for chest pain, palpitations and leg swelling.   Gastrointestinal: Negative for abdominal pain and blood in stool.   Genitourinary: Negative for dysuria and  "hematuria.   Musculoskeletal: Negative for myalgias.   Neurological: Positive for light-headedness. Negative for weakness.        Poor balance   Psychiatric/Behavioral: Negative for sleep disturbance.       /62   Pulse 59   Ht 177.8 cm (70\")   Wt 70.8 kg (156 lb)   BMI 22.38 kg/m²   Procedures    Objective   Physical Exam  Constitutional:       General: He is not in acute distress.     Appearance: He is normal weight. He is not ill-appearing.      Comments: Frail and elderly   HENT:      Head: Normocephalic.      Nose: Nose normal.   Eyes:      Pupils: Pupils are equal, round, and reactive to light.   Cardiovascular:      Rate and Rhythm: Normal rate and regular rhythm.      Heart sounds: Heart sounds are distant. No murmur. No friction rub. No gallop.    Pulmonary:      Effort: Pulmonary effort is normal. No respiratory distress.      Breath sounds: No wheezing, rhonchi or rales.      Comments: Diminished bs - L worse than R  Musculoskeletal:         General: No tenderness or deformity.      Right lower leg: No edema.      Left lower leg: No edema.   Skin:     General: Skin is warm and dry.      Findings: Bruising present.      Comments: Large ecchymosis left upper chest and shoulder - \"don't know how that happened\"   Neurological:      General: No focal deficit present.      Mental Status: He is alert and oriented to person, place, and time.   Psychiatric:         Mood and Affect: Mood normal.         Behavior: Behavior normal.         Assessment/Plan   James was seen today for congestive heart failure, atrial fibrillation and hypertension.    Diagnoses and all orders for this visit:    Paroxysmal atrial fibrillation (CMS/HCC)  Comments:  is sinus rhythm today  Orders:  -     ECG 12 Lead    Essential hypertension  Comments:  good control with some orthostatic dizziness    CHF (congestive heart failure), NYHA class III, chronic, diastolic (CMS/HCC)  Comments:  compenstated and euvolemic             "     Return in about 6 months (around 3/25/2021).  Orders Placed This Encounter   Procedures   • ECG 12 Lead     Order Specific Question:   Reason for Exam:     Answer:   CHF,AFIB,HTN

## 2020-10-07 ENCOUNTER — OFFICE VISIT (OUTPATIENT)
Dept: FAMILY MEDICINE CLINIC | Facility: CLINIC | Age: 85
End: 2020-10-07

## 2020-10-07 VITALS
RESPIRATION RATE: 14 BRPM | HEART RATE: 60 BPM | BODY MASS INDEX: 22.05 KG/M2 | DIASTOLIC BLOOD PRESSURE: 66 MMHG | SYSTOLIC BLOOD PRESSURE: 118 MMHG | TEMPERATURE: 97.3 F | OXYGEN SATURATION: 98 % | HEIGHT: 70 IN | WEIGHT: 154 LBS

## 2020-10-07 DIAGNOSIS — E78.2 MIXED HYPERLIPIDEMIA: Chronic | ICD-10-CM

## 2020-10-07 DIAGNOSIS — R06.02 SHORTNESS OF BREATH: ICD-10-CM

## 2020-10-07 DIAGNOSIS — S40.012A CONTUSION OF LEFT SHOULDER, INITIAL ENCOUNTER: ICD-10-CM

## 2020-10-07 DIAGNOSIS — Z23 NEED FOR INFLUENZA VACCINATION: ICD-10-CM

## 2020-10-07 DIAGNOSIS — N40.0 PROSTATISM: Chronic | ICD-10-CM

## 2020-10-07 DIAGNOSIS — I10 ESSENTIAL HYPERTENSION: Chronic | ICD-10-CM

## 2020-10-07 DIAGNOSIS — I49.9 CARDIAC ARRHYTHMIA, UNSPECIFIED CARDIAC ARRHYTHMIA TYPE: ICD-10-CM

## 2020-10-07 DIAGNOSIS — R26.9 GAIT DIFFICULTY: Chronic | ICD-10-CM

## 2020-10-07 DIAGNOSIS — I50.32 CHF (CONGESTIVE HEART FAILURE), NYHA CLASS III, CHRONIC, DIASTOLIC (HCC): Chronic | ICD-10-CM

## 2020-10-07 DIAGNOSIS — N18.4 CHRONIC KIDNEY DISEASE (CKD), STAGE IV (SEVERE) (HCC): ICD-10-CM

## 2020-10-07 DIAGNOSIS — Z79.01 ANTICOAGULATED: ICD-10-CM

## 2020-10-07 DIAGNOSIS — Z00.00 WELLNESS EXAMINATION: ICD-10-CM

## 2020-10-07 PROBLEM — R41.3 MEMORY LOSS: Status: ACTIVE | Noted: 2020-10-07

## 2020-10-07 PROCEDURE — 99213 OFFICE O/P EST LOW 20 MIN: CPT | Performed by: FAMILY MEDICINE

## 2020-10-07 PROCEDURE — G0008 ADMIN INFLUENZA VIRUS VAC: HCPCS | Performed by: FAMILY MEDICINE

## 2020-10-07 PROCEDURE — 90694 VACC AIIV4 NO PRSRV 0.5ML IM: CPT | Performed by: FAMILY MEDICINE

## 2020-10-07 PROCEDURE — G0439 PPPS, SUBSEQ VISIT: HCPCS | Performed by: FAMILY MEDICINE

## 2020-10-07 NOTE — PROGRESS NOTES
The ABCs of the Annual Wellness Visit  Subsequent Medicare Wellness Visit    Chief Complaint   Patient presents with   • Eye Problem     right side, has been seeing DrGcatpie has been treating an ulcer.   • Medicare Wellness-subsequent   • Burn     on back from heating pad,        Subjective   History of Present Illness:  Jaems Birch is a 89 y.o. male who presents for a Subsequent Medicare Wellness Visit.    HEALTH RISK ASSESSMENT    Recent Hospitalizations:  No hospitalization(s) within the last year.    Current Medical Providers:  Patient Care Team:  Hemal William MD as PCP - General  Hemal William MD as PCP - Family Medicine  Skyler Trimble MD as Cardiologist (Cardiology)  Bill Church MD as Consulting Physician (Pulmonary Disease)  Kkio Campos PA as Physician Assistant (Neurology)  Naun Valente MD as Consulting Physician (Nephrology)  [facility] (DME Services)    Smoking Status:  Social History     Tobacco Use   Smoking Status Former Smoker   • Packs/day: 1.00   • Years: 18.00   • Pack years: 18.00   • Types: Cigarettes   • Quit date:    • Years since quittin.8   Smokeless Tobacco Never Used   Tobacco Comment    already quit       Alcohol Consumption:  Social History     Substance and Sexual Activity   Alcohol Use No       Depression Screen:   PHQ-2/PHQ-9 Depression Screening 10/7/2020   Little interest or pleasure in doing things 1   Feeling down, depressed, or hopeless 1   Total Score 2   If you checked off any problems, how difficult have these problems made it for you to do your work, take care of things at home, or get along with other people? Not difficult at all       Fall Risk Screen:  STEADI Fall Risk Assessment was completed, and patient is at HIGH risk for falls. Assessment completed on:10/7/2020    Health Habits and Functional and Cognitive Screening:  Functional & Cognitive Status 10/7/2020   Do you have difficulty preparing  food and eating? Yes   Do you have difficulty bathing yourself, getting dressed or grooming yourself? Yes   Do you have difficulty using the toilet? Yes   Do you have difficulty moving around from place to place? Yes   Do you have trouble with steps or getting out of a bed or a chair? Yes   Current Diet Well Balanced Diet   Dental Exam Up to date   Eye Exam Up to date   Exercise (times per week) 0 times per week   Current Exercises Include No Regular Exercise   Do you need help using the phone?  Yes   Are you deaf or do you have serious difficulty hearing?  Yes   Do you need help with transportation? Yes   Do you need help shopping? Yes   Do you need help preparing meals?  Yes   Do you need help with housework?  Yes   Do you need help with laundry? Yes   Do you need help taking your medications? Yes   Do you need help managing money? Yes   Do you ever drive or ride in a car without wearing a seat belt? No   Have you felt unusual stress, anger or loneliness in the last month? Yes   Who do you live with? Spouse   If you need help, do you have trouble finding someone available to you? No   Have you been bothered in the last four weeks by sexual problems? No   Do you have difficulty concentrating, remembering or making decisions? Yes         Does the patient have evidence of cognitive impairment? Yes    Asprin use counseling:Taking ASA appropriately as indicated    Age-appropriate Screening Schedule:  Refer to the list below for future screening recommendations based on patient's age, sex and/or medical conditions. Orders for these recommended tests are listed in the plan section. The patient has been provided with a written plan.    Health Maintenance   Topic Date Due   • ZOSTER VACCINE (1 of 2) 04/18/1978   • LIPID PANEL  05/22/2021   • DXA SCAN  07/10/2021   • TDAP/TD VACCINES (2 - Td) 09/13/2029   • INFLUENZA VACCINE  Completed          The following portions of the patient's history were reviewed and updated as  appropriate: allergies, current medications, past family history, past medical history, past social history, past surgical history and problem list.    Outpatient Medications Prior to Visit   Medication Sig Dispense Refill   • acetaminophen (TYLENOL) 500 MG tablet Take 1 tablet by mouth Every 4 (Four) Hours As Needed for Mild Pain .     • azelastine (OPTIVAR) 0.05 % ophthalmic solution Administer 1 drop to both eyes 2 (Two) Times a Day.     • B Complex-C (SUPER B COMPLEX/VITAMIN C PO) Take 1 capsule by mouth Daily.     • budesonide-formoterol (SYMBICORT) 80-4.5 MCG/ACT inhaler USE 2 PUFFS TWICE DAILY 30.6 g 3   • carbonyl iron (FEOSOL) 45 MG tablet tablet TAKE ONE TABLET DAILY 30 each 6   • Cholecalciferol (VITAMIN D3 PO) Take 2,000 Int'l Units by mouth Daily.     • Coenzyme Q10 (CO Q-10 PO) Take 1 tablet by mouth Daily.     • diclofenac (VOLTAREN) 1 % gel gel Apply 4 g topically to the appropriate area as directed At Night As Needed (elbow pain). 1 tube 1   • dutasteride (AVODART) 0.5 MG capsule TAKE 1 CAPSULE DAILY GENERIC FOR AVODART 90 capsule 2   • Elastic Bandages & Supports (JOBST FOR MEN 8-15MMHG LG) misc 1 Device Daily. Apply when up each morning; may remove at night 2 each 0   • esomeprazole (nexIUM) 40 MG capsule TAKE 1 CAPSULE DAILY BEFORE BREAKFAST GENERIC FOR NEXIUM 90 capsule 2   • furosemide (LASIX) 40 MG tablet Take 40 mg by mouth Daily.     • ipratropium-albuterol (DUO-NEB) 0.5-2.5 mg/3 ml nebulizer USE 1 VIAL IN NEBULIZER FOUR TIMES DAILY GENERIC  vial 5   • midodrine (PROAMATINE) 10 MG tablet TAKE ONE TABLET TWICE DAILY BEFORE A MEAL 60 tablet 5   • Multiple Vitamins-Minerals (MULTIVITAMIN PO) Take 1 tablet by mouth daily.     • O2 (OXYGEN) Inhale 1 L/min Every Night. As needed at bedtime.      • Omega-3 Fatty Acids (FISH OIL PO) Take 1,000 mg by mouth 2 (Two) Times a Day.     • polyethylene glycol (MIRALAX) packet Take 17 g by mouth Daily. 30 each 1   • PROAIR  (90 Base) MCG/ACT  inhaler Inhale 2 puffs Every 4 (Four) Hours As Needed for Shortness of Air.  5   • tamsulosin (FLOMAX) 0.4 MG capsule 24 hr capsule TAKE 1 CAPSULE DAILY SHORTLY AFTER THE SAME MEAL GENERIC FOR FLOMAX 90 capsule 3   • torsemide (DEMADEX) 20 MG tablet Take 1 tablet by mouth Daily. 30 tablet 5   • XARELTO 15 MG tablet TAKE ONE TABLET DAILY 90 tablet 4   • methylPREDNISolone (MEDROL, GUY,) 4 MG tablet Take as directed on package instructions. 1 each 0     No facility-administered medications prior to visit.        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   • DAMIAN-intolerant   • Autonomic dysfunction   • Paroxysmal atrial fibrillation (CMS/HCC)   • CHF (congestive heart failure), NYHA class III, chronic, diastolic (CMS/HCC)   • Wellness examination-done   • Gait difficulty-cane/walker   • Asthma   • Oncbkstaaurjtd-ody-rlgm/xarelto   • Chronic kidney disease (CKD), stage IV (severe) (CMS/HCC)   • Prostatism   • History of kidney stones   • Varicose veins of legs   • Hyperlipidemia-statin   • Hypertension   • Hypersomnia   • Gastroesophageal reflux disease   • COPD, group B, by GOLD 2013 classification    • Nocturnal hypoxia-oxygen advised   • Laboratory test*   • History of tobacco use   • Acute renal failure (CMS/HCC)   • Cerebrovascular small vessel disease   • SOB: copd, CHF   • Compression fracture of body of thoracic vertebra (CMS/HCC)   • Dyspnea due to congestive heart failure (CMS/HCC)   • Atrial flutter with controlled response (CMS/HCC)   • Current non-smoker   • Osteoporosis: 2019-no tx with renal   • Fall   • Skin lesion   • Cardiac arrhythmia: a fib-flutter   • Esophageal dysfunction   • Leg edema   • Stasis dermatitis   • Memory loss       Advanced Care Planning:  ACP discussion was held with the patient during this visit. Patient has an advance directive (not in EMR), copy requested.    Review of Systems  GENERAL:  Inactive/slower with limits, speed, stamina  for age and gait and occ SOB. Sleep is ok with oxygen/sleeps in recliner that he can reposition.  No fever now.  ENDO:  No syncope, near or diaphoretic sweaty spells.  HEENT: No head injury or headache usually.  No vision change.  Same hearing loss.  Ears without pain/drainage.  No sore throat.  No significant nasal/sinus congestion/drainage. No epistaxis.  CHEST: No chest wall tenderness or mass other than left shoulder bruise. Occ cough, with mild occ wheeze.  Variable SOB; no hemoptysis.  CV: No exertional chest pain, palpitations; increased end of day ankle edema.   GI: No  dysphagia.  No abdominal pain, diarrhea, constipation.  No rectal bleeding, or melena.  No nausea and vomiting/heartburn.    :  Voids without dysuria, or incontinence to completion.  ORTHO: No painful/swollen joints but various on /off sore; especially hands/fingers with motion.  No change occ sore neck or back.  No acute neck or back pain despite recent injury.   NEURO: Chronic/mild dizziness, and primarily LE weakness of extremities.  No change UE/LE mild numbness/paresthesias.   PSYCH: Mild ? memory loss.  Mood good; occ anxious, depressed but/and not suicidal.  Tries to tolerate stress   Screening:  Mammogram: NA  Bone density: NA  Low dose CT chest:Tobacco-smoker/age 18/<1 ppd/dc 1965 (19) NA  GI:   Colonoscopy+nl/[facility]/9.5.12/5y-reminded  EGD+biop/[facility]/8.31.15  Prostate: urology past  Usual lab order  1m CBC, BMP, iron  6m CBC, CMP, iron  12m CBC, CMP, LIPID    Compared to one year ago, the patient feels his physical health is worse.  Compared to one year ago, the patient feels his mental health is worse.    Reviewed chart for potential of high risk medication in the elderly: yes  Reviewed chart for potential of harmful drug interactions in the elderly:yes    Objective         Vitals:    10/07/20 1042   BP: 118/66   Pulse: 60   Resp: 14   Temp: 97.3 °F (36.3 °C)   TempSrc: Temporal   SpO2: 98%   Weight: 69.9 kg  "(154 lb)   Height: 177.8 cm (70\")   PainSc: 0-No pain       Body mass index is 22.1 kg/m².  Discussed the patient's BMI with him. The BMI is in the acceptable range.    Physical Exam  GENERAL:  Thin AFA developed in no acute distress.  SKIN: Turgor excellent, without rash lesion other than bruising in the green color anterior left shoulder down to the left chest nipple   HEENT: Normal cephalic without trauma.  Pupils equal round reactive to light. Extraocular motions full without nystagmus.  Hard of hearing    No thyromegaly, mass, tenderness, lymphadenopathy and supple.  CV: Irregular irregular rhythm.  No murmur, gallop; no pitting ankle lower leg edema. Posterior pulses intact.  No carotid bruits.  CHEST: No chest wall tenderness or mass.   LUNGS: Symmetric motion with clear/mild reduced to auscultation.   ABD: Soft, nontender without mass.   ORTHO: Symmetric extremities without swelling/point tenderness other than the left shoulder.  Full gross range of motion other than left shoulder; it was not checked.   NEURO: CN 2-12 grossly intact.  Symmetric facies. 1/4 x bicep equal reflexes.  UE/LE   2-3/5 strength throughout.  Nonfocal use extremities. Speech clear.   PSYCH: Oriented x 3.  Pleasant calm, well kept.  Purposeful/directed conservation with intact short/long gross memory    Lab Results   Component Value Date    GLU 95 08/11/2020        Assessment/Plan   Medicare Risks and Personalized Health Plan  CMS Preventative Services Quick Reference  Advance Directive Discussion  Dementia/Memory   Fall Risk  Immunizations Discussed/Encouraged (specific immunizations; adacel Tdap, Influenza, Pneumococcal 23, Prevnar and Shingrix )    The above risks/problems have been discussed with the patient.  Pertinent information has been shared with the patient in the After Visit Summary.  Follow up plans and orders are seen below in the Assessment/Plan Section.    Diagnoses and all orders for this visit:    1. CHF (congestive " heart failure), NYHA class III, chronic, diastolic (CMS/HCC)    2. Mixed hyperlipidemia    3. Essential hypertension    4. Cardiac arrhythmia, unspecified cardiac arrhythmia type    5. SOB: copd, CHF    6. Prostatism    7. Chronic kidney disease (CKD), stage IV (severe) (CMS/HCC)    8. Gait difficulty-cane/walker    9. Wellness examination-done    10. Fuejgakmdczqov-hel-sshi/xarelto    11. Need for influenza vaccination  -     Fluad Quad >65 years    12. Contusion of left shoulder, initial encounter      Follow Up:  Return for lab/Dr William 4m (wife too).     An After Visit Summary and PPPS were given to the patient.

## 2020-10-07 NOTE — PATIENT INSTRUCTIONS
"Medicare/insurances offer certain visits called \"wellness/annual\" that allows for time to deal with and  review the many aspects of \"being well\" that just might not get mentioned during other visits with your doctor through the year.  This includes things like reviews of health screenings (mammograms, various labs),  weight, exercise, vaccines for just a few examples.      In order to help you with this we wish to make you aware of a few things for you to consider:    1. Advanced directives.  These are documents used to help direct your care if your health/situation should reach a point that you cannot make your own decisions.  While it is likely you do not currently have a need for these documents now; it is something that we all might face at any time.   The hand outs you are being given today are simply for you to review and use to learn more about these documents and consider them as you wish.      2. Vaccines: Certain vaccines are important after age 50, 60, and 65 and some health situations (for example COPD), require even boosters beyond age 65.  We are happy to review with you your vaccine status and vaccines that might be needed for you at this point:      a. Tetanus.   Like anyone this needs to given every 10 years; sooner for/with lacerations/wounds.   Likely when getting this booster it needs to be a tetanus called Tdap (tetanus mixed with diptheria and pertussis).   Years ago you had this vaccine.  We now know we can lose our immunity to pertussis (a part of this vaccine) and run a risk of catching this.  Now only would this make us ill; but more importantly we can spread this to very young children (and for them it can be a much more dangerous illness).   We call this the grandparent vaccine for this reason.     b. Pneumonia (strept).   This comes now with two brands.   It is recommended to take pneumovax first; and a year later take the cousin prevnar.  Even if you have had these before; we need to " review when and your current health situation/s as you may need boosters and even recently the CDC has made recent/new recommendations for pneumovax.      c. Shingles.  You do not want to catch shingles.  Though you will recover from this; the pain associated with shingles can be severe.  Even if you have had the now older zostavax, or have had shingles; it is recommended you still get the Shingrix (the new vaccine just available early 2018 shingles vaccine).  A new shingles vaccine (a shot to lower your chance of catching shingles) is now available (shingrix).  This vaccine is the second vaccine created for this purpose; (we have had zostavax for years).  Shingrix provides a much better and longer immunity for shingles than zostavax.  For this and other reasons Shingrix can be started at age 50.  If you have had zostavax in the past; you can still take Shingrix.      This vaccine is not paid for in a doctor's office by medicare, medicaid and probably most insurances.  Like zostavax; this is covered in drug stores.  This is a vaccine that if you chose to get you need to get at a drug store that gives vaccines (like [facility], [facility] and [facility].      d. Yearly flu vaccine given from September through April each year (there is a special vaccine for those over 65).     e. Travel vaccines:  If you are one to do international travel; be sure and ask us for any particular unusual vaccines you may need.     f.  Miscellaneous:  If you have certain health situations/disease you may need specific/particular vaccines not give to the general public.     The vaccines we have on record for you include:   Immunization History   Administered Date(s) Administered   • Fluzone High Dose =>65 Years ([facility] ONLY) 10/27/2018   • Influenza, Unspecified 10/23/2017   • Tdap 09/13/2019       If you have record of other vaccines and want them to show in your chart here; please talk to our nurses about having your  vaccine record updated.     3. Exercise: regular cardio exercise something everyone should consider and try to do; even if health limitations (ie find that exercise UE/LE/cardio that they can tolerate).   Normal weight a goal for everyone (as we discussed)    4. Healthy diet helpful for weight management, illness prevention.     5. If over 50-screening exams include men PSA/rectal exam, women mammograms, and everyone colonoscopy screening for colon cancer.    6. If you use tobacco of any kind or e-products you should stop. We are providing you some information to consider that could make this process easier.      ##################################

## 2020-10-07 NOTE — PROGRESS NOTES
Subjective   James Birch is a 89 y.o. male presenting with chief complaint of:   Chief Complaint   Patient presents with   • Eye Problem     right side, has been seeing Samme has been treating an ulcer.   • Medicare Wellness-subsequent   • Burn     on back from heating pad,        History of Present Illness :  With son/wife.       Has multiple chronic problems to consider that might have a bearing on today's issues;  an interval appointment.       Chronic/acute problems reviewed today:   1. CHF (congestive heart failure), NYHA class III, chronic, diastolic (CMS/HCC) Chronic/stable.  Denies significant sob, orthopnea, leg edema, weight gain.  Aware of influence diet/salt and watching weight at home.       2. Mixed hyperlipidemia Chronic/stable.  Tolerated use of Rx with labs showing improved lipid values and tolerant liver labs. No muscle aches unexpected.      3. Essential hypertension Chronic/stable. Stable here past/no recent home blood pressures.  No significant chest pain, SOB, LE edema, orthopnea, near syncope, dizziness/light headness.   Recent Vitals       7/20/2020 9/25/2020 10/7/2020       BP:  110/74  110/62  118/66     Pulse:  91  59  60     Temp:  --  --  97.3 °F (36.3 °C)     Weight:  69.9 kg (154 lb)  70.8 kg (156 lb)  69.9 kg (154 lb)     BMI (Calculated):  22.1  22.4  22.1            4. Cardiac arrhythmia, unspecified cardiac arrhythmia type Chronic/stable.   History of A. fib flutter.  Rhythm currently seems controlled.  Medications seem tolerated.  No syncope near syncope.     5. SOB: copd, CHF Chronic/stable :  SOB worse with exertion/ok at rest.  Contributing diagnosis: COPD, CHF, atrial fibrillation.     6. Prostatism Chronic/stable.  Slower but tolerated stream with complete emptying.  Nocturia on occ; tolerated.  No desire to persure evaluations/surgery.      7. Chronic kidney disease (CKD), stage IV (severe) (CMS/HCC) Chronic/stable.  Sees/saw nephrology.  No dysuria.  Previously  warned/reminded:   To avoid further kidney function decline  A. Treat any time you think you have infection  B. Stay hydrated (dont get dehydrated); drink at least 60 oz fluid every 24 hr (1800 cc or nearly a 2L)  C. Do not allow any xrays with dye WITHOUT the doctor ordering checking your renal function  D. Do not get new medications without the doctor considering your renal condition  E. Do not use motrin/ibuprofen, alleve/naprosyn and these types of medications     8. Gait difficulty-cane/walker Chronic/stable:.  Ongoing issues with difficulties it results in difficulty with walking or gait.  No recent known falls.  Recent left shoulder injuries.  Uses to help gait: Walker.     9. Wellness examination-done Chronic ongoing over time screening or advice for general health care/wellness.      10. Uahzpcwkajkzav-hsx-tahl/xarelto Chronic/stable reason and use of.  Denies bleeding issues; especially epistaxis, melena, hematochezia.  Upper arms/others do bruise easily.  No significant bleeding or significant falls.      11. Need for influenza vaccination Chronic/ongoing need to review pro/cons for various vaccinations based on age, health issues.  Needs vaccination today for flu shot; and others     12. Contusion of left shoulder, initial encounter recent soreness and bruise noted of the left shoulder; no fall is remembered.  I question of him simply bumping it against something.  Memory is an issue; including the wife.     Has an/another acute issue today: None.    The following portions of the patient's history were reviewed and updated as appropriate: allergies, current medications, past family history, past medical history, past social history, past surgical history and problem list.      Current Outpatient Medications:   •  acetaminophen (TYLENOL) 500 MG tablet, Take 1 tablet by mouth Every 4 (Four) Hours As Needed for Mild Pain ., Disp: , Rfl:   •  azelastine (OPTIVAR) 0.05 % ophthalmic solution, Administer 1 drop  to both eyes 2 (Two) Times a Day., Disp: , Rfl:   •  B Complex-C (SUPER B COMPLEX/VITAMIN C PO), Take 1 capsule by mouth Daily., Disp: , Rfl:   •  budesonide-formoterol (SYMBICORT) 80-4.5 MCG/ACT inhaler, USE 2 PUFFS TWICE DAILY, Disp: 30.6 g, Rfl: 3  •  carbonyl iron (FEOSOL) 45 MG tablet tablet, TAKE ONE TABLET DAILY, Disp: 30 each, Rfl: 6  •  Cholecalciferol (VITAMIN D3 PO), Take 2,000 Int'l Units by mouth Daily., Disp: , Rfl:   •  Coenzyme Q10 (CO Q-10 PO), Take 1 tablet by mouth Daily., Disp: , Rfl:   •  diclofenac (VOLTAREN) 1 % gel gel, Apply 4 g topically to the appropriate area as directed At Night As Needed (elbow pain)., Disp: 1 tube, Rfl: 1  •  dutasteride (AVODART) 0.5 MG capsule, TAKE 1 CAPSULE DAILY GENERIC FOR AVODART, Disp: 90 capsule, Rfl: 2  •  Elastic Bandages & Supports (JOBST FOR MEN 8-15MMHG LG) misc, 1 Device Daily. Apply when up each morning; may remove at night, Disp: 2 each, Rfl: 0  •  esomeprazole (nexIUM) 40 MG capsule, TAKE 1 CAPSULE DAILY BEFORE BREAKFAST GENERIC FOR NEXIUM, Disp: 90 capsule, Rfl: 2  •  furosemide (LASIX) 40 MG tablet, Take 40 mg by mouth Daily., Disp: , Rfl:   •  ipratropium-albuterol (DUO-NEB) 0.5-2.5 mg/3 ml nebulizer, USE 1 VIAL IN NEBULIZER FOUR TIMES DAILY GENERIC FOR, Disp: 180 vial, Rfl: 5  •  midodrine (PROAMATINE) 10 MG tablet, TAKE ONE TABLET TWICE DAILY BEFORE A MEAL, Disp: 60 tablet, Rfl: 5  •  Multiple Vitamins-Minerals (MULTIVITAMIN PO), Take 1 tablet by mouth daily., Disp: , Rfl:   •  O2 (OXYGEN), Inhale 1 L/min Every Night. As needed at bedtime. , Disp: , Rfl:   •  Omega-3 Fatty Acids (FISH OIL PO), Take 1,000 mg by mouth 2 (Two) Times a Day., Disp: , Rfl:   •  polyethylene glycol (MIRALAX) packet, Take 17 g by mouth Daily., Disp: 30 each, Rfl: 1  •  PROAIR  (90 Base) MCG/ACT inhaler, Inhale 2 puffs Every 4 (Four) Hours As Needed for Shortness of Air., Disp: , Rfl: 5  •  tamsulosin (FLOMAX) 0.4 MG capsule 24 hr capsule, TAKE 1 CAPSULE DAILY  SHORTLY AFTER THE SAME MEAL GENERIC FOR FLOMAX, Disp: 90 capsule, Rfl: 3  •  torsemide (DEMADEX) 20 MG tablet, Take 1 tablet by mouth Daily., Disp: 30 tablet, Rfl: 5  •  XARELTO 15 MG tablet, TAKE ONE TABLET DAILY, Disp: 90 tablet, Rfl: 4    No problems with medications.  Refills if needed done    No Known Allergies    Review of Systems  GENERAL:  Inactive/slower with limits, speed, stamina for age and gait and occ SOB. Sleep is ok with oxygen/sleeps in recliner that he can reposition.  No fever now.  ENDO:  No syncope, near or diaphoretic sweaty spells.  HEENT: No head injury or headache usually.  No vision change.  Same hearing loss.  Ears without pain/drainage.  No sore throat.  No significant nasal/sinus congestion/drainage. No epistaxis.  CHEST: No chest wall tenderness or mass other than left shoulder bruise. Occ cough, with mild occ wheeze.  Variable SOB; no hemoptysis.  CV: No exertional chest pain, palpitations; increased end of day ankle edema.   GI: No  dysphagia.  No abdominal pain, diarrhea, constipation.  No rectal bleeding, or melena.  No nausea and vomiting/heartburn.    :  Voids without dysuria, or incontinence to completion.  ORTHO: No painful/swollen joints but various on /off sore; especially hands/fingers with motion.  No change occ sore neck or back.  No acute neck or back pain despite recent injury.   NEURO: Chronic/mild dizziness, and primarily LE weakness of extremities.  No change UE/LE mild numbness/paresthesias.   PSYCH: Mild ? memory loss.  Mood good; occ anxious, depressed but/and not suicidal.  Tries to tolerate stress   Screening:  Mammogram: NA  Bone density: NA  Low dose CT chest:Tobacco-smoker/age 18/<1 ppd/dc 1965 (19) NA  GI:   Colonoscopy+nl/[facility]/9.5.12/5y-reminded  EGD+biop/[facility]/8.31.15  Prostate: urology past  Usual lab order  1m CBC, BMP, iron  6m CBC, CMP, iron  12m CBC, CMP, LIPID      Lab Results:  Results for orders placed or performed in visit  on 08/11/20   Basic metabolic panel    Specimen: Blood   Result Value Ref Range    Glucose 95 65 - 99 mg/dL    BUN 37 (H) 8 - 23 mg/dL    Creatinine 1.92 (H) 0.76 - 1.27 mg/dL    eGFR Non African Am 33 (L) >60 mL/min/1.73    eGFR African Am 40 (L) >60 mL/min/1.73    BUN/Creatinine Ratio 19.3 7.0 - 25.0    Sodium 137 136 - 145 mmol/L    Potassium 4.0 3.5 - 5.2 mmol/L    Chloride 97 (L) 98 - 107 mmol/L    Total CO2 29.3 (H) 22.0 - 29.0 mmol/L    Calcium 9.4 8.2 - 9.6 mg/dL   Iron    Specimen: Blood   Result Value Ref Range    Iron 70 59 - 158 mcg/dL   CBC & Differential    Specimen: Blood   Result Value Ref Range    WBC 6.01 3.40 - 10.80 10*3/mm3    RBC 4.29 4.14 - 5.80 10*6/mm3    Hemoglobin 12.7 (L) 13.0 - 17.7 g/dL    Hematocrit 38.9 37.5 - 51.0 %    MCV 90.7 79.0 - 97.0 fL    MCH 29.6 26.6 - 33.0 pg    MCHC 32.6 31.5 - 35.7 g/dL    RDW 13.8 12.3 - 15.4 %    Platelets 172 140 - 450 10*3/mm3    Neutrophil Rel % 62.0 42.7 - 76.0 %    Lymphocyte Rel % 20.0 19.6 - 45.3 %    Monocyte Rel % 12.8 (H) 5.0 - 12.0 %    Eosinophil Rel % 4.2 0.3 - 6.2 %    Basophil Rel % 0.5 0.0 - 1.5 %    Neutrophils Absolute 3.73 1.70 - 7.00 10*3/mm3    Lymphocytes Absolute 1.20 0.70 - 3.10 10*3/mm3    Monocytes Absolute 0.77 0.10 - 0.90 10*3/mm3    Eosinophils Absolute 0.25 0.00 - 0.40 10*3/mm3    Basophils Absolute 0.03 0.00 - 0.20 10*3/mm3    Immature Granulocyte Rel % 0.5 0.0 - 0.5 %    Immature Grans Absolute 0.03 0.00 - 0.05 10*3/mm3    nRBC 0.0 0.0 - 0.2 /100 WBC       A1C:No results for input(s): HGBA1C in the last 87915 hours.  PSA:No results for input(s): PSA in the last 82759 hours.  CBC:  Lab Results - Last 18 Months   Lab Units 08/11/20  0733 05/22/20  0822 02/09/20  0553 02/08/20  0601 02/07/20  0356 02/06/20  1749 01/27/20  0823 10/14/19  1241   WBC 10*3/mm3 6.01 6.11 5.45 5.99 5.39 5.71 6.76 4.82   HEMOGLOBIN g/dL 12.7* 12.0* 10.6* 13.1 11.0* 11.8* 10.6* 10.6*   HEMATOCRIT % 38.9 34.8* 31.3* 39.1 32.0* 35.5* 31.6* 32.2*  "  PLATELETS 10*3/mm3 172 231 178 188 187 199 181 173   IRON mcg/dL 70 87  --   --   --  26* 42* 60      BMP/CMP:  Lab Results - Last 18 Months   Lab Units 08/11/20  0733 05/22/20  0822 02/24/20  0727 02/09/20  0553 02/08/20  0601 02/07/20  0356 02/06/20  1749 01/27/20  0823 10/14/19  1241 08/12/19  0806  05/06/19  0735   SODIUM mmol/L 137 138 136 137 137 138 138 137 138 142   < > 139   POTASSIUM mmol/L 4.0 4.6 4.5 4.0 4.3 4.1 3.9 4.5 4.0 4.0   < > 4.1   CHLORIDE mmol/L 97* 98 97* 100 99 101 97* 98 99 102   < > 98   TOTAL CO2 mmol/L 29.3* 28.2 27.6  --   --   --   --  25.8 26.3 24  --  27.9   CO2 mmol/L  --   --   --  26.0 23.0 26.0 28.0  --   --   --    < >  --    GLUCOSE mg/dL 95 97 96  --   --   --   --  78 101* 81  --  87   BUN mg/dL 37* 44* 48* 45* 44* 48* 50* 53* 36* 25   < > 36*   CREATININE mg/dL 1.92* 2.29* 2.36* 2.26* 2.17* 2.31* 2.11* 2.49* 2.22* 2.05*   < > 2.35*   EGFR IF NONAFRICN AM mL/min/1.73 33* 27* 26* 27* 29* 27* 30* 24* 28* 28*   < > 26*   EGFR IF AFRICN AM mL/min/1.73 40* 33* 32*  --   --   --   --  30* 34* 32*  --  32*   CALCIUM mg/dL 9.4 9.2 9.2 8.4 9.2 8.8 9.2 8.8 9.1 9.2   < > 9.5    < > = values in this interval not displayed.     HEPATIC:  Lab Results - Last 18 Months   Lab Units 05/22/20  0822 02/06/20  1749 10/14/19  1241   ALT (SGPT) U/L 7 10 10   AST (SGOT) U/L 12 15 17   ALK PHOS U/L 79 105 82     THYROID:  Lab Results - Last 18 Months   Lab Units 05/22/20  0822   TSH uIU/mL 1.500       Objective   /66   Pulse 60   Temp 97.3 °F (36.3 °C) (Temporal)   Resp 14   Ht 177.8 cm (70\")   Wt 69.9 kg (154 lb)   SpO2 98%   BMI 22.10 kg/m²   Body mass index is 22.1 kg/m².    Recent Vitals       7/20/2020 9/25/2020 10/7/2020       BP:  110/74  110/62  118/66     Pulse:  91  59  60     Temp:  --  --  97.3 °F (36.3 °C)     Weight:  69.9 kg (154 lb)  70.8 kg (156 lb)  69.9 kg (154 lb)     BMI (Calculated):  22.1  22.4  22.1           Physical Exam  GENERAL:  Thin AFA developed in no " acute distress.  SKIN: Turgor excellent, without rash lesion other than bruising in the green color anterior left shoulder down to the left chest nipple   HEENT: Normal cephalic without trauma.  Pupils equal round reactive to light. Extraocular motions full without nystagmus.  Hard of hearing    No thyromegaly, mass, tenderness, lymphadenopathy and supple.  CV: Irregular irregular rhythm.  No murmur, gallop; no pitting ankle lower leg edema. Posterior pulses intact.  No carotid bruits.  CHEST: No chest wall tenderness or mass.   LUNGS: Symmetric motion with clear/mild reduced to auscultation.   ABD: Soft, nontender without mass.   ORTHO: Symmetric extremities without swelling/point tenderness other than the left shoulder.  Full gross range of motion other than left shoulder; it was not checked.   NEURO: CN 2-12 grossly intact.  Symmetric facies. 1/4 x bicep equal reflexes.  UE/LE   2-3/5 strength throughout.  Nonfocal use extremities. Speech clear.   PSYCH: Oriented x 3.  Pleasant calm, well kept.  Purposeful/directed conservation with intact short/long gross memory      Assessment/Plan     1. CHF (congestive heart failure), NYHA class III, chronic, diastolic (CMS/HCC)    2. Mixed hyperlipidemia    3. Essential hypertension    4. Cardiac arrhythmia, unspecified cardiac arrhythmia type    5. SOB: copd, CHF    6. Prostatism    7. Chronic kidney disease (CKD), stage IV (severe) (CMS/HCC)    8. Gait difficulty-cane/walker    9. Wellness examination-done    10. Gcjskqgkqxrisw-yml-qoup/xarelto    11. Need for influenza vaccination    12. Contusion of left shoulder, initial encounter        Discussions:   If any injuries or falls start current any significant pattern we will have to always reconsider the pros and cons of using anticoagulation  His shoulder should slowly improve; even if it does not he is not a candidate for MRI progression  There is a plan in place for him and his wife to move to [place] if they together  "which point he cannot care for himself with the help to get [place]  Wellness issues today appropriate for his age and condition discussed    Rx: reviewed/changes:  No orders of the defined types were placed in this encounter.      LAB/Testing/Referrals: reviewed/orders:   Today:   Orders Placed This Encounter   Procedures   • Fluad Quad >65 years     Chronic/recurrent labs above or change to:   Same    Body mass index is 22.1 kg/m².  Patient's Body mass index is 22.1 kg/m². BMI is within normal parameters. No follow-up required..      Tobacco use reviewed:    James Birch  reports that he quit smoking about 53 years ago. His smoking use included cigarettes. He has a 18.00 pack-year smoking history. He has never used smokeless tobacco..     Annual/wellness visit: also/today (see separate note)    Patient Instructions     Medicare/insurances offer certain visits called \"wellness/annual\" that allows for time to deal with and  review the many aspects of \"being well\" that just might not get mentioned during other visits with your doctor through the year.  This includes things like reviews of health screenings (mammograms, various labs),  weight, exercise, vaccines for just a few examples.      In order to help you with this we wish to make you aware of a few things for you to consider:    1. Advanced directives.  These are documents used to help direct your care if your health/situation should reach a point that you cannot make your own decisions.  While it is likely you do not currently have a need for these documents now; it is something that we all might face at any time.   The hand outs you are being given today are simply for you to review and use to learn more about these documents and consider them as you wish.      2. Vaccines: Certain vaccines are important after age 50, 60, and 65 and some health situations (for example COPD), require even boosters beyond age 65.  We are happy to review with you your " vaccine status and vaccines that might be needed for you at this point:      a. Tetanus.   Like anyone this needs to given every 10 years; sooner for/with lacerations/wounds.   Likely when getting this booster it needs to be a tetanus called Tdap (tetanus mixed with diptheria and pertussis).   Years ago you had this vaccine.  We now know we can lose our immunity to pertussis (a part of this vaccine) and run a risk of catching this.  Now only would this make us ill; but more importantly we can spread this to very young children (and for them it can be a much more dangerous illness).   We call this the grandparent vaccine for this reason.     b. Pneumonia (strept).   This comes now with two brands.   It is recommended to take pneumovax first; and a year later take the cousin prevnar.  Even if you have had these before; we need to review when and your current health situation/s as you may need boosters and even recently the CDC has made recent/new recommendations for pneumovax.      c. Shingles.  You do not want to catch shingles.  Though you will recover from this; the pain associated with shingles can be severe.  Even if you have had the now older zostavax, or have had shingles; it is recommended you still get the Shingrix (the new vaccine just available early 2018 shingles vaccine).  A new shingles vaccine (a shot to lower your chance of catching shingles) is now available (shingrix).  This vaccine is the second vaccine created for this purpose; (we have had zostavax for years).  Shingrix provides a much better and longer immunity for shingles than zostavax.  For this and other reasons Shingrix can be started at age 50.  If you have had zostavax in the past; you can still take Shingrix.      This vaccine is not paid for in a doctor's office by medicare, medicaid and probably most insurances.  Like zostavax; this is covered in drug stores.  This is a vaccine that if you chose to get you need to get at a drug store  that gives vaccines (like [facility], [facility] and [facility].      d. Yearly flu vaccine given from September through April each year (there is a special vaccine for those over 65).     e. Travel vaccines:  If you are one to do international travel; be sure and ask us for any particular unusual vaccines you may need.     f.  Miscellaneous:  If you have certain health situations/disease you may need specific/particular vaccines not give to the general public.     The vaccines we have on record for you include:   Immunization History   Administered Date(s) Administered   • Fluzone High Dose =>65 Years ([facility] ONLY) 10/27/2018   • Influenza, Unspecified 10/23/2017   • Tdap 09/13/2019       If you have record of other vaccines and want them to show in your chart here; please talk to our nurses about having your vaccine record updated.     3. Exercise: regular cardio exercise something everyone should consider and try to do; even if health limitations (ie find that exercise UE/LE/cardio that they can tolerate).   Normal weight a goal for everyone (as we discussed)    4. Healthy diet helpful for weight management, illness prevention.     5. If over 50-screening exams include men PSA/rectal exam, women mammograms, and everyone colonoscopy screening for colon cancer.    6. If you use tobacco of any kind or e-products you should stop. We are providing you some information to consider that could make this process easier.      ##################################              Follow up: Return for lab/Dr William 4m (wife too).  Future Appointments   Date Time Provider Department Center   10/28/2020 10:30 AM Bill Church MD [facility] None   2/15/2021  9:45 AM Hemal William MD [facility] None   3/30/2021  9:15 AM Skyler Trimble MD [facility]       Procedures none

## 2020-10-28 ENCOUNTER — OFFICE VISIT (OUTPATIENT)
Dept: PULMONOLOGY | Facility: CLINIC | Age: 85
End: 2020-10-28

## 2020-10-28 VITALS
SYSTOLIC BLOOD PRESSURE: 122 MMHG | TEMPERATURE: 97.3 F | HEART RATE: 78 BPM | HEIGHT: 70 IN | DIASTOLIC BLOOD PRESSURE: 80 MMHG | OXYGEN SATURATION: 97 % | WEIGHT: 156 LBS | BODY MASS INDEX: 22.33 KG/M2

## 2020-10-28 DIAGNOSIS — J44.9 COPD, GROUP B, BY GOLD 2013 CLASSIFICATION (HCC): ICD-10-CM

## 2020-10-28 DIAGNOSIS — J45.40 MODERATE PERSISTENT ASTHMA WITHOUT COMPLICATION: Primary | ICD-10-CM

## 2020-10-28 PROCEDURE — 99213 OFFICE O/P EST LOW 20 MIN: CPT | Performed by: INTERNAL MEDICINE

## 2020-10-28 NOTE — PROGRESS NOTES
Subjective   James Birch is a 89 y.o. male.     Background: Pt with asthma copd overlap  fev1 52% pred 2018    Chief Complaint   Patient presents with   • COPD        History of Present Illness   He says he has been doing well.  He uses the inhaler less than once a month.  Only with emergencies.  He doesn't use the oxygen much at night.    Medical/Family/Social History   has a past medical history of Arthritis, Asthma, Atrial fibrillation (CMS/HCC), Cancer (CMS/[facility]), CHF (congestive heart failure) (CMS/HCC), Conductive hearing loss, COPD (chronic obstructive pulmonary disease) (CMS/HCC), Eustachian tube dysfunction, GERD (gastroesophageal reflux disease), Hyperlipidemia, Hypertension, Obstructive sleep apnea, Prostate disease, Sensorineural hearing loss, Stroke (CMS/HCC), and Vertigo.   has a past surgical history that includes Hip surgery; Hernia repair; Eye surgery; Cataract extraction; Cataract extraction; Colonoscopy (09/05/2012); Skin biopsy; Fracture surgery; and Esophagogastroduodenoscopy (08/31/2015).  family history includes No Known Problems in his father and mother.   reports that he quit smoking about 53 years ago. His smoking use included cigarettes. He has a 18.00 pack-year smoking history. He has never used smokeless tobacco. He reports that he does not drink alcohol or use drugs.  No Known Allergies  Medications    Current Outpatient Medications:   •  acetaminophen (TYLENOL) 500 MG tablet, Take 1 tablet by mouth Every 4 (Four) Hours As Needed for Mild Pain ., Disp: , Rfl:   •  azelastine (OPTIVAR) 0.05 % ophthalmic solution, Administer 1 drop to both eyes 2 (Two) Times a Day., Disp: , Rfl:   •  B Complex-C (SUPER B COMPLEX/VITAMIN C PO), Take 1 capsule by mouth Daily., Disp: , Rfl:   •  budesonide-formoterol (SYMBICORT) 80-4.5 MCG/ACT inhaler, USE 2 PUFFS TWICE DAILY, Disp: 30.6 g, Rfl: 3  •  carbonyl iron (FEOSOL) 45 MG tablet tablet, TAKE ONE TABLET DAILY, Disp: 30 each, Rfl: 6  •   Cholecalciferol (VITAMIN D3 PO), Take 2,000 Int'l Units by mouth Daily., Disp: , Rfl:   •  Coenzyme Q10 (CO Q-10 PO), Take 1 tablet by mouth Daily., Disp: , Rfl:   •  diclofenac (VOLTAREN) 1 % gel gel, Apply 4 g topically to the appropriate area as directed At Night As Needed (elbow pain)., Disp: 1 tube, Rfl: 1  •  dutasteride (AVODART) 0.5 MG capsule, TAKE 1 CAPSULE DAILY GENERIC FOR AVODART, Disp: 90 capsule, Rfl: 2  •  Elastic Bandages & Supports (JOBST FOR MEN 8-15MMHG LG) misc, 1 Device Daily. Apply when up each morning; may remove at night, Disp: 2 each, Rfl: 0  •  esomeprazole (nexIUM) 40 MG capsule, TAKE 1 CAPSULE DAILY BEFORE BREAKFAST GENERIC FOR NEXIUM, Disp: 90 capsule, Rfl: 2  •  furosemide (LASIX) 40 MG tablet, Take 40 mg by mouth Daily., Disp: , Rfl:   •  ipratropium-albuterol (DUO-NEB) 0.5-2.5 mg/3 ml nebulizer, USE 1 VIAL IN NEBULIZER FOUR TIMES DAILY GENERIC FOR, Disp: 180 vial, Rfl: 5  •  midodrine (PROAMATINE) 10 MG tablet, TAKE ONE TABLET TWICE DAILY BEFORE A MEAL, Disp: 60 tablet, Rfl: 5  •  Multiple Vitamins-Minerals (MULTIVITAMIN PO), Take 1 tablet by mouth daily., Disp: , Rfl:   •  O2 (OXYGEN), Inhale 1 L/min Every Night. As needed at bedtime. , Disp: , Rfl:   •  Omega-3 Fatty Acids (FISH OIL PO), Take 1,000 mg by mouth 2 (Two) Times a Day., Disp: , Rfl:   •  polyethylene glycol (MIRALAX) packet, Take 17 g by mouth Daily., Disp: 30 each, Rfl: 1  •  PROAIR  (90 Base) MCG/ACT inhaler, Inhale 2 puffs Every 4 (Four) Hours As Needed for Shortness of Air., Disp: , Rfl: 5  •  tamsulosin (FLOMAX) 0.4 MG capsule 24 hr capsule, TAKE 1 CAPSULE DAILY SHORTLY AFTER THE SAME MEAL GENERIC FOR FLOMAX, Disp: 90 capsule, Rfl: 3  •  torsemide (DEMADEX) 20 MG tablet, Take 1 tablet by mouth Daily., Disp: 30 tablet, Rfl: 5  •  XARELTO 15 MG tablet, TAKE ONE TABLET DAILY, Disp: 90 tablet, Rfl: 4    Review of Systems   Constitutional: Negative for chills and fever.   Cardiovascular: Negative for chest pain.  "  Gastrointestinal: Negative for nausea and vomiting.     Objective   /80   Pulse 78   Temp 97.3 °F (36.3 °C)   Ht 177.8 cm (70\")   Wt 70.8 kg (156 lb)   SpO2 97% Comment: RA  BMI 22.38 kg/m²   Physical Exam  Constitutional:       General: He is not in acute distress.     Appearance: He is well-developed. He is not diaphoretic.   Eyes:      General: No scleral icterus.  Neck:      Vascular: No JVD.      Trachea: No tracheal deviation.   Cardiovascular:      Rate and Rhythm: Normal rate and regular rhythm.   Pulmonary:      Effort: No respiratory distress.      Breath sounds: Rhonchi present.   Abdominal:      Palpations: Abdomen is soft.      Tenderness: There is no abdominal tenderness. There is no guarding.   Skin:     General: Skin is warm and dry.   Psychiatric:         Behavior: Behavior normal.                -----------------------------------------------------------------------------------------------  Assessment/Plan   Problem List Items Addressed This Visit        Pulmonary Problems    Asthma - Primary (Chronic)    COPD, group B, by GOLD 2013 classification  (Chronic)        Patient's Body mass index is 22.38 kg/m². BMI is within normal parameters. No follow-up required..      He appears well.  Not sure whether he has had flu shot yet.  Will confirm; if he has not had one then he needs one  Continue inhalers.       Electronically signed by Bill Church MD, 10/28/2020, 11:19 CDT    "

## 2020-11-11 DIAGNOSIS — N00.9 ACUTE GLOMERULONEPHRITIS WITH PATHOLOGICAL LESION IN KIDNEY: ICD-10-CM

## 2020-11-11 RX ORDER — MIDODRINE HYDROCHLORIDE 10 MG/1
TABLET ORAL
Qty: 60 TABLET | Refills: 5 | Status: SHIPPED | OUTPATIENT
Start: 2020-11-11 | End: 2021-05-05

## 2020-11-11 NOTE — TELEPHONE ENCOUNTER
Requested Prescriptions     Pending Prescriptions Disp Refills   • midodrine (PROAMATINE) 10 MG tablet [Pharmacy Med Name: MIDODRINE 10MG TABS TABLET] 60 tablet 5     Sig: TAKE ONE TABLET TWICE DAILY BEFORE A MEAL

## 2021-01-01 ENCOUNTER — LAB (OUTPATIENT)
Dept: FAMILY MEDICINE CLINIC | Facility: CLINIC | Age: 86
End: 2021-01-01

## 2021-01-01 ENCOUNTER — OFFICE VISIT (OUTPATIENT)
Dept: CARDIOLOGY | Facility: CLINIC | Age: 86
End: 2021-01-01

## 2021-01-01 ENCOUNTER — OFFICE VISIT (OUTPATIENT)
Dept: FAMILY MEDICINE CLINIC | Facility: CLINIC | Age: 86
End: 2021-01-01

## 2021-01-01 VITALS
HEIGHT: 70 IN | SYSTOLIC BLOOD PRESSURE: 100 MMHG | BODY MASS INDEX: 22.53 KG/M2 | DIASTOLIC BLOOD PRESSURE: 67 MMHG | HEART RATE: 83 BPM

## 2021-01-01 VITALS
OXYGEN SATURATION: 98 % | TEMPERATURE: 97.5 F | HEIGHT: 70 IN | DIASTOLIC BLOOD PRESSURE: 68 MMHG | WEIGHT: 157 LBS | RESPIRATION RATE: 16 BRPM | BODY MASS INDEX: 22.48 KG/M2 | HEART RATE: 92 BPM | SYSTOLIC BLOOD PRESSURE: 116 MMHG

## 2021-01-01 DIAGNOSIS — K21.9 GASTROESOPHAGEAL REFLUX DISEASE, UNSPECIFIED WHETHER ESOPHAGITIS PRESENT: Chronic | ICD-10-CM

## 2021-01-01 DIAGNOSIS — I48.11 LONGSTANDING PERSISTENT ATRIAL FIBRILLATION (HCC): Primary | ICD-10-CM

## 2021-01-01 DIAGNOSIS — R11.2 NAUSEA AND VOMITING, INTRACTABILITY OF VOMITING NOT SPECIFIED, UNSPECIFIED VOMITING TYPE: ICD-10-CM

## 2021-01-01 DIAGNOSIS — E78.2 MIXED HYPERLIPIDEMIA: Primary | ICD-10-CM

## 2021-01-01 DIAGNOSIS — Z91.81 HISTORY OF FALL: ICD-10-CM

## 2021-01-01 DIAGNOSIS — R26.9 GAIT DIFFICULTY: Chronic | ICD-10-CM

## 2021-01-01 DIAGNOSIS — J44.9 COPD, GROUP B, BY GOLD 2013 CLASSIFICATION (HCC): ICD-10-CM

## 2021-01-01 DIAGNOSIS — Z86.73 HISTORY OF CVA (CEREBROVASCULAR ACCIDENT): ICD-10-CM

## 2021-01-01 DIAGNOSIS — R11.0 NAUSEA: ICD-10-CM

## 2021-01-01 DIAGNOSIS — I10 PRIMARY HYPERTENSION: ICD-10-CM

## 2021-01-01 DIAGNOSIS — J44.9 COPD, GROUP B, BY GOLD 2013 CLASSIFICATION (HCC): Chronic | ICD-10-CM

## 2021-01-01 DIAGNOSIS — N00.9 ACUTE GLOMERULONEPHRITIS WITH PATHOLOGICAL LESION IN KIDNEY: ICD-10-CM

## 2021-01-01 DIAGNOSIS — Z23 NEED FOR IMMUNIZATION AGAINST INFLUENZA: ICD-10-CM

## 2021-01-01 DIAGNOSIS — I87.2 VENOUS INSUFFICIENCY: ICD-10-CM

## 2021-01-01 DIAGNOSIS — E55.9 VITAMIN D DEFICIENCY: ICD-10-CM

## 2021-01-01 DIAGNOSIS — Z87.442 HISTORY OF KIDNEY STONES: ICD-10-CM

## 2021-01-01 DIAGNOSIS — I49.9 CARDIAC ARRHYTHMIA, UNSPECIFIED CARDIAC ARRHYTHMIA TYPE: ICD-10-CM

## 2021-01-01 DIAGNOSIS — E78.2 MIXED HYPERLIPIDEMIA: Chronic | ICD-10-CM

## 2021-01-01 DIAGNOSIS — N18.4 CHRONIC KIDNEY DISEASE (CKD), STAGE IV (SEVERE) (HCC): ICD-10-CM

## 2021-01-01 DIAGNOSIS — K21.9 GASTROESOPHAGEAL REFLUX DISEASE: ICD-10-CM

## 2021-01-01 DIAGNOSIS — E61.1 IRON DEFICIENCY: ICD-10-CM

## 2021-01-01 DIAGNOSIS — Z79.01 ANTICOAGULATED: ICD-10-CM

## 2021-01-01 DIAGNOSIS — R06.02 SHORTNESS OF BREATH: ICD-10-CM

## 2021-01-01 DIAGNOSIS — I10 PRIMARY HYPERTENSION: Chronic | ICD-10-CM

## 2021-01-01 DIAGNOSIS — J41.1 MUCOPURULENT CHRONIC BRONCHITIS (HCC): ICD-10-CM

## 2021-01-01 DIAGNOSIS — I50.32 CHF (CONGESTIVE HEART FAILURE), NYHA CLASS III, CHRONIC, DIASTOLIC (HCC): Chronic | ICD-10-CM

## 2021-01-01 DIAGNOSIS — Z00.00 WELLNESS EXAMINATION: ICD-10-CM

## 2021-01-01 PROCEDURE — G0008 ADMIN INFLUENZA VIRUS VAC: HCPCS | Performed by: FAMILY MEDICINE

## 2021-01-01 PROCEDURE — 99214 OFFICE O/P EST MOD 30 MIN: CPT | Performed by: INTERNAL MEDICINE

## 2021-01-01 PROCEDURE — G0439 PPPS, SUBSEQ VISIT: HCPCS | Performed by: FAMILY MEDICINE

## 2021-01-01 PROCEDURE — 1126F AMNT PAIN NOTED NONE PRSNT: CPT | Performed by: FAMILY MEDICINE

## 2021-01-01 PROCEDURE — 1170F FXNL STATUS ASSESSED: CPT | Performed by: FAMILY MEDICINE

## 2021-01-01 PROCEDURE — 99214 OFFICE O/P EST MOD 30 MIN: CPT | Performed by: FAMILY MEDICINE

## 2021-01-01 PROCEDURE — 90662 IIV NO PRSV INCREASED AG IM: CPT | Performed by: FAMILY MEDICINE

## 2021-01-01 PROCEDURE — 1160F RVW MEDS BY RX/DR IN RCRD: CPT | Performed by: FAMILY MEDICINE

## 2021-01-01 PROCEDURE — 93000 ELECTROCARDIOGRAM COMPLETE: CPT | Performed by: INTERNAL MEDICINE

## 2021-01-01 RX ORDER — ESOMEPRAZOLE MAGNESIUM 40 MG/1
CAPSULE, DELAYED RELEASE ORAL
Qty: 90 CAPSULE | Refills: 2 | Status: ON HOLD | OUTPATIENT
Start: 2021-01-01 | End: 2022-01-01

## 2021-01-01 RX ORDER — IPRATROPIUM BROMIDE AND ALBUTEROL SULFATE 2.5; .5 MG/3ML; MG/3ML
SOLUTION RESPIRATORY (INHALATION)
Qty: 180 ML | Refills: 2 | Status: SHIPPED | OUTPATIENT
Start: 2021-01-01 | End: 2021-01-01

## 2021-01-01 RX ORDER — IPRATROPIUM BROMIDE AND ALBUTEROL SULFATE 2.5; .5 MG/3ML; MG/3ML
SOLUTION RESPIRATORY (INHALATION)
Qty: 3 ML | Refills: 2 | Status: SHIPPED | OUTPATIENT
Start: 2021-01-01 | End: 2021-01-01 | Stop reason: SDUPTHER

## 2021-01-01 RX ORDER — LANOLIN ALCOHOL/MO/W.PET/CERES
325 CREAM (GRAM) TOPICAL EVERY OTHER DAY
Start: 2021-01-01 | End: 2022-01-01

## 2021-01-01 RX ORDER — DUTASTERIDE 0.5 MG/1
CAPSULE, LIQUID FILLED ORAL
Qty: 90 CAPSULE | Refills: 2 | Status: ON HOLD | OUTPATIENT
Start: 2021-01-01 | End: 2022-01-01

## 2021-01-01 RX ORDER — TAMSULOSIN HYDROCHLORIDE 0.4 MG/1
CAPSULE ORAL
Qty: 90 CAPSULE | Refills: 3 | Status: ON HOLD | OUTPATIENT
Start: 2021-01-01 | End: 2022-01-01

## 2021-01-01 RX ORDER — IPRATROPIUM BROMIDE AND ALBUTEROL SULFATE 2.5; .5 MG/3ML; MG/3ML
SOLUTION RESPIRATORY (INHALATION)
Qty: 270 ML | Refills: 5 | Status: SHIPPED | OUTPATIENT
Start: 2021-01-01 | End: 2022-01-01

## 2021-01-01 RX ORDER — MIDODRINE HYDROCHLORIDE 10 MG/1
TABLET ORAL
Qty: 60 TABLET | Refills: 5 | Status: SHIPPED | OUTPATIENT
Start: 2021-01-01 | End: 2022-01-01

## 2021-01-01 RX ORDER — IPRATROPIUM BROMIDE AND ALBUTEROL SULFATE 2.5; .5 MG/3ML; MG/3ML
SOLUTION RESPIRATORY (INHALATION)
Qty: 360 ML | Refills: 0 | Status: SHIPPED | OUTPATIENT
Start: 2021-01-01 | End: 2021-01-01

## 2021-01-13 DIAGNOSIS — N00.9 ACUTE GLOMERULONEPHRITIS WITH PATHOLOGICAL LESION IN KIDNEY: ICD-10-CM

## 2021-01-14 RX ORDER — TAMSULOSIN HYDROCHLORIDE 0.4 MG/1
CAPSULE ORAL
Qty: 90 CAPSULE | Refills: 3 | Status: SHIPPED | OUTPATIENT
Start: 2021-01-14 | End: 2021-01-01

## 2021-02-10 DIAGNOSIS — N00.9 ACUTE GLOMERULONEPHRITIS WITH PATHOLOGICAL LESION IN KIDNEY: ICD-10-CM

## 2021-02-13 RX ORDER — RIVAROXABAN 15 MG/1
TABLET, FILM COATED ORAL
Qty: 90 TABLET | Refills: 4 | Status: SHIPPED | OUTPATIENT
Start: 2021-02-13 | End: 2022-01-01

## 2021-02-24 ENCOUNTER — TELEPHONE (OUTPATIENT)
Dept: FAMILY MEDICINE CLINIC | Facility: CLINIC | Age: 86
End: 2021-02-24

## 2021-02-24 NOTE — TELEPHONE ENCOUNTER
"[facility] ER \"he is here in the ER and want to send current med list\"    Faxed to [phone] [facility]  "

## 2021-03-11 DIAGNOSIS — R11.0 NAUSEA: ICD-10-CM

## 2021-03-11 DIAGNOSIS — R11.2 NAUSEA AND VOMITING, INTRACTABILITY OF VOMITING NOT SPECIFIED, UNSPECIFIED VOMITING TYPE: ICD-10-CM

## 2021-03-11 DIAGNOSIS — N00.9 ACUTE GLOMERULONEPHRITIS WITH PATHOLOGICAL LESION IN KIDNEY: ICD-10-CM

## 2021-03-11 DIAGNOSIS — K21.9 GASTROESOPHAGEAL REFLUX DISEASE: ICD-10-CM

## 2021-03-12 RX ORDER — ESOMEPRAZOLE MAGNESIUM 40 MG/1
CAPSULE, DELAYED RELEASE ORAL
Qty: 90 CAPSULE | Refills: 2 | Status: SHIPPED | OUTPATIENT
Start: 2021-03-12 | End: 2021-01-01

## 2021-03-12 RX ORDER — DUTASTERIDE 0.5 MG/1
CAPSULE, LIQUID FILLED ORAL
Qty: 90 CAPSULE | Refills: 2 | Status: SHIPPED | OUTPATIENT
Start: 2021-03-12 | End: 2021-01-01

## 2021-03-16 ENCOUNTER — OFFICE VISIT (OUTPATIENT)
Dept: FAMILY MEDICINE CLINIC | Facility: CLINIC | Age: 86
End: 2021-03-16

## 2021-03-16 VITALS
WEIGHT: 158.2 LBS | HEIGHT: 70 IN | DIASTOLIC BLOOD PRESSURE: 64 MMHG | HEART RATE: 79 BPM | SYSTOLIC BLOOD PRESSURE: 102 MMHG | TEMPERATURE: 97.8 F | RESPIRATION RATE: 16 BRPM | BODY MASS INDEX: 22.65 KG/M2 | OXYGEN SATURATION: 100 %

## 2021-03-16 DIAGNOSIS — Z79.01 ANTICOAGULATED: ICD-10-CM

## 2021-03-16 DIAGNOSIS — N18.4 CHRONIC KIDNEY DISEASE (CKD), STAGE IV (SEVERE) (HCC): ICD-10-CM

## 2021-03-16 DIAGNOSIS — K21.9 GASTROESOPHAGEAL REFLUX DISEASE, UNSPECIFIED WHETHER ESOPHAGITIS PRESENT: Chronic | ICD-10-CM

## 2021-03-16 DIAGNOSIS — R06.02 SHORTNESS OF BREATH: ICD-10-CM

## 2021-03-16 DIAGNOSIS — G90.9 AUTONOMIC DYSFUNCTION: ICD-10-CM

## 2021-03-16 DIAGNOSIS — I50.32 CHF (CONGESTIVE HEART FAILURE), NYHA CLASS III, CHRONIC, DIASTOLIC (HCC): Chronic | ICD-10-CM

## 2021-03-16 DIAGNOSIS — R60.0 LEG EDEMA: ICD-10-CM

## 2021-03-16 DIAGNOSIS — Z87.2 HISTORY OF CELLULITIS: ICD-10-CM

## 2021-03-16 DIAGNOSIS — I10 ESSENTIAL HYPERTENSION: Chronic | ICD-10-CM

## 2021-03-16 PROCEDURE — 99214 OFFICE O/P EST MOD 30 MIN: CPT | Performed by: FAMILY MEDICINE

## 2021-03-16 NOTE — PROGRESS NOTES
Subjective   James Birch is a 89 y.o. male presenting with chief complaint of:   Chief Complaint   Patient presents with   • Leg Swelling       History of Present Illness : With wife and son.       Has multiple chronic problems to consider that might have a bearing on today's issues;  an interval appointment.       Chronic/acute problems reviewed today:   1. Chronic kidney disease (CKD), stage IV (severe) (CMS/[facility]) Chronic/stable.  Has seen nephrology.  No dysuria.  Previously warned/reminded:   To avoid further kidney function decline  A. Treat any time you think you have infection  B. Stay hydrated (dont get dehydrated); drink at least 60 oz fluid every 24 hr (1800 cc or nearly a 2L)  C. Do not allow any xrays with dye WITHOUT the doctor ordering checking your renal function  D. Do not get new medications without the doctor considering your renal condition  E. Do not use motrin/ibuprofen, alleve/naprosyn and these types of medications     2. Leg edema chronic stable lower extremity edema worse when he is up during the day.  Various etiologies including CHF   3. SOB: copd, CHF Chronic/variable:  SOB worse with exertion/ok at rest.  Contributing diagnosis: many with near comfort care approach.     4. Gastroesophageal reflux disease, unspecified whether esophagitis present Chronic/stable.  Controlled heartburn, reflux without dysphagia, melena.  Rx used, periods not used proven needed with symptoms -currently doing ok.      5. Fppsmljpwpeszx-eop-gzox/xarelto Chronic/stable reason and use of.  Denies bleeding issues; especially epistaxis, melena, hematochezia.  Upper arms/others do bruise easily.  No significant bleeding or recent/significant falls.      6. Essential hypertension Chronic/stable. Stable here past/no recent home blood pressures.  No significant chest pain, SOB, LE edema, orthopnea, near syncope, dizziness/light headness.   Recent Vitals       10/7/2020 10/28/2020 3/16/2021       BP:  118/66  122/80   102/64     Pulse:  60  78  79     Temp:  97.3 °F (36.3 °C)  97.3 °F (36.3 °C)  97.8 °F (36.6 °C)     Weight:  69.9 kg (154 lb)  70.8 kg (156 lb)  71.8 kg (158 lb 3.2 oz)     BMI (Calculated):  22.1  22.4  22.7            7. CHF (congestive heart failure), NYHA class III, chronic, diastolic (CMS/HCC) Chronic/stable.  Denies significant sob, orthopnea, leg edema, weight gain.  Aware of influence diet/salt and watching weight at home.       8. History of cellulitis recent visit to [place] with concern of cellulitis; treated outpatient and is better   9. Autonomic dysfunction history of hypotension that was improved with midodrine; the latter seems tolerated     Has an/another acute issue today: none.    The following portions of the patient's history were reviewed and updated as appropriate: allergies, current medications, past family history, past medical history, past social history, past surgical history and problem list.      Current Outpatient Medications:   •  acetaminophen (TYLENOL) 500 MG tablet, Take 1 tablet by mouth Every 4 (Four) Hours As Needed for Mild Pain ., Disp: , Rfl:   •  B Complex-C (SUPER B COMPLEX/VITAMIN C PO), Take 1 capsule by mouth Daily., Disp: , Rfl:   •  carbonyl iron (FEOSOL) 45 MG tablet tablet, TAKE ONE TABLET DAILY, Disp: 30 each, Rfl: 6  •  Cholecalciferol (VITAMIN D3 PO), Take 2,000 Int'l Units by mouth Daily., Disp: , Rfl:   •  Coenzyme Q10 (CO Q-10 PO), Take 1 tablet by mouth Daily., Disp: , Rfl:   •  dutasteride (AVODART) 0.5 MG capsule, TAKE 1 CAPSULE DAILY GENERIC FOR AVODART, Disp: 90 capsule, Rfl: 2  •  Elastic Bandages & Supports (JOBST FOR MEN 8-15MMHG LG) misc, 1 Device Daily. Apply when up each morning; may remove at night, Disp: 2 each, Rfl: 0  •  esomeprazole (nexIUM) 40 MG capsule, TAKE 1 CAPSULE DAILY BEFORE BREAKFAST GENERIC FOR NEXIUM, Disp: 90 capsule, Rfl: 2  •  furosemide (LASIX) 40 MG tablet, Take 40 mg by mouth Daily., Disp: , Rfl:   •  ipratropium-albuterol  (DUO-NEB) 0.5-2.5 mg/3 ml nebulizer, USE 1 VIAL IN NEBULIZER FOUR TIMES DAILY GENERIC FOR, Disp: 180 vial, Rfl: 5  •  midodrine (PROAMATINE) 10 MG tablet, TAKE ONE TABLET TWICE DAILY BEFORE A MEAL, Disp: 60 tablet, Rfl: 5  •  Multiple Vitamins-Minerals (MULTIVITAMIN PO), Take 1 tablet by mouth daily., Disp: , Rfl:   •  O2 (OXYGEN), Inhale 1 L/min Every Night. As needed at bedtime. , Disp: , Rfl:   •  Omega-3 Fatty Acids (FISH OIL PO), Take 1,000 mg by mouth 2 (Two) Times a Day., Disp: , Rfl:   •  polyethylene glycol (MIRALAX) packet, Take 17 g by mouth Daily., Disp: 30 each, Rfl: 1  •  tamsulosin (FLOMAX) 0.4 MG capsule 24 hr capsule, TAKE 1 CAPSULE DAILY SHORTLY AFTER THE SAME MEAL GENERIC FOR FLOMAX, Disp: 90 capsule, Rfl: 3  •  torsemide (DEMADEX) 20 MG tablet, Take 1 tablet by mouth Daily., Disp: 30 tablet, Rfl: 5  •  Xarelto 15 MG tablet, TAKE ONE TABLET DAILY, Disp: 90 tablet, Rfl: 4    No problems with medications.    No Known Allergies    Review of Systems  GENERAL:  Inactive/slower with limits, speed, stamina for age and gait and occ SOB. Sleep is ok with oxygen/sleeps in recliner that he can reposition.  No fever now.  ENDO:  No syncope, near or diaphoretic sweaty spells.  HEENT: No head injury or headache usually.  No vision change.  Same hearing loss.  Ears without pain/drainage.  No sore throat.  No significant nasal/sinus congestion/drainage. No epistaxis.  CHEST: No chest wall tenderness or mass other than left shoulder bruise. Occ cough, with mild occ wheeze.  Variable SOB; no hemoptysis.  CV: No exertional chest pain, palpitations; increased end of day ankle edema.   GI: No  dysphagia.  No abdominal pain, diarrhea, constipation.  No rectal bleeding, or melena.  No nausea and vomiting/heartburn.    :  Voids without dysuria, or incontinence to completion.  ORTHO: No painful/swollen joints but various on /off sore; especially hands/fingers with motion.  No change occ sore neck or back.  No acute neck  or back pain despite recent injury.   NEURO: Chronic/mild dizziness, and primarily LE weakness of extremities.  No change UE/LE mild numbness/paresthesias.   PSYCH: Mild ? memory loss.  Mood good; occ anxious, depressed but/and not suicidal.  Tries to tolerate stress   Screening:  Mammogram: NA  Bone density: NA  Low dose CT chest:Tobacco-smoker/age 18/<1 ppd/dc 1965 (19) NA  GI:   Colonoscopy+nl/[facility]/9.5.12/5y-declined  EGD+biop/[facility]/8.31.15  Prostate: urology past  Usual lab order  1m CBC, BMP, iron  6m CBC, CMP, iron  12m CBC, CMP, LIPID      Data reviewed:   Recent admit/ER/MD visits:   [facility] ER GFR 26; others ok    Last cardiac testing:   Echo:   Results for orders placed during the hospital encounter of 04/11/19    Adult Transthoracic Echo Complete W/ Cont if Necessary Per Protocol    Interpretation Summary  · Mild aortic valve stenosis is present.  · Mild dilation of the aortic root is present.  · Left atrial cavity size is borderline dilated.  · Left ventricular systolic function is normal.  · Left ventricular diastolic dysfunction.    MILD AORTIC VALVE STENOSIS  RHYTHM IS ATRIAL FIBRILLATION  NORMAL LV AND RV SYSTOLIC FUNCTION  LV DIASTOLIC DYSFUNCTION IS PROBABLY GRADE 1      Lab Results:  Results for orders placed or performed in visit on 08/11/20   Basic metabolic panel    Specimen: Blood   Result Value Ref Range    Glucose 95 65 - 99 mg/dL    BUN 37 (H) 8 - 23 mg/dL    Creatinine 1.92 (H) 0.76 - 1.27 mg/dL    eGFR Non African Am 33 (L) >60 mL/min/1.73    eGFR African Am 40 (L) >60 mL/min/1.73    BUN/Creatinine Ratio 19.3 7.0 - 25.0    Sodium 137 136 - 145 mmol/L    Potassium 4.0 3.5 - 5.2 mmol/L    Chloride 97 (L) 98 - 107 mmol/L    Total CO2 29.3 (H) 22.0 - 29.0 mmol/L    Calcium 9.4 8.2 - 9.6 mg/dL   Iron    Specimen: Blood   Result Value Ref Range    Iron 70 59 - 158 mcg/dL   CBC & Differential    Specimen: Blood   Result Value Ref Range    WBC 6.01 3.40 - 10.80 10*3/mm3    RBC  4.29 4.14 - 5.80 10*6/mm3    Hemoglobin 12.7 (L) 13.0 - 17.7 g/dL    Hematocrit 38.9 37.5 - 51.0 %    MCV 90.7 79.0 - 97.0 fL    MCH 29.6 26.6 - 33.0 pg    MCHC 32.6 31.5 - 35.7 g/dL    RDW 13.8 12.3 - 15.4 %    Platelets 172 140 - 450 10*3/mm3    Neutrophil Rel % 62.0 42.7 - 76.0 %    Lymphocyte Rel % 20.0 19.6 - 45.3 %    Monocyte Rel % 12.8 (H) 5.0 - 12.0 %    Eosinophil Rel % 4.2 0.3 - 6.2 %    Basophil Rel % 0.5 0.0 - 1.5 %    Neutrophils Absolute 3.73 1.70 - 7.00 10*3/mm3    Lymphocytes Absolute 1.20 0.70 - 3.10 10*3/mm3    Monocytes Absolute 0.77 0.10 - 0.90 10*3/mm3    Eosinophils Absolute 0.25 0.00 - 0.40 10*3/mm3    Basophils Absolute 0.03 0.00 - 0.20 10*3/mm3    Immature Granulocyte Rel % 0.5 0.0 - 0.5 %    Immature Grans Absolute 0.03 0.00 - 0.05 10*3/mm3    nRBC 0.0 0.0 - 0.2 /100 WBC       A1C:No results for input(s): HGBA1C in the last 66135 hours.  LIPID:  Lab Results - Last 18 Months   Lab Units 05/22/20  0822   CHOLESTEROL mg/dL 158   LDL CHOL mg/dL 91   HDL CHOL mg/dL 48   TRIGLYCERIDES mg/dL 97     PSA:No results for input(s): PSA in the last 10180 hours.  CBC:  Lab Results - Last 18 Months   Lab Units 08/11/20  0733 05/22/20  0822 02/09/20  0553 02/08/20  0601 02/07/20  0356 02/06/20  1749 01/27/20  0823 10/14/19  1241   WBC 10*3/mm3 6.01 6.11 5.45 5.99 5.39 5.71 6.76 4.82   HEMOGLOBIN g/dL 12.7* 12.0* 10.6* 13.1 11.0* 11.8* 10.6* 10.6*   HEMATOCRIT % 38.9 34.8* 31.3* 39.1 32.0* 35.5* 31.6* 32.2*   PLATELETS 10*3/mm3 172 231 178 188 187 199 181 173   IRON mcg/dL 70 87  --   --   --  26* 42* 60      BMP/CMP:  Lab Results - Last 18 Months   Lab Units 08/11/20  0733 05/22/20  0822 02/24/20  0727 02/09/20  0553 02/08/20  0601 02/07/20  0356 02/06/20  1749 01/27/20  0823 10/14/19  1241   SODIUM mmol/L 137 138 136 137 137 138 138 137 138   POTASSIUM mmol/L 4.0 4.6 4.5 4.0 4.3 4.1 3.9 4.5 4.0   CHLORIDE mmol/L 97* 98 97* 100 99 101 97* 98 99   TOTAL CO2 mmol/L 29.3* 28.2 27.6  --   --   --   --  25.8  "26.3   CO2 mmol/L  --   --   --  26.0 23.0 26.0 28.0  --   --    GLUCOSE mg/dL 95 97 96  --   --   --   --  78 101*   BUN mg/dL 37* 44* 48* 45* 44* 48* 50* 53* 36*   CREATININE mg/dL 1.92* 2.29* 2.36* 2.26* 2.17* 2.31* 2.11* 2.49* 2.22*   EGFR IF NONAFRICN AM mL/min/1.73 33* 27* 26* 27* 29* 27* 30* 24* 28*   EGFR IF AFRICN AM mL/min/1.73 40* 33* 32*  --   --   --   --  30* 34*   CALCIUM mg/dL 9.4 9.2 9.2 8.4 9.2 8.8 9.2 8.8 9.1     HEPATIC:  Lab Results - Last 18 Months   Lab Units 05/22/20  0822 02/06/20  1749 10/14/19  1241   ALT (SGPT) U/L 7 10 10   AST (SGOT) U/L 12 15 17   ALK PHOS U/L 79 105 82     THYROID:  Lab Results - Last 18 Months   Lab Units 05/22/20  0822   TSH uIU/mL 1.500       Objective   /64   Pulse 79   Temp 97.8 °F (36.6 °C) (Infrared)   Resp 16   Ht 177.8 cm (70\")   Wt 71.8 kg (158 lb 3.2 oz)   SpO2 100%   BMI 22.70 kg/m²   Body mass index is 22.7 kg/m².    Recent Vitals       10/7/2020 10/28/2020 3/16/2021       BP:  118/66  122/80  102/64     Pulse:  60  78  79     Temp:  97.3 °F (36.3 °C)  97.3 °F (36.3 °C)  97.8 °F (36.6 °C)     Weight:  69.9 kg (154 lb)  70.8 kg (156 lb)  71.8 kg (158 lb 3.2 oz)     BMI (Calculated):  22.1  22.4  22.7           Physical Exam  GENERAL:  Thin AFA developed in no acute distress.  SKIN: Turgor excellent, without rash lesion  HEENT: Normal cephalic without trauma.  Pupils equal round reactive to light. Extraocular motions full without nystagmus.  Hard of hearing    No thyromegaly, mass, tenderness, lymphadenopathy and supple.  CV: Irregular irregular rhythm.  No murmur, gallop; mod pitting ankle lower leg edema. Posterior pulses intact.  No carotid bruits.  CHEST: No chest wall tenderness or mass.   LUNGS: Symmetric motion with clear/mild reduced to auscultation.   ABD: Soft, nontender without mass.   ORTHO: Symmetric extremities without swelling/point tenderness other than the left shoulder.  Full gross range of motion other than left shoulder; it " was not checked.   NEURO: CN 2-12 grossly intact.  Symmetric facies. 1/4 x bicep equal reflexes.  UE/LE   2-3/5 strength throughout.  Nonfocal use extremities. Speech clear.   PSYCH: Oriented x 3.  Pleasant calm, well kept.  Purposeful/directed conservation with intact short/long gross memory    Assessment/Plan     1. Chronic kidney disease (CKD), stage IV (severe) (CMS/HCC)    2. Leg edema    3. SOB: copd, CHF    4. Gastroesophageal reflux disease, unspecified whether esophagitis present    5. Zqngsbxcsbiulv-twy-poak/xarelto    6. Essential hypertension    7. CHF (congestive heart failure), NYHA class III, chronic, diastolic (CMS/HCC)    8. History of cellulitis    9. Autonomic dysfunction      Discussion:  Difficult situation as there are many issues that we cannot make whole; trying to temporizing keep him in his current living arrangement and comfortable.  Son does not expect aggression  Labs today to see where we are with his renal function; which could be a major liability if it is worse  Same meds for now  If we get into cellulitis we will have to periodically treat  Long-term prognosis is not good    Medical decision issues:   Data review above:   Rx: reviewed and decisions:   Same Rx for now     Orders placed:   LAB/Testing/Referrals: reviewed/orders:   Today:   Orders Placed This Encounter   Procedures   • Comprehensive Metabolic Panel   • Iron Profile   • CBC & Differential     Chronic/recurrent labs above or change to:   same    Health maintenance:   Body mass index is 22.7 kg/m².  Patient's Body mass index is 22.7 kg/m². BMI is within normal parameters. No follow-up required..    Tobacco use reviewed:    James SHAHAB Birch  reports that he quit smoking about 54 years ago. His smoking use included cigarettes. He has a 18.00 pack-year smoking history. He has never used smokeless tobacco..    There are no Patient Instructions on file for this visit.    Follow up: Return for lab today  b. lab/Dr William  3m.  Future Appointments   Date Time Provider Department Center   3/30/2021  9:15 AM Skyler Trimble MD [facility]   6/16/2021  9:15 AM Hemal William MD [facility]

## 2021-03-17 ENCOUNTER — TELEPHONE (OUTPATIENT)
Dept: FAMILY MEDICINE CLINIC | Facility: CLINIC | Age: 86
End: 2021-03-17

## 2021-03-17 LAB
ALBUMIN SERPL-MCNC: 4.2 G/DL (ref 3.5–4.6)
ALBUMIN/GLOB SERPL: 2 {RATIO} (ref 1.2–2.2)
ALP SERPL-CCNC: 101 IU/L (ref 39–117)
ALT SERPL-CCNC: 8 IU/L (ref 0–44)
AST SERPL-CCNC: 16 IU/L (ref 0–40)
BASOPHILS # BLD AUTO: 0 X10E3/UL (ref 0–0.2)
BASOPHILS NFR BLD AUTO: 0 %
BILIRUB SERPL-MCNC: 0.3 MG/DL (ref 0–1.2)
BUN SERPL-MCNC: 39 MG/DL (ref 10–36)
BUN/CREAT SERPL: 19 (ref 10–24)
CALCIUM SERPL-MCNC: 9 MG/DL (ref 8.6–10.2)
CHLORIDE SERPL-SCNC: 99 MMOL/L (ref 96–106)
CO2 SERPL-SCNC: 25 MMOL/L (ref 20–29)
CREAT SERPL-MCNC: 2.07 MG/DL (ref 0.76–1.27)
EOSINOPHIL # BLD AUTO: 0.2 X10E3/UL (ref 0–0.4)
EOSINOPHIL NFR BLD AUTO: 3 %
ERYTHROCYTE [DISTWIDTH] IN BLOOD BY AUTOMATED COUNT: 13.5 % (ref 11.6–15.4)
GLOBULIN SER CALC-MCNC: 2.1 G/DL (ref 1.5–4.5)
GLUCOSE SERPL-MCNC: 105 MG/DL (ref 65–99)
HCT VFR BLD AUTO: 35.5 % (ref 37.5–51)
HGB BLD-MCNC: 12 G/DL (ref 13–17.7)
IMM GRANULOCYTES # BLD AUTO: 0 X10E3/UL (ref 0–0.1)
IMM GRANULOCYTES NFR BLD AUTO: 0 %
IRON SATN MFR SERPL: 18 % (ref 15–55)
IRON SERPL-MCNC: 46 UG/DL (ref 38–169)
LYMPHOCYTES # BLD AUTO: 1.1 X10E3/UL (ref 0.7–3.1)
LYMPHOCYTES NFR BLD AUTO: 21 %
MCH RBC QN AUTO: 30.8 PG (ref 26.6–33)
MCHC RBC AUTO-ENTMCNC: 33.8 G/DL (ref 31.5–35.7)
MCV RBC AUTO: 91 FL (ref 79–97)
MONOCYTES # BLD AUTO: 0.7 X10E3/UL (ref 0.1–0.9)
MONOCYTES NFR BLD AUTO: 15 %
NEUTROPHILS # BLD AUTO: 3 X10E3/UL (ref 1.4–7)
NEUTROPHILS NFR BLD AUTO: 61 %
PLATELET # BLD AUTO: 172 X10E3/UL (ref 150–450)
POTASSIUM SERPL-SCNC: 4.2 MMOL/L (ref 3.5–5.2)
PROT SERPL-MCNC: 6.3 G/DL (ref 6–8.5)
RBC # BLD AUTO: 3.9 X10E6/UL (ref 4.14–5.8)
SODIUM SERPL-SCNC: 139 MMOL/L (ref 134–144)
TIBC SERPL-MCNC: 260 UG/DL (ref 250–450)
UIBC SERPL-MCNC: 214 UG/DL (ref 111–343)
WBC # BLD AUTO: 5 X10E3/UL (ref 3.4–10.8)

## 2021-03-17 NOTE — TELEPHONE ENCOUNTER
Caller: MADISYN HORTARACH     Relationship: Emergency Contact    Best call back number: [phone]    What medication are you requesting: SOMETHING TO TREAT SWELLING IN LEGS    What are your current symptoms: SWELLING OF LEGS    How long have you been experiencing symptoms: COUPLE OF DAYS- ONGOING    Have you had these symptoms before:    [x] Yes  [] No    Have you been treated for these symptoms before:   [x] Yes  [] No    If a prescription is needed, what is your preferred pharmacy and phone number: [facility] - [facility] [address] [phone] [facility] [phone]      Additional notes: MADISYN STATED THAT THEY WERE IN OFFICE YESTERDAY AND THOUGHT THAT A MEDICATION TO HELP HIS SYMPTOMS WOULD BE SENT IN BUT THERE WAS NOTHING AT PHARMACY WHEN SHE WENT TO  HER MEDICATION.

## 2021-03-17 NOTE — TELEPHONE ENCOUNTER
Checked office notes and do not see any extra diuretic was ordered? Only labs and results in timeline, will check with Provider?

## 2021-03-30 ENCOUNTER — OFFICE VISIT (OUTPATIENT)
Dept: CARDIOLOGY | Facility: CLINIC | Age: 86
End: 2021-03-30

## 2021-03-30 VITALS
SYSTOLIC BLOOD PRESSURE: 112 MMHG | WEIGHT: 164 LBS | HEART RATE: 62 BPM | BODY MASS INDEX: 23.48 KG/M2 | DIASTOLIC BLOOD PRESSURE: 61 MMHG | HEIGHT: 70 IN

## 2021-03-30 DIAGNOSIS — I48.21 PERMANENT ATRIAL FIBRILLATION (HCC): Primary | ICD-10-CM

## 2021-03-30 DIAGNOSIS — I83.93 VARICOSE VEINS OF BOTH LOWER EXTREMITIES, UNSPECIFIED WHETHER COMPLICATED: ICD-10-CM

## 2021-03-30 DIAGNOSIS — I50.32 CHF (CONGESTIVE HEART FAILURE), NYHA CLASS III, CHRONIC, DIASTOLIC (HCC): Chronic | ICD-10-CM

## 2021-03-30 PROCEDURE — 99213 OFFICE O/P EST LOW 20 MIN: CPT | Performed by: INTERNAL MEDICINE

## 2021-03-30 PROCEDURE — 93000 ELECTROCARDIOGRAM COMPLETE: CPT | Performed by: INTERNAL MEDICINE

## 2021-03-30 NOTE — PROGRESS NOTES
Subjective    James Birch is a 89 y.o. male. FU of rhythm and chf    History of Present Illness     PAR AFIB:  He has not had palpitations or soa since LOV. He is very inactive and uses a walker. Is adherent to meds and family concurs. No bleeding probs noted and is on DOAC ok at the correct dose. EKG today is afib with hr=62. He does not have cp.    HFpEF:  He has chronic leg edema but no soa and no orthopnea or pnd. He is not wearing his noc O2 much anymore. Denies any decline in stamina. He sits most all day. No decline in appetite or uop. Wt is slowly increasing and kidney function is declining.        The following portions of the patient's history were reviewed and updated as appropriate: allergies, current medications, past family history, past medical history, past social history, past surgical history and problem list.    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   • DAMIAN-intolerant   • Autonomic dysfunction   • Permanent atrial fibrillation (CMS/HCC)   • CHF (congestive heart failure), NYHA class III, chronic, diastolic (CMS/HCC)   • Wellness examination-done   • Gait difficulty-cane/walker   • Asthma   • Nlxtjuyuselhiy-kwn-qyug/xarelto   • Chronic kidney disease (CKD), stage IV (severe) (CMS/HCC)   • Prostatism   • History of kidney stones   • Varicose veins of legs   • Hyperlipidemia-statin   • Hypertension   • Hypersomnia   • Gastroesophageal reflux disease   • COPD, group B, by GOLD 2013 classification    • Nocturnal hypoxia-oxygen advised   • Laboratory test*   • History of tobacco use   • Acute renal failure (CMS/HCC)   • Cerebrovascular small vessel disease   • SOB: copd, CHF   • Compression fracture of body of thoracic vertebra (CMS/HCC)   • Dyspnea due to congestive heart failure (CMS/HCC)   • Atrial flutter with controlled response (CMS/HCC)   • Current non-smoker   • Osteoporosis: 2019-no tx with renal   • Fall   • Skin lesion   • Cardiac  arrhythmia: a fib-flutter   • Esophageal dysfunction   • Leg edema   • Stasis dermatitis   • Memory loss       No Known Allergies    Family History   Problem Relation Age of Onset   • No Known Problems Mother    • No Known Problems Father        Social History     Socioeconomic History   • Marital status:      Spouse name: Not on file   • Number of children: 3   • Years of education: Not on file   • Highest education level: Not on file   Tobacco Use   • Smoking status: Former Smoker     Packs/day: 1.00     Years: 18.00     Pack years: 18.00     Types: Cigarettes     Quit date:      Years since quittin.2   • Smokeless tobacco: Never Used   • Tobacco comment: already quit   Substance and Sexual Activity   • Alcohol use: No   • Drug use: No   • Sexual activity: Defer     Partners: Female         Current Outpatient Medications:   •  acetaminophen (TYLENOL) 500 MG tablet, Take 1 tablet by mouth Every 4 (Four) Hours As Needed for Mild Pain ., Disp: , Rfl:   •  B Complex-C (SUPER B COMPLEX/VITAMIN C PO), Take 1 capsule by mouth Daily., Disp: , Rfl:   •  carbonyl iron (FEOSOL) 45 MG tablet tablet, TAKE ONE TABLET DAILY, Disp: 30 each, Rfl: 6  •  Cholecalciferol (VITAMIN D3 PO), Take 2,000 Int'l Units by mouth Daily., Disp: , Rfl:   •  Coenzyme Q10 (CO Q-10 PO), Take 1 tablet by mouth Daily., Disp: , Rfl:   •  dutasteride (AVODART) 0.5 MG capsule, TAKE 1 CAPSULE DAILY GENERIC FOR AVODART, Disp: 90 capsule, Rfl: 2  •  Elastic Bandages & Supports (JOBST FOR MEN 8-15MMHG LG) misc, 1 Device Daily. Apply when up each morning; may remove at night, Disp: 2 each, Rfl: 0  •  esomeprazole (nexIUM) 40 MG capsule, TAKE 1 CAPSULE DAILY BEFORE BREAKFAST GENERIC FOR NEXIUM, Disp: 90 capsule, Rfl: 2  •  furosemide (LASIX) 40 MG tablet, Take 40 mg by mouth Daily., Disp: , Rfl:   •  ipratropium-albuterol (DUO-NEB) 0.5-2.5 mg/3 ml nebulizer, USE 1 VIAL IN NEBULIZER FOUR TIMES DAILY GENERIC FOR, Disp: 180 vial, Rfl: 5  •   "midodrine (PROAMATINE) 10 MG tablet, TAKE ONE TABLET TWICE DAILY BEFORE A MEAL, Disp: 60 tablet, Rfl: 5  •  Multiple Vitamins-Minerals (MULTIVITAMIN PO), Take 1 tablet by mouth daily., Disp: , Rfl:   •  O2 (OXYGEN), Inhale 1 L/min Every Night. As needed at bedtime. , Disp: , Rfl:   •  Omega-3 Fatty Acids (FISH OIL PO), Take 1,000 mg by mouth 2 (Two) Times a Day., Disp: , Rfl:   •  polyethylene glycol (MIRALAX) packet, Take 17 g by mouth Daily., Disp: 30 each, Rfl: 1  •  tamsulosin (FLOMAX) 0.4 MG capsule 24 hr capsule, TAKE 1 CAPSULE DAILY SHORTLY AFTER THE SAME MEAL GENERIC FOR FLOMAX, Disp: 90 capsule, Rfl: 3  •  torsemide (DEMADEX) 20 MG tablet, Take 1 tablet by mouth Daily., Disp: 30 tablet, Rfl: 5  •  Xarelto 15 MG tablet, TAKE ONE TABLET DAILY, Disp: 90 tablet, Rfl: 4    Past Surgical History:   Procedure Laterality Date   • CATARACT EXTRACTION      left   • CATARACT EXTRACTION      left   • COLONOSCOPY  09/05/2012    normal   • ENDOSCOPY  08/31/2015    normal   • EYE SURGERY     • FRACTURE SURGERY     • HERNIA REPAIR     • HIP SURGERY     • SKIN BIOPSY         Review of Systems   Constitutional: Negative for activity change, appetite change, fatigue and unexpected weight change.   Cardiovascular: Positive for leg swelling. Negative for chest pain and palpitations.   Gastrointestinal: Negative for blood in stool.   Genitourinary: Negative for difficulty urinating and hematuria.   Musculoskeletal: Positive for arthralgias and back pain.   Neurological: Positive for dizziness.        Poor balance with frequent falls       /61   Pulse 62   Ht 177.8 cm (70\")   Wt 74.4 kg (164 lb)   BMI 23.53 kg/m²   Procedures    Objective   Physical Exam  Constitutional:       Comments: Frail and elderly. Examined in a WC   Cardiovascular:      Rate and Rhythm: Normal rate. Rhythm regularly irregular.      Pulses: Decreased pulses.      Heart sounds: Heart sounds are distant.    No systolic murmur is present.   No " diastolic murmur is present.     Pulmonary:      Effort: Pulmonary effort is normal.      Breath sounds: No wheezing, rhonchi or rales.      Comments: Diminished bs and tubular bs  Musculoskeletal:      Right lower leg: 3+ Edema present.      Left lower leg: 3+ Edema present.   Skin:     General: Skin is warm and dry.   Neurological:      General: No focal deficit present.      Mental Status: He is alert.   Psychiatric:         Mood and Affect: Mood normal.         Assessment/Plan   Diagnoses and all orders for this visit:    1. Permanent atrial fibrillation (CMS/HCC) (Primary)  Comments:  well tolerated and on DOAC ok and correct dose - CPT  Orders:  -     ECG 12 Lead    2. Varicose veins of both lower extremities, unspecified whether complicated  Comments:  venous insuff is prob cause of chronic leg edema    3. CHF (congestive heart failure), NYHA class III, chronic, diastolic (CMS/HCC)  Comments:  he appears euvolemic - CPT                 Return in about 6 months (around 9/30/2021) for Next scheduled follow up.  Orders Placed This Encounter   Procedures   • ECG 12 Lead     Order Specific Question:   Reason for Exam:     Answer:   [email or web address]

## 2021-04-09 DIAGNOSIS — I10 ESSENTIAL HYPERTENSION: ICD-10-CM

## 2021-04-09 DIAGNOSIS — E78.2 MIXED HYPERLIPIDEMIA: Primary | ICD-10-CM

## 2021-04-09 DIAGNOSIS — E55.9 VITAMIN D DEFICIENCY: ICD-10-CM

## 2021-04-09 DIAGNOSIS — N40.0 PROSTATISM: ICD-10-CM

## 2021-04-09 DIAGNOSIS — E61.1 IRON DEFICIENCY: ICD-10-CM

## 2021-04-09 DIAGNOSIS — Z79.01 ANTICOAGULATED: ICD-10-CM

## 2021-04-09 DIAGNOSIS — Z01.89 LABORATORY TEST: ICD-10-CM

## 2021-04-09 DIAGNOSIS — N17.9 ACUTE RENAL FAILURE, UNSPECIFIED ACUTE RENAL FAILURE TYPE (HCC): ICD-10-CM

## 2021-04-09 DIAGNOSIS — R60.0 LEG EDEMA: ICD-10-CM

## 2021-04-28 DIAGNOSIS — J44.9 COPD, GROUP B, BY GOLD 2013 CLASSIFICATION (HCC): ICD-10-CM

## 2021-04-28 RX ORDER — IPRATROPIUM BROMIDE AND ALBUTEROL SULFATE 2.5; .5 MG/3ML; MG/3ML
SOLUTION RESPIRATORY (INHALATION)
Qty: 180 ML | Refills: 2 | Status: SHIPPED | OUTPATIENT
Start: 2021-04-28 | End: 2021-07-06

## 2021-05-05 DIAGNOSIS — N00.9 ACUTE GLOMERULONEPHRITIS WITH PATHOLOGICAL LESION IN KIDNEY: ICD-10-CM

## 2021-05-05 RX ORDER — MIDODRINE HYDROCHLORIDE 10 MG/1
TABLET ORAL
Qty: 60 TABLET | Refills: 5 | Status: SHIPPED | OUTPATIENT
Start: 2021-05-05 | End: 2021-01-01

## 2021-06-14 ENCOUNTER — LAB (OUTPATIENT)
Dept: FAMILY MEDICINE CLINIC | Facility: CLINIC | Age: 86
End: 2021-06-14

## 2021-06-16 ENCOUNTER — OFFICE VISIT (OUTPATIENT)
Dept: FAMILY MEDICINE CLINIC | Facility: CLINIC | Age: 86
End: 2021-06-16

## 2021-06-16 VITALS
BODY MASS INDEX: 21.9 KG/M2 | TEMPERATURE: 97.6 F | OXYGEN SATURATION: 100 % | HEART RATE: 72 BPM | SYSTOLIC BLOOD PRESSURE: 116 MMHG | DIASTOLIC BLOOD PRESSURE: 60 MMHG | WEIGHT: 153 LBS | HEIGHT: 70 IN | RESPIRATION RATE: 16 BRPM

## 2021-06-16 DIAGNOSIS — N18.4 CHRONIC KIDNEY DISEASE (CKD), STAGE IV (SEVERE) (HCC): ICD-10-CM

## 2021-06-16 DIAGNOSIS — Z79.01 ANTICOAGULATED: ICD-10-CM

## 2021-06-16 DIAGNOSIS — I48.21 PERMANENT ATRIAL FIBRILLATION (HCC): ICD-10-CM

## 2021-06-16 DIAGNOSIS — R06.02 SHORTNESS OF BREATH: ICD-10-CM

## 2021-06-16 DIAGNOSIS — I10 ESSENTIAL HYPERTENSION: Chronic | ICD-10-CM

## 2021-06-16 DIAGNOSIS — I50.32 CHF (CONGESTIVE HEART FAILURE), NYHA CLASS III, CHRONIC, DIASTOLIC (HCC): Chronic | ICD-10-CM

## 2021-06-16 DIAGNOSIS — Z91.81 HISTORY OF FALL: ICD-10-CM

## 2021-06-16 DIAGNOSIS — R60.0 LEG EDEMA: ICD-10-CM

## 2021-06-16 PROCEDURE — 99214 OFFICE O/P EST MOD 30 MIN: CPT | Performed by: FAMILY MEDICINE

## 2021-06-16 NOTE — PROGRESS NOTES
Subjective   James Birch is a 89 y.o. male presenting with chief complaint of:   Chief Complaint   Patient presents with   • Chronic Kidney Disease     3mo follow up    • Fatigue       History of Present Illness :  With son/wife.       Has multiple chronic problems to consider that might have a bearing on today's issues;  an interval appointment.       Chronic/acute problems reviewed today:   1. Essential hypertension Chronic/stable. Stable here past/no recent home blood pressures.  No significant chest pain, SOB, LE edema, orthopnea, near syncope, dizziness/light headness.   Recent Vitals       3/16/2021 3/30/2021 6/16/2021       BP:  102/64  112/61  116/60     Pulse:  79  62  72     Temp:  97.8 °F (36.6 °C)  --  97.6 °F (36.4 °C)     Weight:  71.8 kg (158 lb 3.2 oz)  74.4 kg (164 lb)  69.4 kg (153 lb)     BMI (Calculated):  22.7  23.5  22            2. Permanent atrial fibrillation (CMS/HCC) Chronic/stable.   History of a fib.  Rhythm currently seems controlled.  Medications seem tolerated.  No syncope near syncope.     3. CHF (congestive heart failure), NYHA class III, chronic, diastolic (CMS/HCC) Chronic/stable.  Denies significant sob, orthopnea, leg edema, weight gain.  Aware of influence diet/salt and watching weight at home.       4. Okrrqciugwqvck-mrl-enfw/xarelto Chronic/stable reason and use of.  Denies bleeding issues; especially epistaxis, melena, hematochezia.  Upper arms/others do bruise easily.  No significant bleeding or falls.      5. Chronic kidney disease (CKD), stage IV (severe) (CMS/HCC) Chronic/mild decline.  Sees infrequent nephrology.  No dysuria.  Previously warned/reminded:   To avoid further kidney function decline  A. Treat any time you think you have infection  B. Stay hydrated (dont get dehydrated); drink at least 60 oz fluid every 24 hr (1800 cc or nearly a 2L)  C. Do not allow any xrays with dye WITHOUT the doctor ordering checking your renal function  D. Do not get new  medications without the doctor considering your renal condition  E. Do not use motrin/ibuprofen, alleve/naprosyn and these types of medications  Does not plan dialysis   6. SOB: copd, CHF Chronic/variable:  SOB worse with exertion/ok at rest.  Contributing diagnosis: CHF, COPD.     7. Leg edema chronic/variable.  Recently better.  Causes include: CHF/renal.      8. History of fall chronic history of falls with various fortunately limited injury; more fortunately no recent falls     Has an/another acute issue today: none.    The following portions of the patient's history were reviewed and updated as appropriate: allergies, current medications, past family history, past medical history, past social history, past surgical history and problem list.      Current Outpatient Medications:   •  acetaminophen (TYLENOL) 500 MG tablet, Take 1 tablet by mouth Every 4 (Four) Hours As Needed for Mild Pain ., Disp: , Rfl:   •  B Complex-C (SUPER B COMPLEX/VITAMIN C PO), Take 1 capsule by mouth Daily., Disp: , Rfl:   •  carbonyl iron (FEOSOL) 45 MG tablet tablet, TAKE ONE TABLET DAILY, Disp: 30 each, Rfl: 6  •  Cholecalciferol (VITAMIN D3 PO), Take 2,000 Int'l Units by mouth Daily., Disp: , Rfl:   •  Coenzyme Q10 (CO Q-10 PO), Take 1 tablet by mouth Daily., Disp: , Rfl:   •  dutasteride (AVODART) 0.5 MG capsule, TAKE 1 CAPSULE DAILY GENERIC FOR AVODART, Disp: 90 capsule, Rfl: 2  •  Elastic Bandages & Supports (JOBST FOR MEN 8-15MMHG LG) misc, 1 Device Daily. Apply when up each morning; may remove at night, Disp: 2 each, Rfl: 0  •  esomeprazole (nexIUM) 40 MG capsule, TAKE 1 CAPSULE DAILY BEFORE BREAKFAST GENERIC FOR NEXIUM, Disp: 90 capsule, Rfl: 2  •  furosemide (LASIX) 40 MG tablet, Take 40 mg by mouth Daily., Disp: , Rfl:   •  ipratropium-albuterol (DUO-NEB) 0.5-2.5 mg/3 ml nebulizer, USE 1 VIAL IN NEBULIZER FOUR TIMES DAILY GENERIC FOR, Disp: 180 mL, Rfl: 2  •  midodrine (PROAMATINE) 10 MG tablet, TAKE ONE TABLET TWICE DAILY  BEFORE A MEAL, Disp: 60 tablet, Rfl: 5  •  Multiple Vitamins-Minerals (MULTIVITAMIN PO), Take 1 tablet by mouth daily., Disp: , Rfl:   •  O2 (OXYGEN), Inhale 1 L/min Every Night. As needed at bedtime. , Disp: , Rfl:   •  Omega-3 Fatty Acids (FISH OIL PO), Take 1,000 mg by mouth 2 (Two) Times a Day., Disp: , Rfl:   •  polyethylene glycol (MIRALAX) packet, Take 17 g by mouth Daily., Disp: 30 each, Rfl: 1  •  tamsulosin (FLOMAX) 0.4 MG capsule 24 hr capsule, TAKE 1 CAPSULE DAILY SHORTLY AFTER THE SAME MEAL GENERIC FOR FLOMAX, Disp: 90 capsule, Rfl: 3  •  torsemide (DEMADEX) 20 MG tablet, Take 1 tablet by mouth Daily., Disp: 30 tablet, Rfl: 5  •  Xarelto 15 MG tablet, TAKE ONE TABLET DAILY, Disp: 90 tablet, Rfl: 4    No problems with medications.    No Known Allergies    Review of Systems  GENERAL:  Inactive/slower with limits, speed, stamina for age and gait and occ SOB. Sleep is ok with oxygen/sleeps in recliner that he can reposition.  No fever now/recent.  ENDO:  No syncope, near or diaphoretic sweaty spells.  HEENT: No head injury or headache usually.  No vision change.  Same hearing loss.  Ears without pain/drainage.  No sore throat.  No significant nasal/sinus congestion/drainage. No epistaxis.  CHEST: No chest wall tenderness or mass other than left shoulder bruise. Occ cough, with mild occ wheeze.  Variable SOB; no hemoptysis.  CV: No exertional chest pain, palpitations; less end of day ankle edema.   GI: No  dysphagia.  No abdominal pain, diarrhea, constipation.  No rectal bleeding, or melena.  No nausea and vomiting/heartburn.    :  Voids without dysuria, or incontinence to completion.  ORTHO: No painful/swollen joints but various on /off sore; especially hands/fingers with motion.  No change occ sore neck or back.  No acute neck or back pain despite recent injury.   NEURO: Chronic/mild dizziness, and primarily LE weakness of extremities.  No change UE/LE mild numbness/paresthesias.   PSYCH: Mild ? memory  loss.  Mood good; occ anxious, depressed but/and not suicidal.  Tries to tolerate stress   Screening:  Mammogram: NA  Bone density: NA  Low dose CT chest:Tobacco-smoker/age 18/<1 ppd/dc 1965 (19) NA  GI:   Colonoscopy+nl/[facility]/9.5.12/5y-only prn  EGD+biop/[facility]/8.31.15  Prostate: urology past  Usual lab order  1m CBC, BMP, iron  6m CBC, CMP, iron  12m CBC, CMP, LIPID      Data reviewed:   Recent admit/ER/MD visits: [facility] ER 5.13.21-  CXR then nothing acute    Last cardiac testing:   Echo:   Results for orders placed during the hospital encounter of 04/11/19    Adult Transthoracic Echo Complete W/ Cont if Necessary Per Protocol    Interpretation Summary  · Mild aortic valve stenosis is present.  · Mild dilation of the aortic root is present.  · Left atrial cavity size is borderline dilated.  · Left ventricular systolic function is normal.  · Left ventricular diastolic dysfunction.    MILD AORTIC VALVE STENOSIS  RHYTHM IS ATRIAL FIBRILLATION  NORMAL LV AND RV SYSTOLIC FUNCTION  LV DIASTOLIC DYSFUNCTION IS PROBABLY GRADE 1    Lab Results:  Results for orders placed or performed in visit on 04/12/21   Comprehensive Metabolic Panel    Specimen: Blood   Result Value Ref Range    Glucose 79 65 - 99 mg/dL    BUN 43 (H) 8 - 23 mg/dL    Creatinine 2.65 (H) 0.76 - 1.27 mg/dL    eGFR Non African Am 23 (L) >60 mL/min/1.73    eGFR African Am 27 (L) >60 mL/min/1.73    BUN/Creatinine Ratio 16.2 7.0 - 25.0    Sodium 140 136 - 145 mmol/L    Potassium 4.5 3.5 - 5.2 mmol/L    Chloride 102 98 - 107 mmol/L    Total CO2 25.3 22.0 - 29.0 mmol/L    Calcium 9.0 8.2 - 9.6 mg/dL    Total Protein 6.1 6.0 - 8.5 g/dL    Albumin 4.20 3.50 - 5.20 g/dL    Globulin 1.9 gm/dL    A/G Ratio 2.2 g/dL    Total Bilirubin 0.4 0.0 - 1.2 mg/dL    Alkaline Phosphatase 84 39 - 117 U/L    AST (SGOT) 10 1 - 40 U/L    ALT (SGPT) 6 1 - 41 U/L   Vitamin D 25 Hydroxy    Specimen: Blood   Result Value Ref Range    25 Hydroxy, Vitamin D 55.3 30.0 -  100.0 ng/ml   Urinalysis With Culture If Indicated -    Specimen: Urine   Result Value Ref Range    Specific Gravity, UA 1.014 1.005 - 1.030    pH, UA 5.5 5.0 - 7.5    Color, UA Yellow Yellow    Appearance, UA Clear Clear    Leukocytes, UA Negative Negative    Protein Negative Negative/Trace    Glucose, UA Negative Negative    Ketones Negative Negative    Blood, UA Negative Negative    Bilirubin, UA Negative Negative    Urobilinogen, UA 0.2 0.2 - 1.0 mg/dL    Nitrite, UA Negative Negative    Microscopic Examination Comment     Microscopic Examination See below:     Urinalysis Reflex Comment    Uric Acid    Specimen: Blood   Result Value Ref Range    Uric Acid 9.3 (H) 3.4 - 7.0 mg/dL   Phosphorus    Specimen: Blood   Result Value Ref Range    Phosphorus 3.5 2.5 - 4.5 mg/dL   Magnesium    Specimen: Blood   Result Value Ref Range    Magnesium 2.2 1.7 - 2.3 mg/dL   Protein / Creatinine Ratio, Urine - Urine, Clean Catch    Specimen: Urine, Clean Catch   Result Value Ref Range    Creatinine, Urine 97.7 mg/dL    Total Protein, Urine 9.0 mg/dL    Protein/Creatinine Ratio 92.1 0.0 - 200.0 mg/G Crea   PTH, Intact    Specimen: Blood   Result Value Ref Range    PTH, Intact 109 (H) 15 - 65 pg/mL   Microscopic Examination -   Result Value Ref Range    WBC, UA None seen 0 - 5 /hpf    RBC, UA None seen 0 - 2 /hpf    Epithelial Cells (non renal) None seen 0 - 10 /hpf    Casts None seen None seen /lpf    Bacteria, UA None seen None seen/Few /hpf   CBC & Differential    Specimen: Blood   Result Value Ref Range    WBC 5.26 3.40 - 10.80 10*3/mm3    RBC 4.16 4.14 - 5.80 10*6/mm3    Hemoglobin 12.4 (L) 13.0 - 17.7 g/dL    Hematocrit 37.3 (L) 37.5 - 51.0 %    MCV 89.7 79.0 - 97.0 fL    MCH 29.8 26.6 - 33.0 pg    MCHC 33.2 31.5 - 35.7 g/dL    RDW 13.1 12.3 - 15.4 %    Platelets 199 140 - 450 10*3/mm3    Neutrophil Rel % 55.7 42.7 - 76.0 %    Lymphocyte Rel % 24.1 19.6 - 45.3 %    Monocyte Rel % 11.8 5.0 - 12.0 %    Eosinophil Rel % 7.2 (H)  0.3 - 6.2 %    Basophil Rel % 1.0 0.0 - 1.5 %    Neutrophils Absolute 2.93 1.70 - 7.00 10*3/mm3    Lymphocytes Absolute 1.27 0.70 - 3.10 10*3/mm3    Monocytes Absolute 0.62 0.10 - 0.90 10*3/mm3    Eosinophils Absolute 0.38 0.00 - 0.40 10*3/mm3    Basophils Absolute 0.05 0.00 - 0.20 10*3/mm3    Immature Granulocyte Rel % 0.2 0.0 - 0.5 %    Immature Grans Absolute 0.01 0.00 - 0.05 10*3/mm3    nRBC 0.0 0.0 - 0.2 /100 WBC       A1C:No results for input(s): HGBA1C in the last 95336 hours.  LIPID:  Lab Results - Last 18 Months   Lab Units 05/22/20  0822   CHOLESTEROL mg/dL 158   LDL CHOL mg/dL 91   HDL CHOL mg/dL 48   TRIGLYCERIDES mg/dL 97     PSA:No results for input(s): PSA in the last 27060 hours.  CBC:  Lab Results - Last 18 Months   Lab Units 06/14/21 0757 03/16/21  1323 08/11/20  0733 05/22/20  0822 02/09/20  0553 02/08/20  0601 02/07/20  0356 02/06/20  1749 01/27/20  0823   WBC 10*3/mm3 5.26 5.0 6.01 6.11 5.45 5.99 5.39 5.71 6.76   HEMOGLOBIN g/dL 12.4* 12.0* 12.7* 12.0* 10.6* 13.1 11.0* 11.8* 10.6*   HEMATOCRIT % 37.3* 35.5* 38.9 34.8* 31.3* 39.1 32.0* 35.5* 31.6*   PLATELETS 10*3/mm3 199 172 172 231 178 188 187 199 181   IRON ug/dL  --  46 70 87  --   --   --  26* 42*      BMP/CMP:  Lab Results - Last 18 Months   Lab Units 06/14/21 0757 03/16/21  1323 08/11/20  0733 05/22/20  0822 02/24/20  0727 02/09/20  0553 02/08/20  0601 01/27/20  0823   SODIUM mmol/L 140 139 137 138 136 137 137 137   POTASSIUM mmol/L 4.5 4.2 4.0 4.6 4.5 4.0 4.3 4.5   CHLORIDE mmol/L 102 99 97* 98 97* 100 99 98   TOTAL CO2 mmol/L 25.3 25 29.3* 28.2 27.6  --   --  25.8   CO2 mmol/L  --   --   --   --   --  26.0 23.0  --    GLUCOSE mg/dL 79 105* 95 97 96  --   --  78   BUN mg/dL 43* 39* 37* 44* 48* 45* 44* 53*   CREATININE mg/dL 2.65* 2.07* 1.92* 2.29* 2.36* 2.26* 2.17* 2.49*   EGFR IF NONAFRICN AM mL/min/1.73 23* 27* 33* 27* 26* 27* 29* 24*   EGFR IF AFRICN AM mL/min/1.73 27* 31* 40* 33* 32*  --   --  30*   CALCIUM mg/dL 9.0 9.0 9.4 9.2 9.2  "8.4 9.2 8.8     HEPATIC:  Lab Results - Last 18 Months   Lab Units 06/14/21  0757 03/16/21  1323 05/22/20  0822 02/06/20  1749   ALT (SGPT) U/L 6 8 7 10   AST (SGOT) U/L 10 16 12 15   ALK PHOS U/L 84 101 79 105     THYROID:  Lab Results - Last 18 Months   Lab Units 05/22/20  0822   TSH uIU/mL 1.500       Objective   /60   Pulse 72   Temp 97.6 °F (36.4 °C) (Infrared)   Resp 16   Ht 177.8 cm (70\")   Wt 69.4 kg (153 lb)   SpO2 100%   BMI 21.95 kg/m²   Body mass index is 21.95 kg/m².    Recent Vitals       3/16/2021 3/30/2021 6/16/2021       BP:  102/64  112/61  116/60     Pulse:  79  62  72     Temp:  97.8 °F (36.6 °C)  --  97.6 °F (36.4 °C)     Weight:  71.8 kg (158 lb 3.2 oz)  74.4 kg (164 lb)  69.4 kg (153 lb)     BMI (Calculated):  22.7  23.5  22           Physical Exam  GENERAL:  Thin AFA developed in no acute distress.  SKIN: Turgor excellent, without rash lesion   HEENT: Normal cephalic without trauma.  Pupils equal round reactive to light. Extraocular motions full without nystagmus.  Hard of hearing    No thyromegaly, mass, tenderness, lymphadenopathy and supple.  CV: Irregular irregular rhythm.  No murmur, gallop; no pitting ankle lower leg edema. Posterior pulses intact.  No carotid bruits.  CHEST: No chest wall tenderness or mass.   LUNGS: Symmetric motion with clear/mild reduced to auscultation.   ABD: Soft, nontender without mass.   ORTHO: Symmetric extremities without swelling/point tenderness other than the left shoulder.  Full gross range of motion other than left shoulder; it was not checked.   NEURO: CN 2-12 grossly intact.  Symmetric facies. 1/4 x bicep equal reflexes.  UE/LE   2-3/5 strength throughout.  Nonfocal use extremities. Speech clear.   PSYCH: Oriented x 3.  Pleasant calm, well kept.  Purposeful/directed conservation with intact short/long gross memory    Assessment/Plan     1. Essential hypertension    2. Permanent atrial fibrillation (CMS/HCC)    3. CHF (congestive heart " failure), NYHA class III, chronic, diastolic (CMS/HCC)    4. Oaktxyklbdlbbo-oqg-urxd/xarelto    5. Chronic kidney disease (CKD), stage IV (severe) (CMS/HCC)    6. SOB: copd, CHF    7. Leg edema    8. History of fall      Discussion:  Goals are near comfort; and he is with fatigue but fatigue that has no simple answer between cardiac, lung and renal issues    Medical decision issues:   Data review above:   Rx: reviewed and decisions:   Same Rx for now     Orders placed:   LAB/Testing/Referrals: reviewed/orders:   Today:   No orders of the defined types were placed in this encounter.    Chronic/recurrent labs above or change to:   same     Health maintenance:   Body mass index is 21.95 kg/m².  Patient's Body mass index is 21.95 kg/m². indicating that he is within normal range (BMI 18.5-24.9). No BMI management plan needed..    Tobacco use reviewed:    James Birch  reports that he quit smoking about 54 years ago. His smoking use included cigarettes. He has a 18.00 pack-year smoking history. He has never used smokeless tobacco..    There are no Patient Instructions on file for this visit.    Follow up: Return for lab/Dr William 4m.  Future Appointments   Date Time Provider Department Center   10/12/2021  9:15 AM Skyler Trimble MD [facility]

## 2021-07-05 DIAGNOSIS — J44.9 COPD, GROUP B, BY GOLD 2013 CLASSIFICATION (HCC): ICD-10-CM

## 2021-07-06 RX ORDER — IPRATROPIUM BROMIDE AND ALBUTEROL SULFATE 2.5; .5 MG/3ML; MG/3ML
SOLUTION RESPIRATORY (INHALATION)
Qty: 180 ML | Refills: 2 | Status: SHIPPED | OUTPATIENT
Start: 2021-07-06 | End: 2021-07-13

## 2021-07-13 ENCOUNTER — TELEPHONE (OUTPATIENT)
Dept: FAMILY MEDICINE CLINIC | Facility: CLINIC | Age: 86
End: 2021-07-13

## 2021-07-13 DIAGNOSIS — R06.02 SHORTNESS OF BREATH: ICD-10-CM

## 2021-07-13 DIAGNOSIS — J44.9 COPD, GROUP B, BY GOLD 2013 CLASSIFICATION (HCC): Primary | ICD-10-CM

## 2021-07-13 DIAGNOSIS — J44.9 COPD, GROUP B, BY GOLD 2013 CLASSIFICATION (HCC): ICD-10-CM

## 2021-07-13 RX ORDER — IPRATROPIUM BROMIDE AND ALBUTEROL SULFATE 2.5; .5 MG/3ML; MG/3ML
SOLUTION RESPIRATORY (INHALATION)
Qty: 180 ML | Refills: 2 | Status: SHIPPED | OUTPATIENT
Start: 2021-07-13 | End: 2021-01-01 | Stop reason: SDUPTHER

## 2021-07-13 NOTE — TELEPHONE ENCOUNTER
Requested Prescriptions     Pending Prescriptions Disp Refills   • ipratropium-albuterol (DUO-NEB) 0.5-2.5 mg/3 ml nebulizer [Pharmacy Med Name: IPRATROPIUM BROMIDE/ALBUTEROL SULFATE ALBUTER SOLUTION] 180 mL 2     Sig: USE ONE VIAL IN NEBULIZER FOUR TIMES DAILY GENERIC FOR

## 2021-07-13 NOTE — TELEPHONE ENCOUNTER
Caller: TALIA OLIVER    Relationship: Spouse    Best call back number: [phone]    What medication are you requesting: PRESCRIPTION    What are your current symptoms:     How long have you been experiencing symptoms:     Have you had these symptoms before:    [] Yes  [] No    Have you been treated for these symptoms before:   [] Yes  [] No    If a prescription is needed, what is your preferred pharmacy and phone number:      Additional notes:        [facility] -[phone]    PATIENT NEEDS BREATHING TREATMENT MASK FOR THE OXYGEN, AND TUBING FOR CONCENTRATOR AND BREATHING TREATMENT.

## 2021-08-12 NOTE — TELEPHONE ENCOUNTER
Caller: Kade Birch    Relationship: Emergency Contact    Best call back number: [phone]    Medication needed:   Requested Prescriptions     Pending Prescriptions Disp Refills   • ipratropium-albuterol (DUO-NEB) 0.5-2.5 mg/3 ml nebulizer 180 mL 2     What additional details did the patient provide when requesting the medication: PT IS AT [place]    Does the patient have less than a 3 day supply:  [] Yes  [x] No    What is the patient's preferred pharmacy: [facility] - [place], [place] - [address] [place] [address], [address] - [phone]  - [phone] FX

## 2021-09-08 NOTE — TELEPHONE ENCOUNTER
Patient annemarie request script update due to cost.     Rx Refill Note  Requested Prescriptions     Pending Prescriptions Disp Refills   • ipratropium-albuterol (DUO-NEB) 0.5-2.5 mg/3 ml nebulizer 360 mL 0     Sig: USE ONE VIAL IN NEBULIZER FOUR TIMES DAILY GENERIC FOR      Last office visit with prescribing clinician: 6/16/2021      Next office visit with prescribing clinician: 10/13/2021            Maria C Montoya MA  09/08/21, 15:35 CDT

## 2021-10-13 NOTE — PROGRESS NOTES
Pt here to be seen and discussed the benefits and risks of flu shot, as pt is due. Copy of VIS given to pt and verbal and written consent given. Given in left deltoid site, tolerated well.

## 2021-10-13 NOTE — PROGRESS NOTES
Subjective   James Birch is a 89 y.o. male presenting with chief complaint of:   Chief Complaint   Patient presents with   • Medicare Wellness-subsequent   • Fall     Pt fell last week        History of Present Illness :  With son/wife.  Here for primarily general multi-illness review and need medicare wellness..       Has multiple chronic problems to consider that might have a bearing on today's issues;  an interval appointment.       Chronic/acute problems reviewed today:   1. Iron deficiency: Chronic variable levels of iron deficiency.  Chronic variable status of replacement.  Has had to this point benign GI blood loss and probably some degree of poor absorption.   2. Need for immunization against influenza Chronic/ongoing need to review pro/cons for various vaccinations based on age, health issues.  Needs vaccination today for: see below.     3. Primary hypertension Chronic/stable. Stable here past/no recent problems home blood pressures.  No significant chest pain, SOB, LE edema, orthopnea, near syncope, dizziness/light headness.   Recent Vitals       3/30/2021 6/16/2021 10/13/2021       BP: 112/61 116/60 116/68     Pulse: 62 72 92     Temp: -- 97.6 °F (36.4 °C) 97.5 °F (36.4 °C)     Weight: 74.4 kg (164 lb) 69.4 kg (153 lb) 71.2 kg (157 lb)     BMI (Calculated): 23.5 22 22.5            4. Mixed hyperlipidemia Chronic/stable.  Tolerated use of Rx with labs showing improved lipid values and tolerant liver labs. No muscle aches unexpected.      5. History of fall chronic occasional fall.  No recent significant injury.  Has walkers canes and others.   6. Cardiac arrhythmia, unspecified cardiac arrhythmia type Chronic/stable.   History of a fib.  Rhythm currently seems controlled.  Medications seem tolerated.  No syncope near syncope.     7. Mwohejnizwxagw-cie-stwu/xarelto    8. Gastroesophageal reflux disease, unspecified whether esophagitis present Chronic/stable.  Controlled heartburn, reflux without  dysphagia, melena.  Rx used, periods not used proven needed with symptoms -currently doing ok.      9. Chronic kidney disease (CKD), stage IV (severe) (HCC) Chronic/stable.  See nephrology.  No dysuria.  Previously warned/reminded:   To avoid further kidney function decline  A. Treat any time you think you have infection  B. Stay hydrated (dont get dehydrated); drink at least 60 oz fluid every 24 hr (1800 cc or nearly a 2L)  C. Do not allow any xrays with dye WITHOUT the doctor ordering checking your renal function  D. Do not get new medications without the doctor considering your renal condition  E. Do not use motrin/ibuprofen, alleve/naprosyn and these types of medications     10. 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   11. SOB: copd, CHF Chronic/variable:  SOB worse with exertion/ok at rest.  Contributing diagnosis: copd/cardiac.  Treated prn oxygen.     12. Gait difficulty-cane/walker Chronic/stable:.  Ongoing issues with difficulties it results in difficulty with walking or gait.  Recent falls.  No recent injuries.  Uses to help gait: cane, walker.     13. COPD, group B, by GOLD 2013 classification Chronic/stable mild occ cough, sob, wheeze.  Rx helps.   No smoking.       14. Wellness examination-done Chronic ongoing over time screening or advice for general health care/wellness.        Has an/another acute issue today: none.    The following portions of the patient's history were reviewed and updated as appropriate: allergies, current medications, past family history, past medical history, past social history, past surgical history and problem list.      Current Outpatient Medications:   •  acetaminophen (TYLENOL) 500 MG tablet, Take 1 tablet by mouth Every 4 (Four) Hours As Needed for Mild Pain ., Disp: , Rfl:   •  B Complex-C (SUPER B COMPLEX/VITAMIN C PO), Take 1 capsule by mouth Daily., Disp: , Rfl:   •  carbonyl iron (FEOSOL)  45 MG tablet tablet, TAKE ONE TABLET DAILY, Disp: 30 each, Rfl: 6  •  Cholecalciferol (VITAMIN D3 PO), Take 2,000 Int'l Units by mouth Daily., Disp: , Rfl:   •  Coenzyme Q10 (CO Q-10 PO), Take 1 tablet by mouth Daily., Disp: , Rfl:   •  dutasteride (AVODART) 0.5 MG capsule, TAKE 1 CAPSULE DAILY GENERIC FOR AVODART, Disp: 90 capsule, Rfl: 2  •  Elastic Bandages & Supports (JOBST FOR MEN 8-15MMHG LG) misc, 1 Device Daily. Apply when up each morning; may remove at night, Disp: 2 each, Rfl: 0  •  esomeprazole (nexIUM) 40 MG capsule, TAKE 1 CAPSULE DAILY BEFORE BREAKFAST GENERIC FOR NEXIUM, Disp: 90 capsule, Rfl: 2  •  furosemide (LASIX) 40 MG tablet, Take 40 mg by mouth Daily., Disp: , Rfl:   •  ipratropium-albuterol (DUO-NEB) 0.5-2.5 mg/3 ml nebulizer, USE ONE VIAL IN NEBULIZER FOUR TIMES DAILY GENERIC FOR, Disp: 360 mL, Rfl: 0  •  midodrine (PROAMATINE) 10 MG tablet, TAKE ONE TABLET TWICE DAILY BEFORE A MEAL, Disp: 60 tablet, Rfl: 5  •  Multiple Vitamins-Minerals (MULTIVITAMIN PO), Take 1 tablet by mouth daily., Disp: , Rfl:   •  O2 (OXYGEN), Inhale 1 L/min Every Night. As needed at bedtime. , Disp: , Rfl:   •  Omega-3 Fatty Acids (FISH OIL PO), Take 1,000 mg by mouth 2 (Two) Times a Day., Disp: , Rfl:   •  polyethylene glycol (MIRALAX) packet, Take 17 g by mouth Daily., Disp: 30 each, Rfl: 1  •  tamsulosin (FLOMAX) 0.4 MG capsule 24 hr capsule, TAKE 1 CAPSULE DAILY SHORTLY AFTER THE SAME MEAL GENERIC FOR FLOMAX, Disp: 90 capsule, Rfl: 3  •  torsemide (DEMADEX) 20 MG tablet, Take 1 tablet by mouth Daily., Disp: 30 tablet, Rfl: 5  •  Xarelto 15 MG tablet, TAKE ONE TABLET DAILY, Disp: 90 tablet, Rfl: 4  •  ferrous sulfate 325 (65 FE) MG EC tablet, Take 1 tablet by mouth Every Other Day., Disp: , Rfl:      No problems with medications.    No Known Allergies    Review of Systems  GENERAL:  Inactive/slower with limits, speed, stamina for age and gait and occ SOB. Sleep is ok with oxygen/sleeps in recliner that he can  reposition.  No fever now.  ENDO:  No syncope, near or diaphoretic sweaty spells.  HEENT: No head injury or headache usually.  No vision change.  Same hearing loss.  Ears without pain/drainage.  No sore throat.  No significant nasal/sinus congestion/drainage. No epistaxis.  CHEST: No chest wall tenderness or mass other than left shoulder bruise. Occ cough, with mild occ wheeze.  Variable SOB; no hemoptysis.  CV: No exertional chest pain, palpitations; increased end of day ankle edema.   GI: No  dysphagia.  No abdominal pain, diarrhea, constipation.  No rectal bleeding, or melena.  No nausea and vomiting/heartburn.    :  Voids without dysuria, or incontinence to completion.  ORTHO: No painful/swollen joints but various on /off sore; especially hands/fingers with motion.  No change occ sore neck or back.  No acute neck or back pain despite recent injury.   NEURO: Chronic/mild dizziness, and primarily LE weakness of extremities.  No change UE/LE mild numbness/paresthesias.   PSYCH: Mild ? memory loss.  Mood good; occ anxious, depressed but/and not suicidal.  Tries to tolerate stress   Screening:  Mammogram: NA  Bone density: NA  Low dose CT chest:Tobacco-smoker/age 18/<1 ppd/dc 1965 (19) NA  GI:   Colonoscopy+nl/[facility]/9.5.12/5y-reminded  EGD+biop/[facility]/8.31.15  Prostate: urology past  Usual lab order  1m CBC, BMP, iron  6m CBC, CMP, iron  12m CBC, CMP, LIPID    Data reviewed:   Recent admit/ER/MD visits:none  Last cardiac testing:   Echo:   Results for orders placed during the hospital encounter of 04/11/19    Adult Transthoracic Echo Complete W/ Cont if Necessary Per Protocol    Interpretation Summary  · Mild aortic valve stenosis is present.  · Mild dilation of the aortic root is present.  · Left atrial cavity size is borderline dilated.  · Left ventricular systolic function is normal.  · Left ventricular diastolic dysfunction.    MILD AORTIC VALVE STENOSIS  RHYTHM IS ATRIAL  FIBRILLATION  NORMAL LV AND RV SYSTOLIC FUNCTION  LV DIASTOLIC DYSFUNCTION IS PROBABLY GRADE 1    Lab Results:  Results for orders placed or performed in visit on 10/06/21   Comprehensive Metabolic Panel    Specimen: Blood   Result Value Ref Range    Glucose 83 65 - 99 mg/dL    BUN 46 (H) 8 - 23 mg/dL    Creatinine 2.46 (H) 0.76 - 1.27 mg/dL    eGFR Non African Am 25 (L) >60 mL/min/1.73    eGFR African Am 30 (L) >60 mL/min/1.73    BUN/Creatinine Ratio 18.7 7.0 - 25.0    Sodium 143 136 - 145 mmol/L    Potassium 4.4 3.5 - 5.2 mmol/L    Chloride 103 98 - 107 mmol/L    Total CO2 26.3 22.0 - 29.0 mmol/L    Calcium 9.1 8.2 - 9.6 mg/dL    Total Protein 6.2 6.0 - 8.5 g/dL    Albumin 4.20 3.50 - 5.20 g/dL    Globulin 2.0 gm/dL    A/G Ratio 2.1 g/dL    Total Bilirubin 0.4 0.0 - 1.2 mg/dL    Alkaline Phosphatase 86 39 - 117 U/L    AST (SGOT) 10 1 - 40 U/L    ALT (SGPT) 8 1 - 41 U/L   Lipid Panel    Specimen: Blood   Result Value Ref Range    Total Cholesterol 178 0 - 200 mg/dL    Triglycerides 77 0 - 150 mg/dL    HDL Cholesterol 54 40 - 60 mg/dL    VLDL Cholesterol Rick 14 5 - 40 mg/dL    LDL Chol Calc (NIH) 110 (H) 0 - 100 mg/dL   TSH    Specimen: Blood   Result Value Ref Range    TSH 2.270 0.270 - 4.200 uIU/mL   Vitamin D 25 Hydroxy    Specimen: Blood   Result Value Ref Range    25 Hydroxy, Vitamin D 56.9 30.0 - 100.0 ng/ml   Iron    Specimen: Blood   Result Value Ref Range    Iron 53 (L) 59 - 158 mcg/dL   Ferritin    Specimen: Blood   Result Value Ref Range    Ferritin 65.40 30.00 - 400.00 ng/mL   Vitamin B12    Specimen: Blood   Result Value Ref Range    Vitamin B-12 689 211 - 946 pg/mL   Folate    Specimen: Blood   Result Value Ref Range    Folate 14.30 4.78 - 24.20 ng/mL   CBC & Differential    Specimen: Blood   Result Value Ref Range    WBC 5.55 3.40 - 10.80 10*3/mm3    RBC 3.87 (L) 4.14 - 5.80 10*6/mm3    Hemoglobin 11.2 (L) 13.0 - 17.7 g/dL    Hematocrit 34.3 (L) 37.5 - 51.0 %    MCV 88.6 79.0 - 97.0 fL    MCH 28.9  26.6 - 33.0 pg    MCHC 32.7 31.5 - 35.7 g/dL    RDW 13.7 12.3 - 15.4 %    Platelets 193 140 - 450 10*3/mm3    Neutrophil Rel % 58.0 42.7 - 76.0 %    Lymphocyte Rel % 16.9 (L) 19.6 - 45.3 %    Monocyte Rel % 11.9 5.0 - 12.0 %    Eosinophil Rel % 11.7 (H) 0.3 - 6.2 %    Basophil Rel % 1.3 0.0 - 1.5 %    Neutrophils Absolute 3.22 1.70 - 7.00 10*3/mm3    Lymphocytes Absolute 0.94 0.70 - 3.10 10*3/mm3    Monocytes Absolute 0.66 0.10 - 0.90 10*3/mm3    Eosinophils Absolute 0.65 (H) 0.00 - 0.40 10*3/mm3    Basophils Absolute 0.07 0.00 - 0.20 10*3/mm3    Immature Granulocyte Rel % 0.2 0.0 - 0.5 %    Immature Grans Absolute 0.01 0.00 - 0.05 10*3/mm3    nRBC 0.0 0.0 - 0.2 /100 WBC       A1C:No results for input(s): HGBA1C in the last 64651 hours.  LIPID:  Lab Results - Last 18 Months   Lab Units 10/06/21  0743 05/22/20  0822   CHOLESTEROL mg/dL 178 158   LDL CHOL mg/dL 110* 91   HDL CHOL mg/dL 54 48   TRIGLYCERIDES mg/dL 77 97     PSA:No results for input(s): PSA in the last 97675 hours.  CBC:  Lab Results - Last 18 Months   Lab Units 10/06/21  0743 06/14/21  0757 03/16/21  1323 08/11/20  0733 05/22/20  0822   WBC 10*3/mm3 5.55 5.26 5.0 6.01 6.11   HEMOGLOBIN g/dL 11.2* 12.4* 12.0* 12.7* 12.0*   HEMATOCRIT % 34.3* 37.3* 35.5* 38.9 34.8*   PLATELETS 10*3/mm3 193 199 172 172 231   IRON mcg/dL 53*  --  46 70 87      BMP/CMP:  Lab Results - Last 18 Months   Lab Units 10/06/21  0743 06/14/21  0757 03/16/21  1323 08/11/20  0733 05/22/20  0822   SODIUM mmol/L 143 140 139 137 138   POTASSIUM mmol/L 4.4 4.5 4.2 4.0 4.6   CHLORIDE mmol/L 103 102 99 97* 98   TOTAL CO2 mmol/L 26.3 25.3 25 29.3* 28.2   GLUCOSE mg/dL 83 79 105* 95 97   BUN mg/dL 46* 43* 39* 37* 44*   CREATININE mg/dL 2.46* 2.65* 2.07* 1.92* 2.29*   EGFR IF NONAFRICN AM mL/min/1.73 25* 23* 27* 33* 27*   EGFR IF AFRICN AM mL/min/1.73 30* 27* 31* 40* 33*   CALCIUM mg/dL 9.1 9.0 9.0 9.4 9.2     HEPATIC:  Lab Results - Last 18 Months   Lab Units 10/06/21  0743 06/14/21  0757  "03/16/21  1323 05/22/20  0822   ALT (SGPT) U/L 8 6 8 7   AST (SGOT) U/L 10 10 16 12   ALK PHOS U/L 86 84 101 79     THYROID:  Lab Results - Last 18 Months   Lab Units 10/06/21  0743 05/22/20  0822   TSH uIU/mL 2.270 1.500       Objective   /68   Pulse 92   Temp 97.5 °F (36.4 °C) (Infrared)   Resp 16   Ht 177.8 cm (70\")   Wt 71.2 kg (157 lb)   SpO2 98%   BMI 22.53 kg/m²   Body mass index is 22.53 kg/m².    Recent Vitals       3/30/2021 6/16/2021 10/13/2021       BP: 112/61 116/60 116/68     Pulse: 62 72 92     Temp: -- 97.6 °F (36.4 °C) 97.5 °F (36.4 °C)     Weight: 74.4 kg (164 lb) 69.4 kg (153 lb) 71.2 kg (157 lb)     BMI (Calculated): 23.5 22 22.5           Physical Exam  GENERAL:  Thin AFA developed in no acute distress.  SKIN: Turgor excellent, without rash lesion other than bruising in the green color anterior left shoulder down to the left chest nipple   HEENT: Normal cephalic without trauma.  Pupils equal round reactive to light. Extraocular motions full without nystagmus.  Hard of hearing    No thyromegaly, mass, tenderness, lymphadenopathy and supple.  CV: Irregular irregular rhythm.  No murmur, gallop; no pitting ankle lower leg edema. Posterior pulses intact.  No carotid bruits.  CHEST: No chest wall tenderness or mass.   LUNGS: Symmetric motion with clear/mild reduced to auscultation.   ABD: Soft, nontender without mass.   ORTHO: Symmetric extremities without swelling/point tenderness other than the left shoulder.  Full gross range of motion other than left shoulder; it was not checked.   NEURO: CN 2-12 grossly intact.  Symmetric facies. 1/4 x bicep equal reflexes.  UE/LE   2-3/5 strength throughout.  Nonfocal use extremities. Speech clear.   PSYCH: Oriented x 3.  Pleasant calm, well kept.  Purposeful/directed conservation with intact short/long gross memory    Assessment/Plan     1. Iron deficiency    2. Need for immunization against influenza    3. Primary hypertension    4. Mixed " hyperlipidemia    5. History of fall    6. Cardiac arrhythmia, unspecified cardiac arrhythmia type    7. Jclcanbncsxdyx-nwl-ouhl/xarelto    8. Gastroesophageal reflux disease, unspecified whether esophagitis present    9. Chronic kidney disease (CKD), stage IV (severe) (HCC)    10. 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 SOB: copd, CHF    12. Gait difficulty-cane/walker    13. COPD, group B, by GOLD 2013 classification     14. Wellness examination-done      Discussion:  Goals comfort over aggression  Despite many issues; no changes today  Only time till placement at NH going to be required    Wellness done   Vaccine reviewed: today flu; later covid booster  Screening reviewed/updated; limited due to situations/age    Medical decision issues:   Data review above:   Rx: reviewed and decisions:   Same Rx for now     Visit today involved chronic significant medical problems or differentials and/or intensive drug monitoring: ie potential to cause serious morbidity or death:   New Medications Ordered This Visit   Medications   • ferrous sulfate 325 (65 FE) MG EC tablet     Sig: Take 1 tablet by mouth Every Other Day.       Orders placed:   LAB/Testing/Referrals: reviewed/orders:   Today:   Orders Placed This Encounter   Procedures   • Fluzone High-Dose 65+yrs ([id number])   • Comprehensive Metabolic Panel   • Iron Profile   • CBC & Differential     Chronic/recurrent labs above or change to:   same     Health maintenance:   Body mass index is 22.53 kg/m².  Patient's Body mass index is 22.53 kg/m². indicating that he is within normal range (BMI 18.5-24.9). No BMI management plan needed..      Tobacco use reviewed:   James Birch  reports that he quit smoking about 54 years ago. His smoking use included cigarettes. He has a 18.00 pack-year smoking history. He has never used smokeless tobacco..    Annual/wellness visit: also/today (see separate note)-medicare     There are no Patient Instructions on file for  this visit.    Follow up: Return for lab 2m then lab/Dr William 6m.  Future Appointments   Date Time Provider Department Center   10/19/2021 11:30 AM Skyler Trimble MD [facility]   12/21/2021  8:40 AM LAB [facility] GORDON [facility]   4/19/2022  9:15 AM Hemal William MD [facility]

## 2021-10-14 NOTE — PROGRESS NOTES
QUICK REFERENCE INFORMATION:  The ABCs of the Annual Wellness Visit    Subsequent Medicare Wellness Visit     HEALTH RISK ASSESSMENT    : 1928    Recent Hospitalizations:  No hospitalization(s) within the last year..  ccc      Current Medical Providers:  Patient Care Team:  Hemal William MD as PCP - General  Hemal William MD as PCP - Family Medicine  Skyler Trimble MD as Cardiologist (Cardiology)  Bill Church MD as Consulting Physician (Pulmonary Disease)  Kiko Campos PA as Physician Assistant (Neurology)  Naun Valente MD as Consulting Physician (Nephrology)  [facility] (DME Services)        Smoking Status:  Social History     Tobacco Use   Smoking Status Former Smoker   • Packs/day: 1.00   • Years: 18.00   • Pack years: 18.00   • Types: Cigarettes   • Quit date:    • Years since quittin.8   Smokeless Tobacco Never Used   Tobacco Comment    already quit       Alcohol Consumption:  Social History     Substance and Sexual Activity   Alcohol Use No       Depression Screen:   PHQ-2/PHQ-9 Depression Screening 10/13/2021   Little interest or pleasure in doing things 0   Feeling down, depressed, or hopeless 0   Total Score 0   If you checked off any problems, how difficult have these problems made it for you to do your work, take care of things at home, or get along with other people? -       Health Habits and Functional and Cognitive Screening:  Functional & Cognitive Status 10/13/2021   Do you have difficulty preparing food and eating? Yes   Do you have difficulty bathing yourself, getting dressed or grooming yourself? No   Do you have difficulty using the toilet? No   Do you have difficulty moving around from place to place? Yes   Do you have trouble with steps or getting out of a bed or a chair? No   Current Diet Well Balanced Diet   Dental Exam Unknown   Eye Exam Unknown   Exercise (times per week) 0 times per week   Current Exercises Include  No Regular Exercise   Do you need help using the phone?  Yes   Are you deaf or do you have serious difficulty hearing?  Yes   Do you need help with transportation? Yes   Do you need help shopping? Yes   Do you need help preparing meals?  Yes   Do you need help with housework?  Yes   Do you need help with laundry? Yes   Do you need help taking your medications? Yes   Do you need help managing money? Yes   Do you ever drive or ride in a car without wearing a seat belt? Yes   Have you felt unusual stress, anger or loneliness in the last month? No   Who do you live with? Other   If you need help, do you have trouble finding someone available to you? No   Have you been bothered in the last four weeks by sexual problems? No   Do you have difficulty concentrating, remembering or making decisions? No           Does the patient have evidence of cognitive impairment? Yes    Asiprin use counseling: Taking ASA appropriately as indicated      Recent Lab Results:    Lab Results   Component Value Date    GLU 83 10/06/2021        Lab Results   Component Value Date    CHOL 157 05/22/2019    TRIG 77 10/06/2021    HDL 54 10/06/2021    VLDL 14 10/06/2021    LDLHDL 2.78 05/22/2019           Age-appropriate Screening Schedule:  Refer to the list below for future screening recommendations based on patient's age, sex and/or medical conditions. Orders for these recommended tests are listed in the plan section. The patient has been provided with a written plan.    Health Maintenance   Topic Date Due   • ZOSTER VACCINE (1 of 2) Never done   • DXA SCAN  07/10/2021   • LIPID PANEL  10/06/2022   • TDAP/TD VACCINES (2 - Td or Tdap) 09/13/2029   • INFLUENZA VACCINE  Completed        Subjective   History of Present Illness    James Birch is a 89 y.o. male who presents for an Annual Wellness Visit.    The following portions of the patient's history were reviewed and updated as appropriate: allergies, current medications, past family history,  past medical history, past social history, past surgical history and problem list.    Outpatient Medications Prior to Visit   Medication Sig Dispense Refill   • acetaminophen (TYLENOL) 500 MG tablet Take 1 tablet by mouth Every 4 (Four) Hours As Needed for Mild Pain .     • B Complex-C (SUPER B COMPLEX/VITAMIN C PO) Take 1 capsule by mouth Daily.     • carbonyl iron (FEOSOL) 45 MG tablet tablet TAKE ONE TABLET DAILY 30 each 6   • Cholecalciferol (VITAMIN D3 PO) Take 2,000 Int'l Units by mouth Daily.     • Coenzyme Q10 (CO Q-10 PO) Take 1 tablet by mouth Daily.     • dutasteride (AVODART) 0.5 MG capsule TAKE 1 CAPSULE DAILY GENERIC FOR AVODART 90 capsule 2   • Elastic Bandages & Supports (JOBST FOR MEN 8-15MMHG LG) misc 1 Device Daily. Apply when up each morning; may remove at night 2 each 0   • esomeprazole (nexIUM) 40 MG capsule TAKE 1 CAPSULE DAILY BEFORE BREAKFAST GENERIC FOR NEXIUM 90 capsule 2   • furosemide (LASIX) 40 MG tablet Take 40 mg by mouth Daily.     • ipratropium-albuterol (DUO-NEB) 0.5-2.5 mg/3 ml nebulizer USE ONE VIAL IN NEBULIZER FOUR TIMES DAILY GENERIC  mL 0   • midodrine (PROAMATINE) 10 MG tablet TAKE ONE TABLET TWICE DAILY BEFORE A MEAL 60 tablet 5   • Multiple Vitamins-Minerals (MULTIVITAMIN PO) Take 1 tablet by mouth daily.     • O2 (OXYGEN) Inhale 1 L/min Every Night. As needed at bedtime.      • Omega-3 Fatty Acids (FISH OIL PO) Take 1,000 mg by mouth 2 (Two) Times a Day.     • polyethylene glycol (MIRALAX) packet Take 17 g by mouth Daily. 30 each 1   • tamsulosin (FLOMAX) 0.4 MG capsule 24 hr capsule TAKE 1 CAPSULE DAILY SHORTLY AFTER THE SAME MEAL GENERIC FOR FLOMAX 90 capsule 3   • torsemide (DEMADEX) 20 MG tablet Take 1 tablet by mouth Daily. 30 tablet 5   • Xarelto 15 MG tablet TAKE ONE TABLET DAILY 90 tablet 4     No facility-administered medications prior to visit.       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   •  DAMIAN-intolerant   • Autonomic dysfunction   • Permanent atrial fibrillation (HCC)   • CHF (congestive heart failure), NYHA class III, chronic, diastolic (HCC)   • Wellness examination-done   • Gait difficulty-cane/walker   • Asthma   • Sbsyjajunawvoi-syy-zkka/xarelto   • Chronic kidney disease (CKD), stage IV (severe) (HCC)   • Prostatism   • History of kidney stones   • Varicose veins of legs   • Hyperlipidemia-statin   • Hypertension   • Hypersomnia   • Gastroesophageal reflux disease   • COPD, group B, by GOLD 2013 classification    • Nocturnal hypoxia-oxygen advised   • Laboratory test*   • History of tobacco use   • Acute renal failure (HCC)   • Cerebrovascular small vessel disease   • SOB: copd, CHF   • Compression fracture of body of thoracic vertebra (HCC)   • Dyspnea due to congestive heart failure (HCC)   • Atrial flutter with controlled response (HCC)   • Current non-smoker   • Osteoporosis: 2019-no tx with renal   • History of fall   • Skin lesion   • Cardiac arrhythmia: a fib-flutter   • Esophageal dysfunction   • Leg edema   • Stasis dermatitis   • Memory loss       Advance Care Planning:  ACP discussion was held with the patient during this visit. Patient has an advance directive (not in EMR), copy requested.    Identification of Risk Factors:  Risk factors include: Advance Directive Discussion  Fall Risk  Immunizations Discussed/Encouraged (specific immunizations; Tdap, Influenza, Pneumococcal 23, Shingrix and COVID19 ).    Review of Systems  GENERAL:  Inactive/slower with limits, speed, stamina for age and gait and occ SOB. Sleep is ok with oxygen/sleeps in recliner that he can reposition.  No fever now.  ENDO:  No syncope, near or diaphoretic sweaty spells.  HEENT: No head injury or headache usually.  No vision change.  Same hearing loss.  Ears without pain/drainage.  No sore throat.  No significant nasal/sinus congestion/drainage. No epistaxis.  CHEST: No chest wall tenderness or mass other than  left shoulder bruise. Occ cough, with mild occ wheeze.  Variable SOB; no hemoptysis.  CV: No exertional chest pain, palpitations; increased end of day ankle edema.   GI: No  dysphagia.  No abdominal pain, diarrhea, constipation.  No rectal bleeding, or melena.  No nausea and vomiting/heartburn.    :  Voids without dysuria, or incontinence to completion.  ORTHO: No painful/swollen joints but various on /off sore; especially hands/fingers with motion.  No change occ sore neck or back.  No acute neck or back pain despite recent injury.   NEURO: Chronic/mild dizziness, and primarily LE weakness of extremities.  No change UE/LE mild numbness/paresthesias.   PSYCH: Mild ? memory loss.  Mood good; occ anxious, depressed but/and not suicidal.  Tries to tolerate stress   Screening:  Mammogram: NA  Bone density: NA  Low dose CT chest:Tobacco-smoker/age 18/<1 ppd/dc 1965 (19) NA  GI:   Colonoscopy+nl/[facility]/9.5.12/5y-reminded  EGD+biop/[facility]/8.31.15  Prostate: urology past  Usual lab order  1m CBC, BMP, iron  6m CBC, CMP, iron  12m CBC, CMP, LIPID    Compared to one year ago, the patient feels his physical health is worse.  Compared to one year ago, the patient feels his mental health is worse.    Objective     Physical Exam  GENERAL:  Thin AFA developed in no acute distress.  SKIN: Turgor excellent, without rash lesion other than bruising in the green color anterior left shoulder down to the left chest nipple   HEENT: Normal cephalic without trauma.  Pupils equal round reactive to light. Extraocular motions full without nystagmus.  Hard of hearing    No thyromegaly, mass, tenderness, lymphadenopathy and supple.  CV: Irregular irregular rhythm.  No murmur, gallop; no pitting ankle lower leg edema. Posterior pulses intact.  No carotid bruits.  CHEST: No chest wall tenderness or mass.   LUNGS: Symmetric motion with clear/mild reduced to auscultation.   ABD: Soft, nontender without mass.   ORTHO: Symmetric  "extremities without swelling/point tenderness other than the left shoulder.  Full gross range of motion other than left shoulder; it was not checked.   NEURO: CN 2-12 grossly intact.  Symmetric facies. 1/4 x bicep equal reflexes.  UE/LE   2-3/5 strength throughout.  Nonfocal use extremities. Speech clear.   PSYCH: Oriented x 3.  Pleasant calm, well kept.  Purposeful/directed conservation with intact short/long gross memory    Vitals:    10/13/21 0928   BP: 116/68   Pulse: 92   Resp: 16   Temp: 97.5 °F (36.4 °C)   TempSrc: Infrared   SpO2: 98%   Weight: 71.2 kg (157 lb)   Height: 177.8 cm (70\")   PainSc: 0-No pain       Patient's Body mass index is 22.53 kg/m². indicating that he is within normal range (BMI 18.5-24.9). No BMI management plan needed..      Assessment/Plan   Patient Self-Management and Personalized Health Advice  The patient has been provided with information about: fall prevention and preventive services including:   · Annual Wellness Visit (AWV).    Visit Diagnoses:    ICD-10-CM ICD-9-CM   1. Iron deficiency  E61.1 280.9   2. Need for immunization against influenza  Z23 V04.81   3. Primary hypertension  I10 401.9   4. Mixed hyperlipidemia  E78.2 272.2   5. History of fall  Z91.81 V15.88   6. Cardiac arrhythmia, unspecified cardiac arrhythmia type  I49.9 427.9   7. Gcewuyzjbwnsua-bjk-panm/xarelto  Z79.01 V58.61   8. Gastroesophageal reflux disease, unspecified whether esophagitis present  K21.9 530.81   9. Chronic kidney disease (CKD), stage IV (severe) (HCC)  N18.4 585.4   10. 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 SOB: copd, CHF  R06.02 786.05   12. Gait difficulty-cane/walker  R26.9 781.2   13. COPD, group B, by GOLD 2013 classification   J44.9 496   14. Wellness examination-done  Z00.00 V70.0       Orders Placed This Encounter   Procedures   • Fluzone High-Dose 65+yrs (2564-9236)   • Comprehensive Metabolic Panel     Standing Status:   Future     Standing Expiration " Date:   10/13/2022     Order Specific Question:   Release to patient     Answer:   Immediate   • Iron Profile     Standing Status:   Future     Standing Expiration Date:   10/13/2022   • CBC & Differential     Standing Status:   Future     Standing Expiration Date:   10/13/2022     Order Specific Question:   Manual Differential     Answer:   No       Outpatient Encounter Medications as of 10/13/2021   Medication Sig Dispense Refill   • acetaminophen (TYLENOL) 500 MG tablet Take 1 tablet by mouth Every 4 (Four) Hours As Needed for Mild Pain .     • B Complex-C (SUPER B COMPLEX/VITAMIN C PO) Take 1 capsule by mouth Daily.     • carbonyl iron (FEOSOL) 45 MG tablet tablet TAKE ONE TABLET DAILY 30 each 6   • Cholecalciferol (VITAMIN D3 PO) Take 2,000 Int'l Units by mouth Daily.     • Coenzyme Q10 (CO Q-10 PO) Take 1 tablet by mouth Daily.     • dutasteride (AVODART) 0.5 MG capsule TAKE 1 CAPSULE DAILY GENERIC FOR AVODART 90 capsule 2   • Elastic Bandages & Supports (JOBST FOR MEN 8-15MMHG LG) misc 1 Device Daily. Apply when up each morning; may remove at night 2 each 0   • esomeprazole (nexIUM) 40 MG capsule TAKE 1 CAPSULE DAILY BEFORE BREAKFAST GENERIC FOR NEXIUM 90 capsule 2   • furosemide (LASIX) 40 MG tablet Take 40 mg by mouth Daily.     • ipratropium-albuterol (DUO-NEB) 0.5-2.5 mg/3 ml nebulizer USE ONE VIAL IN NEBULIZER FOUR TIMES DAILY GENERIC  mL 0   • midodrine (PROAMATINE) 10 MG tablet TAKE ONE TABLET TWICE DAILY BEFORE A MEAL 60 tablet 5   • Multiple Vitamins-Minerals (MULTIVITAMIN PO) Take 1 tablet by mouth daily.     • O2 (OXYGEN) Inhale 1 L/min Every Night. As needed at bedtime.      • Omega-3 Fatty Acids (FISH OIL PO) Take 1,000 mg by mouth 2 (Two) Times a Day.     • polyethylene glycol (MIRALAX) packet Take 17 g by mouth Daily. 30 each 1   • tamsulosin (FLOMAX) 0.4 MG capsule 24 hr capsule TAKE 1 CAPSULE DAILY SHORTLY AFTER THE SAME MEAL GENERIC FOR FLOMAX 90 capsule 3   • torsemide (DEMADEX) 20  MG tablet Take 1 tablet by mouth Daily. 30 tablet 5   • Xarelto 15 MG tablet TAKE ONE TABLET DAILY 90 tablet 4   • ferrous sulfate 325 (65 FE) MG EC tablet Take 1 tablet by mouth Every Other Day.       No facility-administered encounter medications on file as of 10/13/2021.       Reviewed use of high risk medication in the elderly: yes  Reviewed for potential of harmful drug interactions in the elderly: yes    Follow Up:  Return for lab 2m then lab/Dr William 6m.     An After Visit Summary and PPPS with all of these plans were given to the patient.

## 2021-10-19 PROBLEM — J41.1 MUCOPURULENT CHRONIC BRONCHITIS (HCC): Status: ACTIVE | Noted: 2021-01-01

## 2021-10-19 NOTE — PROGRESS NOTES
"Subjective    James Birch is a 89 y.o. male. FU of rhythm and chf    History of Present Illness     CHRONIC AFIB:  He is not having palpitations or spells of cp. Is on a DOAC without any known bleeding problems. EKG today is afib and nsc    HFpEF:  His main symptom is soa but he also has copd. He is on 2 loop diuretics according to his list and his family thinks that his list is correct. He has chronic leg edema and states \"I don't think its any worse than usual\". He sits with his legs down all day and is immobile. He has no abd swelling and appetite and uop are \"ok\". His bp is too low to add vasodilator tx. Recent lab work with pcp is ok, for his age.     COPD:  He is coughing more but not producing much sputum. He has nebs available but is not using them and is not using his mucolytic tx. He denies fever or chills. He uses NC O2 continuously but did not bring it with him today.      The following portions of the patient's history were reviewed and updated as appropriate: allergies, current medications, past family history, past medical history, past social history, past surgical history and problem list.    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   • DAMIAN-intolerant   • Autonomic dysfunction   • Permanent atrial fibrillation (HCC)   • CHF (congestive heart failure), NYHA class III, chronic, diastolic (HCC)   • Wellness examination-done   • Gait difficulty-cane/walker   • Asthma   • Ygblyjjskeuicx-upg-yqqd/xarelto   • Chronic kidney disease (CKD), stage IV (severe) (HCC)   • Prostatism   • History of kidney stones   • Varicose veins of legs   • Hyperlipidemia-statin   • Hypertension   • Hypersomnia   • Gastroesophageal reflux disease   • COPD, group B, by GOLD 2013 classification    • Nocturnal hypoxia-oxygen advised   • Laboratory test*   • History of tobacco use   • Acute renal failure (HCC)   • Cerebrovascular small vessel disease   • SOB: copd, CHF   • " Compression fracture of body of thoracic vertebra (HCC)   • Dyspnea due to congestive heart failure (HCC)   • Atrial flutter with controlled response (HCC)   • Current non-smoker   • Osteoporosis: 2019-no tx with renal   • History of fall   • Skin lesion   • Cardiac arrhythmia: a fib-flutter   • Esophageal dysfunction   • Leg edema   • Venous insufficiency   • Memory loss   • Mucopurulent chronic bronchitis (HCC)       No Known Allergies    Family History   Problem Relation Age of Onset   • No Known Problems Mother    • No Known Problems Father        Social History     Socioeconomic History   • Marital status:    • Number of children: 3   Tobacco Use   • Smoking status: Former Smoker     Packs/day: 1.00     Years: 18.00     Pack years: 18.00     Types: Cigarettes     Quit date:      Years since quittin.8   • Smokeless tobacco: Never Used   • Tobacco comment: already quit   Substance and Sexual Activity   • Alcohol use: No   • Drug use: No   • Sexual activity: Defer     Partners: Female         Current Outpatient Medications:   •  acetaminophen (TYLENOL) 500 MG tablet, Take 1 tablet by mouth Every 4 (Four) Hours As Needed for Mild Pain ., Disp: , Rfl:   •  B Complex-C (SUPER B COMPLEX/VITAMIN C PO), Take 1 capsule by mouth Daily., Disp: , Rfl:   •  carbonyl iron (FEOSOL) 45 MG tablet tablet, TAKE ONE TABLET DAILY, Disp: 30 each, Rfl: 6  •  Cholecalciferol (VITAMIN D3 PO), Take 2,000 Int'l Units by mouth Daily., Disp: , Rfl:   •  Coenzyme Q10 (CO Q-10 PO), Take 1 tablet by mouth Daily., Disp: , Rfl:   •  dutasteride (AVODART) 0.5 MG capsule, TAKE 1 CAPSULE DAILY GENERIC FOR AVODART, Disp: 90 capsule, Rfl: 2  •  Elastic Bandages & Supports (JOBST FOR MEN 8-15MMHG LG) misc, 1 Device Daily. Apply when up each morning; may remove at night, Disp: 2 each, Rfl: 0  •  esomeprazole (nexIUM) 40 MG capsule, TAKE 1 CAPSULE DAILY BEFORE BREAKFAST GENERIC FOR NEXIUM, Disp: 90 capsule, Rfl: 2  •  ferrous sulfate 325  "(65 FE) MG EC tablet, Take 1 tablet by mouth Every Other Day., Disp: , Rfl:   •  furosemide (LASIX) 40 MG tablet, Take 40 mg by mouth Daily., Disp: , Rfl:   •  ipratropium-albuterol (DUO-NEB) 0.5-2.5 mg/3 ml nebulizer, USE ONE VIAL IN NEBULIZER FOUR TIMES DAILY GENERIC FOR, Disp: 360 mL, Rfl: 0  •  midodrine (PROAMATINE) 10 MG tablet, TAKE ONE TABLET TWICE DAILY BEFORE A MEAL, Disp: 60 tablet, Rfl: 5  •  Multiple Vitamins-Minerals (MULTIVITAMIN PO), Take 1 tablet by mouth daily., Disp: , Rfl:   •  O2 (OXYGEN), Inhale 1 L/min Every Night. As needed at bedtime. , Disp: , Rfl:   •  Omega-3 Fatty Acids (FISH OIL PO), Take 1,000 mg by mouth 2 (Two) Times a Day., Disp: , Rfl:   •  polyethylene glycol (MIRALAX) packet, Take 17 g by mouth Daily., Disp: 30 each, Rfl: 1  •  tamsulosin (FLOMAX) 0.4 MG capsule 24 hr capsule, TAKE 1 CAPSULE DAILY SHORTLY AFTER THE SAME MEAL GENERIC FOR FLOMAX, Disp: 90 capsule, Rfl: 3  •  torsemide (DEMADEX) 20 MG tablet, Take 1 tablet by mouth Daily., Disp: 30 tablet, Rfl: 5  •  Xarelto 15 MG tablet, TAKE ONE TABLET DAILY, Disp: 90 tablet, Rfl: 4    Past Surgical History:   Procedure Laterality Date   • CATARACT EXTRACTION      left   • CATARACT EXTRACTION      left   • COLONOSCOPY  09/05/2012    normal   • ENDOSCOPY  08/31/2015    normal   • EYE SURGERY     • FRACTURE SURGERY     • HERNIA REPAIR     • HIP SURGERY     • SKIN BIOPSY         Review of Systems   Constitutional: Negative for activity change, appetite change, fever and unexpected weight change.   Respiratory: Positive for cough, shortness of breath and wheezing.    Cardiovascular: Positive for leg swelling. Negative for chest pain and palpitations.   Gastrointestinal: Negative for abdominal pain and blood in stool.   Genitourinary: Negative for difficulty urinating and hematuria.       /67   Pulse 83   Ht 177.8 cm (70\")   BMI 22.53 kg/m²   Procedures    Objective   Physical Exam  Vitals reviewed: examined in a wc. "   Constitutional:       Appearance: Normal appearance.   Cardiovascular:      Rate and Rhythm: Normal rate. Rhythm irregular.      Heart sounds: Heart sounds are distant. No murmur heard.  No friction rub. No gallop.    Pulmonary:      Effort: Pulmonary effort is normal.      Breath sounds: Transmitted upper airway sounds present. Wheezing and rhonchi present.      Comments: Partial clearing with cough. O2 sat = 98% on RA  Abdominal:      General: Bowel sounds are normal.      Tenderness: There is no abdominal tenderness.   Musculoskeletal:      Right lower leg: 3+ Edema present.      Left lower leg: 3+ Pitting Edema present.   Skin:     General: Skin is warm and dry.   Neurological:      General: No focal deficit present.   Psychiatric:         Mood and Affect: Mood normal.         Assessment/Plan   Diagnoses and all orders for this visit:    1. Longstanding persistent atrial fibrillation (HCC) (Primary)  Comments:  HR ok and on DOAC ok  Orders:  -     ECG 12 Lead    2. CHF (congestive heart failure), NYHA class III, chronic, diastolic (HCC)  Comments:  probably compensated    3. Mucopurulent chronic bronchitis (HCC)  Comments:  cause of his soa today - encouraged to use Nebs as ordered    4. Venous insufficiency  Comments:  cause of leg edema - encouraged to elevate feet and use compression hose that he has        MDM=MOD - worsening soa with mult different possibilities requiring lab review and assess of O2 sat and education of importance of med adherence         Return in about 1 year (around 10/19/2022).  Orders Placed This Encounter   Procedures   • ECG 12 Lead     Order Specific Question:   Reason for Exam:     Answer:   [email or web address]     Order Specific Question:   Release to patient     Answer:   Immediate

## 2021-12-15 NOTE — TELEPHONE ENCOUNTER
Rx Refill Note  Requested Prescriptions     Pending Prescriptions Disp Refills   • tamsulosin (FLOMAX) 0.4 MG capsule 24 hr capsule [Pharmacy Med Name: TAMSULOSIN HYDROCHLORIDE 0.4MG CAPSULE] 90 capsule 3     Sig: TAKE 1 CAPSULE DAILY SHORTLY AFTER THE SAME MEAL GENERIC FOR FLOMAX      Last office visit with prescribing clinician: 10/13/2021      Next office visit with prescribing clinician: 4/19/2022            Devorah Robin MA  12/15/21, 07:11 CST

## 2022-01-01 ENCOUNTER — NURSING HOME (OUTPATIENT)
Dept: FAMILY MEDICINE CLINIC | Facility: CLINIC | Age: 87
End: 2022-01-01

## 2022-01-01 ENCOUNTER — HOSPITAL ENCOUNTER (INPATIENT)
Facility: HOSPITAL | Age: 87
LOS: 1 days | Discharge: NURSING, REHAB OR LONG-TERM CARE | End: 2022-07-07
Attending: EMERGENCY MEDICINE | Admitting: FAMILY MEDICINE

## 2022-01-01 ENCOUNTER — APPOINTMENT (OUTPATIENT)
Dept: GENERAL RADIOLOGY | Facility: HOSPITAL | Age: 87
End: 2022-01-01

## 2022-01-01 ENCOUNTER — OFFICE VISIT (OUTPATIENT)
Dept: FAMILY MEDICINE CLINIC | Facility: CLINIC | Age: 87
End: 2022-01-01

## 2022-01-01 ENCOUNTER — TELEPHONE (OUTPATIENT)
Dept: FAMILY MEDICINE CLINIC | Facility: CLINIC | Age: 87
End: 2022-01-01

## 2022-01-01 VITALS
BODY MASS INDEX: 20.86 KG/M2 | TEMPERATURE: 97.5 F | HEART RATE: 88 BPM | RESPIRATION RATE: 16 BRPM | OXYGEN SATURATION: 91 % | HEIGHT: 71 IN | SYSTOLIC BLOOD PRESSURE: 115 MMHG | DIASTOLIC BLOOD PRESSURE: 65 MMHG | WEIGHT: 149 LBS

## 2022-01-01 VITALS
HEIGHT: 70 IN | BODY MASS INDEX: 21.9 KG/M2 | HEART RATE: 74 BPM | SYSTOLIC BLOOD PRESSURE: 98 MMHG | DIASTOLIC BLOOD PRESSURE: 58 MMHG | WEIGHT: 153 LBS | TEMPERATURE: 97.1 F | RESPIRATION RATE: 16 BRPM

## 2022-01-01 DIAGNOSIS — N00.9 ACUTE GLOMERULONEPHRITIS WITH PATHOLOGICAL LESION IN KIDNEY: ICD-10-CM

## 2022-01-01 DIAGNOSIS — J18.9 PNEUMONIA DUE TO INFECTIOUS ORGANISM, UNSPECIFIED LATERALITY, UNSPECIFIED PART OF LUNG: Primary | ICD-10-CM

## 2022-01-01 DIAGNOSIS — E55.9 VITAMIN D DEFICIENCY: ICD-10-CM

## 2022-01-01 DIAGNOSIS — I67.9 CEREBROVASCULAR SMALL VESSEL DISEASE: ICD-10-CM

## 2022-01-01 DIAGNOSIS — E78.2 MIXED HYPERLIPIDEMIA: ICD-10-CM

## 2022-01-01 DIAGNOSIS — I50.32 CHF (CONGESTIVE HEART FAILURE), NYHA CLASS III, CHRONIC, DIASTOLIC: ICD-10-CM

## 2022-01-01 DIAGNOSIS — U07.1 COVID: ICD-10-CM

## 2022-01-01 DIAGNOSIS — I10 PRIMARY HYPERTENSION: Chronic | ICD-10-CM

## 2022-01-01 DIAGNOSIS — N18.4 CHRONIC KIDNEY DISEASE (CKD), STAGE IV (SEVERE): ICD-10-CM

## 2022-01-01 DIAGNOSIS — R13.10 DYSPHAGIA, UNSPECIFIED TYPE: ICD-10-CM

## 2022-01-01 DIAGNOSIS — I48.20 CHRONIC ATRIAL FIBRILLATION: ICD-10-CM

## 2022-01-01 DIAGNOSIS — K21.9 GASTROESOPHAGEAL REFLUX DISEASE, UNSPECIFIED WHETHER ESOPHAGITIS PRESENT: Chronic | ICD-10-CM

## 2022-01-01 DIAGNOSIS — I48.11 LONGSTANDING PERSISTENT ATRIAL FIBRILLATION: ICD-10-CM

## 2022-01-01 DIAGNOSIS — R26.9 GAIT DIFFICULTY: Chronic | ICD-10-CM

## 2022-01-01 DIAGNOSIS — I50.32 CHF (CONGESTIVE HEART FAILURE), NYHA CLASS III, CHRONIC, DIASTOLIC: Chronic | ICD-10-CM

## 2022-01-01 DIAGNOSIS — R63.4 WEIGHT LOSS: ICD-10-CM

## 2022-01-01 DIAGNOSIS — J44.9 COPD, GROUP B, BY GOLD 2013 CLASSIFICATION: Chronic | ICD-10-CM

## 2022-01-01 DIAGNOSIS — N18.9 CHRONIC KIDNEY DISEASE, UNSPECIFIED CKD STAGE: ICD-10-CM

## 2022-01-01 DIAGNOSIS — S51.011A SKIN TEAR OF RIGHT ELBOW WITHOUT COMPLICATION, INITIAL ENCOUNTER: ICD-10-CM

## 2022-01-01 DIAGNOSIS — Z79.01 ANTICOAGULATED: ICD-10-CM

## 2022-01-01 DIAGNOSIS — R53.81 DEBILITY: ICD-10-CM

## 2022-01-01 DIAGNOSIS — J44.9 COPD, GROUP B, BY GOLD 2013 CLASSIFICATION: ICD-10-CM

## 2022-01-01 LAB
25(OH)D3+25(OH)D2 SERPL-MCNC: 47.5 NG/ML (ref 30–100)
ALBUMIN SERPL-MCNC: 3 G/DL (ref 3.5–5.2)
ALBUMIN SERPL-MCNC: 3.6 G/DL (ref 3.5–5.2)
ALBUMIN SERPL-MCNC: 4.1 G/DL (ref 3.5–5.2)
ALBUMIN/GLOB SERPL: 0.9 G/DL
ALBUMIN/GLOB SERPL: 1.1 G/DL
ALBUMIN/GLOB SERPL: 1.9 G/DL
ALP SERPL-CCNC: 113 U/L (ref 39–117)
ALP SERPL-CCNC: 124 U/L (ref 39–117)
ALP SERPL-CCNC: 94 U/L (ref 39–117)
ALT SERPL W P-5'-P-CCNC: 10 U/L (ref 1–41)
ALT SERPL W P-5'-P-CCNC: 8 U/L (ref 1–41)
ALT SERPL-CCNC: 5 U/L (ref 1–41)
ANION GAP SERPL CALCULATED.3IONS-SCNC: 17 MMOL/L (ref 5–15)
ANION GAP SERPL CALCULATED.3IONS-SCNC: 17 MMOL/L (ref 5–15)
ARTERIAL PATENCY WRIST A: POSITIVE
AST SERPL-CCNC: 17 U/L (ref 1–40)
AST SERPL-CCNC: 18 U/L (ref 1–40)
AST SERPL-CCNC: 8 U/L (ref 1–40)
ATMOSPHERIC PRESS: 748 MMHG
BACTERIA SPEC AEROBE CULT: ABNORMAL
BACTERIA SPEC AEROBE CULT: NORMAL
BACTERIA SPEC AEROBE CULT: NORMAL
BACTERIA UR QL AUTO: ABNORMAL /HPF
BASE EXCESS BLDA CALC-SCNC: -0.9 MMOL/L (ref 0–2)
BASOPHILS # BLD AUTO: 0.01 10*3/MM3 (ref 0–0.2)
BASOPHILS # BLD AUTO: 0.01 10*3/MM3 (ref 0–0.2)
BASOPHILS # BLD AUTO: 0.04 10*3/MM3 (ref 0–0.2)
BASOPHILS NFR BLD AUTO: 0.1 % (ref 0–1.5)
BASOPHILS NFR BLD AUTO: 0.1 % (ref 0–1.5)
BASOPHILS NFR BLD AUTO: 0.7 % (ref 0–1.5)
BDY SITE: ABNORMAL
BILIRUB SERPL-MCNC: 0.4 MG/DL (ref 0–1.2)
BILIRUB SERPL-MCNC: 0.4 MG/DL (ref 0–1.2)
BILIRUB SERPL-MCNC: 0.8 MG/DL (ref 0–1.2)
BILIRUB UR QL STRIP: NEGATIVE
BODY TEMPERATURE: 37 C
BUN SERPL-MCNC: 41 MG/DL (ref 8–23)
BUN SERPL-MCNC: 44 MG/DL (ref 8–23)
BUN SERPL-MCNC: 52 MG/DL (ref 8–23)
BUN/CREAT SERPL: 17.5 (ref 7–25)
BUN/CREAT SERPL: 18.9 (ref 7–25)
BUN/CREAT SERPL: 19.2 (ref 7–25)
CALCIUM SERPL-MCNC: 9 MG/DL (ref 8.2–9.6)
CALCIUM SPEC-SCNC: 8.6 MG/DL (ref 8.2–9.6)
CALCIUM SPEC-SCNC: 8.6 MG/DL (ref 8.2–9.6)
CHLORIDE SERPL-SCNC: 101 MMOL/L (ref 98–107)
CHLORIDE SERPL-SCNC: 95 MMOL/L (ref 98–107)
CHLORIDE SERPL-SCNC: 98 MMOL/L (ref 98–107)
CLARITY UR: ABNORMAL
CO2 SERPL-SCNC: 21 MMOL/L (ref 22–29)
CO2 SERPL-SCNC: 24 MMOL/L (ref 22–29)
CO2 SERPL-SCNC: 26.9 MMOL/L (ref 22–29)
COLOR UR: ABNORMAL
CREAT SERPL-MCNC: 2.14 MG/DL (ref 0.76–1.27)
CREAT SERPL-MCNC: 2.52 MG/DL (ref 0.76–1.27)
CREAT SERPL-MCNC: 2.75 MG/DL (ref 0.76–1.27)
CRP SERPL-MCNC: 26.05 MG/DL (ref 0–0.5)
CRP SERPL-MCNC: 27.44 MG/DL (ref 0–0.5)
D DIMER PPP FEU-MCNC: 2.77 MCGFEU/ML (ref 0–0.5)
D-LACTATE SERPL-SCNC: 1.4 MMOL/L (ref 0.5–2)
DEPRECATED RDW RBC AUTO: 43 FL (ref 37–54)
DEPRECATED RDW RBC AUTO: 43.9 FL (ref 37–54)
EGFRCR SERPLBLD CKD-EPI 2021: 20.7 ML/MIN/1.73
EGFRCR SERPLBLD CKD-EPI 2021: 23 ML/MIN/1.73
EGFRCR SERPLBLD CKD-EPI 2021: 28 ML/MIN/1.73
EOSINOPHIL # BLD AUTO: 0 10*3/MM3 (ref 0–0.4)
EOSINOPHIL # BLD AUTO: 0.02 10*3/MM3 (ref 0–0.4)
EOSINOPHIL # BLD AUTO: 0.44 10*3/MM3 (ref 0–0.4)
EOSINOPHIL NFR BLD AUTO: 0 % (ref 0.3–6.2)
EOSINOPHIL NFR BLD AUTO: 0.1 % (ref 0.3–6.2)
EOSINOPHIL NFR BLD AUTO: 8.2 % (ref 0.3–6.2)
ERYTHROCYTE [DISTWIDTH] IN BLOOD BY AUTOMATED COUNT: 13 % (ref 12.3–15.4)
ERYTHROCYTE [DISTWIDTH] IN BLOOD BY AUTOMATED COUNT: 13.1 % (ref 12.3–15.4)
ERYTHROCYTE [DISTWIDTH] IN BLOOD BY AUTOMATED COUNT: 13.2 % (ref 12.3–15.4)
FERRITIN SERPL-MCNC: 505.4 NG/ML (ref 30–400)
FERRITIN SERPL-MCNC: 79.9 NG/ML (ref 30–400)
GAS FLOW AIRWAY: 35 LPM
GLOBULIN SER CALC-MCNC: 2.2 GM/DL
GLOBULIN UR ELPH-MCNC: 3.3 GM/DL
GLOBULIN UR ELPH-MCNC: 3.5 GM/DL
GLUCOSE SERPL-MCNC: 116 MG/DL (ref 65–99)
GLUCOSE SERPL-MCNC: 134 MG/DL (ref 65–99)
GLUCOSE SERPL-MCNC: 78 MG/DL (ref 65–99)
GLUCOSE UR STRIP-MCNC: NEGATIVE MG/DL
HCO3 BLDA-SCNC: 24.5 MMOL/L (ref 20–26)
HCT VFR BLD AUTO: 34.7 % (ref 37.5–51)
HCT VFR BLD AUTO: 36.2 % (ref 37.5–51)
HCT VFR BLD AUTO: 37.7 % (ref 37.5–51)
HGB BLD-MCNC: 11 G/DL (ref 13–17.7)
HGB BLD-MCNC: 11.7 G/DL (ref 13–17.7)
HGB BLD-MCNC: 12.2 G/DL (ref 13–17.7)
HGB UR QL STRIP.AUTO: ABNORMAL
HYALINE CASTS UR QL AUTO: ABNORMAL /LPF
IMM GRANULOCYTES # BLD AUTO: 0.01 10*3/MM3 (ref 0–0.05)
IMM GRANULOCYTES # BLD AUTO: 0.12 10*3/MM3 (ref 0–0.05)
IMM GRANULOCYTES # BLD AUTO: 0.13 10*3/MM3 (ref 0–0.05)
IMM GRANULOCYTES NFR BLD AUTO: 0.2 % (ref 0–0.5)
IMM GRANULOCYTES NFR BLD AUTO: 0.8 % (ref 0–0.5)
IMM GRANULOCYTES NFR BLD AUTO: 0.9 % (ref 0–0.5)
INHALED O2 CONCENTRATION: 50 %
INR PPP: 1.64 (ref 0.91–1.09)
IRON SATN MFR SERPL: 19 % (ref 20–50)
IRON SERPL-MCNC: 62 MCG/DL (ref 59–158)
KETONES UR QL STRIP: ABNORMAL
L PNEUMO1 AG UR QL IA: NEGATIVE
LEUKOCYTE ESTERASE UR QL STRIP.AUTO: ABNORMAL
LYMPHOCYTES # BLD AUTO: 0.23 10*3/MM3 (ref 0.7–3.1)
LYMPHOCYTES # BLD AUTO: 0.31 10*3/MM3 (ref 0.7–3.1)
LYMPHOCYTES # BLD AUTO: 0.91 10*3/MM3 (ref 0.7–3.1)
LYMPHOCYTES NFR BLD AUTO: 1.7 % (ref 19.6–45.3)
LYMPHOCYTES NFR BLD AUTO: 17 % (ref 19.6–45.3)
LYMPHOCYTES NFR BLD AUTO: 2 % (ref 19.6–45.3)
Lab: ABNORMAL
MAGNESIUM SERPL-MCNC: 2.4 MG/DL (ref 1.7–2.3)
MAGNESIUM SERPL-MCNC: 3.1 MG/DL (ref 1.7–2.3)
MCH RBC QN AUTO: 28.8 PG (ref 26.6–33)
MCH RBC QN AUTO: 29.2 PG (ref 26.6–33)
MCH RBC QN AUTO: 29.4 PG (ref 26.6–33)
MCHC RBC AUTO-ENTMCNC: 31.7 G/DL (ref 31.5–35.7)
MCHC RBC AUTO-ENTMCNC: 32.3 G/DL (ref 31.5–35.7)
MCHC RBC AUTO-ENTMCNC: 32.4 G/DL (ref 31.5–35.7)
MCV RBC AUTO: 90.3 FL (ref 79–97)
MCV RBC AUTO: 90.8 FL (ref 79–97)
MCV RBC AUTO: 90.8 FL (ref 79–97)
MODALITY: ABNORMAL
MONOCYTES # BLD AUTO: 0.25 10*3/MM3 (ref 0.1–0.9)
MONOCYTES # BLD AUTO: 0.51 10*3/MM3 (ref 0.1–0.9)
MONOCYTES # BLD AUTO: 0.63 10*3/MM3 (ref 0.1–0.9)
MONOCYTES NFR BLD AUTO: 1.8 % (ref 5–12)
MONOCYTES NFR BLD AUTO: 11.8 % (ref 5–12)
MONOCYTES NFR BLD AUTO: 3.3 % (ref 5–12)
MRSA DNA SPEC QL NAA+PROBE: NORMAL
NEUTROPHILS # BLD AUTO: 3.31 10*3/MM3 (ref 1.7–7)
NEUTROPHILS NFR BLD AUTO: 12.96 10*3/MM3 (ref 1.7–7)
NEUTROPHILS NFR BLD AUTO: 14.55 10*3/MM3 (ref 1.7–7)
NEUTROPHILS NFR BLD AUTO: 62.1 % (ref 42.7–76)
NEUTROPHILS NFR BLD AUTO: 93.7 % (ref 42.7–76)
NEUTROPHILS NFR BLD AUTO: 95.5 % (ref 42.7–76)
NITRITE UR QL STRIP: NEGATIVE
NRBC BLD AUTO-RTO: 0 /100 WBC (ref 0–0.2)
NT-PROBNP SERPL-MCNC: 6041 PG/ML (ref 0–1800)
PCO2 BLDA: 42.7 MM HG (ref 35–45)
PCO2 TEMP ADJ BLD: 42.7 MM HG (ref 35–45)
PH BLDA: 7.37 PH UNITS (ref 7.35–7.45)
PH UR STRIP.AUTO: <=5 [PH] (ref 5–8)
PH, TEMP CORRECTED: 7.37 PH UNITS (ref 7.35–7.45)
PLATELET # BLD AUTO: 182 10*3/MM3 (ref 140–450)
PLATELET # BLD AUTO: 189 10*3/MM3 (ref 140–450)
PLATELET # BLD AUTO: 222 10*3/MM3 (ref 140–450)
PMV BLD AUTO: 9.5 FL (ref 6–12)
PMV BLD AUTO: 9.6 FL (ref 6–12)
PO2 BLDA: 131 MM HG (ref 83–108)
PO2 TEMP ADJ BLD: 131 MM HG (ref 83–108)
POTASSIUM SERPL-SCNC: 4.2 MMOL/L (ref 3.5–5.2)
POTASSIUM SERPL-SCNC: 4.8 MMOL/L (ref 3.5–5.2)
POTASSIUM SERPL-SCNC: 5 MMOL/L (ref 3.5–5.2)
PROCALCITONIN SERPL-MCNC: 0.59 NG/ML (ref 0–0.25)
PROT SERPL-MCNC: 6.3 G/DL (ref 6–8.5)
PROT SERPL-MCNC: 6.5 G/DL (ref 6–8.5)
PROT SERPL-MCNC: 6.9 G/DL (ref 6–8.5)
PROT UR QL STRIP: ABNORMAL
PROTHROMBIN TIME: 18.7 SECONDS (ref 11.9–14.6)
QT INTERVAL: 324 MS
QTC INTERVAL: 442 MS
RBC # BLD AUTO: 3.82 10*6/MM3 (ref 4.14–5.8)
RBC # BLD AUTO: 4.01 10*6/MM3 (ref 4.14–5.8)
RBC # BLD AUTO: 4.15 10*6/MM3 (ref 4.14–5.8)
RBC # UR STRIP: ABNORMAL /HPF
REF LAB TEST METHOD: ABNORMAL
S PNEUM AG SPEC QL LA: NEGATIVE
SAO2 % BLDCOA: 99.5 % (ref 94–99)
SARS-COV-2 RNA PNL SPEC NAA+PROBE: DETECTED
SODIUM SERPL-SCNC: 136 MMOL/L (ref 136–145)
SODIUM SERPL-SCNC: 136 MMOL/L (ref 136–145)
SODIUM SERPL-SCNC: 138 MMOL/L (ref 136–145)
SP GR UR STRIP: 1.02 (ref 1–1.03)
SQUAMOUS #/AREA URNS HPF: ABNORMAL /HPF
TIBC SERPL-MCNC: 324 MCG/DL
TROPONIN T SERPL-MCNC: 0.05 NG/ML (ref 0–0.03)
TSH SERPL DL<=0.005 MIU/L-ACNC: 1.34 UIU/ML (ref 0.27–4.2)
UIBC SERPL-MCNC: 262 MCG/DL (ref 112–346)
UROBILINOGEN UR QL STRIP: ABNORMAL
VENTILATOR MODE: ABNORMAL
WBC # BLD AUTO: 5.34 10*3/MM3 (ref 3.4–10.8)
WBC # UR STRIP: ABNORMAL /HPF
WBC NRBC COR # BLD: 13.57 10*3/MM3 (ref 3.4–10.8)
WBC NRBC COR # BLD: 15.53 10*3/MM3 (ref 3.4–10.8)

## 2022-01-01 PROCEDURE — 94799 UNLISTED PULMONARY SVC/PX: CPT

## 2022-01-01 PROCEDURE — 87086 URINE CULTURE/COLONY COUNT: CPT | Performed by: INTERNAL MEDICINE

## 2022-01-01 PROCEDURE — 84145 PROCALCITONIN (PCT): CPT | Performed by: EMERGENCY MEDICINE

## 2022-01-01 PROCEDURE — 94664 DEMO&/EVAL PT USE INHALER: CPT

## 2022-01-01 PROCEDURE — 25010000002 DEXAMETHASONE PER 1 MG: Performed by: EMERGENCY MEDICINE

## 2022-01-01 PROCEDURE — 25010000002 ONDANSETRON PER 1 MG: Performed by: INTERNAL MEDICINE

## 2022-01-01 PROCEDURE — 25010000002 CEFEPIME PER 500 MG: Performed by: INTERNAL MEDICINE

## 2022-01-01 PROCEDURE — 99309 SBSQ NF CARE MODERATE MDM 30: CPT | Performed by: NURSE PRACTITIONER

## 2022-01-01 PROCEDURE — 86140 C-REACTIVE PROTEIN: CPT | Performed by: INTERNAL MEDICINE

## 2022-01-01 PROCEDURE — 83605 ASSAY OF LACTIC ACID: CPT | Performed by: EMERGENCY MEDICINE

## 2022-01-01 PROCEDURE — 93010 ELECTROCARDIOGRAM REPORT: CPT | Performed by: EMERGENCY MEDICINE

## 2022-01-01 PROCEDURE — 85025 COMPLETE CBC W/AUTO DIFF WBC: CPT | Performed by: INTERNAL MEDICINE

## 2022-01-01 PROCEDURE — 86140 C-REACTIVE PROTEIN: CPT | Performed by: EMERGENCY MEDICINE

## 2022-01-01 PROCEDURE — 80053 COMPREHEN METABOLIC PANEL: CPT | Performed by: EMERGENCY MEDICINE

## 2022-01-01 PROCEDURE — 36600 WITHDRAWAL OF ARTERIAL BLOOD: CPT

## 2022-01-01 PROCEDURE — 94640 AIRWAY INHALATION TREATMENT: CPT

## 2022-01-01 PROCEDURE — 85610 PROTHROMBIN TIME: CPT | Performed by: EMERGENCY MEDICINE

## 2022-01-01 PROCEDURE — 81001 URINALYSIS AUTO W/SCOPE: CPT | Performed by: INTERNAL MEDICINE

## 2022-01-01 PROCEDURE — 99214 OFFICE O/P EST MOD 30 MIN: CPT | Performed by: FAMILY MEDICINE

## 2022-01-01 PROCEDURE — 87635 SARS-COV-2 COVID-19 AMP PRB: CPT | Performed by: EMERGENCY MEDICINE

## 2022-01-01 PROCEDURE — 87088 URINE BACTERIA CULTURE: CPT | Performed by: INTERNAL MEDICINE

## 2022-01-01 PROCEDURE — 25010000002 AZITHROMYCIN PER 500 MG: Performed by: EMERGENCY MEDICINE

## 2022-01-01 PROCEDURE — 71045 X-RAY EXAM CHEST 1 VIEW: CPT

## 2022-01-01 PROCEDURE — 82728 ASSAY OF FERRITIN: CPT | Performed by: INTERNAL MEDICINE

## 2022-01-01 PROCEDURE — 25010000002 DEXAMETHASONE PER 1 MG: Performed by: INTERNAL MEDICINE

## 2022-01-01 PROCEDURE — 83735 ASSAY OF MAGNESIUM: CPT | Performed by: INTERNAL MEDICINE

## 2022-01-01 PROCEDURE — 85379 FIBRIN DEGRADATION QUANT: CPT | Performed by: EMERGENCY MEDICINE

## 2022-01-01 PROCEDURE — 93005 ELECTROCARDIOGRAM TRACING: CPT | Performed by: EMERGENCY MEDICINE

## 2022-01-01 PROCEDURE — 83880 ASSAY OF NATRIURETIC PEPTIDE: CPT | Performed by: EMERGENCY MEDICINE

## 2022-01-01 PROCEDURE — 85025 COMPLETE CBC W/AUTO DIFF WBC: CPT | Performed by: EMERGENCY MEDICINE

## 2022-01-01 PROCEDURE — 87040 BLOOD CULTURE FOR BACTERIA: CPT | Performed by: EMERGENCY MEDICINE

## 2022-01-01 PROCEDURE — 87641 MR-STAPH DNA AMP PROBE: CPT | Performed by: INTERNAL MEDICINE

## 2022-01-01 PROCEDURE — 87449 NOS EACH ORGANISM AG IA: CPT | Performed by: INTERNAL MEDICINE

## 2022-01-01 PROCEDURE — 80053 COMPREHEN METABOLIC PANEL: CPT | Performed by: INTERNAL MEDICINE

## 2022-01-01 PROCEDURE — 84484 ASSAY OF TROPONIN QUANT: CPT | Performed by: EMERGENCY MEDICINE

## 2022-01-01 PROCEDURE — 87899 AGENT NOS ASSAY W/OPTIC: CPT | Performed by: INTERNAL MEDICINE

## 2022-01-01 PROCEDURE — 87186 SC STD MICRODIL/AGAR DIL: CPT | Performed by: INTERNAL MEDICINE

## 2022-01-01 PROCEDURE — 83735 ASSAY OF MAGNESIUM: CPT | Performed by: EMERGENCY MEDICINE

## 2022-01-01 PROCEDURE — 25010000002 MAGNESIUM SULFATE 2 GM/50ML SOLUTION: Performed by: EMERGENCY MEDICINE

## 2022-01-01 PROCEDURE — 99285 EMERGENCY DEPT VISIT HI MDM: CPT

## 2022-01-01 PROCEDURE — 82803 BLOOD GASES ANY COMBINATION: CPT

## 2022-01-01 RX ORDER — DEXAMETHASONE SODIUM PHOSPHATE 10 MG/ML
10 INJECTION INTRAMUSCULAR; INTRAVENOUS ONCE
Status: COMPLETED | OUTPATIENT
Start: 2022-01-01 | End: 2022-01-01

## 2022-01-01 RX ORDER — RIVAROXABAN 15 MG/1
TABLET, FILM COATED ORAL
Qty: 90 TABLET | Refills: 3 | Status: ON HOLD | OUTPATIENT
Start: 2022-01-01 | End: 2022-01-01

## 2022-01-01 RX ORDER — BISACODYL 10 MG
10 SUPPOSITORY, RECTAL RECTAL DAILY PRN
COMMUNITY

## 2022-01-01 RX ORDER — ACETAMINOPHEN 650 MG/1
650 SUPPOSITORY RECTAL EVERY 4 HOURS PRN
Status: DISCONTINUED | OUTPATIENT
Start: 2022-01-01 | End: 2022-01-01 | Stop reason: HOSPADM

## 2022-01-01 RX ORDER — CEFDINIR 300 MG/1
300 CAPSULE ORAL DAILY
Qty: 7 CAPSULE | Refills: 0 | Status: SHIPPED | OUTPATIENT
Start: 2022-01-01

## 2022-01-01 RX ORDER — DUTASTERIDE 0.5 MG/1
0.5 CAPSULE, LIQUID FILLED ORAL NIGHTLY
COMMUNITY

## 2022-01-01 RX ORDER — FINASTERIDE 5 MG/1
5 TABLET, FILM COATED ORAL DAILY
Status: DISCONTINUED | OUTPATIENT
Start: 2022-01-01 | End: 2022-01-01 | Stop reason: HOSPADM

## 2022-01-01 RX ORDER — TAMSULOSIN HYDROCHLORIDE 0.4 MG/1
0.4 CAPSULE ORAL DAILY
Status: DISCONTINUED | OUTPATIENT
Start: 2022-01-01 | End: 2022-01-01 | Stop reason: HOSPADM

## 2022-01-01 RX ORDER — TAMSULOSIN HYDROCHLORIDE 0.4 MG/1
1 CAPSULE ORAL NIGHTLY
COMMUNITY

## 2022-01-01 RX ORDER — AMOXICILLIN 250 MG
1 CAPSULE ORAL 2 TIMES DAILY
Status: DISCONTINUED | OUTPATIENT
Start: 2022-01-01 | End: 2022-01-01 | Stop reason: HOSPADM

## 2022-01-01 RX ORDER — ESOMEPRAZOLE MAGNESIUM 20 MG/1
1 TABLET, DELAYED RELEASE ORAL DAILY
COMMUNITY

## 2022-01-01 RX ORDER — FUROSEMIDE 40 MG/1
20 TABLET ORAL DAILY
Start: 2022-01-01

## 2022-01-01 RX ORDER — IPRATROPIUM BROMIDE AND ALBUTEROL SULFATE 2.5; .5 MG/3ML; MG/3ML
SOLUTION RESPIRATORY (INHALATION)
Qty: 270 ML | Refills: 5 | Status: ON HOLD | OUTPATIENT
Start: 2022-01-01 | End: 2022-01-01

## 2022-01-01 RX ORDER — FAMOTIDINE 20 MG/1
10 TABLET, FILM COATED ORAL DAILY
Status: DISCONTINUED | OUTPATIENT
Start: 2022-01-01 | End: 2022-01-01 | Stop reason: HOSPADM

## 2022-01-01 RX ORDER — ASCORBIC ACID 500 MG
500 TABLET ORAL DAILY
Status: DISCONTINUED | OUTPATIENT
Start: 2022-01-01 | End: 2022-01-01 | Stop reason: HOSPADM

## 2022-01-01 RX ORDER — ONDANSETRON 2 MG/ML
4 INJECTION INTRAMUSCULAR; INTRAVENOUS EVERY 6 HOURS PRN
Status: DISCONTINUED | OUTPATIENT
Start: 2022-01-01 | End: 2022-01-01 | Stop reason: HOSPADM

## 2022-01-01 RX ORDER — MIDODRINE HYDROCHLORIDE 10 MG/1
TABLET ORAL
Qty: 60 TABLET | Refills: 5 | Status: ON HOLD | OUTPATIENT
Start: 2022-01-01 | End: 2022-01-01

## 2022-01-01 RX ORDER — SODIUM CHLORIDE 0.9 % (FLUSH) 0.9 %
10 SYRINGE (ML) INJECTION EVERY 12 HOURS SCHEDULED
Status: DISCONTINUED | OUTPATIENT
Start: 2022-01-01 | End: 2022-01-01 | Stop reason: HOSPADM

## 2022-01-01 RX ORDER — POLYETHYLENE GLYCOL 3350 17 G/17G
17 POWDER, FOR SOLUTION ORAL DAILY PRN
COMMUNITY

## 2022-01-01 RX ORDER — ACETAMINOPHEN 325 MG/1
650 TABLET ORAL EVERY 4 HOURS PRN
Status: DISCONTINUED | OUTPATIENT
Start: 2022-01-01 | End: 2022-01-01 | Stop reason: HOSPADM

## 2022-01-01 RX ORDER — DEXAMETHASONE 6 MG/1
6 TABLET ORAL
Qty: 7 TABLET | Refills: 0
Start: 2022-01-01

## 2022-01-01 RX ORDER — MAGNESIUM SULFATE HEPTAHYDRATE 40 MG/ML
2 INJECTION, SOLUTION INTRAVENOUS ONCE
Status: COMPLETED | OUTPATIENT
Start: 2022-01-01 | End: 2022-01-01

## 2022-01-01 RX ORDER — IPRATROPIUM BROMIDE AND ALBUTEROL SULFATE 2.5; .5 MG/3ML; MG/3ML
3 SOLUTION RESPIRATORY (INHALATION)
COMMUNITY

## 2022-01-01 RX ORDER — SODIUM CHLORIDE, SODIUM LACTATE, POTASSIUM CHLORIDE, CALCIUM CHLORIDE 600; 310; 30; 20 MG/100ML; MG/100ML; MG/100ML; MG/100ML
75 INJECTION, SOLUTION INTRAVENOUS ONCE
Status: COMPLETED | OUTPATIENT
Start: 2022-01-01 | End: 2022-01-01

## 2022-01-01 RX ORDER — DEXAMETHASONE SODIUM PHOSPHATE 4 MG/ML
6 INJECTION, SOLUTION INTRA-ARTICULAR; INTRALESIONAL; INTRAMUSCULAR; INTRAVENOUS; SOFT TISSUE DAILY
Status: DISCONTINUED | OUTPATIENT
Start: 2022-01-01 | End: 2022-01-01 | Stop reason: HOSPADM

## 2022-01-01 RX ORDER — SODIUM CHLORIDE 9 MG/ML
75 INJECTION, SOLUTION INTRAVENOUS CONTINUOUS
Status: DISCONTINUED | OUTPATIENT
Start: 2022-01-01 | End: 2022-01-01 | Stop reason: HOSPADM

## 2022-01-01 RX ORDER — MIDODRINE HYDROCHLORIDE 10 MG/1
10 TABLET ORAL
COMMUNITY

## 2022-01-01 RX ORDER — IPRATROPIUM BROMIDE AND ALBUTEROL SULFATE 2.5; .5 MG/3ML; MG/3ML
3 SOLUTION RESPIRATORY (INHALATION) ONCE
Status: DISCONTINUED | OUTPATIENT
Start: 2022-01-01 | End: 2022-01-01

## 2022-01-01 RX ORDER — ALBUTEROL SULFATE 90 UG/1
2 AEROSOL, METERED RESPIRATORY (INHALATION)
Status: DISCONTINUED | OUTPATIENT
Start: 2022-01-01 | End: 2022-01-01 | Stop reason: HOSPADM

## 2022-01-01 RX ORDER — MELATONIN
1000 DAILY
Status: DISCONTINUED | OUTPATIENT
Start: 2022-01-01 | End: 2022-01-01 | Stop reason: HOSPADM

## 2022-01-01 RX ORDER — ZINC SULFATE 50(220)MG
220 CAPSULE ORAL DAILY
Status: DISCONTINUED | OUTPATIENT
Start: 2022-01-01 | End: 2022-01-01 | Stop reason: HOSPADM

## 2022-01-01 RX ORDER — SODIUM CHLORIDE 0.9 % (FLUSH) 0.9 %
10 SYRINGE (ML) INJECTION AS NEEDED
Status: DISCONTINUED | OUTPATIENT
Start: 2022-01-01 | End: 2022-01-01 | Stop reason: HOSPADM

## 2022-01-01 RX ORDER — ACETAMINOPHEN 160 MG/5ML
650 SOLUTION ORAL EVERY 4 HOURS PRN
Status: DISCONTINUED | OUTPATIENT
Start: 2022-01-01 | End: 2022-01-01 | Stop reason: HOSPADM

## 2022-01-01 RX ADMIN — Medication 10 ML: at 23:53

## 2022-01-01 RX ADMIN — ALBUTEROL SULFATE 2 PUFF: 90 AEROSOL, METERED RESPIRATORY (INHALATION) at 07:33

## 2022-01-01 RX ADMIN — Medication 1000 UNITS: at 08:33

## 2022-01-01 RX ADMIN — FINASTERIDE 5 MG: 5 TABLET, FILM COATED ORAL at 08:34

## 2022-01-01 RX ADMIN — MAGNESIUM SULFATE HEPTAHYDRATE 2 G: 2 INJECTION, SOLUTION INTRAVENOUS at 21:22

## 2022-01-01 RX ADMIN — SODIUM CHLORIDE, POTASSIUM CHLORIDE, SODIUM LACTATE AND CALCIUM CHLORIDE 75 ML/HR: 600; 310; 30; 20 INJECTION, SOLUTION INTRAVENOUS at 23:45

## 2022-01-01 RX ADMIN — ONDANSETRON 4 MG: 2 INJECTION INTRAMUSCULAR; INTRAVENOUS at 09:34

## 2022-01-01 RX ADMIN — DOCUSATE SODIUM 50 MG AND SENNOSIDES 8.6 MG 1 TABLET: 8.6; 5 TABLET, FILM COATED ORAL at 08:33

## 2022-01-01 RX ADMIN — DEXAMETHASONE SODIUM PHOSPHATE 10 MG: 10 INJECTION INTRAMUSCULAR; INTRAVENOUS at 18:49

## 2022-01-01 RX ADMIN — AZITHROMYCIN 500 MG: 500 INJECTION, POWDER, LYOPHILIZED, FOR SOLUTION INTRAVENOUS at 18:51

## 2022-01-01 RX ADMIN — OXYCODONE HYDROCHLORIDE AND ACETAMINOPHEN 500 MG: 500 TABLET ORAL at 08:34

## 2022-01-01 RX ADMIN — ZINC SULFATE 220 MG (50 MG) CAPSULE 220 MG: CAPSULE at 08:34

## 2022-01-01 RX ADMIN — ALBUTEROL SULFATE 2 PUFF: 90 AEROSOL, METERED RESPIRATORY (INHALATION) at 23:48

## 2022-01-01 RX ADMIN — Medication 10 ML: at 08:34

## 2022-01-01 RX ADMIN — DEXAMETHASONE SODIUM PHOSPHATE 6 MG: 4 INJECTION, SOLUTION INTRA-ARTICULAR; INTRALESIONAL; INTRAMUSCULAR; INTRAVENOUS; SOFT TISSUE at 08:32

## 2022-01-01 RX ADMIN — RIVAROXABAN 15 MG: 15 TABLET, FILM COATED ORAL at 08:34

## 2022-01-01 RX ADMIN — ONDANSETRON 4 MG: 2 INJECTION INTRAMUSCULAR; INTRAVENOUS at 00:14

## 2022-01-01 RX ADMIN — CEFEPIME HYDROCHLORIDE 2 G: 2 INJECTION, POWDER, FOR SOLUTION INTRAVENOUS at 08:31

## 2022-01-01 RX ADMIN — ALBUTEROL SULFATE 2 PUFF: 90 AEROSOL, METERED RESPIRATORY (INHALATION) at 11:16

## 2022-01-01 RX ADMIN — ALBUTEROL SULFATE 2 PUFF: 90 AEROSOL, METERED RESPIRATORY (INHALATION) at 15:34

## 2022-01-01 RX ADMIN — SODIUM CHLORIDE 75 ML/HR: 9 INJECTION, SOLUTION INTRAVENOUS at 10:18

## 2022-01-01 RX ADMIN — FAMOTIDINE 10 MG: 20 TABLET, FILM COATED ORAL at 08:33

## 2022-01-01 RX ADMIN — CEFEPIME HYDROCHLORIDE 2 G: 2 INJECTION, POWDER, FOR SOLUTION INTRAVENOUS at 23:56

## 2022-01-01 RX ADMIN — TAMSULOSIN HYDROCHLORIDE 0.4 MG: 0.4 CAPSULE ORAL at 08:33

## 2022-04-12 NOTE — TELEPHONE ENCOUNTER
Rx Refill Note  Requested Prescriptions     Pending Prescriptions Disp Refills   • midodrine (PROAMATINE) 10 MG tablet [Pharmacy Med Name: MIDODRINE HCL 10MG TABLET] 60 tablet 5     Sig: TAKE ONE TABLET TWICE DAILY BEFORE A MEAL      Last office visit with prescribing clinician: 10/13/2021      Next office visit with prescribing clinician: 4/19/2022            Angeli Singh LPN  04/12/22, 14:02 CDT

## 2022-04-19 NOTE — PROGRESS NOTES
Subjective   James Birch is a 89 y.o. male presenting with chief complaint of:   Chief Complaint   Patient presents with   • Hypertension   • Extremity Weakness     History of Present Illness :  With wife/son.  Here for primarily f/u his chronic problems including HTN/others.       Has multiple chronic problems to consider that might have a bearing on today's issues;  an interval appointment.       Chronic/acute problems reviewed today:   1. Chronic kidney disease (CKD), stage IV (severe) (HCC): Chronic/stable.  Sees nephrology.  No dysuria.  Previously warned/reminded:   To avoid further kidney function decline  A. Treat any time you think you have infection  B. Stay hydrated (dont get dehydrated); drink at least 60 oz fluid every 24 hr (1800 cc or nearly a 2L)  C. Do not allow any xrays with dye WITHOUT the doctor ordering checking your renal function  D. Do not get new medications without the doctor considering your renal condition  E. Do not use motrin/ibuprofen, alleve/naprosyn and these types of medications     2. Vitamin D deficiency : chronic/variable up/down with past labs and/or risk to run low especially in winter. Lab monitored.      3. Weight loss : chronic/variable nutrition/weight.  Eats poorly.    4. Primary hypertension: Chronic/stable. Stable here past/no recent home blood pressures.  No significant chest pain, SOB, LE edema, orthopnea, near syncope, dizziness/light headness.   Recent Vitals       10/13/2021 10/19/2021 4/19/2022       BP: 116/68 100/67 98/58     Pulse: 92 83 74     Temp: 97.5 °F (36.4 °C) -- 97.1 °F (36.2 °C)     Weight: 71.2 kg (157 lb) --  69.4 kg (153 lb)       DID NOT WEIGH PATIENT      BMI (Calculated): 22.5 -- 22            5. CHF (congestive heart failure), NYHA class III, chronic, diastolic (HCC) : Chronic/stable.  Denies significant sob, orthopnea, leg edema, weight gain.  Aware of influence diet/salt and watching weight at home.       6. Longstanding persistent  atrial fibrillation (HCC) : Chronic/stable.   History of a fib.  Rhythm currently seems controlled.  Medications seem tolerated.  No syncope near syncope.     7. Ojpxwlgmeqbicl-xgo-tpci/xarelto : Chronic/stable reason for stopping or use of.  Denies bleeding issues; especially epistaxis, melena, hematochezia.  Upper arms/others do not significantly bruise easily.  No significant bleeding or falls.      8. Gastroesophageal reflux disease, unspecified whether esophagitis present : Chronic/stable.  Controlled heartburn, reflux without dysphagia, melena.  Rx used, periods not used proven needed with symptoms -currently doing ok.      9. Cerebrovascular small vessel disease chronic/stable history CVA/residuals.  Nothing new: (Manifest by slurred speech asymmetry of face dysfunction/weakness of extremities).     10. COPD, group B, by GOLD 2013 classification : Chronic/stable mild occ cough, sob, wheeze.  Rx helps.   No smoking.       11. Gait difficulty-cane/walker: Chronic/stable:.  Ongoing issues with difficulties it results in difficulty with walking or gait.  No recent falls.  No recent injuries.  Uses to help gait: walker.       Has an/another acute issue today: none.    The following portions of the patient's history were reviewed and updated as appropriate: allergies, current medications, past family history, past medical history, past social history, past surgical history and problem list.      Current Outpatient Medications:   •  B Complex-C (SUPER B COMPLEX/VITAMIN C PO), Take 1 capsule by mouth Daily., Disp: , Rfl:   •  carbonyl iron (FEOSOL) 45 MG tablet tablet, TAKE ONE TABLET DAILY, Disp: 30 each, Rfl: 6  •  Cholecalciferol (VITAMIN D3 PO), Take 2,000 Int'l Units by mouth Daily., Disp: , Rfl:   •  dutasteride (AVODART) 0.5 MG capsule, TAKE 1 CAPSULE DAILY GENERIC FOR AVODART, Disp: 90 capsule, Rfl: 2  •  esomeprazole (nexIUM) 40 MG capsule, TAKE 1 CAPSULE DAILY BEFORE BREAKFAST GENERIC FOR NEXIUM, Disp: 90  capsule, Rfl: 2  •  furosemide (LASIX) 40 MG tablet, Take 40 mg by mouth Daily., Disp: , Rfl:   •  ipratropium-albuterol (DUO-NEB) 0.5-2.5 mg/3 ml nebulizer, USE ONE VIAL IN NEBULIZER FOUR TIMES DAILY GENERIC FOR, Disp: 270 mL, Rfl: 5  •  midodrine (PROAMATINE) 10 MG tablet, TAKE ONE TABLET TWICE DAILY BEFORE A MEAL, Disp: 60 tablet, Rfl: 5  •  Multiple Vitamins-Minerals (MULTIVITAMIN PO), Take 1 tablet by mouth daily., Disp: , Rfl:   •  O2 (OXYGEN), Inhale 1 L/min Every Night. As needed at bedtime., Disp: , Rfl:   •  tamsulosin (FLOMAX) 0.4 MG capsule 24 hr capsule, TAKE 1 CAPSULE DAILY SHORTLY AFTER THE SAME MEAL GENERIC FOR FLOMAX, Disp: 90 capsule, Rfl: 3  •  Xarelto 15 MG tablet, TAKE ONE TABLET DAILY, Disp: 90 tablet, Rfl: 3  •  acetaminophen (TYLENOL) 500 MG tablet, Take 1 tablet by mouth Every 4 (Four) Hours As Needed for Mild Pain ., Disp: , Rfl:   •  Coenzyme Q10 (CO Q-10 PO), Take 1 tablet by mouth Daily., Disp: , Rfl:   •  Elastic Bandages & Supports (JOBST FOR MEN 8-15MMHG LG) misc, 1 Device Daily. Apply when up each morning; may remove at night, Disp: 2 each, Rfl: 0  •  Omega-3 Fatty Acids (FISH OIL PO), Take 1,000 mg by mouth 2 (Two) Times a Day., Disp: , Rfl:   •  polyethylene glycol (MIRALAX) packet, Take 17 g by mouth Daily., Disp: 30 each, Rfl: 1  •  torsemide (DEMADEX) 20 MG tablet, Take 1 tablet by mouth Daily., Disp: 30 tablet, Rfl: 5    No problems with medications.    No Known Allergies    Review of Systems  GENERAL:  Inactive/slower with limits, speed, stamina for age and gait and occ SOB. Sleep is ok with oxygen/sleeps in recliner that he can reposition.  No fever now.  ENDO:  No syncope, near or diaphoretic sweaty spells.  HEENT: No head injury or headache.   No vision change.  Same hearing loss.  Ears without pain/drainage.  No sore throat.  No significant nasal/sinus congestion/drainage. No epistaxis.  CHEST: No chest wall tenderness or mass.   Occ cough, with mild occ wheeze.  Variable  SOB; no hemoptysis.  CV: No exertional chest pain, palpitations; less end of day ankle edema.   GI: No dysphagia.  No abdominal pain, diarrhea, constipation.  No rectal bleeding, or melena.  No nausea and vomiting/heartburn.    :  Voids without dysuria, or incontinence to completion.  ORTHO: No painful/swollen joints but various on /off sore; especially hands/fingers with motion.  No change occ sore neck or back.  No acute neck or back pain despite recent injury.   NEURO: Chronic/mild dizziness, and primarily LE weakness of extremities.  No change UE/LE mild numbness/paresthesias.   PSYCH: Mild-mod memory loss.  Mood good; occ anxious, depressed but/and not suicidal.  Tries to tolerate stress   Screening:  Mammogram: NA  Bone density: NA  Low dose CT chest:Tobacco-smoker/age 18/<1 ppd/dc 1965 (19) NA  GI:   Colonoscopy+nl/[facility]/9.5.12/5y-declined  EGD+biop/[facility]/8.31.15  Prostate: urology past  Usual lab order  1m CBC, BMP, iron  6m CBC, CMP, iron  12m CBC, CMP, LIPID    Copy/paste function used for ROS/exam AND each area of these were reviewed, updated, confirmed and supplemented as needed.   Data reviewed:   Recent admit/ER/MD visits: last visit  Last cardiac testing:   Echo:   Results for orders placed during the hospital encounter of 04/11/19    Adult Transthoracic Echo Complete W/ Cont if Necessary Per Protocol    Interpretation Summary  · Mild aortic valve stenosis is present.  · Mild dilation of the aortic root is present.  · Left atrial cavity size is borderline dilated.  · Left ventricular systolic function is normal.  · Left ventricular diastolic dysfunction.    MILD AORTIC VALVE STENOSIS  RHYTHM IS ATRIAL FIBRILLATION  NORMAL LV AND RV SYSTOLIC FUNCTION  LV DIASTOLIC DYSFUNCTION IS PROBABLY GRADE 1    Radiology considered:   No radiology results for the last 90 days.    Lab Results:  Results for orders placed or performed in visit on 12/13/21   Comprehensive Metabolic Panel     Specimen: Blood   Result Value Ref Range    Glucose 73 65 - 99 mg/dL    BUN 38 (H) 8 - 23 mg/dL    Creatinine 2.08 (H) 0.76 - 1.27 mg/dL    eGFR Non African Am 30 (L) >60 mL/min/1.73    eGFR  Am 36 (L) >60 mL/min/1.73    BUN/Creatinine Ratio 18.3 7.0 - 25.0    Sodium 139 136 - 145 mmol/L    Potassium 4.1 3.5 - 5.2 mmol/L    Chloride 102 98 - 107 mmol/L    Total CO2 27.1 22.0 - 29.0 mmol/L    Calcium 8.8 8.2 - 9.6 mg/dL    Total Protein 6.1 6.0 - 8.5 g/dL    Albumin 4.00 3.50 - 5.20 g/dL    Globulin 2.1 gm/dL    A/G Ratio 1.9 g/dL    Total Bilirubin 0.4 0.0 - 1.2 mg/dL    Alkaline Phosphatase 94 39 - 117 U/L    AST (SGOT) 9 1 - 40 U/L    ALT (SGPT) 6 1 - 41 U/L   Iron Profile    Specimen: Blood   Result Value Ref Range    TIBC 355 mcg/dL    UIBC 282 112 - 346 mcg/dL    Iron 73 59 - 158 mcg/dL    Iron Saturation 21 20 - 50 %   CBC & Differential    Specimen: Blood   Result Value Ref Range    WBC 5.14 3.40 - 10.80 10*3/mm3    RBC 3.94 (L) 4.14 - 5.80 10*6/mm3    Hemoglobin 11.2 (L) 13.0 - 17.7 g/dL    Hematocrit 35.1 (L) 37.5 - 51.0 %    MCV 89.1 79.0 - 97.0 fL    MCH 28.4 26.6 - 33.0 pg    MCHC 31.9 31.5 - 35.7 g/dL    RDW 13.7 12.3 - 15.4 %    Platelets 171 140 - 450 10*3/mm3    Neutrophil Rel % 57.5 42.7 - 76.0 %    Lymphocyte Rel % 19.5 (L) 19.6 - 45.3 %    Monocyte Rel % 13.8 (H) 5.0 - 12.0 %    Eosinophil Rel % 8.2 (H) 0.3 - 6.2 %    Basophil Rel % 0.8 0.0 - 1.5 %    Neutrophils Absolute 2.96 1.70 - 7.00 10*3/mm3    Lymphocytes Absolute 1.00 0.70 - 3.10 10*3/mm3    Monocytes Absolute 0.71 0.10 - 0.90 10*3/mm3    Eosinophils Absolute 0.42 (H) 0.00 - 0.40 10*3/mm3    Basophils Absolute 0.04 0.00 - 0.20 10*3/mm3    Immature Granulocyte Rel % 0.2 0.0 - 0.5 %    Immature Grans Absolute 0.01 0.00 - 0.05 10*3/mm3    nRBC 0.0 0.0 - 0.2 /100 WBC       A1C:No results for input(s): HGBA1C in the last 48730 hours.  GLUCOSE:  Lab Results - Last 18 Months   Lab Units 12/21/21  0734 10/06/21  0743 06/14/21  0757  "03/16/21  1323   GLUCOSE mg/dL 73 83 79 105*     LIPID:  Lab Results - Last 18 Months   Lab Units 10/06/21  0743   CHOLESTEROL mg/dL 178   LDL CHOL mg/dL 110*   HDL CHOL mg/dL 54   TRIGLYCERIDES mg/dL 77     PSA:No results for input(s): PSA in the last 25133 hours.  CBC:  Lab Results - Last 18 Months   Lab Units 12/21/21  0734 10/06/21  0743 06/14/21 0757 03/16/21  1323   WBC 10*3/mm3 5.14 5.55 5.26 5.0   HEMOGLOBIN g/dL 11.2* 11.2* 12.4* 12.0*   HEMATOCRIT % 35.1* 34.3* 37.3* 35.5*   PLATELETS 10*3/mm3 171 193 199 172   IRON mcg/dL 73 53*  --  46      BMP/CMP:  Lab Results - Last 18 Months   Lab Units 12/21/21  0734 10/06/21  0743 06/14/21 0757 03/16/21  1323   SODIUM mmol/L 139 143 140 139   POTASSIUM mmol/L 4.1 4.4 4.5 4.2   CHLORIDE mmol/L 102 103 102 99   TOTAL CO2 mmol/L 27.1 26.3 25.3 25   GLUCOSE mg/dL 73 83 79 105*   BUN mg/dL 38* 46* 43* 39*   CREATININE mg/dL 2.08* 2.46* 2.65* 2.07*   EGFR IF NONAFRICN AM mL/min/1.73 30* 25* 23* 27*   EGFR IF AFRICN AM mL/min/1.73 36* 30* 27* 31*   CALCIUM mg/dL 8.8 9.1 9.0 9.0     HEPATIC:  Lab Results - Last 18 Months   Lab Units 12/21/21  0734 10/06/21  0743 06/14/21 0757 03/16/21  1323   ALT (SGPT) U/L 6 8 6 8   AST (SGOT) U/L 9 10 10 16   ALK PHOS U/L 94 86 84 101     THYROID:  Lab Results - Last 18 Months   Lab Units 10/06/21  0743   TSH uIU/mL 2.270       Objective   BP 98/58 (BP Location: Right arm)   Pulse 74   Temp 97.1 °F (36.2 °C)   Resp 16   Ht 177.8 cm (70\")   Wt 69.4 kg (153 lb)   BMI 21.95 kg/m²   Body mass index is 21.95 kg/m².    Recent Vitals       10/13/2021 10/19/2021 4/19/2022       BP: 116/68 100/67 98/58     Pulse: 92 83 74     Temp: 97.5 °F (36.4 °C) -- 97.1 °F (36.2 °C)     Weight: 71.2 kg (157 lb) --  69.4 kg (153 lb)       DID NOT WEIGH PATIENT      BMI (Calculated): 22.5 -- 22         Physical Exam  GENERAL:  Thin AFA developed in no acute distress.  SKIN: Turgor ok without rash lesion  HEENT: Normal cephalic without trauma.  Pupils " equal round reactive to light. Extraocular motions full without nystagmus.  Hard of hearing    No thyromegaly, mass, tenderness, lymphadenopathy and supple.  CV: Irregular irregular rhythm.  No murmur, gallop; no pitting ankle lower leg edema. Posterior pulses intact.  No carotid bruits.  CHEST: No chest wall tenderness or mass.   LUNGS: Symmetric motion with clear/mild reduced to auscultation.   ABD: Soft, nontender without mass.   ORTHO: Symmetric extremities without swelling/point tenderness.  Full gross range of motion other than left shoulder; it was not checked.   NEURO: CN 2-12 grossly intact.  Symmetric facies. 1/4 x bicep equal reflexes.  UE/LE   2-3/5 strength throughout.  Nonfocal use extremities. Speech clear.   PSYCH: Oriented x 3.  Pleasant calm, well kept.  Purposeful/directed conservation with intact short/long gross memory    Assessment/Plan     1. Chronic kidney disease (CKD), stage IV (severe) ([facility])    2. Vitamin D deficiency    3. Weight loss    4. Primary hypertension    5. CHF (congestive heart failure), NYHA class III, chronic, diastolic ([facility])    6. Longstanding persistent atrial fibrillation ([facility])    7. Sxkiwxtuaarhgt-sfo-aais/xarelto    8. Gastroesophageal reflux disease, unspecified whether esophagitis present    9. Cerebrovascular small vessel disease    10. COPD, group B, by GOLD 2013 classification     11. Gait difficulty-cane/walker      Discussion:  BP lowish/with no real options to change  Other vitals ok  Labs all due    Goals comfort  Review labs today first    Immunization History   Administered Date(s) Administered   • COVID-19 (MODERNA) 1st, 2nd, 3rd Dose Only 02/18/2021, 03/18/2021   • Fluad Quad 65+ 10/07/2020   • Fluzone High Dose =>65 Years ([employer] ONLY) 10/27/2018   • Fluzone High-Dose 65+yrs 10/13/2021   • Influenza, Unspecified 10/23/2017   • Tdap 09/13/2019     Screening reviewed/updated; declined    Medical decision issues:   Data review above:   Rx: reviewed and  decisions:   Same Rx for now     Visit today involved chronic significant medical problems or differentials and/or intensive drug monitoring: ie potential to cause serious morbidity or death:   No orders of the defined types were placed in this encounter.    Orders placed:   LAB/Testing/Referrals: reviewed/orders:   Today:   Orders Placed This Encounter   Procedures   • Comprehensive Metabolic Panel   • Ferritin   • Iron Profile   • TSH   • Vitamin D 25 Hydroxy   • CBC & Differential     Chronic/recurrent labs above or change to:   same     Health maintenance:   Body mass index is 21.95 kg/m².  Patient's Body mass index is 21.95 kg/m². indicating that he is within normal range (BMI 18.5-24.9). No BMI management plan needed..    Tobacco use reviewed:   James Birch  reports that he quit smoking about 55 years ago. His smoking use included cigarettes. He has a 18.00 pack-year smoking history. He has never used smokeless tobacco..     There are no Patient Instructions on file for this visit.    Follow up: Return for lab today then lab/Dr William .  Future Appointments   Date Time Provider Department Center   7/19/2022  9:15 AM Hemal William MD [facility]   10/19/2022  9:15 AM Skyler Trimble MD [facility]

## 2022-06-08 NOTE — TELEPHONE ENCOUNTER
Rx Refill Note  Requested Prescriptions     Pending Prescriptions Disp Refills   • ipratropium-albuterol (DUO-NEB) 0.5-2.5 mg/3 ml nebulizer [Pharmacy Med Name: IPRATROPIUM BROMIDE/ALBUTEROL SULFATE ALBUTER SOLUTION]  5     Sig: USE ONE VIAL IN NEBULIZER FOUR TIMES DAILY GENERIC FOR      Last office visit with prescribing clinician: 4/19/2022      Next office visit with prescribing clinician: 7/19/2022            Gabrielle Lackey, PCT  06/08/22, 10:41 CDT

## 2022-06-29 NOTE — TELEPHONE ENCOUNTER
Spoke with Samira Steward/ALONZO; advised willing to work and see them transition from assisted living to NH if time
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
## 2022-07-06 PROBLEM — J18.9 PNEUMONIA DUE TO INFECTIOUS ORGANISM: Status: ACTIVE | Noted: 2022-01-01

## 2022-07-06 PROBLEM — U07.1 COVID-19 VIRUS INFECTION: Status: ACTIVE | Noted: 2022-01-01

## 2022-07-06 PROBLEM — A41.9 SEPSIS DUE TO PNEUMONIA: Status: ACTIVE | Noted: 2022-01-01

## 2022-07-06 PROBLEM — J18.9 SEPSIS DUE TO PNEUMONIA (HCC): Status: ACTIVE | Noted: 2022-01-01

## 2022-07-06 PROBLEM — J96.01 ACUTE RESPIRATORY FAILURE WITH HYPOXIA (HCC): Status: ACTIVE | Noted: 2022-01-01

## 2022-07-06 NOTE — TELEPHONE ENCOUNTER
Sent to  ED, order called to RIKY Zacarias/JONNATHAN    Electronically signed by Jose Hairston, WILLIAM, 07/06/22, 4:33 PM CDT.

## 2022-07-06 NOTE — ED PROVIDER NOTES
Subjective   Patient is a 89-year-old gentleman who recently diagnosed COVID 7 days ago came in with progressive worsening shortness of breath      Shortness of Breath  Severity:  Severe  Onset quality:  Gradual  Timing:  Constant  Chronicity:  New  Context: activity    Context: not pollens and not URI    Relieved by:  Nothing  Worsened by:  Nothing  Ineffective treatments:  None tried  Associated symptoms: cough, diaphoresis, sputum production and wheezing    Associated symptoms: no abdominal pain, no chest pain, no headaches, no hemoptysis, no neck pain, no rash, no swollen glands and no vomiting    Risk factors: no recent alcohol use, no obesity and no recent surgery        Review of Systems   Constitutional: Positive for diaphoresis.   HENT: Negative.    Respiratory: Positive for cough, sputum production, shortness of breath and wheezing. Negative for hemoptysis.    Cardiovascular: Negative for chest pain.   Gastrointestinal: Negative.  Negative for abdominal distention, abdominal pain, nausea and vomiting.   Endocrine: Negative.    Genitourinary: Negative.    Musculoskeletal: Negative.  Negative for back pain and neck pain.   Skin: Negative for color change, pallor and rash.   Neurological: Negative.  Negative for syncope, weakness, light-headedness, numbness and headaches.   Hematological: Negative.  Does not bruise/bleed easily.   All other systems reviewed and are negative.      Past Medical History:   Diagnosis Date   • Arthritis    • Asthma    • Atrial fibrillation (HCC)    • Cancer (HCC)     SKIN   • CHF (congestive heart failure) (HCC)    • Conductive hearing loss    • COPD (chronic obstructive pulmonary disease) (HCC)    • Eustachian tube dysfunction    • GERD (gastroesophageal reflux disease)    • Hyperlipidemia    • Hypertension    • Obstructive sleep apnea    • Prostate disease    • Sensorineural hearing loss    • Stroke (HCC)    • Vertigo        No Known Allergies    Past Surgical History:    Procedure Laterality Date   • CATARACT EXTRACTION      left   • CATARACT EXTRACTION      left   • COLONOSCOPY  2012    normal   • ENDOSCOPY  2015    normal   • EYE SURGERY     • FRACTURE SURGERY     • HERNIA REPAIR     • HIP SURGERY     • SKIN BIOPSY         Family History   Problem Relation Age of Onset   • No Known Problems Mother    • No Known Problems Father        Social History     Socioeconomic History   • Marital status:    • Number of children: 3   Tobacco Use   • Smoking status: Former Smoker     Packs/day: 1.00     Years: 18.00     Pack years: 18.00     Types: Cigarettes     Quit date:      Years since quittin.5   • Smokeless tobacco: Never Used   • Tobacco comment: already quit   Substance and Sexual Activity   • Alcohol use: No   • Drug use: No   • Sexual activity: Defer     Partners: Female           Objective   Physical Exam  Vitals and nursing note reviewed. Exam conducted with a chaperone present.   Constitutional:       General: He is in acute distress.      Appearance: Normal appearance. He is well-developed. He is toxic-appearing.   HENT:      Head: Normocephalic and atraumatic.      Nose: Nose normal.      Mouth/Throat:      Mouth: Mucous membranes are moist.      Pharynx: Uvula midline. No oropharyngeal exudate.   Eyes:      General: Lids are normal. Lids are everted, no foreign bodies appreciated.      Conjunctiva/sclera: Conjunctivae normal.      Pupils: Pupils are equal, round, and reactive to light.   Neck:      Vascular: Normal carotid pulses. No carotid bruit or JVD.      Trachea: Trachea and phonation normal. No tracheal deviation.   Cardiovascular:      Rate and Rhythm: Regular rhythm. Tachycardia present.      Chest Wall: PMI is not displaced.      Pulses: Normal pulses.      Heart sounds: Normal heart sounds.     No gallop.   Pulmonary:      Effort: Tachypnea, accessory muscle usage and respiratory distress present.      Breath sounds: No stridor.  Examination of the right-lower field reveals decreased breath sounds and rhonchi. Examination of the left-lower field reveals rhonchi. Decreased breath sounds and rhonchi present. No wheezing or rales.   Chest:      Chest wall: No mass.   Abdominal:      General: Bowel sounds are normal. There is no distension.      Palpations: Abdomen is soft.      Tenderness: There is no abdominal tenderness.   Musculoskeletal:         General: No swelling. Normal range of motion.      Cervical back: Full passive range of motion without pain, normal range of motion and neck supple. No rigidity.      Right lower leg: Edema present.      Left lower leg: Edema present.      Comments: Lower extremity exam bilaterally is unremarkable.  There is no right or left calf tenderness .  There is no palpable venous cord.  No obvious difference in the size of the legs.  No pitting edema.  The dorsalis pedis and posterior tibial femoral and popliteal pulses are palpable and +2 bilaterally.  Homans sign is negative   Skin:     General: Skin is warm and dry.      Capillary Refill: Capillary refill takes 2 to 3 seconds.      Coloration: Skin is not cyanotic, jaundiced or pale.      Findings: No ecchymosis.      Nails: There is no clubbing.   Neurological:      General: No focal deficit present.      Mental Status: He is alert and oriented to person, place, and time.      GCS: GCS eye subscore is 4. GCS verbal subscore is 5. GCS motor subscore is 6.      Cranial Nerves: Cranial nerves are intact. No cranial nerve deficit.      Motor: Motor function is intact.      Gait: Gait normal.      Deep Tendon Reflexes: Reflexes are normal and symmetric. Reflexes normal.   Psychiatric:         Speech: Speech normal.         Behavior: Behavior normal.         Procedures           ED Course  ED Course as of 07/06/22 1959 Wed Jul 06, 2022   1840 Appears to be A. fib rate 112 [TS]   1954 Patient is appears sick came in with shortness of breath lab work-up was  initiated patient as per the EMS required increasing with oxygen at some point they will put him on a nonrebreather.  The EMS had told the nurses that the patient is a DNR as per his nursing notes he is a full code given with heart rate 125 appears to be in A. fib was given 2 g of magnesium he already is on anticoagulation.  His rate has controlled down to 104 he was having air hunger tachypneic tachycardic wanting more oxygen therefore was placed on Vapotherm to improve his work of breathing he is calmer now lab work-up is consistent with possible pneumonia he does not any clinical evidence of CHF except for trace amount of lower extremity edema is elevated BNP and D-dimer is probably related to his renal insufficiency troponin I elevation is type II elevation the patient has no chest pain at this time.  He was given IV Zithromax I have discussed this case with the hospitalist will admit the patient to hospital service. [TS]      ED Course User Index  [TS] Eduardo Mckeon MD                                           MDM  Number of Diagnoses or Management Options  Diagnosis management comments: Differential Diagnosis:  I considered pulmonary etiology, asthma, chronic obstructive pulmonary disease, pneumonia, pulmonary embolism, adult respiratory distress syndrome, pneumothorax, pleural effusion, pulmonary fibrosis, cardiac etiology, congestive heart failure, myocardial infarction, metabolic etiology, diabetic ketoacidosis, uremia, acidosis, sepsis, anemia, drug related etiology, hyperventilation and CNS disease as a possible cause of dyspnea in this patient. This is a partial list of diagnoses considered.            Amount and/or Complexity of Data Reviewed  Clinical lab tests: ordered and reviewed  Tests in the radiology section of CPT®: ordered and reviewed  Tests in the medicine section of CPT®: reviewed and ordered    Risk of Complications, Morbidity, and/or Mortality  Presenting problems: moderate  Diagnostic  procedures: moderate  Management options: moderate        Final diagnoses:   Pneumonia due to infectious organism, unspecified laterality, unspecified part of lung   COVID   Chronic kidney disease, unspecified CKD stage   Chronic atrial fibrillation (HCC)       ED Disposition  ED Disposition     ED Disposition   Decision to Admit    Condition   --    Comment   Level of Care: Telemetry [5]   Diagnosis: Pneumonia due to infectious organism, unspecified laterality, unspecified part of lung [[id number]]   Admitting Physician: KAPIL ROSE [[id number]]   Attending Physician: KAPIL ROSE [[id number]]   Certification: I Certify That Inpatient Hospital Services Are Medically Necessary For Greater Than 2 Midnights               No follow-up provider specified.       Medication List      No changes were made to your prescriptions during this visit.          Eduardo Mckeon MD  07/06/22 1959

## 2022-07-06 NOTE — TELEPHONE ENCOUNTER
Defer to discretion of Dr. William - send out versus labs/meds, he has been working with them and knows their situation.    Electronically signed by WILLIAM Bravo, 07/06/22, 3:41 PM CDT.

## 2022-07-06 NOTE — TELEPHONE ENCOUNTER
Could get involved  Suggest with possible urology issues sending to /ER    Be sure NH aware we feel needs to be BH

## 2022-07-06 NOTE — TELEPHONE ENCOUNTER
"Jennifer MCMILLAN \"he is not eating or drinking well, but he does have covid, he has started throwing up and doesn't have anything for that? But he is also having blood tinge urine, the CNA reported it today, she has not seen it before today\"    Needs prn for nausea if possible and the blood in the urine  " Assessment/Plan:       No problem-specific Assessment & Plan notes found for this encounter  Diagnoses and all orders for this visit:    Acute conjunctivitis of left eye, unspecified acute conjunctivitis type  -     tobramycin (TOBREX) 0 3 % SOLN; Administer 2 drops to the right eye every 4 (four) hours while awake    Decreased visual acuity  Comments:  Since there is decrease in visual acuity and since he has pain and blurry vision of the left eye advised to be referred today to [facility], pt declined ,    Arcus senilis of both corneas  Comments:  his lipid profile is ok 1-2021    Discussed with patient he need further evaluation to make there is no foreign body in the eye  Or other medical condition causing diffuse erythema, blurry vision and pain  patient is concerned because he has no coverage for an eye doctor   he said he will call us if his symptoms is not getting any better in 2 days  advised if symptoms get worse to go to the ER or any Eye Clinic  There are no Patient Instructions on file for this visit  He    No orders of the defined types were placed in this encounter  Subjective:     Patient ID: Ericka Welch is a 62 y o  male      HPI  Pink eye,  Pt with abnormal blood sugar and lipid   stated his left eye is mildly diffusely red  Started 2 days ago  He said his vision is blurry  mild  Constant  He said he had slight pain of the left eye, but it goes away when he use artificial tear drops  Admit to yellowish drainage discharge in the morning  Denied injury to the eye  He is  ,works on car  But he denied any known foriegn body to the eye  He does not wear contact  He wear glasses  Patient stated he worked he he in [place] [id number] 15 years ago  He had so much damage of the left eye      Review of Systems   Constitutional: Negative for appetite change and fatigue     HENT: Negative for congestion, dental problem, ear pain, nosebleeds, postnasal drip, rhinorrhea, sinus pressure, sinus pain, tinnitus, trouble swallowing and voice change  Eyes: Negative for photophobia, pain and visual disturbance  Respiratory: Negative for cough, chest tightness and wheezing  Cardiovascular: Negative for chest pain, palpitations and leg swelling  Gastrointestinal: Negative for abdominal distention, abdominal pain, anal bleeding, constipation, diarrhea, nausea and rectal pain  Endocrine: Negative for cold intolerance, heat intolerance, polydipsia and polyuria  Genitourinary: Negative for decreased urine volume, difficulty urinating, dysuria, flank pain, frequency, hematuria and urgency  Musculoskeletal: Negative for arthralgias, back pain, gait problem, myalgias and neck pain  Skin: Negative for pallor and rash  Allergic/Immunologic: Negative for immunocompromised state  Neurological: Negative for dizziness, tremors, seizures, syncope, facial asymmetry, speech difficulty, weakness, light-headedness, numbness and headaches  Hematological: Negative for adenopathy  Does not bruise/bleed easily  Psychiatric/Behavioral: Negative for agitation, confusion and hallucinations  The patient is not nervous/anxious  Objective:     Physical Exam  Constitutional:       General: He is not in acute distress  Appearance: Normal appearance  He is well-developed  HENT:      Head: Normocephalic  Ears:      Comments: No preauricular lymphadenopathy  Eyes:      General: No scleral icterus  Right eye: No discharge  Left eye: No discharge  Extraocular Movements: Extraocular movements intact  Pupils: Pupils are equal, round, and reactive to light  Comments: Mild diffuse erythema of the left eye  Mild erythema of the conjunctivae  Positive whitish grayish drinks during at the periphery of the iris of both eyes ( arcus  Senilis )  discussed with pt   Neck:      Musculoskeletal: Neck supple  Thyroid: No thyromegaly  Vascular: No carotid bruit or JVD  Cardiovascular:      Rate and Rhythm: Normal rate and regular rhythm  Heart sounds: Normal heart sounds  No murmur  No gallop  Pulmonary:      Effort: Pulmonary effort is normal       Breath sounds: Normal breath sounds  Musculoskeletal: Normal range of motion  Right lower leg: No edema  Left lower leg: No edema  Lymphadenopathy:      Cervical: No cervical adenopathy  Skin:     Coloration: Skin is not pale  Findings: No rash  Neurological:      General: No focal deficit present  Mental Status: He is alert and oriented to person, place, and time  Cranial Nerves: No cranial nerve deficit  Motor: No abnormal muscle tone  Psychiatric:         Mood and Affect: Mood normal          Behavior: Behavior normal          Thought Content:  Thought content normal

## 2022-07-07 PROBLEM — N30.01 ACUTE CYSTITIS WITH HEMATURIA: Status: ACTIVE | Noted: 2022-01-01

## 2022-07-07 PROBLEM — R31.9 HEMATURIA: Status: ACTIVE | Noted: 2022-01-01

## 2022-07-07 PROBLEM — I51.89 GRADE I DIASTOLIC DYSFUNCTION: Status: ACTIVE | Noted: 2022-01-01

## 2022-07-07 PROBLEM — N18.4 CKD (CHRONIC KIDNEY DISEASE) STAGE 4, GFR 15-29 ML/MIN (HCC): Status: ACTIVE | Noted: 2022-01-01

## 2022-07-07 PROBLEM — Z79.01 CHRONIC ANTICOAGULATION: Status: ACTIVE | Noted: 2022-01-01

## 2022-07-07 NOTE — CASE MANAGEMENT/SOCIAL WORK
Continued Stay Note   [place]     Patient Name: James Birch  MRN: [id number]  Today's Date: 7/7/2022    Admit Date: 7/6/2022     Discharge Plan     Row Name 07/07/22 1010       Plan    Plan [facility]    Patient/Family in Agreement with Plan yes    Final Discharge Disposition Code 03 - skilled nursing facility (SNF)    Final Note SW spoke Bing at [facility].  Pt is private pay and can return today.  Phone: [phone]         Fax: [phone]    Row Name 07/07/22 0976       Plan    Plan Comments VM to dirk Braun for DC planning screen requesting return call               Discharge Codes    No documentation.                     CHAVEZ AyalaW

## 2022-07-07 NOTE — H&P
"    [facility] Medicine Services  HISTORY AND PHYSICAL    Date of Admission: 7/6/2022  Primary Care Physician: Hemal William MD    Subjective     Chief Complaint: \"Don't feel good\"    History of Present Illness  Patient is a 89-year-old  male with past medical history significant for chronic obstructive pulmonary disease, atrial fibrillation (on outpatient anticoagulation with Xarelto), chronic kidney disease that presented to our hospital today from his nursing home facility secondary to shortness of breath symptoms.  It sounds like patient was diagnosed with COVID-19 approximately 1 week ago, and is gotten progressively worse since that time.  Patient is unable to tell me if he has been vaccinated against COVID-19.  It does appear that he may have some underlying history of dementia.  He is able to state that he just does not feel good.  When I asked him where he does not feel good he does respond \"all over.\"  He does report feeling some shortness of breath symptoms, and in fact upon arrival he was on 4 L by nasal cannula and continued to express shortness of breath.  He has since been placed on Vapotherm while in the emergency department.  He does report having some congestion in his chest but states that his cough has not been productive.  He reports that his appetite has been poor.  He does report having some vomiting recently.  He denies any diarrhea.  He does report taste change as well.  No family currently at bedside.    Records reviewed from a phone conversation with Dr. William's office.  This corroborates a recent history of poor appetite and vomiting.  Also states some possible recent hematuria.  Records indicate the patient is on Xarelto for anticoagulation, and has a history of atrial fibrillation.  Urinalysis has been ordered in the emergency department but not been obtained at this time.    Review of Systems     Otherwise complete ROS reviewed and " negative except as mentioned in the HPI.    Past Medical History:   Past Medical History:   Diagnosis Date   • Arthritis    • Asthma    • Atrial fibrillation (HCC)    • Cancer (HCC)     SKIN   • CHF (congestive heart failure) (HCC)    • Conductive hearing loss    • COPD (chronic obstructive pulmonary disease) (HCC)    • Eustachian tube dysfunction    • GERD (gastroesophageal reflux disease)    • Hyperlipidemia    • Hypertension    • Obstructive sleep apnea    • Prostate disease    • Sensorineural hearing loss    • Stroke (HCC)    • Vertigo      Past Surgical History:  Past Surgical History:   Procedure Laterality Date   • CATARACT EXTRACTION      left   • CATARACT EXTRACTION      left   • COLONOSCOPY  09/05/2012    normal   • ENDOSCOPY  08/31/2015    normal   • EYE SURGERY     • FRACTURE SURGERY     • HERNIA REPAIR     • HIP SURGERY     • SKIN BIOPSY       Social History:  reports that he quit smoking about 55 years ago. His smoking use included cigarettes. He has a 18.00 pack-year smoking history. He has never used smokeless tobacco. He reports that he does not drink alcohol and does not use drugs.    Family History: family history includes No Known Problems in his father and mother.       Allergies:  No Known Allergies    Medications:  Prior to Admission medications    Medication Sig Start Date End Date Taking? Authorizing Provider   acetaminophen (TYLENOL) 500 MG tablet Take 1 tablet by mouth Every 4 (Four) Hours As Needed for Mild Pain . 5/27/19   Hemal William MD   B Complex-C (SUPER B COMPLEX/VITAMIN C PO) Take 1 capsule by mouth Daily.    ProviderMaryuri MD   bisacodyl (DULCOLAX) 10 MG suppository Insert 10 mg into the rectum Daily.    Maryuri Coburn MD   carbonyl iron (FEOSOL) 45 MG tablet tablet TAKE ONE TABLET DAILY 4/27/20   Hemal William MD   Cholecalciferol (VITAMIN D3 PO) Take 2,000 Int'l Units by mouth Daily.    Maryuri Coburn MD   Coenzyme Q10 (CO Q-10 PO) Take 1  "tablet by mouth Daily.    Maryuri Coburn MD   dutasteride (AVODART) 0.5 MG capsule TAKE 1 CAPSULE DAILY GENERIC FOR AVODART 11/15/21   Jose Hairston APRN   Elastic Bandages & Supports (JOBST FOR MEN 8-15MMHG LG) misc 1 Device Daily. Apply when up each morning; may remove at night 2/18/20   Hemal William MD   esomeprazole (nexIUM) 40 MG capsule TAKE 1 CAPSULE DAILY BEFORE BREAKFAST GENERIC FOR NEXIUM 11/15/21   Jose Hairston APRN   furosemide (LASIX) 40 MG tablet Take 40 mg by mouth Daily.    Maryuri Coburn MD   ipratropium-albuterol (DUO-NEB) 0.5-2.5 mg/3 ml nebulizer USE ONE VIAL IN NEBULIZER FOUR TIMES DAILY GENERIC FOR 6/8/22   Jose Hairston APRN   midodrine (PROAMATINE) 10 MG tablet TAKE ONE TABLET TWICE DAILY BEFORE A MEAL 4/12/22   Hemal William MD   Multiple Vitamins-Minerals (MULTIVITAMIN PO) Take 1 tablet by mouth daily.    Maryuri Coburn MD   O2 (OXYGEN) Inhale 1 L/min Every Night. As needed at bedtime.    Maryuri Coburn MD   Omega-3 Fatty Acids (FISH OIL PO) Take 1,000 mg by mouth 2 (Two) Times a Day.    Maryuri Coburn MD   polyethylene glycol (MIRALAX) packet Take 17 g by mouth Daily. 10/13/18   Hemal William MD   tamsulosin (FLOMAX) 0.4 MG capsule 24 hr capsule TAKE 1 CAPSULE DAILY SHORTLY AFTER THE SAME MEAL GENERIC FOR FLOMAX 12/15/21   Hemal William MD   torsemide (DEMADEX) 20 MG tablet Take 1 tablet by mouth Daily. 7/2/19   Hemal William MD   Xarelto 15 MG tablet TAKE ONE TABLET DAILY 1/12/22   Skyler Trimble MD     I have utilized all available immediate resources to obtain, update, and review the patient's current medications.    Objective     Vital Signs: /78   Pulse 104   Temp 98.8 °F (37.1 °C)   Resp 24   Ht 180.3 cm (71\")   Wt 65.8 kg (145 lb)   SpO2 99%   BMI 20.22 kg/m²   Physical Exam  Vitals reviewed.   Constitutional:       General: He is not in acute distress.     Appearance: He is " ill-appearing.      Comments: Frail and ill appearing   HENT:      Head: Normocephalic.      Mouth/Throat:      Mouth: Mucous membranes are dry.   Eyes:      Pupils: Pupils are equal, round, and reactive to light.   Cardiovascular:      Rate and Rhythm: Normal rate.      Heart sounds: Murmur heard.   Pulmonary:      Effort: Pulmonary effort is normal.      Breath sounds: Rhonchi present. No wheezing.   Abdominal:      Palpations: Abdomen is soft.   Musculoskeletal:         General: No deformity.      Cervical back: Neck supple.   Skin:     General: Skin is warm.      Capillary Refill: Capillary refill takes less than 2 seconds.   Neurological:      General: No focal deficit present.      Mental Status: He is alert. He is disoriented.   Psychiatric:         Mood and Affect: Mood normal.          Results Reviewed:  Lab Results (last 24 hours)     Procedure Component Value Units Date/Time    COVID PRE-OP / PRE-PROCEDURE SCREENING ORDER (NO ISOLATION) - Swab, Nasal Cavity [[id number]] Collected: 07/06/22 2015    Specimen: Swab from Nasal Cavity Updated: 07/06/22 2019    Narrative:      The following orders were created for panel order COVID PRE-OP / PRE-PROCEDURE SCREENING ORDER (NO ISOLATION) - Swab, Nasal Cavity.  Procedure                               Abnormality         Status                     ---------                               -----------         ------                     COVID-19,[facility]...[[id number]]                      In process                   Please view results for these tests on the individual orders.    COVID-19,[facility] IN-HOUSE,Nasal Swab No Transport Media 3-4 HR TAT - Swab, Nasal Cavity [[id number]] Collected: 07/06/22 2015    Specimen: Swab from Nasal Cavity Updated: 07/06/22 2019    Procalcitonin [[id number]]  (Abnormal) Collected: 07/06/22 1842    Specimen: Blood Updated: 07/06/22 1925     Procalcitonin 0.59 ng/mL     Narrative:      As a Marker for Sepsis (Non-Neonates):    1.  "<0.5 ng/mL represents a low risk of severe sepsis and/or septic shock.  2. >2 ng/mL represents a high risk of severe sepsis and/or septic shock.    As a Marker for Lower Respiratory Tract Infections that require antibiotic therapy:    PCT on Admission    Antibiotic Therapy       6-12 Hrs later    >0.5                Strongly Recommended  >0.25 - <0.5        Recommended   0.1 - 0.25          Discouraged              Remeasure/reassess PCT  <0.1                Strongly Discouraged     Remeasure/reassess PCT    As 28 day mortality risk marker: \"Change in Procalcitonin Result\" (>80% or <=80%) if Day 0 (or Day 1) and Day 4 values are available. Refer to [email or web address]    Change in PCT <=80%  A decrease of PCT levels below or equal to 80% defines a positive change in PCT test result representing a higher risk for 28-day all-cause mortality of patients diagnosed with severe sepsis for septic shock.    Change in PCT >80%  A decrease of PCT levels of more than 80% defines a negative change in PCT result representing a lower risk for 28-day all-cause mortality of patients diagnosed with severe sepsis or septic shock.       Troponin [[id number]]  (Abnormal) Collected: 07/06/22 1842    Specimen: Blood Updated: 07/06/22 1923     Troponin T 0.054 ng/mL     Narrative:      Troponin T Reference Range:  <= 0.03 ng/mL-   Negative for AMI  >0.03 ng/mL-     Abnormal for myocardial necrosis.  Clinicians would have to utilize clinical acumen, EKG, Troponin and serial changes to determine if it is an Acute Myocardial Infarction or myocardial injury due to an underlying chronic condition.       Results may be falsely decreased if patient taking Biotin.      Comprehensive Metabolic Panel [[id number]]  (Abnormal) Collected: 07/06/22 1842    Specimen: Blood Updated: 07/06/22 1922     Glucose 116 mg/dL      BUN 44 mg/dL      Creatinine 2.52 mg/dL      Sodium 136 mmol/L      Potassium 4.8 mmol/L      Chloride 95 mmol/L      " CO2 24.0 mmol/L      Calcium 8.6 mg/dL      Total Protein 6.9 g/dL      Albumin 3.60 g/dL      ALT (SGPT) 10 U/L      AST (SGOT) 18 U/L      Alkaline Phosphatase 124 U/L      Total Bilirubin 0.8 mg/dL      Globulin 3.3 gm/dL      A/G Ratio 1.1 g/dL      BUN/Creatinine Ratio 17.5     Anion Gap 17.0 mmol/L      eGFR 23.0 mL/min/1.73      Comment: National Kidney Foundation and American Society of Nephrology (ASN) Task Force recommended calculation based on the Chronic Kidney Disease Epidemiology Collaboration (CKD-EPI) equation refit without adjustment for race.       Narrative:      GFR Normal >60  Chronic Kidney Disease <60  Kidney Failure <15      C-reactive Protein [[id number]]  (Abnormal) Collected: 07/06/22 1842    Specimen: Blood Updated: 07/06/22 1920     C-Reactive Protein 26.05 mg/dL     Lactic Acid, Plasma [[id number]]  (Normal) Collected: 07/06/22 1842    Specimen: Blood Updated: 07/06/22 1920     Lactate 1.4 mmol/L     Protime-INR [[id number]]  (Abnormal) Collected: 07/06/22 1842    Specimen: Blood Updated: 07/06/22 1918     Protime 18.7 Seconds      INR 1.64    D-dimer, Quantitative [[id number]]  (Abnormal) Collected: 07/06/22 1842    Specimen: Blood Updated: 07/06/22 1918     D-Dimer, Quantitative 2.77 MCGFEU/mL     Narrative:      Reference Range is 0-0.50 MCGFEU/mL. However, results <0.50 MCGFEU/mL tends to rule out DVT or PE. Results >0.50 MCGFEU/mL are not useful in predicting absence or presence of DVT or PE.      BNP [[id number]]  (Abnormal) Collected: 07/06/22 1842    Specimen: Blood Updated: 07/06/22 1918     proBNP 6,041.0 pg/mL     Narrative:      Among patients with dyspnea, NT-proBNP is highly sensitive for the detection of acute congestive heart failure. In addition NT-proBNP of <300 pg/ml effectively rules out acute congestive heart failure with 99% negative predictive value.    Results may be falsely decreased if patient taking Biotin.      Magnesium [[id number]]  (Abnormal) Collected: 07/06/22  1842    Specimen: Blood Updated: 07/06/22 1916     Magnesium 2.4 mg/dL     Blood Culture - Blood, Arm, Left [[id number]] Collected: 07/06/22 1840    Specimen: Blood from Arm, Left Updated: 07/06/22 1908    Blood Culture - Blood, Hand, Left [[id number]] Collected: 07/06/22 1845    Specimen: Blood from Hand, Left Updated: 07/06/22 1907    CBC & Differential [[id number]]  (Abnormal) Collected: 07/06/22 1842    Specimen: Blood Updated: 07/06/22 1900    Narrative:      The following orders were created for panel order CBC & Differential.  Procedure                               Abnormality         Status                     ---------                               -----------         ------                     CBC Auto Differential[[id number]]        Abnormal            Final result                 Please view results for these tests on the individual orders.    CBC Auto Differential [[id number]]  (Abnormal) Collected: 07/06/22 1842    Specimen: Blood Updated: 07/06/22 1900     WBC 15.53 10*3/mm3      RBC 4.01 10*6/mm3      Hemoglobin 11.7 g/dL      Hematocrit 36.2 %      MCV 90.3 fL      MCH 29.2 pg      MCHC 32.3 g/dL      RDW 13.0 %      RDW-SD 43.0 fl      MPV 9.6 fL      Platelets 222 10*3/mm3      Neutrophil % 93.7 %      Lymphocyte % 2.0 %      Monocyte % 3.3 %      Eosinophil % 0.1 %      Basophil % 0.1 %      Immature Grans % 0.8 %      Neutrophils, Absolute 14.55 10*3/mm3      Lymphocytes, Absolute 0.31 10*3/mm3      Monocytes, Absolute 0.51 10*3/mm3      Eosinophils, Absolute 0.02 10*3/mm3      Basophils, Absolute 0.01 10*3/mm3      Immature Grans, Absolute 0.13 10*3/mm3      nRBC 0.0 /100 WBC     Blood Gas, Arterial - [[id number]]  (Abnormal) Collected: 07/06/22 1852    Specimen: Arterial Blood Updated: 07/06/22 1853     Site Right Radial     Nayan's Test Positive     pH, Arterial 7.368 pH units      pCO2, Arterial 42.7 mm Hg      pO2, Arterial 131.0 mm Hg      Comment: 83 Value above reference range        HCO3,  "Arterial 24.5 mmol/L      Base Excess, Arterial -0.9 mmol/L      Comment: 84 Value below reference range        O2 Saturation, Arterial 99.5 %      Comment: 83 Value above reference range        Temperature 37.0 C      Barometric Pressure for Blood Gas 748 mmHg      Modality Heated HFNC     FIO2 50 %      Flow Rate 35.0 lpm      Ventilator Mode NA     Collected by [id number]     Comment: Meter: [id number]     :  [id number]        pCO2, Temperature Corrected 42.7 mm Hg      pH, Temp Corrected 7.368 pH Units      pO2, Temperature Corrected 131 mm Hg         Imaging Results (Last 24 Hours)     Procedure Component Value Units Date/Time    XR Chest 1 View [[id number]] Collected: 07/06/22 1843     Updated: 07/06/22 1847    Narrative:      HISTORY: Shortness of breath     CXR: Frontal view the chest obtained     COMPARISON: 15 2015     FINDINGS: There are new hazy right upper lobe opacities suspicious for  pneumonia. No left lung consolidation. Cardiac and mediastinal contours  unremarkable. Thoracic aortic calcification. No pleural effusion or  pneumothorax. At least moderate arthritic change of the shoulders.       Impression:      1. Hazy right upper lobe opacities suspicious for pneumonia.  This report was finalized on 07/06/2022 18:44 by Dr. Joy De La Fuente MD.        I have personally reviewed and interpreted the radiology studies and ECG obtained at time of admission.     Assessment / Plan     Assessment:   Active Hospital Problems    Diagnosis    • Pneumonia due to infectious organism    • COVID-19 virus infection    • Acute respiratory failure with hypoxia (HCC)    • Sepsis due to pneumonia (HCC)    • COPD, group B, by GOLD 2013 classification     • Nmbrwogljtbmky-vkb-lrec/xarelto      1.29.10- onset/CVA/a fib    3.26.15 vm from spouse \" I wanted to report to Dr William what is going on with James. He went to the heart center yesterday to have his heart shocked back into rhythm, but is was already went back " "into rhythm. But yesterday evening he has another TIA, more severe than the others. It has affected his right side, he has to use his walker to stand because he is leaning to the right. He has a big black eye and dark spot on his face? (did not say he fell) I am aware that nothing can be done, but want Dr William to be aware\" [phone]  called to clarify, and wife said that he did not fall, it happen with the TIA, and the papers from [facility] said he has \"CHF\" and orders some tests if they can get them approved/le...a black eye..means another CT without contrast..    6.2.15 change coumadin/xarelto  6.3.15/6.3.15 - Dr Trimble - Office Notes a fib/back in flutter..rate control strategy..start xarelto    111.24.15/11.24.15 Office Visit Dr. Trimble a fib/xarelto     • Chronic kidney disease (CKD), stage IV (severe) (HCC)      IgA nephritis..(see that) 4-1-1997    Date       Patient Last Name Patient First Name BUN Creatinine, Serum BUN/Creatinine Ratio Creatinine, Ur 24hr Creatinine Clearance Glom Filt Rate, Est eGFR  2005-02-09 Queta Ramirez              20               1.5                   13  2005-08-17 Queta Ramirez              21               1.5                   14                                                         42.8  2006-01-25 Queta Ramirez              20               1.5                   13                                                         45.4  2007-01-17 Queta Ramirez              23               1.5                   15                                                         45.3  2008-10-27 Queta Ramirez  2008-11-05 Queta Ramirez  2010-09-08 Queta Ramirez              21              1.50                   14                                                                45    03/06/12 Nephrologist stable fu 3 mos   11.3.14/1.23.15 -  [facility] - Office Notes GFR 37..increased " proteinuria..    2.10.15/5.4.15 - [facility] - Office Notes CKD-improved    2015-02-18    181 5 ft 10 in 26.0 110 /  62             98.0    82          20 ro visit  fatigue-mutiple issues, a fib, copd, CKD3, past anemias, polypharmacy- renal seems stable/unlikely can improve...always some    neuromuscular issues...but ? how much of his somewhat vague complaint is cardiac with his a fib and any ? CHF  a fib ? chronic/paroxysmal  copd  CKD-3//PLAN:  Stay close to cardiologist-likely the biggest problem with his fatigue  LAB-today CBC, CMP    2015-03-12    182 5 ft 10 in 26.1 104 /  72             97.8    76          18  ro visit  R lower back pain-suggestive ? urolithiasis  nausea and vomiting  urolithiasis-history of  CKD-3  late affects CVA  anticoagulation-coumadin-trying to complete 1 m theraputic INR for cardioversion  fatigue-believed to have influence of A fib  a fib-chronic //PLAN:  Rx-reviewed..same  LAB-reviewed..none  Discussion..ER if at any time needed  Renal US today-call results//can't do until tomorrow morning due to patient having eaten at 12:30PM. Scheduled for 3.13.15 at 8am at [facility]. Noted for them to call results.dfw    8.18.15/8.18.15 - [facility]  - Office Notes CKD decline..     02.17.16/02.17.16   [facility]-sl worse CKD     • CHF (congestive heart failure), NYHA class III, chronic, diastolic (HCC)      5.26.18  Left ventricular systolic function is normal. Estimated ejection fraction is 61-65%.  Right ventricular cavity is mildly dilated. Systolic function is normal.  There is aortic valve sclerosis without significant stensois.    4.12.19  Mild aortic valve stenosis is present.  Mild dilation of the aortic root is present.  Left atrial cavity size is borderline dilated.  Left ventricular systolic function is normal.  Left ventricular diastolic dysfunction.  MILD AORTIC VALVE STENOSIS  RHYTHM IS ATRIAL FIBRILLATION  NORMAL LV AND RV SYSTOLIC FUNCTION  LV DIASTOLIC  DYSFUNCTION IS PROBABLY GRADE 1     • Permanent atrial fibrillation (HCC)    • Autonomic dysfunction      Plan:   1.  Chest x-ray personally reviewed by me-infiltrate noted on the right.  Current infiltrates more suggestive of a suspected bacterial pneumonia as opposed to COVID-19.  Monitor closely.      2.  Patient currently receiving azithromycin in the emergency department.  Per report, patient is a resident of a local nursing home and for that reason I am going to broaden antibiotic coverage at this time (will start cefepime).  3.  Check MRSA PCR  4.  Patient did test positive for COVID-19 approximately 7 days ago per report.  He was administered 10 mg of Decadron in the emergency department; will continue dexamethasone 6 mg daily in the interim  5.  Supplemental oxygen; started on Vapotherm in the emergency department currently with FiO2 of 50%  6.  It sounds like patient has had some recent symptoms of nausea and vomiting.  Monitor closely.  Zofran as needed.  Start clear liquids only at this time.  7.  He does have a history of chronic diastolic heart failure per records; currently appears on the dry side and I am good administered 500 mL of intravenous fluids and then reassess.  Results of proBNP and troponin noted.  Patient denies having any chest pain or chest pressure.  8.  Patient is on Xarelto as an outpatient for chronic anticoagulation; will continue  9.  Albuterol MDI  10.  Incentive spirometry and flutter valve  11.  Check MRSA PCR.  Check strep pneumonia and urine Legionella antigens.  Check respiratory culture.  12.  Urinalysis pending- records from a recent phone conversation with Dr. William's office reviewed indicative of some possible blood-tinged urine.  Patient is on anticoagulation with Xarelto.  13.  Work-up ongoing    Code Status/Advanced Care Plan: I was informed by the ED provider and nursing staff in the emergency department the patient is a full code-they did contact the nursing  facility for verification      Electronically signed by Jostin Rogers MD, 07/06/22, 20:36 CDT.

## 2022-07-07 NOTE — PLAN OF CARE
Goal Outcome Evaluation:              Outcome Evaluation: NTN assessment complete. Poor PO intake. Starting Boost Plus twice daily. Regular diet for lunch. NTN following per protocol.

## 2022-07-07 NOTE — DISCHARGE SUMMARY
[facility] Medicine Services  DISCHARGE SUMMARY       Date of Admission: 7/6/2022  Date of Discharge:  7/7/2022  Primary Care Physician: Hemal William MD    Discharge Diagnoses:  Active Hospital Problems    Diagnosis    • **Sepsis due to pneumonia (HCC)    • CKD (chronic kidney disease) stage 4, GFR 15-29 ml/min (HCC)    • Chronic anticoagulation    • Grade I diastolic dysfunction    • Hematuria    • Acute cystitis with hematuria    • Pneumonia due to infectious organism    • COVID-19 virus infection    • Acute respiratory failure with hypoxia (HCC)    • COPD, group B, by GOLD 2013 classification     • Permanent atrial fibrillation (HCC)    • Autonomic dysfunction          Presenting Problem/History of Present Illness:  Pneumonia due to infectious organism, unspecified laterality, unspecified part of lung [J18.9]     Chief Complaint on Day of Discharge:   No complaint    History of Present Illness on Day of Discharge:   The patient was seen and evaluated this morning.  He is saturating in the mid 90s on room air.  He has no respiratory distress at present.  Creatinine is at baseline at 2.5-2.75.  Chest x-ray shows hazy right upper lobe opacity suspicious for pneumonia.  The patient has been afebrile since admission.  White blood cell count has decreased since admission.  He is stable for outpatient treatment of possible pneumonia.  He can be discharged back to the nursing home today on oral antibiotics.  He did not require oxygen supplementation.  I suspect his brief period of hypoxia was related to mucous plugging which has resolved.  He appears to be stable for discharge.    The patient's renal function is significantly impaired.  Calculated creatinine clearance today is 15.7.  The patient's renal dysfunction in combination with his advanced age make him a highly marginal and possibly even an appropriate candidate for continuation of anticoagulation.  Jenn has an  extremely long duration of action and will be discontinued in favor of Eliquis besides a much shorter duration of action should acute bleeding develop.    Hospital Course  Plan:   1.  Chest x-ray personally reviewed by me-infiltrate noted on the right.  Current infiltrates more suggestive of a suspected bacterial pneumonia as opposed to COVID-19.  Monitor closely.  2.  Patient currently receiving azithromycin in the emergency department.  Per report, patient is a resident of a local nursing home and for that reason I am going to broaden antibiotic coverage at this time (will start cefepime).  3.  Check MRSA PCR  4.  Patient did test positive for COVID-19 approximately 7 days ago per report.  He was administered 10 mg of Decadron in the emergency department; will continue dexamethasone 6 mg daily in the interim  5.  Supplemental oxygen; started on Vapotherm in the emergency department currently with FiO2 of 50%  6.  It sounds like patient has had some recent symptoms of nausea and vomiting.  Monitor closely.  Zofran as needed.  Start clear liquids only at this time.  7.  He does have a history of chronic diastolic heart failure per records; currently appears on the dry side and I am good administered 500 mL of intravenous fluids and then reassess.  Results of proBNP and troponin noted.  Patient denies having any chest pain or chest pressure.  8.  Patient is on Xarelto as an outpatient for chronic anticoagulation; will continue  9.  Albuterol MDI  10.  Incentive spirometry and flutter valve  11.  Check MRSA PCR.  Check strep pneumonia and urine Legionella antigens.  Check respiratory culture.  12.  Urinalysis pending- records from a recent phone conversation with Dr. William's office reviewed indicative of some possible blood-tinged urine.  Patient is on anticoagulation with Xarelto.  13.  Work-up ongoing            Result Review    Result Review:  I have personally reviewed the results from the time of this admission  "to 7/7/2022 13:27 CDT and agree with these findings:  []  Laboratory  []  Microbiology  []  Radiology  []  EKG/Telemetry   []  Cardiology/Vascular   []  Pathology  []  Old records  []  Other:      Condition on Discharge:    Stable and improved    Physical Exam on Discharge:  /65 (BP Location: Left arm, Patient Position: Lying)   Pulse 88   Temp 97.5 °F (36.4 °C) (Axillary)   Resp 16   Ht 180.3 cm (71\")   Wt 67.6 kg (149 lb)   SpO2 91%   BMI 20.78 kg/m²   Physical Exam     Constitutional:       General: He is not in acute distress.     Appearance: He is ill-appearing chronically.      Comments: Frail and chronically ill appearing.  He is currently drinking coffee and complaining that the coffee tastes bad because it is cold.  HENT:      Head: Normocephalic.      Mouth/Throat:      Mouth: Mucous membranes are moist.   Eyes:      Pupils: Pupils are equal, round, and reactive to light.   Cardiovascular:      Rate and Rhythm: Normal rate.      Heart sounds: Murmur heard.   Pulmonary:      Effort: Pulmonary effort is normal.      Breath sounds: Rhonchi present which clear with cough. No wheezing.   Abdominal:      Palpations: Abdomen is soft.   Musculoskeletal:         General: No deformity.      Cervical back: Neck supple.   Skin:     General: Skin is warm.      Capillary Refill: Capillary refill takes less than 2 seconds.   Neurological:      General: No focal deficit present.      Mental Status: He is alert. He is disoriented.   Psychiatric:         Mood and Affect: Mood normal.     Discharge Disposition:  Skilled Nursing Facility (DC - External)    Discharge Medications:     Discharge Medications      New Medications      Instructions Start Date   cefdinir 300 MG capsule  Commonly known as: OMNICEF   300 mg, Oral, Daily      dexamethasone 6 MG tablet  Commonly known as: DECADRON   6 mg, Oral, Daily With Breakfast         Changes to Medications      Instructions Start Date   dutasteride 0.5 MG " capsule  Commonly known as: AVODART  What changed: See the new instructions.   TAKE 1 CAPSULE DAILY GENERIC FOR AVODART      furosemide 40 MG tablet  Commonly known as: LASIX  What changed: how much to take   20 mg, Oral, Daily         Continue These Medications      Instructions Start Date   acetaminophen 500 MG tablet  Commonly known as: TYLENOL   500 mg, Oral, Every 4 Hours PRN      bisacodyl 10 MG suppository  Commonly known as: DULCOLAX   10 mg, Rectal, Daily      carbonyl iron 45 MG tablet tablet  Commonly known as: FEOSOL   TAKE ONE TABLET DAILY      CO Q-10 PO   1 tablet, Oral, Daily      esomeprazole 40 MG capsule  Commonly known as: nexIUM   TAKE 1 CAPSULE DAILY BEFORE BREAKFAST GENERIC FOR NEXIUM      FISH OIL PO   1,000 mg, Oral, 2 Times Daily      ipratropium-albuterol 0.5-2.5 mg/3 ml nebulizer  Commonly known as: DUO-NEB   USE ONE VIAL IN NEBULIZER FOUR TIMES DAILY GENERIC FOR      midodrine 10 MG tablet  Commonly known as: PROAMATINE   TAKE ONE TABLET TWICE DAILY BEFORE A MEAL      MILK OF MAGNESIA PO   30 mL, Oral, As Needed      multivitamin tablet tablet  Commonly known as: THERAGRAN   1 tablet, Oral, Daily      polyethylene glycol packet  Commonly known as: MIRALAX   17 g, Oral, Daily      SUPER B COMPLEX/VITAMIN C PO   1 capsule, Oral, Daily      tamsulosin 0.4 MG capsule 24 hr capsule  Commonly known as: FLOMAX   TAKE 1 CAPSULE DAILY SHORTLY AFTER THE SAME MEAL GENERIC FOR FLOMAX      VITAMIN D3 PO   2,000 Int'l Units, Oral, Daily         Stop These Medications    torsemide 20 MG tablet  Commonly known as: DEMADEX     Xarelto 15 MG tablet  Generic drug: rivaroxaban            Discharge Diet:   Diet Instructions     Diet: Regular; Thin      Discharge Diet: Regular    Fluid Consistency: Thin    Encourage oral fluid intake          Discharge Care Plan / Instructions:   Discharge back to skilled nursing facility to continue oral antibiotics and a short course of dexamethasone  Given the patient's  advanced age of 89 and his significant renal disease with a creatinine clearance of 15.7, reevaluation for possible cessation of anticoagulation would be appropriate.  I have taken the liberty of changing him to a shorter acting anticoagulant.    Activity at Discharge:   Activity Instructions     Activity as Tolerated             Electronically signed by Martin Wade DO, 07/07/22, 13:27 CDT.    Time: Discharge Over 30 min    Part of this note may be an electronic transcription/translation of spoken language to printed text using the Dragon Dictation system.

## 2022-07-07 NOTE — CASE MANAGEMENT/SOCIAL WORK
Continued Stay Note   Jessica     Patient Name: James Birch  MRN: [id number]  Today's Date: 7/7/2022    Admit Date: 7/6/2022     Discharge Plan     Row Name 07/07/22 0928       Plan    Plan Comments VM to dirk Braun for DC planning screen requesting return call               Discharge Codes    No documentation.                     Lynnette Forrest RN

## 2022-07-07 NOTE — PROGRESS NOTES
Patient transported to [facility] on non rebreather 15 lpm. SpO2 98%. No issues during transport. Patient returned to [facility] 35L 50%.

## 2022-07-20 NOTE — TELEPHONE ENCOUNTER
Caller: ANGELA     Relationship: NURSE [facility]     Best call back number: [phone]    What is the best time to reach you: ANYTIME     Who are you requesting to speak with (clinical staff, provider,  specific staff member): CLINICAL     Do you know the name of the person who called:CLINICAL     What was the call regarding:  FAMILY HAS DECIDED THEY WANT HIM TO HAVE PALLIATIVE CARE NOT TO BE HOSPITALIZED DECLINED HOSPICE   AND HIS CONDITION IS DECLINING AND THE ONLY THING THEY HAVE TO GIVE HIM FOR PAIN IS TYLENOL   Do you require a callback: YES

## 2022-07-20 NOTE — TELEPHONE ENCOUNTER
"Spoke to Maria Anton[facility] \"we found him non responsive at 1:20p, no pulse, son had not signed the DNR yet and we coded him for about 10 minutes and sent to [facility]\"    Advised will inform Dr William and MARTIR THOMAS  "

## 2022-07-25 NOTE — PROGRESS NOTES
Progress Note      Jose Hairston, APRN  [address] [address]  Phone: [phone]  Fax: [phone]      PATIENT NAME: James Birch                                                                          YOB: 1928            DATE OF SERVICE: 07/20/2022  FACILITY: [facility]     CHIEF COMPLAINT:  Regular nursing facility visit      HISTORY OF PRESENT ILLNESS:   This 89 y.o. male was seen at the nursing facility today for routine rounding review of acute and chronic problems.      This patient was admitted to the skilled nursing facility on 7/7/2022.  He transferred in from an assisted living situation.  Facility reports worsened speech and coughs with eating.  They report they have talked to the family and they are wanting a palliative approach but no hospice at this point.  At the time of this visit he is still full code.  He has hyperlipidemia, CHF, chronic kidney disease-severe stage IV.  The facility reports a new skin tear to the right elbow during lunch.    PAST MEDICAL & SURGICAL HISTORY:   Past Medical History:   Diagnosis Date   • Arthritis    • Asthma    • Atrial fibrillation (HCC)    • Cancer (HCC)     SKIN   • CHF (congestive heart failure) (HCC)    • Conductive hearing loss    • COPD (chronic obstructive pulmonary disease) ([facility])    • Eustachian tube dysfunction    • GERD (gastroesophageal reflux disease)    • Hyperlipidemia    • Hypertension    • Obstructive sleep apnea    • Prostate disease    • Sensorineural hearing loss    • Stroke (HCC)    • Vertigo       Past Surgical History:   Procedure Laterality Date   • CATARACT EXTRACTION      left   • CATARACT EXTRACTION      left   • COLONOSCOPY  09/05/2012    normal   • ENDOSCOPY  08/31/2015    normal   • EYE SURGERY     • FRACTURE SURGERY     • HERNIA REPAIR     • HIP SURGERY     • SKIN BIOPSY          MEDICATIONS:  Full medication list from facility reviewed this visit.    ALLERGIES:  No  Known Allergies    SOCIAL HISTORY:  Social History     Socioeconomic History   • Marital status:    • Number of children: 3   Tobacco Use   • Smoking status: Former Smoker     Packs/day: 1.00     Years: 18.00     Pack years: 18.00     Types: Cigarettes     Quit date:      Years since quittin.6   • Smokeless tobacco: Never Used   • Tobacco comment: already quit   Substance and Sexual Activity   • Alcohol use: No   • Drug use: No   • Sexual activity: Defer     Partners: Female     FAMILY HISTORY:  Family History   Problem Relation Age of Onset   • No Known Problems Mother    • No Known Problems Father      REVIEW OF SYSTEMS:  Review of Systems   Unable to perform ROS: Acuity of condition     PHYSICAL EXAMINATION:   VITAL SIGNS: Temperature 97.2 pulse 98 respiratory rate 18 blood pressure 120/68  Physical Exam  Constitutional:       General: He is not in acute distress.     Comments: Oriented to person-slightly agitated-not talkative   Cardiovascular:      Rate and Rhythm: Normal rate and regular rhythm.      Pulses: Normal pulses.      Heart sounds: No murmur heard.    No friction rub. No gallop.   Pulmonary:      Effort: Pulmonary effort is normal. No respiratory distress.      Breath sounds: Normal breath sounds. No wheezing or rhonchi.   Skin:     General: Skin is warm and dry.      Comments: Skin tear right forearm/elbow   Neurological:      Motor: Weakness (*General weakness-poor core strength) present.       RECORDS REVIEW:   I have reviewed and interpreted any labs, xrays, and diagnostic tests available today.    ASSESSMENT   Diagnoses and all orders for this visit:    1. Nursing home resident (Primary)    2. Skin tear of right elbow without complication, initial encounter    3. Debility    4. Dysphagia, unspecified type    5. CHF (congestive heart failure), NYHA class III, chronic, diastolic (HCC)    6. Chronic kidney disease (CKD), stage IV (severe) (HCC)    7. Mixed hyperlipidemia    PLAN  1.   Discussed Patient in detail with nursing/staff, addressed all needs today.     2.  Plan of Care Reviewed   3.  PT/OT/SLP Reviewed  4.  Order Changes  -Discontinue MiraLAX-due to it not being able to be thickened-may use milk of magnesia as needed as already ordered-SLP to eval further  -Discontinue fish oil multivitamin co-Q10 and super 8 vitamin  5.  Discharge Plans Reviewed - No immediate plan to discharge.  6.  Advance Directive on file with nursing facility.   7.  POA on file with nursing facility.   8.  Code Status on facility chart reviewed during encounter.  9.  Review labs, xrays, diagnostics in realtime as office receives results.    Note e-Signed by WILLIAM Bravo on 2022 at 07:40 CDT    Addendum: Notified approximately 25 minutes after visit that patient coded and went to the ER-subsequently notified that he .    Electronically signed by WILLIAM Bravo, 22, 7:44 AM CDT.    [id number]

## 2025-06-28 NOTE — H&P
"Patient ID: James Birch  MRN: [id number]     Acct:  [id number]  Admit Date: 10/11/2018   Date of service: 10/12/2018    SOURCE: The source of this information is prior knowledge of the patient, review of his office records, his current chart, as well as discussion with he and his wife; all are considered reliable.      PATIENT PROFILE: The patient is a 89 y/o white  male resident of [facility]/assisted living; he was cooperative.      CHIEF COMPLAINT: \"drop renal function\"     HISTORY OF PRESENT ILLNESS: Weeks/days of nausea, nausea with vomiting on/off.  Outpt GI apt several days away.  Has CKD with usual GFRs 30s-40s; drop this week to 28.  Along with this increased weakness; fall recent with major skin tear LUE, fracture L radius ([facility] has splinted), and fortunately with xarelto; nothing else.  With uncertain why vomiting, risk for further renal decline and weakness/fall risk admitted for review GI sooner, nephrology review, fluids, PT.     No Known Allergies    HOME MEDICATIONS:  Prior to Admission medications    Medication Sig Start Date End Date Taking? Authorizing Provider   azelastine (OPTIVAR) 0.05 % ophthalmic solution INSTILL 1 DROP INTO BOTH EYES TWICE DAILY GENERIC FOR OPTIVAR 4/25/18   Hemal William MD   B Complex-C (SUPER B COMPLEX/VITAMIN C PO) Take  by mouth daily.    Maryuri Coburn MD   Calcium Polycarbophil (FIBER-CAPS PO) Take  by mouth 2 (Two) Times a Day.    Maryuri Coburn MD   Cholecalciferol (VITAMIN D3 PO) Take 2,000 Int'l Units by mouth Daily.    Maryuri Coburn MD   Coenzyme Q10 (CO Q-10 PO) Take  by mouth daily.    Maryuri Coburn MD   diltiaZEM CD (CARDIZEM CD) 120 MG 24 hr capsule Take 1 capsule by mouth Daily. 6/22/18   Hemal William MD   dutasteride (AVODART) 0.5 MG capsule TAKE 1 CAPSULE DAILY GENERIC FOR AVODART 4/6/18   Hemal William MD   esomeprazole (NEXIUM) 40 MG capsule Take 1 capsule by mouth Every Morning " Before Breakfast. 5/17/18   Hemal William MD   furosemide (LASIX) 40 MG tablet Take 40 mg by mouth Daily. May take one half tablet additionally prn swelling    Maryuri Coburn MD   guaiFENesin (MUCINEX) 600 MG 12 hr tablet Take 600 mg by mouth Every 12 (Twelve) Hours.    Maryuri Coburn MD   HYDROcodone-acetaminophen (NORCO) 5-325 MG per tablet Take 1 tablet by mouth Every 6 (Six) Hours As Needed for Moderate Pain . 10/2/18   Hemal William MD   levalbuterol (XOPENEX HFA) 45 MCG/ACT inhaler Inhale 1-2 puffs Every 4 (Four) Hours As Needed for Wheezing. 5/27/18   Hemal William MD   Multiple Vitamins-Minerals (MULTIVITAMIN PO) Take 1 tablet by mouth daily.    Maryuri Coburn MD   O2 (OXYGEN) Inhale 1 L/min Every Night. As needed at bedtime.     Maryuri Coburn MD   Omega-3 Fatty Acids (FISH OIL PO) Take 1,000 mg by mouth 2 (Two) Times a Day.    Maryuri Coburn MD   ondansetron (ZOFRAN) 4 MG tablet Take 1 tablet by mouth Every 8 (Eight) Hours As Needed for Nausea or Vomiting. 10/2/18   Hemal William MD   PROAIR  (90 Base) MCG/ACT inhaler Every 4 (Four) Hours As Needed for Shortness of Air. 5/30/18   Maryuri Coburn MD   ramipril (ALTACE) 2.5 MG capsule TAKE 1 CAPSULE AT BEDTIME GENERIC FOR ALTACE 9/26/18   Hemal William MD   SYMBICORT 80-4.5 MCG/ACT inhaler Inhale 2 puffs 2 (Two) Times a Day. 3/6/17   Maryuri Coburn MD   tamsulosin (FLOMAX) 0.4 MG capsule 24 hr capsule TAKE 1 CAPSULE DAILY SHORTLY AFTER THE SAME MEAL GENERIC FOR FLOMAX 8/16/18   Hemal William MD   traMADol (ULTRAM) 50 MG tablet Take 1 tablet by mouth Every 8 (Eight) Hours As Needed for Moderate Pain . 10/1/18   Hemal William MD   XARELTO 15 MG tablet TAKE ONE TABLET DAILY 2/8/18   Skyler Trimble MD       PAST HISTORY:  CHILDHOOD: unremarkable.     PAST HISTORY:  CHILDHOOD: unremarkable.      PROCEDURES:     SCANNED  Prostate  exam/urology/confirmed/8.22.16     EGD+biop/[facility]/10-2-02  Colonscopy/Mary/02-20-02  EGD+PEG-candida esophagitis/[facility]/[place]/3-25-10  Colonoscopy+nl/[facility]/[place]/9.5.12/5y  EGD+biop/[facility]/[place]ick/8.31.15     SURGERIES:  R Hip/1994  Renan Inguinal Hernia/2001  L ear/Misbah  L ear/Misbah  L ear/Misbah/9.30.11  Tkach/L lower eyelid excision/LH/7.8.16        FAMILY HISTORY:  Heart/none  DM/none  CA-prostate/none  CA-colon/none  CA-eye/b  CA-testicle/b     HABITS:  Tobacco-smoker/age 18/<1 ppd/dc 1965  Alcohol/none  Drugs/none     SOCIAL HISTORY:  /1949  Retired/[employer]/1993  Children/3     HOSPITAL ADMITS:   BH:   HOSPITAL NOTES:  1.29.10-2.12.10 vertigo?CVA  2.12.10-4.10.10-TCU  cerebellar CVA-probably of atrial fib  vertigo  nausea with vomiting-positional  nausea -central  nausea with vomiting-central  gastroenteritis (N&V, diarrhea)  hematemesis  anemia  diarrhea  nutritional risk/decline  A fib-flutter  ataxia  gait issues  IgA nephropathy  coumadin therapy- afib indication  hypercoagulation  hypermagnesiuma  syncope- vasovagal +/- orthostasis  hypotension  late affects CVA, (balance, gait, vertigo, nausea)  pseudomonas bacteremia- lung/PIC line  LLL pneumonia 3.21.10  COPD  esophagitis-candida     HOSPITAL NOTES:  3.29.11-4.1.11 R hand burn  R hand burn-2nd degree  seizure-possible  CVA-cerebellar/embolic-1.29.10  late affects CVA  atrial fib-chronic  coumadin therapy  excessive INR 4.47  chronic kidney disease-3     HOSPITAL NOTES: [facility]  3.28.13-4.2.13   fever-assume from respiratory source  nausea with vomiting  abnormal xray-STELLA  STELLA pneumonia-community acquired  copd excerbation  copd  long term anticoagulation-coumadin (a fib, CVA)  elevated/low INR 3.21-1.65  CKD-3 (IgA nephropathy)  abnormal urine (mod blood, protein)  bacturia (3.25.13 10-25K E coli)  stomatitis     HOSPITAL NOTES: [facility]  9.16.13   weakness  leg weakness..    5.25.18-5.27.18  Rapid a fib  Paroxysmal  SVT  tachycardia  Bradycardia  Palpitations  Dizziness  Autonomic dysfunction  Recent R pontine lesion  Small vessel ischemic cerebral disease  Acute renal insufficiency (on CKD)  Hyperlipidemia-resumption of statin     [facility]:   12.4.91-12.14.91-pelvic fx  1.28.10-1.29.10-vertigo     [facility]:   None    GENERAL:  Inactive/slower with limits, speed, stamina for age and fatigue; no sure of his walk. Sleep is ok. No fever.  ENDO:  No syncope, near or diaphoretic sweaty spells.  BS Ok here.  HEENT: No head injury occ/same headache,  No vision change.  Same aide treated hearing loss.  Ears without pain/drainage.  No sore throat.  No significant nasal/sinus congestion/drainage. No epistaxis.  CHEST: No chest wall tenderness or mass. No change occ cough, wheeze, SOB; no hemoptysis.  CV: No chest pain, palpitations, ankle edema.  GI: No heartburn, dysphagia.  No abdominal pain, diarrhea,  rectal bleeding, or melena; no BM several days.  On/off nausea and vomiting even before Norco.   :  Voids without dysuria, or incontinence to completion.  ORTHO: No painful/swollen joints but various on /off sore.  No change mild sore neck or back.  No acute neck or back pain without recent injury.   NEURO: No dizziness, weakness of extremities.  No change LE numbness/paresthesias.   PSYCH: Mild/stable memory loss.  Mood good; occ mildly anxious, depressed but/and not suicidal.  Tolerated stress.     PHYSICAL EXAMINATION:  BP 97/67 (BP Location: Right arm, Patient Position: Lying)   Pulse 68   Temp 98.4 °F (36.9 °C) (Temporal Artery )   Resp 16   SpO2 94%     GENERAL:  Well nourished/developed in no acute distress. Thin  SKIN: Turgor excellent, without wound, rash, lesion other than scabbing/skin tear LUE/elbow region.   HEENT: Normal cephalic without trauma.  Pupils equal round reactive to light. Extraocular motions full without nystagmus.   .  Oral cavity without growths, exudates, and moist.  Posterior pharynx without  mass, obstruction, redness.  No thyromegaly, mass, tenderness, lymphadenopathy and supple.  CV: Regular rhythm.  No murmur, gallop,  edema. Posterior pulses intact.  No carotid bruits.   CHEST: Mild chest wall tenderness without mass.   LUNGS: Symmetric motion with clear to auscultation.   ABD: Soft, nontender without mass.   ORTHO: Symmetric extremities without swelling/point tenderness.  Full gross range of motion.    NEURO: CN 2-12 grossly intact.  Symmetric facies. 1/4 x bicep equal reflexes.  UE/LE   2-3/5 strength throughout.  Nonfocal use extremities. Speech clear.    PSYCH: Oriented x 3.  Pleasant calm, well kept.  Purposeful/directed conservation with intact short/long gross memory.     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  Cerebral small vessel disease  Late effects CVA  Recurrent dizziness  History seizure disorder  Peripheral neuropathy  Gait difficulties  Fall risk-history falls  Prostatism  BPH  History urolithiasis  Varicose veins  GE reflux (heartburn)  History cholinergic urticaria  Hearing loss-conductive  Degenerative joint disease     REASON FOR ADMISSION:    Acute renal injury (with CKD) GFR 28 usually 30s  Dehydration (b/cr 18.3)  Nausea  Nausea/vomiting  Gait decline-acute  Recent fall  Risk falls  Recent UE skin tear  Recent distal radius fracture    PLANS:   Rx-reviewed; ordered as needed and will review daily/as needed.   Includes anticoagulation for DVT prevention; his xarelto   LAB-reviewed; ordered as needed and will  review daily.  Particular:  Daily cbc, bmp  Imaging-reviewed; ordered as needed and will review daily.  Particular:  Per others  Consults nephrology, gi  Diet: as tolerated  Fluids: maintance  Special issues: PT; gait  DC planning: he/wife expects home  Code status: full   135